# Patient Record
Sex: FEMALE | Race: BLACK OR AFRICAN AMERICAN | NOT HISPANIC OR LATINO | Employment: FULL TIME | URBAN - METROPOLITAN AREA
[De-identification: names, ages, dates, MRNs, and addresses within clinical notes are randomized per-mention and may not be internally consistent; named-entity substitution may affect disease eponyms.]

---

## 2017-01-09 ENCOUNTER — HOSPITAL ENCOUNTER (OUTPATIENT)
Dept: RADIOLOGY | Facility: HOSPITAL | Age: 62
Discharge: HOME | End: 2017-01-09
Attending: UROLOGY
Payer: COMMERCIAL

## 2017-01-09 ENCOUNTER — HOSPITAL ENCOUNTER (OUTPATIENT)
Dept: UROLOGY | Facility: HOSPITAL | Age: 62
Discharge: HOME | End: 2017-01-09
Attending: UROLOGY
Payer: COMMERCIAL

## 2017-01-09 VITALS
HEART RATE: 93 BPM | WEIGHT: 188.25 LBS | BODY MASS INDEX: 30.25 KG/M2 | TEMPERATURE: 98 F | DIASTOLIC BLOOD PRESSURE: 84 MMHG | SYSTOLIC BLOOD PRESSURE: 135 MMHG | HEIGHT: 66 IN | RESPIRATION RATE: 18 BRPM

## 2017-01-09 DIAGNOSIS — R35.0 FREQUENCY OF MICTURITION: ICD-10-CM

## 2017-01-09 PROCEDURE — 87088 URINE BACTERIA CULTURE: CPT

## 2017-01-09 PROCEDURE — 52000 CYSTOURETHROSCOPY: CPT | Mod: ,,, | Performed by: UROLOGY

## 2017-01-09 PROCEDURE — 52000 CYSTOURETHROSCOPY: CPT

## 2017-01-09 PROCEDURE — 76770 US EXAM ABDO BACK WALL COMP: CPT | Mod: TC

## 2017-01-09 PROCEDURE — 25000003 PHARM REV CODE 250: Performed by: UROLOGY

## 2017-01-09 PROCEDURE — 87086 URINE CULTURE/COLONY COUNT: CPT

## 2017-01-09 PROCEDURE — 76770 US EXAM ABDO BACK WALL COMP: CPT | Mod: 26,,, | Performed by: INTERNAL MEDICINE

## 2017-01-09 RX ORDER — CIPROFLOXACIN 500 MG/1
500 TABLET ORAL ONCE
Status: COMPLETED | OUTPATIENT
Start: 2017-01-09 | End: 2017-01-09

## 2017-01-09 RX ORDER — LIDOCAINE HYDROCHLORIDE 20 MG/ML
JELLY TOPICAL ONCE
Status: COMPLETED | OUTPATIENT
Start: 2017-01-09 | End: 2017-01-09

## 2017-01-09 RX ADMIN — CIPROFLOXACIN HYDROCHLORIDE 500 MG: 750 TABLET, FILM COATED ORAL at 01:01

## 2017-01-09 RX ADMIN — LIDOCAINE HYDROCHLORIDE: 20 JELLY TOPICAL at 12:01

## 2017-01-09 NOTE — IP AVS SNAPSHOT
[address]  [place] [address]  Phone: [phone]           I have received a copy of my After Visit Summary and discharge instructions from [facility].    INSTRUCTIONS RECEIVED AND UNDERSTOOD BY:                     Patient/Patient Representative: ________________________________________________________________     Date/Time: ________________________________________________________________                     Instructions Given By: ________________________________________________________________     Date/Time: ________________________________________________________________

## 2017-01-09 NOTE — OP NOTE
DATE OF PROCEDURE:  01/09/2017    PREOPERATIVE DIAGNOSIS:  Urinary frequency and nocturia.    POSTOPERATIVE DIAGNOSES:  Urinary frequency and nocturia.    OPERATION PERFORMED:  Flexible cystoscopy.    BLOOD LOSS:  None.    PROCEDURE IN DETAIL:  The patient was prepped and draped in dorsal supine   position.  I was unable to review her renal ultrasound except for one slot which   demonstrated a renal cyst that appeared benign.  I will await the final   radiologic interpretation.  The patient was prepped and draped in dorsal supine   position.  Xylocaine jelly was instilled per urethra.  The urethra was normal.    Bladder neck was normal.  Both ureteral orifices were normal size, shape, and   position.  There were no stones, tumors, foreign bodies, or active infections   noted.  Urine was cloudy and on dipstick was negative, however, was strong   smelling, therefore a urine culture will be sent.  The patient tolerated the   procedure well.  We will contact her with her culture results.      ALEXEI/IN  dd: 01/09/2017 13:19:48 (CST)  td: 01/09/2017 17:29:23 (CST)  Doc ID   #[id number]  Job ID #[id number]    CC:

## 2017-01-09 NOTE — PATIENT INSTRUCTIONS
What to Expect After a Cystoscopy  For the next 24-48 hours, you may feel a mild burning when you urinate. This burning is normal and expected. Drink plenty of water to dilute the urine to help relieve the burning sensation. You may also see a small amount of blood in your urine after the procedure.    Unless you are already taking antibiotics, you may be given an antibiotic after the test to prevent infection.    Signs and Symptoms to Report  Call the [facility] at [phone] if you develop any of the following:  · Fever of 101 degrees or higher  · Chills or persistent bleeding  · Inability to urinate

## 2017-01-10 LAB — BACTERIA UR CULT: NORMAL

## 2017-01-30 ENCOUNTER — OFFICE VISIT (OUTPATIENT)
Dept: FAMILY MEDICINE | Facility: CLINIC | Age: 62
End: 2017-01-30
Payer: COMMERCIAL

## 2017-01-30 VITALS
DIASTOLIC BLOOD PRESSURE: 81 MMHG | BODY MASS INDEX: 30.72 KG/M2 | HEART RATE: 126 BPM | WEIGHT: 191.13 LBS | SYSTOLIC BLOOD PRESSURE: 115 MMHG | HEIGHT: 66 IN | OXYGEN SATURATION: 93 %

## 2017-01-30 DIAGNOSIS — R73.01 IFG (IMPAIRED FASTING GLUCOSE): ICD-10-CM

## 2017-01-30 DIAGNOSIS — R35.89 POLYURIA: ICD-10-CM

## 2017-01-30 DIAGNOSIS — Z00.00 ROUTINE GENERAL MEDICAL EXAMINATION AT A HEALTH CARE FACILITY: Primary | ICD-10-CM

## 2017-01-30 PROCEDURE — 90686 IIV4 VACC NO PRSV 0.5 ML IM: CPT | Mod: S$GLB,,, | Performed by: FAMILY MEDICINE

## 2017-01-30 PROCEDURE — 90472 IMMUNIZATION ADMIN EACH ADD: CPT | Mod: S$GLB,,, | Performed by: FAMILY MEDICINE

## 2017-01-30 PROCEDURE — 99999 PR PBB SHADOW E&M-EST. PATIENT-LVL III: CPT | Mod: PBBFAC,,, | Performed by: FAMILY MEDICINE

## 2017-01-30 PROCEDURE — 90715 TDAP VACCINE 7 YRS/> IM: CPT | Mod: S$GLB,,, | Performed by: FAMILY MEDICINE

## 2017-01-30 PROCEDURE — 99396 PREV VISIT EST AGE 40-64: CPT | Mod: 25,S$GLB,, | Performed by: FAMILY MEDICINE

## 2017-01-30 PROCEDURE — 90471 IMMUNIZATION ADMIN: CPT | Mod: S$GLB,,, | Performed by: FAMILY MEDICINE

## 2017-01-30 NOTE — PROGRESS NOTES
(Portions of this note were dictated using voice recognition software and may contain dictation related errors in spelling/grammar/syntax not found on text review)    CC:   Chief Complaint   Patient presents with    Annual Exam       HPI: 61 y.o. female here for annual exam.  No current prescription medications.  She takes calcium  over-the-counter.  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  She states that she has upcoming appointment this next week with her specialist.  Does have polyuria for which she is seeing urology.  Normal cystoscopy.  She had a hemoglobin A1c that was 6.0.  She has had past glucose as below, unclear fasting status, that was elevated.  Needs updated blood work.  Does admit that she has tried to get better with exercise and healthy diet once learning about her elevated sugar        Past Medical History   Diagnosis Date    Anxiety     Chronic low back pain     Gastritis     GERD (gastroesophageal reflux disease)     History of cervical cancer     Soft tissue tumor, malignant      sarcoma left foot       Past Surgical History   Procedure Laterality Date    Breast surgery  1984     lump    Total vaginal hysterectomy  1996    Tubal ligation  1994    Foot surgery  2009 or 2010    Back surgery         Family History   Problem Relation Age of Onset    Breast cancer Sister      dx in 2006    No Known Problems Mother     Throat cancer Father     Colon cancer Neg Hx     Diabetes Neg Hx     Hypertension Neg Hx     Ovarian cancer Neg Hx     Stroke Neg Hx        Social History     Social History    Marital status:      Spouse name: N/A    Number of children: N/A    Years of education: N/A     Occupational History    Not on file.     Social History Main Topics    Smoking status: Former Smoker     Types: Cigarettes     Quit date: 8/15/1993    Smokeless tobacco: Never Used    Alcohol use 0.6 oz/week     1 Glasses of wine per week       Comment: seldom    Drug use: No    Sexual activity: Yes     Partners: Male     Other Topics Concern    Not on file     Social History Narrative     SCREENINGS  Colonoscopy 2010. Normal, repeat in 10 years    GYN: Dr. Christianson , last visit in November 2016 with Pap smear    Mammogram dec. 2016    Immunizations    flu today  Tetanus today    Lab Results   Component Value Date    WBC 6.18 06/19/2012    HGB 12.6 06/19/2012    HCT 39.4 06/19/2012     06/19/2012    ALT 16 03/18/2010    AST 27 03/18/2010     03/19/2010    K 4.0 03/19/2010     03/19/2010    CREATININE 0.7 03/19/2010    BUN 5 (L) 03/19/2010    CO2 23 03/19/2010    INR 1.0 09/29/2008    HGBA1C 6.0 12/07/2016     (H) 03/19/2010       ROS:  GENERAL: No fever, chills, fatigability or weight loss.  SKIN: No rashes, no itching.  HEAD: No headaches.  EYES: No visual changes  EARS: No ear pain or changes in hearing.  NOSE: No congestion or rhinorrhea.  MOUTH & THROAT: No hoarseness, change in voice, or sore throat.  NODES: Denies swollen glands.  CHEST: Denies URBINA, cyanosis, wheezing, cough and sputum production.  CARDIOVASCULAR: Denies chest pain, PND, orthopnea.  ABDOMEN: No nausea, vomiting, or changes in bowel function.  URINARY: Polyuria  PERIPHERAL VASCULAR: No claudication or cyanosis.  MUSCULOSKELETAL: No joint stiffness or swelling. Denies back pain.  NEUROLOGIC: No weakness or numbness.    Vital signs reviewed  PE:   APPEARANCE: Well nourished, well developed, in no acute distress.    HEAD: Normocephalic, atraumatic.  EYES: PERRL. EOMI.   Conjunctivae noninjected.  EARS: TM's intact. Light reflex normal. No retraction or perforation  NOSE: Mucosa pink. Airway clear.  MOUTH & THROAT: No tonsillar enlargement. No pharyngeal erythema or exudate.   NECK: Supple with no cervical lymphadenopathy.  No carotid bruits.  No thyromegaly  CHEST: Good inspiratory effort. Lungs clear to auscultation with no wheezes or crackles.  CARDIOVASCULAR:  Normal S1, S2. No rubs, murmurs, or gallops.  ABDOMEN: Bowel sounds normal. Not distended. Soft. No tenderness or masses. No organomegaly.  EXTREMITIES: No edema, cyanosis, or clubbing.      IMPRESSION  1. Routine general medical examination at a health care facility    2. Polyuria    3. IFG (impaired fasting glucose)            PLAN  Orders Placed This Encounter   Procedures    Influenza - Quadrivalent (3 years & older) (PF)    Tdap Vaccine (Adult)    CBC auto differential    Comprehensive metabolic panel    Hemoglobin A1c    Lipid panel    TSH     Labs above    Continue working on exercise, healthy diet, weight loss    Tdap and flu shot today

## 2017-01-30 NOTE — PATIENT INSTRUCTIONS
Viral Upper respiratory tract infections    Most upper respiratory infections are in fact caused by viruses.  Unfortunately as there is no treatment to eradicate these viruses, treatment for upper respiratory infections relies on control of symptoms.      SORE THROAT:  You may take throat lozenges such as Halls cough drops for sore throat pain.  Also, gargling with warm salt water may help with pain symptoms also.  Chloraseptic Spray is an over-the-counter anesthetic spray that may provide relief for sore throat pain. Over-the-counter pain relievers such as Tylenol (acetaminophen), Motrin/Advil  (ibuprofen), or Aleve (naproxen) may also provide relief for sore throat pain. These medications will also help for body aches and/or fever.      NASAL CONGESTION  You may try an oral decongestant such as pseudoephedrine ( brand-name Sudafed).  Note that this medication is available only behind the pharmacy counter.  There are some over-the-counter decongestants with a medicine called phenylephrine but these may not be as effective.  There are some antihistamine/decongestant combination medications (for example, Claritin-D, Zyrtec-D, Allegra-D).  Since these medications already have pseudoephedrine in them, you may take these in place of plain Sudafed.  These may be effective additionally if there is early element of allergy symptoms contributing to your nasal congestion. Although there is a caution  about using decongestants in individuals with hypertension, if you are stable on blood pressure medications and her blood pressure is well-controlled, temporary use of these medications should be okay just as long as you continue to monitor your blood pressures.  If your blood pressure is not controlled on medication therapy, you may need to avoid these pseudoephedrine-containing medications and try antihistamines alone such as plain Claritin, Zyrtec, Allegra.     You may also use a device called a Neti Pot to help with nasal  congestion.  This is an effective treatment for helping clear out nasal congestion and drainage.  A Neti pot is a device in which a saltwater solution is flushed through the nasal passages to help clear out any congestion and help relieve swelling.  You may buy this at any pharmacy.   It is safe to take even if you  have uncontrolled hypertension or are taking certain medications and cannot take other therapies as listed above. However, it is important that she is sterile or distilled water to mix with the solution given to avoid any chance of contamination during the flushing process.    Cough  Cough is a common symptom with most upper respiratory tract infections. Many times this can occur late in the process of a cold and may be the last symptom to resolve.  This is usually a typical pattern for the upper respiratory virus, and usually not a sign that you have a chest infection.  You may have some coughing with mucus.  While no treatments are proven to be absolutely beneficial for cough, some available therapies include guaifenesin with dextromethorphan ( Mucinex DM, Robitussin-DM). This may help to settle the cough and to thin out any mucus associated with the cough.  It is important to stay well hydrated to avoid having the mucus become very thick and difficult to cough up. Many times the cough aspect of an upper respiratory infection may take up to 2 weeks to completely clear.       Most viral upper respiratory infections may take up to 7-10 days to clear.  Many people get better before this time, but some may take the full 7-10 days to completely get better.        Signs that may indicate something more than an upper respiratory viral infection may include:    - Persistent high fever above 100.4 ( note that early low-grade fever may even occur with a viral upper respiratory infection but usually gets better within a few days)    - While discolored mucus such as yellow or green drainage from the nose is not a  sign by itself of a bacterial infection, if you find that symptoms are lasting longer than 7-10 days and nasal drainage is becoming progressively more thick and discolored, this may be indicative of a bacterial sinus infection    - If overall your congestion and headache symptoms get worse beyond the 7-10 day time frame    - If your symptoms started to improve by the  7-10 day time frame but then suddenly got worse      Nutrition: How to Make Healthier Food Choices     Nutrition: How to Make Healthier Food Choices     Why is healthy eating important?  When combined with exercise, a healthy diet can help you lose weight, lower your cholesterol level and improve the way your body functions on a daily basis.  The [place] Department of Agricultures (USDA) Food Guide Pyramid divides food into 6 basic food groups, consisting of 1) grains, 2) fruits, 3) vegetables, 4) meats and beans, 5) dairy and 6) fats.  The USDA recommends an adult daily diet include the following:  3 ounces of whole grains, and 6 ounces of grains total   2 cups of fruit   2 1/2 cups of vegetables   3 cups fat-free or low-fat dairy   The following are some ways to make healthier food choices and to get the recommended amounts of whole grains, fruits, vegetables and dairy.    Grains  Whole-grain breads are low in fat; they're also high in fiber and complex carbohydrates, which help you feel shields longer and prevent overeating. Choose breads whose first ingredient says whole in front of the grain, for example, whole wheat flour or whole white flour; enriched or other types of flour have the important fiber and nutrients removed. Choose whole grain breads for sandwiches and as additions to meals.  Avoid rich bakery foods such as donuts, sweet rolls and muffins. These foods can contain more than 50% fat calories. Snacks such as agrrett food cake and gingersnap cookies can satisfy your sweet tooth without adding fat to your diet.  Hot and cold cereals  are usually low in fat. But instant cereals with cream may contain high-fat oils or butterfat. Granola cereals may also contain high-fat oils and extra sugars. Look for low-sugar options for both instant and granola cereals.  Avoid fried snacks such as potato chips and tortilla chips. Try the low-fat or baked versions instead.  Instead of this: Try this:   Croissants, biscuits, white breads and rolls Low-fat whole grain breads and rolls (wheat, rye and pumpernickel)   Doughnuts, pastries and scones English muffins and small whole grain bagels   Fried tortillas Soft tortillas (corn or whole wheat)   Sugar cereals and regular granola Oatmeal, low-fat granola and whole-grain cereal   Snack crackers Crackers (animal, kiley, rye, soda, saltine, oyster)   Potato or corn chips and buttered popcorn Pretzels (unsalted) and popcorn (unbuttered)   White pasta Whole-wheat pasta   White rice Brown rice   Fried rice, or pasta and rice mixes that contain high-fat sauces Rice or pasta (without egg yolk) with vegetable sauces   All-purpose white flour 100% whole-wheat flour     Fruits and Vegetables  Fruits and vegetables are naturally low in fat. They add flavor and variety to your diet. They also contain fiber, vitamins and minerals.  Margarine, butter, mayonnaise and sour cream add fat to vegetables and fruits. Try using nonfat or low-fat versions of these foods. You can also use nonfat or low-fat yogurt or herbs as seasonings instead.  Instead of this: Try this:   Fried vegetables or vegetables served with cream, cheese or butter sauces All vegetables raw, steamed, broiled, baked or tossed with a very small amount of olive oil and salt and pepper   Coconut Fruit (fresh)   French fries, hash browns and potato   chips Baked white or sweet potatoes     Meat, Poultry and Fish  Beef, Pork, Veal and Lamb  Baking, broiling and roasting are the healthiest ways to prepare meat. Lean cuts can be pan-broiled or stir-fried. Use either a  "nonstick pan or nonstick spray coating instead of butter or margarine.  Trim outside fat before cooking. Trim any inside, separable fat before eating. Select low-fat, lean cuts of meat. Lean beef and veal cuts have the word "loin" or "round" in their names. Lean pork cuts have the word "loin" or "leg" in their names.  Use herbs, spices, fresh vegetables and nonfat marinades to season meat. Avoid high-fat sauces and gravies.  Poultry  Baking, broiling and roasting are the healthiest ways to prepare poultry. Skinless poultry can be pan-broiled or stir-fried. Use either a nonstick pan or nonstick spray coating instead of butter or margarine.  Remove skin and visible fat before cooking. Chicken breasts are a good choice because they are low in fat and high in protein. Use domestic goose and duck only once in a while because both are high in fat.  Fish  Poaching, steaming, baking and broiling are the healthiest ways to prepare fish. Fresh fish should have a clear color, a moist look, a clean smell and firm, springy flesh. If good-quality fresh fish isn't available, buy frozen fish.  Most seafood is high in healthy polyunsaturated fat. Omega-3 fatty acids are also found in some fatty fish, such as salmon and cold-water trout. They may help lower the risk of heart disease in some people.  Cross-over Foods  Dry beans, peas and lentils offer protein and fiber without the cholesterol and fat of meats. Once in a while, try substituting beans for meat in a favorite recipe, such as lasagna or chili.  TVP, or textured vegetable protein, is widely available in many foods. Vegetarian "hot dogs," "hamburger" and "chicken nuggets" are low-fat, cholesterol-free alternatives to meat.  Instead of this: Try this:   Regular or breaded fish sticks or cakes, fish canned in oil, seafood prepared with butter or served in high-fat sauce Fish (fresh, frozen, canned in water), low-fat fish sticks or cakes and shellfish (such as shrimp)   Prime " and marbled cuts Select-grade lean beef (round, sirloin and loin)   Pork spare ribs and jaeger Lean pork (tenderloin and loin chop) and turkey jaeger   Regular ground beef Lean or extra-lean ground beef, ground chicken and turkey breast   Lunch meats such as pepperoni, salami, bologna and liverwurst Lean lunch meats such as turkey, chicken and ham   Regular hot dogs or sausage Fat-free hot dogs and turkey dogs     Dairy  Choose skim milk or low-fat buttermilk. Substitute evaporated skim milk for cream in recipes for soups, sauces and coffee.  Try low-fat cheeses. Skim ricotta can replace cream cheese on a bagel or in a vegetable dip. Use part-skim cheeses in recipes. Use 1% cottage cheese for salads and cooking. String cheese is a low-fat, high-calcium snack option.  Plain nonfat yogurt can replace sour cream in many recipes. (To maintain texture, stir 1 tablespoon of cornstarch into each cup of yogurt that you use in cooking.) Try mixing frozen nonfat or low-fat yogurt with fruit for dessert.  Skim sherbet is an alternative to ice cream. Soft-serve and regular ice creams are also lower in fat than premium styles.  Instead of this: Try this:   Whole or 2% milk Non-fat or 1% milk   Evaporated milk Evaporated non-fat milk   Regular buttermilk Buttermilk made from non-fat (or 1%) milk   Yogurt made with whole milk Nonfat or low-fat yogurt   Regular cheese (examples: American, blue, Brie, cheddar, Ike and Parmesan) Low-fat cheese with less than 3 grams of fat per serving (example: natural cheese, processed cheese and nondairy cheese such as soy cheese)   Regular cottage cheese Low-fat, nonfat, and dry-curd cottage cheese with less than 2% fat   Regular cream cheese Low-fat cream cheese (no more than 3 grams of fat per ounce)   Regular ice cream Sorbet, sherbet and nonfat or low-fat ice cream (no more than 3 grams of fat per 1/2 cup serving)     Fats, Oils and Sweets  Eating too many high-fat foods not only adds excess  "calories (which can lead to obesity and weight gain), but can increase your risk factor for several diseases. Heart disease, diabetes, certain types of cancer and osteoarthritis have all been linked to diets too high in fat. If you consume too much saturated and trans fats, you are more likely to develop high cholesterol and coronary artery disease.  Sugar-sweetened drinks, such as fruit juice, fruit drinks, regular soft drinks, sports drinks, energy drinks, sweetened or flavored milk and sweetened iced tea can add lots of sugar and calories to your diet. But staying hydrated is important for good health. Substitute water, zero-calorie flavored water, non-fat or reduced-fat milk, unsweetened tea or diet soda for sweetened drinks. Talk with your family doctor or a dietitian if you have questions about your diet or healthy eating for your family.  Instead of this: Try this:   Cookies Fig bars, gingersnaps and molasses cookies   Shortening, butter or margarine Olive, soybean and canola oils   Regular mayonnaise Nonfat or light mayonnaise   Regular salad dressing Nonfat or light salad dressing   Using fat (including butter) to grease pan Nonstick cooking spray       What it Takes to Lose Weight     What it Takes to Lose Weight     What does it take to lose weight?  To lose weight, you have to eat fewer calories than your body uses. Calories are the amount of energy in the food you eat. Some foods have more calories than others. For example, foods that are high in fat and sugar are also high in calories. If you eat more calories than your body uses, the extra calories will be stored as body excess fat.  A pound of fat is about 3,500 calories. To lose 1 pound of fat in a week, you have to eat 3,500 fewer calories (that is 500 fewer calories a day), or you have to "burn off" an extra 3,500 calories. You can burn off calories by exercising or just by being more active. (Talk to your family doctor before you begin any type " "of exercise program. Your doctor can help you determine what kind of exercise program is right for you.)  The best way to lose weight and keep it off is to eat fewer calories and be active. If you cut 250 calories from your diet each day and exercise enough to burn off 250 calories, that adds up to 500 fewer calories in one day. If you do this for 7 days, you can lose 1 pound of fat in a week.  Many experts believe you should not try to lose more than 2 pounds per week. Losing more than 2 pounds in a week usually means that you are losing water weight and lean muscle mass instead of losing excess fat. If you do this, you will have less energy, and you will most likely gain the weight back.    How often should I eat?  Most people can eat 3 regular meals and 1 snack every day. The 3 meals should be about the same size and should be low in fat. Try to eat 1 to 2 cups of fruits and vegetables, 2 to 3 ounces of whole grains and 1 to 2 ounces of meat (or a meat alternative) at most meals.  Some people benefit more if they eat 5 to 6 smaller meals throughout the day, about 2 to 3 hours apart. For example, their first meal of the day might be a cup of low-fat or nonfat yogurt and a banana. Three hours later they might eat a simple deli sandwich with whole-grain bread and fat-free mayonnaise.  Eat breakfast and don't skip meals. While skipping meals may help you lose weight in the beginning, it fails in the long run. Skipping meals may make you feel too hungry later in the day, causing you to overeat at your next meal.  After about a month of eating a normal breakfast, lunch and a light dinner, your body will adjust.    What is so bad about high-fat foods?  Foods high in fat are usually high in calories, which could lead to unwanted weight gain. Consuming too much saturated and trans fats may increase LDL cholesterol ("bad" cholesterol) level, and increase your risk of heart disease. The USDA suggests that you eat no more " "than 20 grams of saturated fat, and that you limit the amount of trans fat to as close to 0 grams as possible. Click here for more information on reading nutrition labels.  It is important to remember that some fats can be beneficial to your overall health. "Good" fat, such as polyunsaturated and monounsaturated fats are found in fish, nuts, and low- or nonfat dairy products.    What are empty calories?  Some foods are referred to as "empty calories" because they add lots of calories to your diet without providing much nutritional benefit. An example is sugar-sweetened drinks, such as fruit juice, fruit drinks, regular soft drinks, sports drinks, energy drinks, sweetened or flavored milk and sweetened iced tea. These drinks can add lots of sugar and calories to your diet.  But staying hydrated is important for good health. To reduce the calories and sugar in your diet, substitute water, zero-calorie flavored water, non-fat or reduced-fat milk, unsweetened tea or diet soda for sweetened drinks. Talk with your family doctor or a dietitian if you have questions about your diet or healthy eating for your family.    Can I trust nutrition information I get from newspapers and magazines?  Nutrition tips and diet information from different sources often conflict with each other. You should always check with your doctor first. Also, keep in mind the following advice:  There is no "magic bullet" when it comes to nutrition. There isn't one single diet that works for every person. You need to find an eating plan that works for you.   Good nutrition doesn't come in a vitamin supplement. Only take a vitamin with your doctor's recommendation, as your body benefits the most when you eat healthy foods.   Eating all different kinds of foods is best for your body, so learn to try new foods.   Fad diets offer short-term changes, but good health comes from long-term effort and commitment.   Stories from people who have used a diet " program or product, especially in commercials and [employer], are advertisements. These people are usually paid to endorse what the ad is selling. Remember, regained weight or other problems that develop after someone has completed the diet program are never talked about in those ads.     Will diet drugs help?  Although diet drugs may help you lose weight at first, they usually don't help you keep the weight off and may have damaging side effects. Most diet pills have not been tested by the Food and Drug Administration, which means you can't be sure if the drugs are safe. Taking drugs also does not help you learn how to change your eating and exercise habits. Making lasting changes in these habits is the way to lose weight and keep it off.

## 2017-01-30 NOTE — MR AVS SNAPSHOT
[address]  [place] [address]  Phone: [phone]  Fax: [phone]                  Jaja Toledo   2017 2:00 PM   Office Visit    Description:  Female : 1955   Provider:  Andres Hurtado MD   Department:  [facility]           Reason for Visit     Annual Exam           Diagnoses this Visit        Comments    Routine general medical examination at a health care facility    -  Primary     Polyuria         IFG (impaired fasting glucose)                To Do List           Future Appointments        Provider Department Dept Phone    2017 3:30 PM Leonard Cordero MD [facility] Plastic S[facility] [phone]    2017 8:15 AM LAB, SAME DAY [facility] [phone]      Goals (5 Years of Data)     None      [facility]     [facility] Care [facility] -  Assistance  Registered nurses in the [facility] provide clinical advisement, health education, appointment booking, and other advisory services.  Call for this free service at [phone].             Medications           Message regarding Medications     Verify the changes and/or additions to your medication regime listed below are the same as discussed with your clinician today.  If any of these changes or additions are incorrect, please notify your healthcare provider.        STOP taking these medications     hydrOXYzine HCl (ATARAX) 25 MG tablet TAKE ONE EVERY NIGHT AS NEEDED FOR ITCHING    hydrOXYzine pamoate (VISTARIL) 25 MG Cap Take 1 capsule (25 mg total) by mouth 4 (four) times daily.    ranitidine (ZANTAC) 150 MG tablet Take 1 tablet (150 mg total) by mouth daily as needed for Heartburn.           Verify that the below list of medications is an accurate representation of the medications you are currently taking.  If none reported, the list may be blank. If incorrect, please contact your healthcare provider. Carry  "this list with you in case of emergency.           Current Medications     CALCIUM CARBONATE (CALCIUM 500 ORAL) Take by mouth.    multivitamin capsule Take 1 capsule by mouth once daily.             Clinical Reference Information           Vital Signs - Last Recorded  Most recent update: 1/30/2017  2:20 PM by Ronda Chaudhary MA    BP Pulse Ht Wt SpO2 BMI    115/81 (!) 126 5' 6" (1.676 m) 86.7 kg (191 lb 2.2 oz) (!) 93% 30.85 kg/m2      Blood Pressure          Most Recent Value    BP  115/81      Allergies as of 1/30/2017     No Known Drug Allergies      Immunizations Administered on Date of Encounter - 1/30/2017     Name Date Dose VIS Date Route    TDAP  Incomplete 0.5 mL 2/24/2015 Intramuscular    influenza - Quadrivalent - PF (ADULT)  Incomplete 0.5 mL 8/7/2015 Intramuscular      Orders Placed During Today's Visit      Normal Orders This Visit    Influenza - Quadrivalent (3 years & older) (PF)     Tdap Vaccine (Adult)     Future Labs/Procedures Expected by Expires    CBC auto differential  1/30/2017 1/30/2018    Comprehensive metabolic panel  1/30/2017 1/30/2018    Lipid panel  1/30/2017 1/30/2018    TSH  1/30/2017 1/30/2018    Hemoglobin A1c  As directed 1/30/2018      [facility] Sign-Up     Activating your [facility] account is as easy as 1-2-3!     1) Visit [email or web address], select Sign Up Now, enter this activation code and your date of birth, then select Next.  [email or web address]  Expires: 3/16/2017  2:48 PM      2) Create a username and password to use when you visit [facility] in the future and select a security question in case you lose your password and select Next.    3) Enter your e-mail address and click Sign Up!    Additional Information  If you have questions, please e-mail [email or web address] or call [phone] to talk to our [facility] staff. Remember, [facility] is NOT to be used for urgent needs. For medical emergencies, dial 911.         Instructions    Viral Upper respiratory tract " infections    Most upper respiratory infections are in fact caused by viruses.  Unfortunately as there is no treatment to eradicate these viruses, treatment for upper respiratory infections relies on control of symptoms.      SORE THROAT:  You may take throat lozenges such as Halls cough drops for sore throat pain.  Also, gargling with warm salt water may help with pain symptoms also.  Chloraseptic Spray is an over-the-counter anesthetic spray that may provide relief for sore throat pain. Over-the-counter pain relievers such as Tylenol (acetaminophen), Motrin/Advil  (ibuprofen), or Aleve (naproxen) may also provide relief for sore throat pain. These medications will also help for body aches and/or fever.      NASAL CONGESTION  You may try an oral decongestant such as pseudoephedrine ( brand-name Sudafed).  Note that this medication is available only behind the pharmacy counter.  There are some over-the-counter decongestants with a medicine called phenylephrine but these may not be as effective.  There are some antihistamine/decongestant combination medications (for example, Claritin-D, Zyrtec-D, Allegra-D).  Since these medications already have pseudoephedrine in them, you may take these in place of plain Sudafed.  These may be effective additionally if there is early element of allergy symptoms contributing to your nasal congestion. Although there is a caution  about using decongestants in individuals with hypertension, if you are stable on blood pressure medications and her blood pressure is well-controlled, temporary use of these medications should be okay just as long as you continue to monitor your blood pressures.  If your blood pressure is not controlled on medication therapy, you may need to avoid these pseudoephedrine-containing medications and try antihistamines alone such as plain Claritin, Zyrtec, Allegra.     You may also use a device called a Neti Pot to help with nasal congestion.  This is an  effective treatment for helping clear out nasal congestion and drainage.  A Neti pot is a device in which a saltwater solution is flushed through the nasal passages to help clear out any congestion and help relieve swelling.  You may buy this at any pharmacy.   It is safe to take even if you  have uncontrolled hypertension or are taking certain medications and cannot take other therapies as listed above. However, it is important that she is sterile or distilled water to mix with the solution given to avoid any chance of contamination during the flushing process.    Cough  Cough is a common symptom with most upper respiratory tract infections. Many times this can occur late in the process of a cold and may be the last symptom to resolve.  This is usually a typical pattern for the upper respiratory virus, and usually not a sign that you have a chest infection.  You may have some coughing with mucus.  While no treatments are proven to be absolutely beneficial for cough, some available therapies include guaifenesin with dextromethorphan ( Mucinex DM, Robitussin-DM). This may help to settle the cough and to thin out any mucus associated with the cough.  It is important to stay well hydrated to avoid having the mucus become very thick and difficult to cough up. Many times the cough aspect of an upper respiratory infection may take up to 2 weeks to completely clear.       Most viral upper respiratory infections may take up to 7-10 days to clear.  Many people get better before this time, but some may take the full 7-10 days to completely get better.        Signs that may indicate something more than an upper respiratory viral infection may include:    - Persistent high fever above 100.4 ( note that early low-grade fever may even occur with a viral upper respiratory infection but usually gets better within a few days)    - While discolored mucus such as yellow or green drainage from the nose is not a sign by itself of a  bacterial infection, if you find that symptoms are lasting longer than 7-10 days and nasal drainage is becoming progressively more thick and discolored, this may be indicative of a bacterial sinus infection    - If overall your congestion and headache symptoms get worse beyond the 7-10 day time frame    - If your symptoms started to improve by the  7-10 day time frame but then suddenly got worse      Nutrition: How to Make Healthier Food Choices     Nutrition: How to Make Healthier Food Choices     Why is healthy eating important?  When combined with exercise, a healthy diet can help you lose weight, lower your cholesterol level and improve the way your body functions on a daily basis.  The U.S. Department of Agricultures (USDA) Food Guide Pyramid divides food into 6 basic food groups, consisting of 1) grains, 2) fruits, 3) vegetables, 4) meats and beans, 5) dairy and 6) fats.  The USDA recommends an adult daily diet include the following:  3 ounces of whole grains, and 6 ounces of grains total   2 cups of fruit   2 1/2 cups of vegetables   3 cups fat-free or low-fat dairy   The following are some ways to make healthier food choices and to get the recommended amounts of whole grains, fruits, vegetables and dairy.    Grains  Whole-grain breads are low in fat; they're also high in fiber and complex carbohydrates, which help you feel shields longer and prevent overeating. Choose breads whose first ingredient says whole in front of the grain, for example, whole wheat flour or whole white flour; enriched or other types of flour have the important fiber and nutrients removed. Choose whole grain breads for sandwiches and as additions to meals.  Avoid rich bakery foods such as donuts, sweet rolls and muffins. These foods can contain more than 50% fat calories. Snacks such as garrett food cake and gingersnap cookies can satisfy your sweet tooth without adding fat to your diet.  Hot and cold cereals are usually low in  fat. But instant cereals with cream may contain high-fat oils or butterfat. Granola cereals may also contain high-fat oils and extra sugars. Look for low-sugar options for both instant and granola cereals.  Avoid fried snacks such as potato chips and tortilla chips. Try the low-fat or baked versions instead.  Instead of this: Try this:   Croissants, biscuits, white breads and rolls Low-fat whole grain breads and rolls (wheat, rye and pumpernickel)   Doughnuts, pastries and scones English muffins and small whole grain bagels   Fried tortillas Soft tortillas (corn or whole wheat)   Sugar cereals and regular granola Oatmeal, low-fat granola and whole-grain cereal   Snack crackers Crackers (animal, kiley, rye, soda, saltine, oyster)   Potato or corn chips and buttered popcorn Pretzels (unsalted) and popcorn (unbuttered)   White pasta Whole-wheat pasta   White rice Brown rice   Fried rice, or pasta and rice mixes that contain high-fat sauces Rice or pasta (without egg yolk) with vegetable sauces   All-purpose white flour 100% whole-wheat flour     Fruits and Vegetables  Fruits and vegetables are naturally low in fat. They add flavor and variety to your diet. They also contain fiber, vitamins and minerals.  Margarine, butter, mayonnaise and sour cream add fat to vegetables and fruits. Try using nonfat or low-fat versions of these foods. You can also use nonfat or low-fat yogurt or herbs as seasonings instead.  Instead of this: Try this:   Fried vegetables or vegetables served with cream, cheese or butter sauces All vegetables raw, steamed, broiled, baked or tossed with a very small amount of olive oil and salt and pepper   Coconut Fruit (fresh)   French fries, hash browns and potato   chips Baked white or sweet potatoes     Meat, Poultry and Fish  Beef, Pork, Veal and Lamb  Baking, broiling and roasting are the healthiest ways to prepare meat. Lean cuts can be pan-broiled or stir-fried. Use either a nonstick pan or  "nonstick spray coating instead of butter or margarine.  Trim outside fat before cooking. Trim any inside, separable fat before eating. Select low-fat, lean cuts of meat. Lean beef and veal cuts have the word "loin" or "round" in their names. Lean pork cuts have the word "loin" or "leg" in their names.  Use herbs, spices, fresh vegetables and nonfat marinades to season meat. Avoid high-fat sauces and gravies.  Poultry  Baking, broiling and roasting are the healthiest ways to prepare poultry. Skinless poultry can be pan-broiled or stir-fried. Use either a nonstick pan or nonstick spray coating instead of butter or margarine.  Remove skin and visible fat before cooking. Chicken breasts are a good choice because they are low in fat and high in protein. Use domestic goose and duck only once in a while because both are high in fat.  Fish  Poaching, steaming, baking and broiling are the healthiest ways to prepare fish. Fresh fish should have a clear color, a moist look, a clean smell and firm, springy flesh. If good-quality fresh fish isn't available, buy frozen fish.  Most seafood is high in healthy polyunsaturated fat. Omega-3 fatty acids are also found in some fatty fish, such as salmon and cold-water trout. They may help lower the risk of heart disease in some people.  Cross-over Foods  Dry beans, peas and lentils offer protein and fiber without the cholesterol and fat of meats. Once in a while, try substituting beans for meat in a favorite recipe, such as lasagna or chili.  TVP, or textured vegetable protein, is widely available in many foods. Vegetarian "hot dogs," "hamburger" and "chicken nuggets" are low-fat, cholesterol-free alternatives to meat.  Instead of this: Try this:   Regular or breaded fish sticks or cakes, fish canned in oil, seafood prepared with butter or served in high-fat sauce Fish (fresh, frozen, canned in water), low-fat fish sticks or cakes and shellfish (such as shrimp)   Prime and marbled cuts " Select-grade lean beef (round, sirloin and loin)   Pork spare ribs and jaeger Lean pork (tenderloin and loin chop) and turkey jaeger   Regular ground beef Lean or extra-lean ground beef, ground chicken and turkey breast   Lunch meats such as pepperoni, salami, bologna and liverwurst Lean lunch meats such as turkey, chicken and ham   Regular hot dogs or sausage Fat-free hot dogs and turkey dogs     Dairy  Choose skim milk or low-fat buttermilk. Substitute evaporated skim milk for cream in recipes for soups, sauces and coffee.  Try low-fat cheeses. Skim ricotta can replace cream cheese on a bagel or in a vegetable dip. Use part-skim cheeses in recipes. Use 1% cottage cheese for salads and cooking. String cheese is a low-fat, high-calcium snack option.  Plain nonfat yogurt can replace sour cream in many recipes. (To maintain texture, stir 1 tablespoon of cornstarch into each cup of yogurt that you use in cooking.) Try mixing frozen nonfat or low-fat yogurt with fruit for dessert.  Skim sherbet is an alternative to ice cream. Soft-serve and regular ice creams are also lower in fat than premium styles.  Instead of this: Try this:   Whole or 2% milk Non-fat or 1% milk   Evaporated milk Evaporated non-fat milk   Regular buttermilk Buttermilk made from non-fat (or 1%) milk   Yogurt made with whole milk Nonfat or low-fat yogurt   Regular cheese (examples: American, blue, Brie, cheddar, Ike and Parmesan) Low-fat cheese with less than 3 grams of fat per serving (example: natural cheese, processed cheese and nondairy cheese such as soy cheese)   Regular cottage cheese Low-fat, nonfat, and dry-curd cottage cheese with less than 2% fat   Regular cream cheese Low-fat cream cheese (no more than 3 grams of fat per ounce)   Regular ice cream Sorbet, sherbet and nonfat or low-fat ice cream (no more than 3 grams of fat per 1/2 cup serving)     Fats, Oils and Sweets  Eating too many high-fat foods not only adds excess calories (which  "can lead to obesity and weight gain), but can increase your risk factor for several diseases. Heart disease, diabetes, certain types of cancer and osteoarthritis have all been linked to diets too high in fat. If you consume too much saturated and trans fats, you are more likely to develop high cholesterol and coronary artery disease.  Sugar-sweetened drinks, such as fruit juice, fruit drinks, regular soft drinks, sports drinks, energy drinks, sweetened or flavored milk and sweetened iced tea can add lots of sugar and calories to your diet. But staying hydrated is important for good health. Substitute water, zero-calorie flavored water, non-fat or reduced-fat milk, unsweetened tea or diet soda for sweetened drinks. Talk with your family doctor or a dietitian if you have questions about your diet or healthy eating for your family.  Instead of this: Try this:   Cookies Fig bars, gingersnaps and molasses cookies   Shortening, butter or margarine Olive, soybean and canola oils   Regular mayonnaise Nonfat or light mayonnaise   Regular salad dressing Nonfat or light salad dressing   Using fat (including butter) to grease pan Nonstick cooking spray       What it Takes to Lose Weight     What it Takes to Lose Weight     What does it take to lose weight?  To lose weight, you have to eat fewer calories than your body uses. Calories are the amount of energy in the food you eat. Some foods have more calories than others. For example, foods that are high in fat and sugar are also high in calories. If you eat more calories than your body uses, the extra calories will be stored as body excess fat.  A pound of fat is about 3,500 calories. To lose 1 pound of fat in a week, you have to eat 3,500 fewer calories (that is 500 fewer calories a day), or you have to "burn off" an extra 3,500 calories. You can burn off calories by exercising or just by being more active. (Talk to your family doctor before you begin any type of exercise " "program. Your doctor can help you determine what kind of exercise program is right for you.)  The best way to lose weight and keep it off is to eat fewer calories and be active. If you cut 250 calories from your diet each day and exercise enough to burn off 250 calories, that adds up to 500 fewer calories in one day. If you do this for 7 days, you can lose 1 pound of fat in a week.  Many experts believe you should not try to lose more than 2 pounds per week. Losing more than 2 pounds in a week usually means that you are losing water weight and lean muscle mass instead of losing excess fat. If you do this, you will have less energy, and you will most likely gain the weight back.    How often should I eat?  Most people can eat 3 regular meals and 1 snack every day. The 3 meals should be about the same size and should be low in fat. Try to eat 1 to 2 cups of fruits and vegetables, 2 to 3 ounces of whole grains and 1 to 2 ounces of meat (or a meat alternative) at most meals.  Some people benefit more if they eat 5 to 6 smaller meals throughout the day, about 2 to 3 hours apart. For example, their first meal of the day might be a cup of low-fat or nonfat yogurt and a banana. Three hours later they might eat a simple deli sandwich with whole-grain bread and fat-free mayonnaise.  Eat breakfast and don't skip meals. While skipping meals may help you lose weight in the beginning, it fails in the long run. Skipping meals may make you feel too hungry later in the day, causing you to overeat at your next meal.  After about a month of eating a normal breakfast, lunch and a light dinner, your body will adjust.    What is so bad about high-fat foods?  Foods high in fat are usually high in calories, which could lead to unwanted weight gain. Consuming too much saturated and trans fats may increase LDL cholesterol ("bad" cholesterol) level, and increase your risk of heart disease. The USDA suggests that you eat no more than 20 grams " "of saturated fat, and that you limit the amount of trans fat to as close to 0 grams as possible. Click here for more information on reading nutrition labels.  It is important to remember that some fats can be beneficial to your overall health. "Good" fat, such as polyunsaturated and monounsaturated fats are found in fish, nuts, and low- or nonfat dairy products.    What are empty calories?  Some foods are referred to as "empty calories" because they add lots of calories to your diet without providing much nutritional benefit. An example is sugar-sweetened drinks, such as fruit juice, fruit drinks, regular soft drinks, sports drinks, energy drinks, sweetened or flavored milk and sweetened iced tea. These drinks can add lots of sugar and calories to your diet.  But staying hydrated is important for good health. To reduce the calories and sugar in your diet, substitute water, zero-calorie flavored water, non-fat or reduced-fat milk, unsweetened tea or diet soda for sweetened drinks. Talk with your family doctor or a dietitian if you have questions about your diet or healthy eating for your family.    Can I trust nutrition information I get from newspapers and magazines?  Nutrition tips and diet information from different sources often conflict with each other. You should always check with your doctor first. Also, keep in mind the following advice:  There is no "magic bullet" when it comes to nutrition. There isn't one single diet that works for every person. You need to find an eating plan that works for you.   Good nutrition doesn't come in a vitamin supplement. Only take a vitamin with your doctor's recommendation, as your body benefits the most when you eat healthy foods.   Eating all different kinds of foods is best for your body, so learn to try new foods.   Fad diets offer short-term changes, but good health comes from long-term effort and commitment.   Stories from people who have used a diet program or product, " especially in commercials and [place], are advertisements. These people are usually paid to endorse what the ad is selling. Remember, regained weight or other problems that develop after someone has completed the diet program are never talked about in those ads.     Will diet drugs help?  Although diet drugs may help you lose weight at first, they usually don't help you keep the weight off and may have damaging side effects. Most diet pills have not been tested by the Food and Drug Administration, which means you can't be sure if the drugs are safe. Taking drugs also does not help you learn how to change your eating and exercise habits. Making lasting changes in these habits is the way to lose weight and keep it off.

## 2017-02-01 ENCOUNTER — OFFICE VISIT (OUTPATIENT)
Dept: PLASTIC SURGERY | Facility: CLINIC | Age: 62
End: 2017-02-01
Payer: COMMERCIAL

## 2017-02-01 VITALS
DIASTOLIC BLOOD PRESSURE: 93 MMHG | BODY MASS INDEX: 30.47 KG/M2 | SYSTOLIC BLOOD PRESSURE: 142 MMHG | HEIGHT: 66 IN | TEMPERATURE: 99 F | HEART RATE: 121 BPM | WEIGHT: 189.63 LBS

## 2017-02-01 DIAGNOSIS — Z85.831 HISTORY OF SARCOMA OF SOFT TISSUE: Primary | ICD-10-CM

## 2017-02-01 PROCEDURE — 99204 OFFICE O/P NEW MOD 45 MIN: CPT | Mod: S$GLB,,, | Performed by: SURGERY

## 2017-02-01 PROCEDURE — 99999 PR PBB SHADOW E&M-EST. PATIENT-LVL III: CPT | Mod: PBBFAC,,, | Performed by: SURGERY

## 2017-02-01 NOTE — PROGRESS NOTES
Ms. 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  She did very well.  Everything is well   healed, although she developed new onset of pain in the medial plantar surface of the   foot.  She was evaluated by Dr. Yo and is free of recurrence.     She does not have any signs of recurrence, flap well healed. Bulkiness of superior portion of flap    Patient may be interested in debulking of flap. She will call us. Also discussed cosmetic liposuction.

## 2017-05-11 ENCOUNTER — OFFICE VISIT (OUTPATIENT)
Dept: FAMILY MEDICINE | Facility: CLINIC | Age: 62
End: 2017-05-11
Attending: FAMILY MEDICINE
Payer: COMMERCIAL

## 2017-05-11 VITALS
HEIGHT: 66 IN | DIASTOLIC BLOOD PRESSURE: 70 MMHG | BODY MASS INDEX: 30.24 KG/M2 | HEART RATE: 110 BPM | SYSTOLIC BLOOD PRESSURE: 114 MMHG | WEIGHT: 188.19 LBS | OXYGEN SATURATION: 99 %

## 2017-05-11 DIAGNOSIS — N30.00 ACUTE CYSTITIS WITHOUT HEMATURIA: Primary | ICD-10-CM

## 2017-05-11 LAB
BILIRUB SERPL-MCNC: NEGATIVE MG/DL
BLOOD URINE, POC: NEGATIVE
COLOR, POC UA: YELLOW
GLUCOSE UR QL STRIP: NORMAL
KETONES UR QL STRIP: NEGATIVE
LEUKOCYTE ESTERASE URINE, POC: ABNORMAL
NITRITE, POC UA: NEGATIVE
PH, POC UA: 5
PROTEIN, POC: ABNORMAL
SPECIFIC GRAVITY, POC UA: 1.02
UROBILINOGEN, POC UA: NORMAL

## 2017-05-11 PROCEDURE — 87086 URINE CULTURE/COLONY COUNT: CPT

## 2017-05-11 PROCEDURE — 87088 URINE BACTERIA CULTURE: CPT

## 2017-05-11 PROCEDURE — 81001 URINALYSIS AUTO W/SCOPE: CPT | Mod: S$GLB,,, | Performed by: FAMILY MEDICINE

## 2017-05-11 PROCEDURE — 99213 OFFICE O/P EST LOW 20 MIN: CPT | Mod: 25,S$GLB,, | Performed by: FAMILY MEDICINE

## 2017-05-11 PROCEDURE — 99999 PR PBB SHADOW E&M-EST. PATIENT-LVL III: CPT | Mod: PBBFAC,,, | Performed by: FAMILY MEDICINE

## 2017-05-11 PROCEDURE — 87077 CULTURE AEROBIC IDENTIFY: CPT

## 2017-05-11 PROCEDURE — 1160F RVW MEDS BY RX/DR IN RCRD: CPT | Mod: S$GLB,,, | Performed by: FAMILY MEDICINE

## 2017-05-11 PROCEDURE — 87186 SC STD MICRODIL/AGAR DIL: CPT

## 2017-05-11 RX ORDER — SULFAMETHOXAZOLE AND TRIMETHOPRIM 800; 160 MG/1; MG/1
1 TABLET ORAL 2 TIMES DAILY
Qty: 6 TABLET | Refills: 0 | Status: SHIPPED | OUTPATIENT
Start: 2017-05-11 | End: 2017-05-14

## 2017-05-11 NOTE — PROGRESS NOTES
"  Subjective:      Jaja Toledo is a 61 y.o. female who complains of frequency and suprapubic pressure. She has had symptoms for 10 days. . Patient denies back pain, fever, sorethroat and dysuria. Patient does have a history of recurrent UTI. Patient does not have a history of pyelonephritis.   She did take Azo late last week.    The following portions of the patient's history were reviewed and updated as appropriate: allergies, current medications, past family history, past medical history, past social history, past surgical history and problem list.    Review of Systems  Pertinent items are noted in HPI.      Objective:      /70 (BP Location: Right arm, Patient Position: Sitting, BP Method: Manual)  Pulse 110  Ht 5' 6" (1.676 m)  Wt 85.4 kg (188 lb 3.2 oz)  SpO2 99%  BMI 30.38 kg/m2  Abdomen: soft, non-tender; bowel sounds normal; no masses,  no organomegaly    Laboratory:   Urine dipstick: sp gravity 1.020, negative for glucose, negative for hemoglobin, negative for ketones, 2+ for leukocyte esterase, negative for nitrites, trace for protein, negative for urobilinogen and pH 5.0.    Micro exam: not done.      Assessment:      Acute cystitis       Plan:      Medications: TMP/SMX.  Maintain adequate hydration.  Follow up if symptoms not improving, and as needed.  Await C+S.    Info provided on preventive measures for recurrent UTI.  "

## 2017-05-11 NOTE — PATIENT INSTRUCTIONS
Urinary Tract Infections in Women    Urinary tract infections (UTIs) are most often caused by bacteria (germs). These bacteria enter the urinary tract. The bacteria may come from outside the body. Or they may travel from the skin outside the rectum or vagina into the urethra. Female anatomy makes it easier for bacteria from the bowel to enter a womans urinary tract, which is the most common source of UTI. This means women develop UTIs more often than men. Pain in or around the urinary tract is a common UTI symptom. But the only way to know for sure if you have a UTI for the health care provider to test your urine. The two tests that may be done are the urinalysis and urine culture.  Types of UTIs  · Cystitis: A bladder infection (cystitis) is the most common UTI in women. You may have urgent or frequent urination. You may also have pain, burning when you urinate, and bloody urine.  · Urethritis: This is an inflamed urethra, which is the tube that carries urine from the bladder to outside the body. You may have lower stomach or back pain. You may also have urgent or frequent urination.  · Pyelonephritis: This is a kidney infection. If not treated, it can be serious and damage your kidneys. In severe cases, you may be hospitalized. You may have a fever and lower back pain.  Medications to treat a UTI  Most UTIs are treated with antibiotics. These kill the bacteria. The length of time you need to take them depends on the type of infection. It may be as short as 3 days. If you have repeated UTIs, a low-dose antibiotic may be needed for several months. Take antibiotics exactly as directed. Dont stop taking them until all of the medication is gone. If you stop taking the antibiotic too soon, the infection may not go away, and you may develop a resistance to the antibiotic. This can make it much harder to treat.  Lifestyle changes to treat and prevent UTIs  The lifestyle changes below will help get rid of your UTI.  They may also help prevent future UTIs.  · Drink plenty of fluids. This includes water, juice, or other caffeine-free drinks. Fluids help flush bacteria out of your body.  · Empty your bladder. Always empty your bladder when you feel the urge to urinate. And always urinate before going to sleep. Urine that stays in your bladder can lead to infection. Try to urinate before and after sex as well.  · Practice good personal hygiene. Wipe yourself from front to back after using the toilet. This helps keep bacteria from getting into the urethra.  · Use condoms during sex. These help prevent UTIs caused by sexually transmitted bacteria. Also, avoid using spermicides during sex. These can increase the risk of UTIs. Choose other forms of birth control instead. For women who tend to get UTIs after sex, a low-dose of a preventive antibiotic may be used. Be sure to discuss this option with your health care provider.  · Follow up with your health care provider as directed. He or she may test to make sure the infection has cleared. If necessary, additional treatment may be started.  Date Last Reviewed: 9/8/2014  © 5732-3134 [employer]. [address] [place] [address], [place] [address]. All rights reserved. This information is not intended as a substitute for professional medical care. Always follow your healthcare professional's instructions.

## 2017-05-11 NOTE — MR AVS SNAPSHOT
[facility]  [address], [address]  [facility] [address]  Phone: [phone]                  Jaja Toledo   2017 2:20 PM   Office Visit    Description:  Female : 1955   Provider:  Gage Rogers Jr., MD   Department:  [facility]           Reason for Visit     Urinary Tract Infection           Diagnoses this Visit        Comments    Acute cystitis without hematuria    -  Primary            To Do List           Goals (5 Years of Data)     None       These Medications        Disp Refills Start End    sulfamethoxazole-trimethoprim 800-160mg (BACTRIM DS) 800-160 mg Tab 6 tablet 0 2017    Take 1 tablet by mouth 2 (two) times daily. - Oral    [facility] [address] [place] [address] #: [phone]         [facility]     [facility] Nurse Care Line -  Assistance  Unless otherwise directed by your provider, please contact [facility], our nurse care line that is available for  assistance.     Registered nurses in the [facility] provide: appointment scheduling, clinical advisement, health education, and other advisory services.  Call: [phone] (toll free)               Medications           Message regarding Medications     Verify the changes and/or additions to your medication regime listed below are the same as discussed with your clinician today.  If any of these changes or additions are incorrect, please notify your healthcare provider.        START taking these NEW medications        Refills    sulfamethoxazole-trimethoprim 800-160mg (BACTRIM DS) 800-160 mg Tab 0    Sig: Take 1 tablet by mouth 2 (two) times daily.    Class: Normal    Route: Oral           Verify that the below list of medications is an accurate representation of the medications you are currently taking.  If none reported, the list may be blank. If  "incorrect, please contact your healthcare provider. Carry this list with you in case of emergency.           Current Medications     CALCIUM CARBONATE (CALCIUM 500 ORAL) Take by mouth.    multivitamin capsule Take 1 capsule by mouth once daily.      sulfamethoxazole-trimethoprim 800-160mg (BACTRIM DS) 800-160 mg Tab Take 1 tablet by mouth 2 (two) times daily.           Clinical Reference Information           Your Vitals Were     BP Pulse Height Weight SpO2 BMI    114/70 (BP Location: Right arm, Patient Position: Sitting, BP Method: Manual) 110 5' 6" (1.676 m) 85.4 kg (188 lb 3.2 oz) 99% 30.38 kg/m2      Blood Pressure          Most Recent Value    BP  114/70      Allergies as of 5/11/2017     No Known Drug Allergies      Immunizations Administered on Date of Encounter - 5/11/2017     None      Orders Placed During Today's Visit      Normal Orders This Visit    POCT urinalysis, dipstick or tablet reag     Urine culture                   [facility] Sign-Up     Activating your [facility] account is as easy as 1-2-3!     1) Visit [email or web address], select Sign Up Now, enter this activation code and your date of birth, then select Next.  [email or web address]  Expires: 6/25/2017  3:05 PM      2) Create a username and password to use when you visit [facility] in the future and select a security question in case you lose your password and select Next.    3) Enter your e-mail address and click Sign Up!    Additional Information  If you have questions, please e-mail [email or web address] or call [phone] to talk to our [facility] staff. Remember, [facility] is NOT to be used for urgent needs. For medical emergencies, dial 911.         Instructions      Urinary Tract Infections in Women    Urinary tract infections (UTIs) are most often caused by bacteria (germs). These bacteria enter the urinary tract. The bacteria may come from outside the body. Or they may travel from the skin outside the rectum or vagina into the urethra. Female " anatomy makes it easier for bacteria from the bowel to enter a womans urinary tract, which is the most common source of UTI. This means women develop UTIs more often than men. Pain in or around the urinary tract is a common UTI symptom. But the only way to know for sure if you have a UTI for the health care provider to test your urine. The two tests that may be done are the urinalysis and urine culture.  Types of UTIs  · Cystitis: A bladder infection (cystitis) is the most common UTI in women. You may have urgent or frequent urination. You may also have pain, burning when you urinate, and bloody urine.  · Urethritis: This is an inflamed urethra, which is the tube that carries urine from the bladder to outside the body. You may have lower stomach or back pain. You may also have urgent or frequent urination.  · Pyelonephritis: This is a kidney infection. If not treated, it can be serious and damage your kidneys. In severe cases, you may be hospitalized. You may have a fever and lower back pain.  Medications to treat a UTI  Most UTIs are treated with antibiotics. These kill the bacteria. The length of time you need to take them depends on the type of infection. It may be as short as 3 days. If you have repeated UTIs, a low-dose antibiotic may be needed for several months. Take antibiotics exactly as directed. Dont stop taking them until all of the medication is gone. If you stop taking the antibiotic too soon, the infection may not go away, and you may develop a resistance to the antibiotic. This can make it much harder to treat.  Lifestyle changes to treat and prevent UTIs  The lifestyle changes below will help get rid of your UTI. They may also help prevent future UTIs.  · Drink plenty of fluids. This includes water, juice, or other caffeine-free drinks. Fluids help flush bacteria out of your body.  · Empty your bladder. Always empty your bladder when you feel the urge to urinate. And always urinate before going to  sleep. Urine that stays in your bladder can lead to infection. Try to urinate before and after sex as well.  · Practice good personal hygiene. Wipe yourself from front to back after using the toilet. This helps keep bacteria from getting into the urethra.  · Use condoms during sex. These help prevent UTIs caused by sexually transmitted bacteria. Also, avoid using spermicides during sex. These can increase the risk of UTIs. Choose other forms of birth control instead. For women who tend to get UTIs after sex, a low-dose of a preventive antibiotic may be used. Be sure to discuss this option with your health care provider.  · Follow up with your health care provider as directed. He or she may test to make sure the infection has cleared. If necessary, additional treatment may be started.  Date Last Reviewed: 9/8/2014  © 0033-6253 [employer]. [address] [place] [address]. All rights reserved. This information is not intended as a substitute for professional medical care. Always follow your healthcare professional's instructions.             Language Assistance Services     ATTENTION: Language assistance services are available, free of charge. Please call [phone].      ATENCIÓN: Si habla español, tiene a sutton disposición servicios gratuitos de asistencia lingüística. Llame al [phone].     ROBERT Ý: N?u b?n nói Ti?ng Vi?t, có các d?ch v? h? tr? ngôn ng? mi?n phí dành cho b?n. G?i s? [phone].         [facility] complies with applicable Federal civil rights laws and does not discriminate on the basis of race, color, national origin, age, disability, or sex.

## 2017-05-15 ENCOUNTER — TELEPHONE (OUTPATIENT)
Dept: FAMILY MEDICINE | Facility: CLINIC | Age: 62
End: 2017-05-15

## 2017-05-15 LAB — BACTERIA UR CULT: NORMAL

## 2017-05-15 NOTE — TELEPHONE ENCOUNTER
Please inform patient that urine culture was positive, with the bacteria sensitive to the antibiotic prescribed. If she is asymptomatic, no further evaluation is necessary. If symptoms recur, she should follow with Dr. Hurtado.

## 2017-05-17 NOTE — TELEPHONE ENCOUNTER
Spoke with patient. Informed her of Dr. Rogers's message. Patient states she's finished with the antibiotics and is asymptomatic

## 2017-07-12 ENCOUNTER — OFFICE VISIT (OUTPATIENT)
Dept: PLASTIC SURGERY | Facility: CLINIC | Age: 62
End: 2017-07-12
Payer: COMMERCIAL

## 2017-07-12 VITALS
TEMPERATURE: 98 F | SYSTOLIC BLOOD PRESSURE: 130 MMHG | DIASTOLIC BLOOD PRESSURE: 81 MMHG | HEART RATE: 112 BPM | HEIGHT: 66 IN

## 2017-07-12 DIAGNOSIS — Z85.831 HISTORY OF SARCOMA OF SOFT TISSUE: Primary | ICD-10-CM

## 2017-07-12 PROCEDURE — 99999 PR PBB SHADOW E&M-EST. PATIENT-LVL III: CPT | Mod: PBBFAC,,, | Performed by: SURGERY

## 2017-07-12 PROCEDURE — 99212 OFFICE O/P EST SF 10 MIN: CPT | Mod: S$GLB,,, | Performed by: SURGERY

## 2017-07-12 NOTE — PROGRESS NOTES
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  Otherwise she is doing well.  She also desires liposuction of the abdomen    Vitals:    07/12/17 1244   BP: 130/81   Pulse: (!) 112   Temp: 98 °F (36.7 °C)     NAD  RRR  Non-labored breathing  Soft, Nt, ND  L foot flap well healed and viable    A/P: s/p above  Discussed risks of debulking the protuberant aspect of the flap, including the flap dying resulting in amputation.  She agreed that the risks of the procedure were not worth the benefit  We also discussed liposuction of the abdomen, but we explained that she would need to lose 20lbs prior to undergoing this operation.  She will call back if she is able to lose the weight and still wants the procedure

## 2017-07-20 ENCOUNTER — HOSPITAL ENCOUNTER (EMERGENCY)
Facility: OTHER | Age: 62
Discharge: HOME | End: 2017-07-20
Attending: EMERGENCY MEDICINE
Payer: COMMERCIAL

## 2017-07-20 VITALS
TEMPERATURE: 99 F | SYSTOLIC BLOOD PRESSURE: 126 MMHG | DIASTOLIC BLOOD PRESSURE: 77 MMHG | HEIGHT: 66 IN | HEART RATE: 99 BPM | BODY MASS INDEX: 28.12 KG/M2 | OXYGEN SATURATION: 97 % | RESPIRATION RATE: 17 BRPM | WEIGHT: 175 LBS

## 2017-07-20 DIAGNOSIS — T14.90XA INJURY: ICD-10-CM

## 2017-07-20 DIAGNOSIS — N39.0 URINARY TRACT INFECTION WITHOUT HEMATURIA, SITE UNSPECIFIED: ICD-10-CM

## 2017-07-20 DIAGNOSIS — S93.401A SPRAIN OF RIGHT ANKLE, UNSPECIFIED LIGAMENT, INITIAL ENCOUNTER: Primary | ICD-10-CM

## 2017-07-20 LAB
BACTERIA #/AREA URNS HPF: ABNORMAL /HPF
BILIRUB UR QL STRIP: NEGATIVE
CLARITY UR: CLEAR
COLOR UR: YELLOW
GLUCOSE UR QL STRIP: NEGATIVE
HGB UR QL STRIP: NEGATIVE
KETONES UR QL STRIP: ABNORMAL
LEUKOCYTE ESTERASE UR QL STRIP: ABNORMAL
MICROSCOPIC COMMENT: ABNORMAL
NITRITE UR QL STRIP: NEGATIVE
PH UR STRIP: 6 [PH] (ref 5–8)
PROT UR QL STRIP: NEGATIVE
RBC #/AREA URNS HPF: 1 /HPF (ref 0–4)
SP GR UR STRIP: >=1.03 (ref 1–1.03)
URN SPEC COLLECT METH UR: ABNORMAL
UROBILINOGEN UR STRIP-ACNC: 1 EU/DL
WBC #/AREA URNS HPF: 25 /HPF (ref 0–5)

## 2017-07-20 PROCEDURE — 25000003 PHARM REV CODE 250: Performed by: EMERGENCY MEDICINE

## 2017-07-20 PROCEDURE — 99284 EMERGENCY DEPT VISIT MOD MDM: CPT

## 2017-07-20 PROCEDURE — 81000 URINALYSIS NONAUTO W/SCOPE: CPT

## 2017-07-20 RX ORDER — IBUPROFEN 400 MG/1
800 TABLET ORAL
Status: COMPLETED | OUTPATIENT
Start: 2017-07-20 | End: 2017-07-20

## 2017-07-20 RX ORDER — SULFAMETHOXAZOLE AND TRIMETHOPRIM 800; 160 MG/1; MG/1
1 TABLET ORAL 2 TIMES DAILY
Qty: 6 TABLET | Refills: 0 | Status: SHIPPED | OUTPATIENT
Start: 2017-07-20 | End: 2017-07-23

## 2017-07-20 RX ORDER — IBUPROFEN 800 MG/1
800 TABLET ORAL EVERY 6 HOURS PRN
Qty: 30 TABLET | Refills: 0 | Status: SHIPPED | OUTPATIENT
Start: 2017-07-20 | End: 2018-06-22

## 2017-07-20 RX ADMIN — IBUPROFEN 800 MG: 400 TABLET, FILM COATED ORAL at 04:07

## 2017-07-20 NOTE — ED PROVIDER NOTES
Encounter Date: 7/20/2017    SCRIBE #1 NOTE: I, Chidi Majano, am scribing for, and in the presence of, Dr. Mendoza.       History     Chief Complaint   Patient presents with    Foot Pain     pt states twisted foot going down stairs this morning and is c/o right foot pain and swelling to outer aspect of right foot - pt also c/o UTI symptoms     Time seen by provider: 3:48 PM    This is a 61 y.o. female who presents to the ED with a chief complaint of right foot pain and swelling. The patient reports falling and twisting her right foot while walking down stairs this morning. Her pain has been constant since onset and worse with bearing weight and walking. It is rated at an 8/10 in severity. She reports no pain in her knee. No analgesia use reported. The patient also reports dysuria and urinary frequency over the past week. She notes that she was taking azo with some improvement. She reports her last dose was 4 days ago. She denies any hx of DM or HTN.      The history is provided by the patient.     Review of patient's allergies indicates:   Allergen Reactions    No known drug allergies      Past Medical History:   Diagnosis Date    Anxiety     Chronic low back pain     Gastritis     GERD (gastroesophageal reflux disease)     History of cervical cancer     Soft tissue tumor, malignant     sarcoma left foot     Past Surgical History:   Procedure Laterality Date    BACK SURGERY      BREAST SURGERY  1984    lump    FOOT SURGERY  2009 or 2010    TOTAL VAGINAL HYSTERECTOMY  1996    TUBAL LIGATION  1994     Family History   Problem Relation Age of Onset    Breast cancer Sister      dx in 2006    No Known Problems Mother     Throat cancer Father     Colon cancer Neg Hx     Diabetes Neg Hx     Hypertension Neg Hx     Ovarian cancer Neg Hx     Stroke Neg Hx      Social History   Substance Use Topics    Smoking status: Former Smoker     Types: Cigarettes     Quit date: 8/15/1993    Smokeless tobacco:  Never Used    Alcohol use 0.6 oz/week     1 Glasses of wine per week      Comment: seldom     Review of Systems   Constitutional: Negative for fever.   HENT: Negative for sore throat.    Respiratory: Negative for shortness of breath.    Cardiovascular: Negative for chest pain.   Gastrointestinal: Negative for nausea.   Genitourinary: Positive for dysuria and frequency.   Musculoskeletal: Negative for back pain.        Positive for right foot pain and swelling. Negative for knee pain.   Skin: Negative for rash.   Neurological: Negative for weakness.   Hematological: Does not bruise/bleed easily.       Physical Exam     Initial Vitals [07/20/17 1406]   BP Pulse Resp Temp SpO2   (!) 158/73 (!) 118 18 98.5 °F (36.9 °C) 96 %      MAP       101.33         Physical Exam    Nursing note and vitals reviewed.  Constitutional: She is not diaphoretic. No distress.   Overweight. Uncomfortable appearing.    HENT:   Head: Normocephalic and atraumatic.   Right Ear: External ear normal.   Left Ear: External ear normal.   Mouth/Throat: Oropharynx is clear and moist.   Eyes: Conjunctivae and EOM are normal. Pupils are equal, round, and reactive to light. Right eye exhibits no discharge. Left eye exhibits no discharge.   Neck: Normal range of motion.   Musculoskeletal: Normal range of motion.   No TTP at the proximal fibula or malleoli. TTP over the lateral foot and base of the 5th metatarsal along with mild soft tissue swelling laterally. No ecchymosis. 2+ pulse. NV intact distally.    Neurological: She is alert and oriented to person, place, and time. She has normal strength. No cranial nerve deficit or sensory deficit.   Skin: Skin is warm and dry. No rash and no abscess noted. No erythema. No pallor.   Psychiatric: She has a normal mood and affect. Her behavior is normal. Judgment and thought content normal.         ED Course   Procedures  Labs Reviewed   URINALYSIS - Abnormal; Notable for the following:        Result Value     Specific Gravity, UA >=1.030 (*)     Ketones, UA Trace (*)     Leukocytes, UA Trace (*)     All other components within normal limits   URINALYSIS MICROSCOPIC - Abnormal; Notable for the following:     WBC, UA 25 (*)     Bacteria, UA Moderate (*)     All other components within normal limits         Imaging Results          X-Ray Foot Complete Right (Final result)  Result time 07/20/17 15:12:29    Final result by Franco Valerio MD (07/20/17 15:12:29)                 Impression:      No evidence for acute fracture, bone destruction, or dislocation.      Electronically signed by: FRANCO VALERIO MD  Date:     07/20/17  Time:    15:12              Narrative:    AP, lateral, and oblique radiographs of the right foot were obtained.  The bones are intact.  There is no evidence for acute fracture or bone destruction.  There is no evidence for dislocation.  No radiopaque soft tissue foreign bodies are identified.                                X-Rays:   Independently Interpreted Readings:   Other Readings:  Right foot x-ray: No fracture or dislocation.                Scribe Attestation:   Scribe #1: I performed the above scribed service and the documentation accurately describes the services I performed. I attest to the accuracy of the note.    Attending Attestation:           Physician Attestation for Scribe:  Physician Attestation Statement for Scribe #1: I, Dr. Mendoza, reviewed documentation, as scribed by Chidi Majano in my presence, and it is both accurate and complete.                 ED Course     Patient presents with lateral right foot pain after twisting earlier today.  Likely inversion injury.  On exam she does not have ankle tenderness but does have tenderness over the lateral foot, however x-ray does not show acute fracture.  Ace bandage applied.  Treat with anti-inflammatory, rice.  Encouraged follow-up with primary care, especially if symptoms persist.    Clinical Impression:     1. Sprain of right ankle,  unspecified ligament, initial encounter    2. Injury    3. Urinary tract infection without hematuria, site unspecified                                 Juan Carlos Mendoza II, MD  07/20/17 3190

## 2017-11-13 ENCOUNTER — OFFICE VISIT (OUTPATIENT)
Dept: OBSTETRICS AND GYNECOLOGY | Facility: CLINIC | Age: 62
End: 2017-11-13
Attending: OBSTETRICS & GYNECOLOGY
Payer: COMMERCIAL

## 2017-11-13 VITALS
BODY MASS INDEX: 28.16 KG/M2 | HEIGHT: 67 IN | WEIGHT: 179.44 LBS | DIASTOLIC BLOOD PRESSURE: 80 MMHG | SYSTOLIC BLOOD PRESSURE: 130 MMHG

## 2017-11-13 DIAGNOSIS — Z01.419 WELL WOMAN EXAM WITH ROUTINE GYNECOLOGICAL EXAM: Primary | ICD-10-CM

## 2017-11-13 DIAGNOSIS — R39.15 URINARY URGENCY: ICD-10-CM

## 2017-11-13 DIAGNOSIS — Z12.31 VISIT FOR SCREENING MAMMOGRAM: ICD-10-CM

## 2017-11-13 PROCEDURE — 99396 PREV VISIT EST AGE 40-64: CPT | Mod: S$GLB,,, | Performed by: OBSTETRICS & GYNECOLOGY

## 2017-11-13 PROCEDURE — 87086 URINE CULTURE/COLONY COUNT: CPT

## 2017-11-13 PROCEDURE — 99999 PR PBB SHADOW E&M-EST. PATIENT-LVL III: CPT | Mod: PBBFAC,,, | Performed by: OBSTETRICS & GYNECOLOGY

## 2017-11-13 NOTE — PROGRESS NOTES
SUBJECTIVE:   62 y.o. female   for annual routine Pap and checkup. No LMP recorded. Patient has had a hysterectomy..  She complains of urinary urgency.        Past Medical History:   Diagnosis Date    Anxiety     Chronic low back pain     Gastritis     GERD (gastroesophageal reflux disease)     History of cervical cancer     History of recurrent urinary tract infection     Soft tissue tumor, malignant     sarcoma left foot     Past Surgical History:   Procedure Laterality Date    BACK SURGERY      BREAST SURGERY      lump    FOOT SURGERY   or     TOTAL VAGINAL HYSTERECTOMY      TUBAL LIGATION       Social History     Social History    Marital status:      Spouse name: N/A    Number of children: N/A    Years of education: N/A     Occupational History    Not on file.     Social History Main Topics    Smoking status: Former Smoker     Types: Cigarettes     Quit date: 8/15/1993    Smokeless tobacco: Never Used    Alcohol use 0.6 oz/week     1 Glasses of wine per week      Comment: seldom    Drug use: No    Sexual activity: Yes     Partners: Male     Other Topics Concern    Not on file     Social History Narrative    No narrative on file     Family History   Problem Relation Age of Onset    Breast cancer Sister      dx in     No Known Problems Mother     Throat cancer Father     Colon cancer Neg Hx     Diabetes Neg Hx     Hypertension Neg Hx     Ovarian cancer Neg Hx     Stroke Neg Hx      OB History    Para Term  AB Living   3 3       6   SAB TAB Ectopic Multiple Live Births           3      # Outcome Date GA Lbr Chris/2nd Weight Sex Delivery Anes PTL Lv   3 Para 94    M    AZIZA   2 Para 10/21/84    F    AZIZA   1 Para 76    F    AZIZA            Current Outpatient Prescriptions   Medication Sig Dispense Refill    CALCIUM CARBONATE (CALCIUM 500 ORAL) Take by mouth.      ibuprofen (ADVIL,MOTRIN) 800 MG tablet Take 1 tablet (800 mg  "total) by mouth every 6 (six) hours as needed for Pain. 30 tablet 0    multivitamin capsule Take 1 capsule by mouth once daily.         No current facility-administered medications for this visit.      Allergies: No known drug allergies     ROS:  Constitutional: no weight loss, weight gain, fever, fatigue  Eyes:  No vision changes, glasses/contacts  ENT/Mouth: No ulcers, sinus problems, ears ringing, headache  Cardiovascular: No inability to lie flat, chest pain, exercise intolerance, swelling, heart palpitations  Respiratory: No wheezing, coughing blood, shortness of breath, or cough  Gastrointestinal: No diarrhea, bloody stool, nausea/vomiting, constipation, gas, hemorrhoids  Genitourinary: No blood in urine, painful urination, urgency of urination, frequency of urination, incomplete emptying, incontinence, abnormal bleeding, painful periods, heavy periods, vaginal discharge, vaginal odor, painful intercourse, sexual problems, bleeding after intercourse.  Musculoskeletal: No muscle weakness  Skin/Breast: No painful breasts, nipple discharge, masses, rash, ulcers  Neurological: No passing out, seizures, numbness, headache  Endocrine: No diabetes, hypothyroid, hyperthyroid, hot flashes, hair loss, abnormal hair growth, ance  Psychiatric: No depression, crying  Hematologic: No bruises, bleeding, swollen lymph nodes, anemia.      OBJECTIVE:   The patient appears well, alert, oriented x 3, in no distress.  /80   Ht 5' 7" (1.702 m)   Wt 81.4 kg (179 lb 7.3 oz)   BMI 28.11 kg/m²   NECK: no thyromegaly, trachea midline  SKIN: no acne, striae, hirsutism  BREAST EXAM: breasts appear normal, no suspicious masses, no skin or nipple changes or axillary nodes  ABDOMEN: no hernias, masses, or hepatosplenomegaly  GENITALIA: normal external genitalia, no erythema, no discharge  URETHRA: normal urethra, normal urethral meatus  VAGINA: mucosal atrophy  CERVIX: absent  UTERUS: uterus absent  ADNEXA: no mass, fullness, " tenderness    \  ASSESSMENT:   El was seen today for annual exam, mammogram and urinary tract infection.    Diagnoses and all orders for this visit:    Well woman exam with routine gynecological exam    Visit for screening mammogram  -     Mammo Digital Screening Bilat with CAD; Future    Urinary urgency  -     Urine culture

## 2017-11-14 LAB — BACTERIA UR CULT: NO GROWTH

## 2018-01-11 ENCOUNTER — HOSPITAL ENCOUNTER (OUTPATIENT)
Dept: RADIOLOGY | Facility: HOSPITAL | Age: 63
Discharge: HOME | End: 2018-01-11
Attending: OBSTETRICS & GYNECOLOGY
Payer: COMMERCIAL

## 2018-01-11 DIAGNOSIS — Z12.31 VISIT FOR SCREENING MAMMOGRAM: ICD-10-CM

## 2018-01-11 PROCEDURE — 77063 BREAST TOMOSYNTHESIS BI: CPT | Mod: 26,,, | Performed by: RADIOLOGY

## 2018-01-11 PROCEDURE — 77067 SCR MAMMO BI INCL CAD: CPT | Mod: 26,,, | Performed by: RADIOLOGY

## 2018-01-11 PROCEDURE — 77067 SCR MAMMO BI INCL CAD: CPT | Mod: TC

## 2018-03-20 ENCOUNTER — TELEPHONE (OUTPATIENT)
Dept: FAMILY MEDICINE | Facility: CLINIC | Age: 63
End: 2018-03-20

## 2018-03-20 NOTE — TELEPHONE ENCOUNTER
----- Message from Bindu Taylor sent at 3/20/2018 10:44 AM CDT -----  Contact: Self. [phone]  Patient is returning your call.  Please advise.

## 2018-03-20 NOTE — TELEPHONE ENCOUNTER
----- Message from Chelsea Colon sent at 3/19/2018  3:27 PM CDT -----  Contact: self  Pt needs a refill on her heart medication. She stated that she would like to schedule her annual physical but she cannot wait till the next available because she is almost out of meds.    Please call pt at [phone]

## 2018-03-21 ENCOUNTER — TELEPHONE (OUTPATIENT)
Dept: FAMILY MEDICINE | Facility: CLINIC | Age: 63
End: 2018-03-21

## 2018-03-21 NOTE — TELEPHONE ENCOUNTER
----- Message from George Ball sent at 3/21/2018 11:25 AM CDT -----  Contact: [phone] self  Patient called in returning your call. Please advise.

## 2018-05-14 ENCOUNTER — TELEPHONE (OUTPATIENT)
Dept: FAMILY MEDICINE | Facility: CLINIC | Age: 63
End: 2018-05-14

## 2018-05-14 DIAGNOSIS — Z00.00 ROUTINE GENERAL MEDICAL EXAMINATION AT A HEALTH CARE FACILITY: Primary | ICD-10-CM

## 2018-05-14 NOTE — TELEPHONE ENCOUNTER
----- Message from Narda Putnam sent at 5/14/2018  2:46 PM CDT -----  Contact: [phone]/ self   Pt its requesting to be seen as soon as possible . Please advise

## 2018-05-17 NOTE — TELEPHONE ENCOUNTER
Orders Placed This Encounter   Procedures    CBC auto differential    Comprehensive metabolic panel    Hemoglobin A1c    Lipid panel    TSH     1 wk prior to annual

## 2018-06-15 ENCOUNTER — LAB VISIT (OUTPATIENT)
Dept: LAB | Facility: HOSPITAL | Age: 63
End: 2018-06-15
Attending: FAMILY MEDICINE
Payer: COMMERCIAL

## 2018-06-15 DIAGNOSIS — Z00.00 ROUTINE GENERAL MEDICAL EXAMINATION AT A HEALTH CARE FACILITY: ICD-10-CM

## 2018-06-15 LAB
ALBUMIN SERPL BCP-MCNC: 3.8 G/DL
ALP SERPL-CCNC: 70 U/L
ALT SERPL W/O P-5'-P-CCNC: 16 U/L
ANION GAP SERPL CALC-SCNC: 5 MMOL/L
AST SERPL-CCNC: 20 U/L
BASOPHILS # BLD AUTO: 0.05 K/UL
BASOPHILS NFR BLD: 0.8 %
BILIRUB SERPL-MCNC: 0.4 MG/DL
BUN SERPL-MCNC: 13 MG/DL
CALCIUM SERPL-MCNC: 9.9 MG/DL
CHLORIDE SERPL-SCNC: 106 MMOL/L
CHOLEST SERPL-MCNC: 225 MG/DL
CHOLEST/HDLC SERPL: 5.9 {RATIO}
CO2 SERPL-SCNC: 29 MMOL/L
CREAT SERPL-MCNC: 0.8 MG/DL
DIFFERENTIAL METHOD: ABNORMAL
EOSINOPHIL # BLD AUTO: 0.1 K/UL
EOSINOPHIL NFR BLD: 2 %
ERYTHROCYTE [DISTWIDTH] IN BLOOD BY AUTOMATED COUNT: 14.1 %
EST. GFR  (AFRICAN AMERICAN): >60 ML/MIN/1.73 M^2
EST. GFR  (NON AFRICAN AMERICAN): >60 ML/MIN/1.73 M^2
ESTIMATED AVG GLUCOSE: 117 MG/DL
GLUCOSE SERPL-MCNC: 108 MG/DL
HBA1C MFR BLD HPLC: 5.7 %
HCT VFR BLD AUTO: 40.9 %
HDLC SERPL-MCNC: 38 MG/DL
HDLC SERPL: 16.9 %
HGB BLD-MCNC: 12.4 G/DL
LDLC SERPL CALC-MCNC: 149.2 MG/DL
LYMPHOCYTES # BLD AUTO: 1.9 K/UL
LYMPHOCYTES NFR BLD: 32.8 %
MCH RBC QN AUTO: 26.4 PG
MCHC RBC AUTO-ENTMCNC: 30.3 G/DL
MCV RBC AUTO: 87 FL
MONOCYTES # BLD AUTO: 0.4 K/UL
MONOCYTES NFR BLD: 7.4 %
NEUTROPHILS # BLD AUTO: 3.4 K/UL
NEUTROPHILS NFR BLD: 56.8 %
NONHDLC SERPL-MCNC: 187 MG/DL
PLATELET # BLD AUTO: 312 K/UL
PMV BLD AUTO: 9 FL
POTASSIUM SERPL-SCNC: 3.8 MMOL/L
PROT SERPL-MCNC: 8 G/DL
RBC # BLD AUTO: 4.69 M/UL
SODIUM SERPL-SCNC: 140 MMOL/L
TRIGL SERPL-MCNC: 189 MG/DL
TSH SERPL DL<=0.005 MIU/L-ACNC: 0.45 UIU/ML
WBC # BLD AUTO: 5.92 K/UL

## 2018-06-15 PROCEDURE — 80061 LIPID PANEL: CPT

## 2018-06-15 PROCEDURE — 80053 COMPREHEN METABOLIC PANEL: CPT

## 2018-06-15 PROCEDURE — 83036 HEMOGLOBIN GLYCOSYLATED A1C: CPT

## 2018-06-15 PROCEDURE — 85025 COMPLETE CBC W/AUTO DIFF WBC: CPT

## 2018-06-15 PROCEDURE — 84443 ASSAY THYROID STIM HORMONE: CPT

## 2018-06-15 PROCEDURE — 36415 COLL VENOUS BLD VENIPUNCTURE: CPT

## 2018-06-22 ENCOUNTER — OFFICE VISIT (OUTPATIENT)
Dept: FAMILY MEDICINE | Facility: CLINIC | Age: 63
End: 2018-06-22
Payer: COMMERCIAL

## 2018-06-22 VITALS
TEMPERATURE: 98 F | WEIGHT: 171.94 LBS | BODY MASS INDEX: 27.63 KG/M2 | DIASTOLIC BLOOD PRESSURE: 80 MMHG | SYSTOLIC BLOOD PRESSURE: 121 MMHG | OXYGEN SATURATION: 98 % | HEIGHT: 66 IN | HEART RATE: 102 BPM

## 2018-06-22 DIAGNOSIS — K21.9 GASTROESOPHAGEAL REFLUX DISEASE, ESOPHAGITIS PRESENCE NOT SPECIFIED: ICD-10-CM

## 2018-06-22 DIAGNOSIS — R73.01 IFG (IMPAIRED FASTING GLUCOSE): ICD-10-CM

## 2018-06-22 DIAGNOSIS — Z00.00 ROUTINE GENERAL MEDICAL EXAMINATION AT A HEALTH CARE FACILITY: Primary | ICD-10-CM

## 2018-06-22 DIAGNOSIS — E78.5 DYSLIPIDEMIA: ICD-10-CM

## 2018-06-22 DIAGNOSIS — R63.0 DECREASED APPETITE: ICD-10-CM

## 2018-06-22 PROCEDURE — 99396 PREV VISIT EST AGE 40-64: CPT | Mod: S$GLB,,, | Performed by: FAMILY MEDICINE

## 2018-06-22 PROCEDURE — 99999 PR PBB SHADOW E&M-EST. PATIENT-LVL III: CPT | Mod: PBBFAC,,, | Performed by: FAMILY MEDICINE

## 2018-06-22 RX ORDER — CYPROHEPTADINE HYDROCHLORIDE 4 MG/1
4 TABLET ORAL 3 TIMES DAILY PRN
Qty: 30 TABLET | Refills: 3 | Status: SHIPPED | OUTPATIENT
Start: 2018-06-22 | End: 2020-01-14 | Stop reason: SDUPTHER

## 2018-06-22 RX ORDER — HYDROXYZINE HYDROCHLORIDE 25 MG/1
TABLET, FILM COATED ORAL
Qty: 30 TABLET | Refills: 2 | Status: SHIPPED | OUTPATIENT
Start: 2018-06-22 | End: 2020-01-14

## 2018-06-22 NOTE — PROGRESS NOTES
(Portions of this note were dictated using voice recognition software and may contain dictation related errors in spelling/grammar/syntax not found on text review)    CC: annual    HPI: 62 y.o. female Last annual exam in 2017    IFG, last labs as below.  I have discussed lifestyle modification    GERD, would like a refill of ranitidine to use as needed    Does get some itching periodically, did respond well to hydroxyzine the past, would like a refill    Complains of chronically decreased appetite.  She states this is gone on all her life.  She has used cyproheptidine she is talking crazy in the past which did help.  She does not use this all the time but would like to have prescription in case she goes through a time where she is really not eating much.  She is not exercising.  We did discuss increasing exercise may help increase appetite as well.    Past Medical History:   Diagnosis Date    Anxiety     Chronic low back pain     Gastritis     GERD (gastroesophageal reflux disease)     History of cervical cancer     History of recurrent urinary tract infection     Soft tissue tumor, malignant     sarcoma left foot       Past Surgical History:   Procedure Laterality Date    BACK SURGERY      BREAST CYST EXCISION Left 1984    FOOT SURGERY  2009 or 2010    TOTAL VAGINAL HYSTERECTOMY  1996    TUBAL LIGATION  1994       Family History   Problem Relation Age of Onset    Breast cancer Sister         dx in 2006    No Known Problems Mother     Throat cancer Father     Colon cancer Neg Hx     Diabetes Neg Hx     Hypertension Neg Hx     Ovarian cancer Neg Hx     Stroke Neg Hx        Social History     Social History    Marital status:      Spouse name: N/A    Number of children: N/A    Years of education: N/A     Occupational History    Not on file.     Social History Main Topics    Smoking status: Former Smoker     Types: Cigarettes     Quit date: 8/15/1993    Smokeless tobacco: Never Used     Alcohol use 0.6 oz/week     1 Glasses of wine per week      Comment: seldom    Drug use: No    Sexual activity: Yes     Partners: Male     Other Topics Concern    Not on file     Social History Narrative    No narrative on file          Lab Results   Component Value Date    WBC 5.92 06/15/2018    HGB 12.4 06/15/2018    HCT 40.9 06/15/2018     06/15/2018    CHOL 225 (H) 06/15/2018    TRIG 189 (H) 06/15/2018    HDL 38 (L) 06/15/2018    ALT 16 06/15/2018    AST 20 06/15/2018     06/15/2018    K 3.8 06/15/2018     06/15/2018    CREATININE 0.8 06/15/2018    CALCIUM 9.9 06/15/2018    BUN 13 06/15/2018    CO2 29 06/15/2018    TSH 0.448 06/15/2018    INR 1.0 09/29/2008    HGBA1C 5.7 (H) 06/15/2018    LDLCALC 149.2 06/15/2018     06/15/2018                 Vital signs reviewed  PE:   APPEARANCE: Well nourished, well developed, in no acute distress.    HEAD: Normocephalic, atraumatic.  EYES: PERRL. EOMI.   Conjunctivae noninjected.  EARS: TM's intact. Light reflex normal. No retraction or perforation.    NOSE: Mucosa pink. Airway clear.  MOUTH & THROAT: No tonsillar enlargement. No pharyngeal erythema or exudate.   NECK: Supple with no cervical lymphadenopathy.    CHEST: Good inspiratory effort. Lungs clear to auscultation with no wheezes or crackles.  CARDIOVASCULAR: Normal S1, S2. No rubs, murmurs, or gallops.  ABDOMEN: Bowel sounds normal. Not distended. Soft. No tenderness or masses. No organomegaly.  EXTREMITIES: No edema, cyanosis, or clubbing.      IMPRESSION  1. Routine general medical examination at a health care facility    2. Dyslipidemia    3. IFG (impaired fasting glucose)    4. Gastroesophageal reflux disease, esophagitis presence not specified    5. Decreased appetite        PLAN  Reviewed labs as above    IFG and dyslipidemia:  Dietary and exercise modification    Refill ranitidine    Refill hydroxyzine if needed for itching    Refill cyproheptidine if needed for significantly  decreased appetite.  Discussed trying to eat small frequent meals and regular exercise also help with appetite.    Labs below in 1 year prior to next scheduled wellness check. rtc sooner as needed..    Orders Placed This Encounter   Procedures    CBC auto differential    Comprehensive metabolic panel    Hemoglobin A1c    Hepatitis C antibody    Lipid panel    TSH       SCREENINGS  Colonoscopy 2010. Normal, repeat in 10 years     GYN: Dr. Christianson , pap smear November 2016       Mammogram 01/11/2018     Immunizations    flu  up-to-date  Tetanus  2017   Advise that she can get zoster vaccine at pharmacy

## 2018-06-22 NOTE — PATIENT INSTRUCTIONS
Nutrition: How to Make Healthier Food Choices     Nutrition: How to Make Healthier Food Choices     Why is healthy eating important?  When combined with exercise, a healthy diet can help you lose weight, lower your cholesterol level and improve the way your body functions on a daily basis.  The U.[place]. Department of Agricultures (USDA) Food Guide Pyramid divides food into 6 basic food groups, consisting of 1) grains, 2) fruits, 3) vegetables, 4) meats and beans, 5) dairy and 6) fats.  The USDA recommends an adult daily diet include the following:  3 ounces of whole grains, and 6 ounces of grains total   2 cups of fruit   2 1/2 cups of vegetables   3 cups fat-free or low-fat dairy   The following are some ways to make healthier food choices and to get the recommended amounts of whole grains, fruits, vegetables and dairy.    Grains  Whole-grain breads are low in fat; they're also high in fiber and complex carbohydrates, which help you feel shields longer and prevent overeating. Choose breads whose first ingredient says whole in front of the grain, for example, whole wheat flour or whole white flour; enriched or other types of flour have the important fiber and nutrients removed. Choose whole grain breads for sandwiches and as additions to meals.  Avoid rich bakery foods such as donuts, sweet rolls and muffins. These foods can contain more than 50% fat calories. Snacks such as garrett food cake and gingersnap cookies can satisfy your sweet tooth without adding fat to your diet.  Hot and cold cereals are usually low in fat. But instant cereals with cream may contain high-fat oils or butterfat. Granola cereals may also contain high-fat oils and extra sugars. Look for low-sugar options for both instant and granola cereals.  Avoid fried snacks such as potato chips and tortilla chips. Try the low-fat or baked versions instead.  Instead of this: Try this:   Croissants, biscuits, white breads and rolls Low-fat whole grain  "breads and rolls (wheat, rye and pumpernickel)   Doughnuts, pastries and scones English muffins and small whole grain bagels   Fried tortillas Soft tortillas (corn or whole wheat)   Sugar cereals and regular granola Oatmeal, low-fat granola and whole-grain cereal   Snack crackers Crackers (animal, kiley, rye, soda, saltine, oyster)   Potato or corn chips and buttered popcorn Pretzels (unsalted) and popcorn (unbuttered)   White pasta Whole-wheat pasta   White rice Brown rice   Fried rice, or pasta and rice mixes that contain high-fat sauces Rice or pasta (without egg yolk) with vegetable sauces   All-purpose white flour 100% whole-wheat flour     Fruits and Vegetables  Fruits and vegetables are naturally low in fat. They add flavor and variety to your diet. They also contain fiber, vitamins and minerals.  Margarine, butter, mayonnaise and sour cream add fat to vegetables and fruits. Try using nonfat or low-fat versions of these foods. You can also use nonfat or low-fat yogurt or herbs as seasonings instead.  Instead of this: Try this:   Fried vegetables or vegetables served with cream, cheese or butter sauces All vegetables raw, steamed, broiled, baked or tossed with a very small amount of olive oil and salt and pepper   Coconut Fruit (fresh)   French fries, hash browns and potato   chips Baked white or sweet potatoes     Meat, Poultry and Fish  Beef, Pork, Veal and Lamb  Baking, broiling and roasting are the healthiest ways to prepare meat. Lean cuts can be pan-broiled or stir-fried. Use either a nonstick pan or nonstick spray coating instead of butter or margarine.  Trim outside fat before cooking. Trim any inside, separable fat before eating. Select low-fat, lean cuts of meat. Lean beef and veal cuts have the word "loin" or "round" in their names. Lean pork cuts have the word "loin" or "leg" in their names.  Use herbs, spices, fresh vegetables and nonfat marinades to season meat. Avoid high-fat sauces and " "gravies.  Poultry  Baking, broiling and roasting are the healthiest ways to prepare poultry. Skinless poultry can be pan-broiled or stir-fried. Use either a nonstick pan or nonstick spray coating instead of butter or margarine.  Remove skin and visible fat before cooking. Chicken breasts are a good choice because they are low in fat and high in protein. Use domestic goose and duck only once in a while because both are high in fat.  Fish  Poaching, steaming, baking and broiling are the healthiest ways to prepare fish. Fresh fish should have a clear color, a moist look, a clean smell and firm, springy flesh. If good-quality fresh fish isn't available, buy frozen fish.  Most seafood is high in healthy polyunsaturated fat. Omega-3 fatty acids are also found in some fatty fish, such as salmon and cold-water trout. They may help lower the risk of heart disease in some people.  Cross-over Foods  Dry beans, peas and lentils offer protein and fiber without the cholesterol and fat of meats. Once in a while, try substituting beans for meat in a favorite recipe, such as lasagna or chili.  TVP, or textured vegetable protein, is widely available in many foods. Vegetarian "hot dogs," "hamburger" and "chicken nuggets" are low-fat, cholesterol-free alternatives to meat.  Instead of this: Try this:   Regular or breaded fish sticks or cakes, fish canned in oil, seafood prepared with butter or served in high-fat sauce Fish (fresh, frozen, canned in water), low-fat fish sticks or cakes and shellfish (such as shrimp)   Prime and marbled cuts Select-grade lean beef (round, sirloin and loin)   Pork spare ribs and jaeger Lean pork (tenderloin and loin chop) and turkey jaeger   Regular ground beef Lean or extra-lean ground beef, ground chicken and turkey breast   Lunch meats such as pepperoni, salami, bologna and liverwurst Lean lunch meats such as turkey, chicken and ham   Regular hot dogs or sausage Fat-free hot dogs and turkey dogs "     Dairy  Choose skim milk or low-fat buttermilk. Substitute evaporated skim milk for cream in recipes for soups, sauces and coffee.  Try low-fat cheeses. Skim ricotta can replace cream cheese on a bagel or in a vegetable dip. Use part-skim cheeses in recipes. Use 1% cottage cheese for salads and cooking. String cheese is a low-fat, high-calcium snack option.  Plain nonfat yogurt can replace sour cream in many recipes. (To maintain texture, stir 1 tablespoon of cornstarch into each cup of yogurt that you use in cooking.) Try mixing frozen nonfat or low-fat yogurt with fruit for dessert.  Skim sherbet is an alternative to ice cream. Soft-serve and regular ice creams are also lower in fat than premium styles.  Instead of this: Try this:   Whole or 2% milk Non-fat or 1% milk   Evaporated milk Evaporated non-fat milk   Regular buttermilk Buttermilk made from non-fat (or 1%) milk   Yogurt made with whole milk Nonfat or low-fat yogurt   Regular cheese (examples: American, blue, Brie, cheddar, Ike and Parmesan) Low-fat cheese with less than 3 grams of fat per serving (example: natural cheese, processed cheese and nondairy cheese such as soy cheese)   Regular cottage cheese Low-fat, nonfat, and dry-curd cottage cheese with less than 2% fat   Regular cream cheese Low-fat cream cheese (no more than 3 grams of fat per ounce)   Regular ice cream Sorbet, sherbet and nonfat or low-fat ice cream (no more than 3 grams of fat per 1/2 cup serving)     Fats, Oils and Sweets  Eating too many high-fat foods not only adds excess calories (which can lead to obesity and weight gain), but can increase your risk factor for several diseases. Heart disease, diabetes, certain types of cancer and osteoarthritis have all been linked to diets too high in fat. If you consume too much saturated and trans fats, you are more likely to develop high cholesterol and coronary artery disease.  Sugar-sweetened drinks, such as fruit juice, fruit drinks,  "regular soft drinks, sports drinks, energy drinks, sweetened or flavored milk and sweetened iced tea can add lots of sugar and calories to your diet. But staying hydrated is important for good health. Substitute water, zero-calorie flavored water, non-fat or reduced-fat milk, unsweetened tea or diet soda for sweetened drinks. Talk with your family doctor or a dietitian if you have questions about your diet or healthy eating for your family.  Instead of this: Try this:   Cookies Fig bars, gingersnaps and molasses cookies   Shortening, butter or margarine Olive, soybean and canola oils   Regular mayonnaise Nonfat or light mayonnaise   Regular salad dressing Nonfat or light salad dressing   Using fat (including butter) to grease pan Nonstick cooking spray       What it Takes to Lose Weight     What it Takes to Lose Weight     What does it take to lose weight?  To lose weight, you have to eat fewer calories than your body uses. Calories are the amount of energy in the food you eat. Some foods have more calories than others. For example, foods that are high in fat and sugar are also high in calories. If you eat more calories than your body uses, the extra calories will be stored as body excess fat.  A pound of fat is about 3,500 calories. To lose 1 pound of fat in a week, you have to eat 3,500 fewer calories (that is 500 fewer calories a day), or you have to "burn off" an extra 3,500 calories. You can burn off calories by exercising or just by being more active. (Talk to your family doctor before you begin any type of exercise program. Your doctor can help you determine what kind of exercise program is right for you.)  The best way to lose weight and keep it off is to eat fewer calories and be active. If you cut 250 calories from your diet each day and exercise enough to burn off 250 calories, that adds up to 500 fewer calories in one day. If you do this for 7 days, you can lose 1 pound of fat in a week.  Many experts " "believe you should not try to lose more than 2 pounds per week. Losing more than 2 pounds in a week usually means that you are losing water weight and lean muscle mass instead of losing excess fat. If you do this, you will have less energy, and you will most likely gain the weight back.    How often should I eat?  Most people can eat 3 regular meals and 1 snack every day. The 3 meals should be about the same size and should be low in fat. Try to eat 1 to 2 cups of fruits and vegetables, 2 to 3 ounces of whole grains and 1 to 2 ounces of meat (or a meat alternative) at most meals.  Some people benefit more if they eat 5 to 6 smaller meals throughout the day, about 2 to 3 hours apart. For example, their first meal of the day might be a cup of low-fat or nonfat yogurt and a banana. Three hours later they might eat a simple deli sandwich with whole-grain bread and fat-free mayonnaise.  Eat breakfast and don't skip meals. While skipping meals may help you lose weight in the beginning, it fails in the long run. Skipping meals may make you feel too hungry later in the day, causing you to overeat at your next meal.  After about a month of eating a normal breakfast, lunch and a light dinner, your body will adjust.    What is so bad about high-fat foods?  Foods high in fat are usually high in calories, which could lead to unwanted weight gain. Consuming too much saturated and trans fats may increase LDL cholesterol ("bad" cholesterol) level, and increase your risk of heart disease. The USDA suggests that you eat no more than 20 grams of saturated fat, and that you limit the amount of trans fat to as close to 0 grams as possible. Click here for more information on reading nutrition labels.  It is important to remember that some fats can be beneficial to your overall health. "Good" fat, such as polyunsaturated and monounsaturated fats are found in fish, nuts, and low- or nonfat dairy products.    What are empty calories?  Some " "foods are referred to as "empty calories" because they add lots of calories to your diet without providing much nutritional benefit. An example is sugar-sweetened drinks, such as fruit juice, fruit drinks, regular soft drinks, sports drinks, energy drinks, sweetened or flavored milk and sweetened iced tea. These drinks can add lots of sugar and calories to your diet.  But staying hydrated is important for good health. To reduce the calories and sugar in your diet, substitute water, zero-calorie flavored water, non-fat or reduced-fat milk, unsweetened tea or diet soda for sweetened drinks. Talk with your family doctor or a dietitian if you have questions about your diet or healthy eating for your family.    Can I trust nutrition information I get from newspapers and magazines?  Nutrition tips and diet information from different sources often conflict with each other. You should always check with your doctor first. Also, keep in mind the following advice:  There is no "magic bullet" when it comes to nutrition. There isn't one single diet that works for every person. You need to find an eating plan that works for you.   Good nutrition doesn't come in a vitamin supplement. Only take a vitamin with your doctor's recommendation, as your body benefits the most when you eat healthy foods.   Eating all different kinds of foods is best for your body, so learn to try new foods.   Fad diets offer short-term changes, but good health comes from long-term effort and commitment.   Stories from people who have used a diet program or product, especially in commercials and LOYAL3, are advertisements. These people are usually paid to endorse what the ad is selling. Remember, regained weight or other problems that develop after someone has completed the diet program are never talked about in those ads.     Will diet drugs help?  Although diet drugs may help you lose weight at first, they usually don't help you keep the weight off " and may have damaging side effects. Most diet pills have not been tested by the Food and Drug Administration, which means you can't be sure if the drugs are safe. Taking drugs also does not help you learn how to change your eating and exercise habits. Making lasting changes in these habits is the way to lose weight and keep it off.

## 2018-08-15 ENCOUNTER — OFFICE VISIT (OUTPATIENT)
Dept: URGENT CARE | Facility: CLINIC | Age: 63
End: 2018-08-15
Payer: COMMERCIAL

## 2018-08-15 VITALS
OXYGEN SATURATION: 97 % | HEIGHT: 66 IN | HEART RATE: 108 BPM | SYSTOLIC BLOOD PRESSURE: 149 MMHG | DIASTOLIC BLOOD PRESSURE: 77 MMHG | RESPIRATION RATE: 18 BRPM | TEMPERATURE: 98 F | WEIGHT: 171 LBS | BODY MASS INDEX: 27.48 KG/M2

## 2018-08-15 DIAGNOSIS — N30.90 CYSTITIS: Primary | ICD-10-CM

## 2018-08-15 DIAGNOSIS — R10.9 ABDOMINAL PRESSURE: ICD-10-CM

## 2018-08-15 LAB
BILIRUB UR QL STRIP: POSITIVE
GLUCOSE UR QL STRIP: NEGATIVE
KETONES UR QL STRIP: NEGATIVE
LEUKOCYTE ESTERASE UR QL STRIP: NEGATIVE
PH, POC UA: 5.5 (ref 5–8)
POC BLOOD, URINE: NEGATIVE
POC NITRATES, URINE: NEGATIVE
PROT UR QL STRIP: NEGATIVE
SP GR UR STRIP: 1.03 (ref 1–1.03)
UROBILINOGEN UR STRIP-ACNC: NORMAL (ref 0.1–1.1)

## 2018-08-15 PROCEDURE — 81003 URINALYSIS AUTO W/O SCOPE: CPT | Mod: QW,S$GLB,, | Performed by: NURSE PRACTITIONER

## 2018-08-15 PROCEDURE — 99214 OFFICE O/P EST MOD 30 MIN: CPT | Mod: 25,S$GLB,, | Performed by: NURSE PRACTITIONER

## 2018-08-15 PROCEDURE — 3008F BODY MASS INDEX DOCD: CPT | Mod: CPTII,S$GLB,, | Performed by: NURSE PRACTITIONER

## 2018-08-15 RX ORDER — NITROFURANTOIN 25; 75 MG/1; MG/1
100 CAPSULE ORAL 2 TIMES DAILY
Qty: 14 CAPSULE | Refills: 0 | Status: SHIPPED | OUTPATIENT
Start: 2018-08-15 | End: 2018-08-22

## 2018-08-15 NOTE — PROGRESS NOTES
"Subjective:       Patient ID: Jaja Toledo is a 62 y.o. female.    Vitals:  height is 5' 6" (1.676 m) and weight is 77.6 kg (171 lb). Her temperature is 97.7 °F (36.5 °C). Her blood pressure is 149/77 (abnormal) and her pulse is 108. Her respiration is 18 and oxygen saturation is 97%.     Chief Complaint: Dysuria    Lower abdominal pressure for 2 weeks       Dysuria    This is a new problem. The current episode started 1 to 4 weeks ago. The problem occurs every urination. The problem has been gradually worsening. There is no history of pyelonephritis. Pertinent negatives include no chills, hematuria, nausea, urgency or vomiting. Treatments tried: Azo  The treatment provided no relief.     Review of Systems   Constitution: Negative for chills and fever.   Skin: Negative for itching.   Musculoskeletal: Negative for back pain.   Gastrointestinal: Negative for abdominal pain, nausea and vomiting.   Genitourinary: Negative for dysuria, genital sores, hematuria, missed menses, non-menstrual bleeding and urgency.        Lower abdominal pressure          Objective:      Physical Exam   Constitutional: She is oriented to person, place, and time. She appears well-developed and well-nourished.   HENT:   Head: Normocephalic and atraumatic.   Nose: Nose normal. No nasal deformity. No epistaxis.   Mouth/Throat: Mucous membranes are normal.   Eyes: Conjunctivae and lids are normal.   Neck: Trachea normal, normal range of motion and phonation normal. Neck supple.   Cardiovascular: Normal rate, regular rhythm, normal heart sounds and normal pulses.   Pulmonary/Chest: Effort normal and breath sounds normal.   Abdominal: Soft. Normal appearance and bowel sounds are normal. She exhibits no distension and no mass. There is no tenderness. There is no CVA tenderness.   Genitourinary: No vaginal discharge found.   Neurological: She is alert and oriented to person, place, and time.   Skin: Skin is warm, dry and intact.   Psychiatric: She " "has a normal mood and affect. Her speech is normal and behavior is normal. Cognition and memory are normal.   Nursing note and vitals reviewed.      Assessment:       1. Cystitis    2. Abdominal pressure        Plan:       Patient Instructions       Bladder Infection, Female (Adult)    Urine is normally doesn't have any bacteria in it. But bacteria can get into the urinary tract from the skin around the rectum. Or they can travel in the blood from elsewhere in the body. Once they are in your urinary tract, they can cause infection in the urethra (urethritis), the bladder (cystitis), or the kidneys (pyelonephritis).  The most common place for an infection is in the bladder. This is called a bladder infection. This is one of the most common infections in women. Most bladder infections are easily treated. They are not serious unless the infection spreads to the kidney.  The phrases "bladder infection," "UTI," and "cystitis" are often used to describe the same thing. But they are not always the same. Cystitis is an inflammation of the bladder. The most common cause of cystitis is an infection.  Symptoms  The infection causes inflammation in the urethra and bladder. This causes many of the symptoms. The most common symptoms of a bladder infection are:  · Pain or burning when urinating  · Having to urinate more often than usual  · Urgent need to urinate  · Only a small amount of urine comes out  · Blood in urine  · Abdominal discomfort. This is usually in the lower abdomen above the pubic bone.  · Cloudy urine  · Strong- or bad-smelling urine  · Unable to urinate (urinary retention)  · Unable to hold urine in (urinary incontinence)  · Fever  · Loss of appetite  · Confusion (in older adults)  Causes  Bladder infections are not contagious. You can't get one from someone else, from a toilet seat, or from sharing a bath.  The most common cause of bladder infections is bacteria from the bowels. The bacteria get onto the skin " around the opening of the urethra. From there, they can get into the urine and travel up to the bladder, causing inflammation and infection. This usually happens because of:  · Wiping improperly after urinating. Always wipe from front to back.  · Bowel incontinence  · Pregnancy  · Procedures such as having a catheter inserted  · Older age  · Not emptying your bladder. This can allow bacteria a chance to grow in your urine.  · Dehydration  · Constipation  · Sex  · Use of a diaphragm for birth control   Treatment  Bladder infections are diagnosed by a urine test. They are treated with antibiotics and usually clear up quickly without complications. Treatment helps prevent a more serious kidney infection.  Medicines  Medicines can help in the treatment of a bladder infection:  · Take antibiotics until they are used up, even if you feel better. It is important to finish them to make sure the infection has cleared.  · You can use acetaminophen or ibuprofen for pain, fever, or discomfort, unless another medicine was prescribed. If you have chronic liver or kidney disease, talk with your healthcare provider before using these medicines. Also talk with your provider if you've ever had a stomach ulcer or gastrointestinal bleeding, or are taking blood-thinner medicines.  · If you are given phenazopydridine to reduce burning with urination, it will cause your urine to become a bright orange color. This can stain clothing.  Care and prevention  These self-care steps can help prevent future infections:  · Drink plenty of fluids to prevent dehydration and flush out your bladder. Do this unless you must restrict fluids for other health reasons, or your doctor told you not to.  · Proper cleaning after going to the bathroom is important. Wipe from front to back after using the toilet to prevent the spread of bacteria.  · Urinate more often. Don't try to hold urine in for a long time.  · Wear loose-fitting clothes and cotton  underwear. Avoid tight-fitting pants.  · Improve your diet and prevent constipation. Eat more fresh fruit and vegetables, and fiber, and less junk and fatty foods.  · Avoid sex until your symptoms are gone.  · Avoid caffeine, alcohol, and spicy foods. These can irritate your bladder.  · Urinate right after intercourse to flush out your bladder.  · If you use birth control pills and have frequent bladder infections, discuss it with your doctor.  Follow-up care  Call your healthcare provider if all symptoms are not gone after 3 days of treatment. This is especially important if you have repeat infections.  If a culture was done, you will be told if your treatment needs to be changed. If directed, you can call to find out the results.  If X-rays were done, you will be told if the results will affect your treatment.  Call 911  Call 911 if any of the following occur:  · Trouble breathing  · Hard to wake up or confusion  · Fainting or loss of consciousness  · Rapid heart rate  When to seek medical advice  Call your healthcare provider right away if any of these occur:  · Fever of 100.4ºF (38.0ºC) or higher, or as directed by your healthcare provider  · Symptoms are not better by the third day of treatment  · Back or belly (abdominal) pain that gets worse  · Repeated vomiting, or unable to keep medicine down  · Weakness or dizziness  · Vaginal discharge  · Pain, redness, or swelling in the outer vaginal area (labia)  Date Last Reviewed: 10/1/2016  © 1761-9654 [employer]. [address] [place] [address]. All rights reserved. This information is not intended as a substitute for professional medical care. Always follow your healthcare professional's instructions.            What is Interstitial Cystitis?     Cross section of bladder showing normal lining.     Interstitial cystitis is a painful condition of the bladder. It causes the bladder wall to be tender and easily irritated. This leads to  uncomfortable symptoms. Interstitial cystitis is chronic (ongoing). This means it has no cure. But you can manage the symptoms to help you feel better.   Your bladder stores urine until its passed out of the body. It is not clear what causes interstitial cystitis. However, doctors note some changes in the bladder that may be responsible. The protective lining that keeps urine away from your bladder walls seems to thin and stiffen. This makes it hard for the bladder to expand to hold urine. During certain tests, doctors may see pinpoints of bleeding (glomerulations) on your bladder wall. In rare cases, healthcare providers may also find a crater (called a Hunner ulcer).  What are the symptoms of interstitial cystitis?  Symptoms in women may get worse during their period. Symptoms may go away for a time (remission), but they often come back again. Symptoms include:  · The frequent and urgent need to urinate  · Pain or pressure in the bladder area, often relieved for a short time after urinating  · Pain in the genitals or anus  · Painful sexual intercourse  What causes interstitial cystitis?  Possible causes include:  · Damage to the protective bladder lining, allowing urine to irritate your bladder wall  · Infection of your bladder  · Allergic reaction in your bladder  · Nerve problems  · Substances found in the urine that irritate your bladder   How is interstitial cystitis treated?  Treatment may involve a combination of medicine, lifestyle changes, surgery and other methods. There is no single known effective treatment. It may take some time to find the right combination of treatments for you.  Medicine options include:  · Pain medicine. These may be used for a short time to help ease discomfort.  · Antispasmodic medicines. These may help relax the bladder muscles. This may decrease the need to urinate.  · Nonsteroidal anti-inflammatory drugs (NSAIDs). These may help reduce inflammation and ease  pain.  · Antihistamines. These may help reduce inflammation and ease pain.  · Antidepressants. In low doses, these may block pain and help ease symptoms.  · Pentosan polysulfate sodium and similar medicines. These can restore the bladder lining.  Bladder instillation. In some cases, medicine may be flushed directly into the bladder using a catheter.  Other treatments may include:  · Biofeedback. Biofeedback uses sensors placed on your abdomen to allow you to see signals given off by your bladder muscles. This may  help you control your bladder muscles and reduce symptoms.  · Electrical stimulation. Electrical signals may help block nerve sensations to and from the bladder. This may improve blood flow and strengthen pelvic muscles. This is sometimes called TENS.  · Surgery. For severe cases, surgery may be recommended.  Lifestyle changes include:  · Avoiding foods that irritate your bladder and worsen symptoms. These may include alcohol, spicy food, chocolate, and caffeine among others.  · Bladder retraining. This involves holding urine for longer and longer periods. The goal is to stretch the bladder and increase the amount the bladder can control.  · Stress management. While stress does not cause interstitial cystitis, any type of chronic pain can make be helped by learning techniques to help manage stress. Exercise may help, too.  Date Last Reviewed: 1/1/2017  © 4354-1670 [employer]. [address] [place] [address], [place] [address]. All rights reserved. This information is not intended as a substitute for professional medical care. Always follow your healthcare professional's instructions.            Cystitis    Abdominal pressure  -     POCT Urinalysis, Dipstick, Automated, W/O Scope  -     Ambulatory referral to Urology    Other orders  -     nitrofurantoin, macrocrystal-monohydrate, (MACROBID) 100 MG capsule; Take 1 capsule (100 mg total) by mouth 2 (two) times daily. for 7 days  Dispense: 14 capsule;  Refill: 0

## 2018-08-15 NOTE — PATIENT INSTRUCTIONS
"  Bladder Infection, Female (Adult)    Urine is normally doesn't have any bacteria in it. But bacteria can get into the urinary tract from the skin around the rectum. Or they can travel in the blood from elsewhere in the body. Once they are in your urinary tract, they can cause infection in the urethra (urethritis), the bladder (cystitis), or the kidneys (pyelonephritis).  The most common place for an infection is in the bladder. This is called a bladder infection. This is one of the most common infections in women. Most bladder infections are easily treated. They are not serious unless the infection spreads to the kidney.  The phrases "bladder infection," "UTI," and "cystitis" are often used to describe the same thing. But they are not always the same. Cystitis is an inflammation of the bladder. The most common cause of cystitis is an infection.  Symptoms  The infection causes inflammation in the urethra and bladder. This causes many of the symptoms. The most common symptoms of a bladder infection are:  · Pain or burning when urinating  · Having to urinate more often than usual  · Urgent need to urinate  · Only a small amount of urine comes out  · Blood in urine  · Abdominal discomfort. This is usually in the lower abdomen above the pubic bone.  · Cloudy urine  · Strong- or bad-smelling urine  · Unable to urinate (urinary retention)  · Unable to hold urine in (urinary incontinence)  · Fever  · Loss of appetite  · Confusion (in older adults)  Causes  Bladder infections are not contagious. You can't get one from someone else, from a toilet seat, or from sharing a bath.  The most common cause of bladder infections is bacteria from the bowels. The bacteria get onto the skin around the opening of the urethra. From there, they can get into the urine and travel up to the bladder, causing inflammation and infection. This usually happens because of:  · Wiping improperly after urinating. Always wipe from front to " back.  · Bowel incontinence  · Pregnancy  · Procedures such as having a catheter inserted  · Older age  · Not emptying your bladder. This can allow bacteria a chance to grow in your urine.  · Dehydration  · Constipation  · Sex  · Use of a diaphragm for birth control   Treatment  Bladder infections are diagnosed by a urine test. They are treated with antibiotics and usually clear up quickly without complications. Treatment helps prevent a more serious kidney infection.  Medicines  Medicines can help in the treatment of a bladder infection:  · Take antibiotics until they are used up, even if you feel better. It is important to finish them to make sure the infection has cleared.  · You can use acetaminophen or ibuprofen for pain, fever, or discomfort, unless another medicine was prescribed. If you have chronic liver or kidney disease, talk with your healthcare provider before using these medicines. Also talk with your provider if you've ever had a stomach ulcer or gastrointestinal bleeding, or are taking blood-thinner medicines.  · If you are given phenazopydridine to reduce burning with urination, it will cause your urine to become a bright orange color. This can stain clothing.  Care and prevention  These self-care steps can help prevent future infections:  · Drink plenty of fluids to prevent dehydration and flush out your bladder. Do this unless you must restrict fluids for other health reasons, or your doctor told you not to.  · Proper cleaning after going to the bathroom is important. Wipe from front to back after using the toilet to prevent the spread of bacteria.  · Urinate more often. Don't try to hold urine in for a long time.  · Wear loose-fitting clothes and cotton underwear. Avoid tight-fitting pants.  · Improve your diet and prevent constipation. Eat more fresh fruit and vegetables, and fiber, and less junk and fatty foods.  · Avoid sex until your symptoms are gone.  · Avoid caffeine, alcohol, and spicy  foods. These can irritate your bladder.  · Urinate right after intercourse to flush out your bladder.  · If you use birth control pills and have frequent bladder infections, discuss it with your doctor.  Follow-up care  Call your healthcare provider if all symptoms are not gone after 3 days of treatment. This is especially important if you have repeat infections.  If a culture was done, you will be told if your treatment needs to be changed. If directed, you can call to find out the results.  If X-rays were done, you will be told if the results will affect your treatment.  Call 911  Call 911 if any of the following occur:  · Trouble breathing  · Hard to wake up or confusion  · Fainting or loss of consciousness  · Rapid heart rate  When to seek medical advice  Call your healthcare provider right away if any of these occur:  · Fever of 100.4ºF (38.0ºC) or higher, or as directed by your healthcare provider  · Symptoms are not better by the third day of treatment  · Back or belly (abdominal) pain that gets worse  · Repeated vomiting, or unable to keep medicine down  · Weakness or dizziness  · Vaginal discharge  · Pain, redness, or swelling in the outer vaginal area (labia)  Date Last Reviewed: 10/1/2016  © 7117-7777 [employer]. [address] [place] [address], [place] [address]. All rights reserved. This information is not intended as a substitute for professional medical care. Always follow your healthcare professional's instructions.            What is Interstitial Cystitis?     Cross section of bladder showing normal lining.     Interstitial cystitis is a painful condition of the bladder. It causes the bladder wall to be tender and easily irritated. This leads to uncomfortable symptoms. Interstitial cystitis is chronic (ongoing). This means it has no cure. But you can manage the symptoms to help you feel better.   Your bladder stores urine until its passed out of the body. It is not clear what causes  interstitial cystitis. However, doctors note some changes in the bladder that may be responsible. The protective lining that keeps urine away from your bladder walls seems to thin and stiffen. This makes it hard for the bladder to expand to hold urine. During certain tests, doctors may see pinpoints of bleeding (glomerulations) on your bladder wall. In rare cases, healthcare providers may also find a crater (called a Hunner ulcer).  What are the symptoms of interstitial cystitis?  Symptoms in women may get worse during their period. Symptoms may go away for a time (remission), but they often come back again. Symptoms include:  · The frequent and urgent need to urinate  · Pain or pressure in the bladder area, often relieved for a short time after urinating  · Pain in the genitals or anus  · Painful sexual intercourse  What causes interstitial cystitis?  Possible causes include:  · Damage to the protective bladder lining, allowing urine to irritate your bladder wall  · Infection of your bladder  · Allergic reaction in your bladder  · Nerve problems  · Substances found in the urine that irritate your bladder   How is interstitial cystitis treated?  Treatment may involve a combination of medicine, lifestyle changes, surgery and other methods. There is no single known effective treatment. It may take some time to find the right combination of treatments for you.  Medicine options include:  · Pain medicine. These may be used for a short time to help ease discomfort.  · Antispasmodic medicines. These may help relax the bladder muscles. This may decrease the need to urinate.  · Nonsteroidal anti-inflammatory drugs (NSAIDs). These may help reduce inflammation and ease pain.  · Antihistamines. These may help reduce inflammation and ease pain.  · Antidepressants. In low doses, these may block pain and help ease symptoms.  · Pentosan polysulfate sodium and similar medicines. These can restore the bladder lining.  Bladder  instillation. In some cases, medicine may be flushed directly into the bladder using a catheter.  Other treatments may include:  · Biofeedback. Biofeedback uses sensors placed on your abdomen to allow you to see signals given off by your bladder muscles. This may  help you control your bladder muscles and reduce symptoms.  · Electrical stimulation. Electrical signals may help block nerve sensations to and from the bladder. This may improve blood flow and strengthen pelvic muscles. This is sometimes called TENS.  · Surgery. For severe cases, surgery may be recommended.  Lifestyle changes include:  · Avoiding foods that irritate your bladder and worsen symptoms. These may include alcohol, spicy food, chocolate, and caffeine among others.  · Bladder retraining. This involves holding urine for longer and longer periods. The goal is to stretch the bladder and increase the amount the bladder can control.  · Stress management. While stress does not cause interstitial cystitis, any type of chronic pain can make be helped by learning techniques to help manage stress. Exercise may help, too.  Date Last Reviewed: 1/1/2017 © 2000-2017 [employer]. [address] [place] [address], [place] [address]. All rights reserved. This information is not intended as a substitute for professional medical care. Always follow your healthcare professional's instructions.

## 2018-08-20 ENCOUNTER — OFFICE VISIT (OUTPATIENT)
Dept: UROLOGY | Facility: CLINIC | Age: 63
End: 2018-08-20
Payer: COMMERCIAL

## 2018-08-20 VITALS — BODY MASS INDEX: 27.75 KG/M2 | WEIGHT: 171.94 LBS

## 2018-08-20 DIAGNOSIS — R10.32 BILATERAL LOWER ABDOMINAL DISCOMFORT: Primary | ICD-10-CM

## 2018-08-20 DIAGNOSIS — R10.31 BILATERAL LOWER ABDOMINAL DISCOMFORT: Primary | ICD-10-CM

## 2018-08-20 PROCEDURE — 3008F BODY MASS INDEX DOCD: CPT | Mod: CPTII,S$GLB,, | Performed by: NURSE PRACTITIONER

## 2018-08-20 PROCEDURE — 87086 URINE CULTURE/COLONY COUNT: CPT

## 2018-08-20 PROCEDURE — 87088 URINE BACTERIA CULTURE: CPT

## 2018-08-20 PROCEDURE — 81001 URINALYSIS AUTO W/SCOPE: CPT | Mod: S$GLB,,, | Performed by: NURSE PRACTITIONER

## 2018-08-20 PROCEDURE — 87077 CULTURE AEROBIC IDENTIFY: CPT

## 2018-08-20 PROCEDURE — 87186 SC STD MICRODIL/AGAR DIL: CPT

## 2018-08-20 PROCEDURE — 99999 PR PBB SHADOW E&M-EST. PATIENT-LVL III: CPT | Mod: PBBFAC,,, | Performed by: NURSE PRACTITIONER

## 2018-08-20 PROCEDURE — 99214 OFFICE O/P EST MOD 30 MIN: CPT | Mod: 25,S$GLB,, | Performed by: NURSE PRACTITIONER

## 2018-08-20 NOTE — PATIENT INSTRUCTIONS
Mag Citrate (comes in glass bottle)    Miralax- 1/2 to 1 capful daily in 10 oz water.  Try to have bowel movement daily or every other day.    May need to follow up with GI if constipation persist

## 2018-08-20 NOTE — LETTER
August 22, 2018      Sabine Brown, NP  [address] [facility] [address]           [facility] 4th Floor  [address]  [place] [address]  Phone: [phone]          Patient: Jaja Toledo   MR Number: [id number]   YOB: 1955   Date of Visit: 8/20/2018       Dear Sabine Brown:    Thank you for referring Jaja Toledo to me for evaluation. Attached you will find relevant portions of my assessment and plan of care.    If you have questions, please do not hesitate to call me. I look forward to following Jaja Toledo along with you.    Sincerely,    Marialuisa Madison NP    Enclosure  CC:  No Recipients    If you would like to receive this communication electronically, please contact [email or web address] or [phone] to request more information on [employer] access.    For providers and/or their staff who would like to refer a patient to [facility], please contact us through our one-stop-shop provider referral line, [facility] , at [phone].    If you feel you have received this communication in error or would no longer like to receive these types of communications, please e-mail [email or web address]

## 2018-08-22 NOTE — PROGRESS NOTES
"Subjective:       Patient ID: Jaja Toledo is a 62 y.o. female.    Chief Complaint: Lower abdominal pressure       HPI: Jaja Toledo is a 62 y.o. Black or  female who presents today for evaluation and management of lower abdominal pressure. She is an established patient with [facility] but a new patient to me. Her last clinic visit was with Dr. Pollock 12/7/16.    Had a cysto 1/9/2017   The urethra was normal. Bladder neck was normal.  Both ureteral orifices were normal size, shape, and position.  There were no stones, tumors, foreign bodies, or active infections noted.     She was recently seen at Urgent Care for lower abdominal pressure. Her POCT UA dipstick resulted negative nitrates, leukocytes, and blood. She was started on Macrobid for her symptoms. No urine culture was ordered.    Today she reports lower abdominal pressure ("bloating") for the past few weeks. She just filled her prescription for Macrobid and took one pill before coming to appt. She denies dysuria, hematuria or flank pain. Denies frequency, urgency, incontinence, bladder pain, or any other urinary complaints. Denies f/c/n/v. She reports constipation. She has not had bowel movement in over 3 days. She takes Miralax as needed twice a day for constipation but since it gives her diarrhea, she has not taken it lately. She states she does not drink much water.   Denies history of recurrent UTI's. Last UTI was 5/11/17.      Review of patient's allergies indicates:   Allergen Reactions    No known drug allergies        Current Outpatient Medications   Medication Sig Dispense Refill    cyproheptadine (PERIACTIN) 4 mg tablet Take 1 tablet (4 mg total) by mouth 3 (three) times daily as needed (decreased appetite). 30 tablet 3    hydrOXYzine HCl (ATARAX) 25 MG tablet TAKE ONE EVERY NIGHT AS NEEDED FOR ITCHING 30 tablet 2    nitrofurantoin, macrocrystal-monohydrate, (MACROBID) 100 MG capsule Take 1 capsule (100 mg total) by mouth 2 (two) " times daily. for 7 days 14 capsule 0    multivitamin capsule Take 1 capsule by mouth once daily.        ranitidine (ZANTAC) 150 MG tablet Take 1 tablet (150 mg total) by mouth daily as needed for Heartburn. 30 tablet 2     No current facility-administered medications for this visit.        Past Medical History:   Diagnosis Date    Anxiety     Chronic low back pain     Gastritis     GERD (gastroesophageal reflux disease)     History of cervical cancer     History of recurrent urinary tract infection     Soft tissue tumor, malignant     sarcoma left foot       Past Surgical History:   Procedure Laterality Date    BACK SURGERY      BREAST CYST EXCISION Left 1984    FOOT SURGERY  2009 or 2010    TOTAL VAGINAL HYSTERECTOMY  1996    TUBAL LIGATION  1994       Family History   Problem Relation Age of Onset    Breast cancer Sister         dx in 2006    No Known Problems Mother     Throat cancer Father     Colon cancer Neg Hx     Diabetes Neg Hx     Hypertension Neg Hx     Ovarian cancer Neg Hx     Stroke Neg Hx        Review of Systems   Constitutional: Negative for chills, diaphoresis and fever.   HENT: Negative for congestion and trouble swallowing.    Eyes: Negative for visual disturbance.   Respiratory: Negative for chest tightness and shortness of breath.    Cardiovascular: Negative for chest pain and palpitations.   Gastrointestinal: Positive for constipation. Negative for diarrhea, nausea and vomiting.        +lower abdominal pressure   Genitourinary: Negative for decreased urine volume, difficulty urinating, dysuria, flank pain, frequency, hematuria and urgency.   Musculoskeletal: Negative for gait problem.   Skin: Negative for rash.   Allergic/Immunologic: Negative for immunocompromised state.   Neurological: Negative for seizures, syncope and headaches.   Hematological: Negative for adenopathy.   Psychiatric/Behavioral: Negative for confusion.         All other systems were reviewed and were  negative.    Objective:   There were no vitals filed for this visit.     Physical Exam   Nursing note and vitals reviewed.  Constitutional: She is oriented to person, place, and time. She appears well-developed and well-nourished.  Non-toxic appearance. She does not have a sickly appearance. She does not appear ill. No distress.   HENT:   Head: Normocephalic.   Eyes: Conjunctivae are normal.   Neck: Normal range of motion.   Cardiovascular: Regular rhythm.    Pulmonary/Chest: Effort normal. No respiratory distress.   Abdominal: Soft. She exhibits no distension. There is no tenderness. There is no CVA tenderness.   Musculoskeletal: Normal range of motion.   Neurological: She is alert and oriented to person, place, and time.   Skin: Skin is warm and dry.     Psychiatric: She has a normal mood and affect. Her behavior is normal. Judgment and thought content normal.         Lab Results   Component Value Date    CREATININE 0.8 06/15/2018     Lab Results   Component Value Date    EGFRNONAA >60 06/15/2018     Lab Results   Component Value Date    ESTGFRAFRICA >60 06/15/2018     POCT UA: negative bacteria or blood, results negative    Assessment:       1. Bilateral lower abdominal discomfort        Plan:     Jaja was seen today for urinary frequency.    Diagnoses and all orders for this visit:    Bilateral lower abdominal discomfort  -     POCT urinalysis, dipstick or tablet reag  -     Urine culture      -Discussed plan of care with patient  -Urine specimen sent for urine culture. Will notify patient of results.   -Avoid Bladder Irritants: Tea, coffee, caffeine, alcohol, artificial sweeteners, citrus, spicy foods, acidic foods,chocolate, tomato-based foods, smoking  -Increase water intake  -Avoid constipation. Can take mag citrate as clean out for constipation. Then can take miralax 1/2-1 capful daily or every other day to have bowel movement daily. Mix in at least 10 oz water.  -RTC as needed     I spent 25 minutes with  the patient of which more than half was spent in coordinating the patient's care as well as in direct consultation with the patient in regards to our treatment and plan.

## 2018-08-23 ENCOUNTER — TELEPHONE (OUTPATIENT)
Dept: UROLOGY | Facility: CLINIC | Age: 63
End: 2018-08-23

## 2018-08-23 LAB — BACTERIA UR CULT: NORMAL

## 2018-08-23 NOTE — TELEPHONE ENCOUNTER
Spoke with patient regarding + urine culture.  ENTEROCOCCUS FAECALIS >100,000 cfu/ml     She is taking Macrobid and will complete course of Macrobid as ordered.   F/U as needed.  Pt verbalized understanding

## 2018-11-13 ENCOUNTER — TELEPHONE (OUTPATIENT)
Dept: FAMILY MEDICINE | Facility: CLINIC | Age: 63
End: 2018-11-13

## 2018-11-13 NOTE — TELEPHONE ENCOUNTER
----- Message from Bindu Taylor sent at 11/13/2018  9:11 AM CST -----  Contact: Self [phone]  Patient would like blood work orders put in the system. Please advise.

## 2018-11-13 NOTE — TELEPHONE ENCOUNTER
----- Message from Narda Putnam sent at 11/13/2018 10:53 AM CST -----  Contact: [phone]/Melinda with Dr Bart Lawrence its requesting to speak with the nurse in regarding getting a medical clearance for the pt . Please advise

## 2018-11-13 NOTE — TELEPHONE ENCOUNTER
Left message requesting a callback.  Need to schedule patient for an appointment for medical clearance.

## 2018-11-13 NOTE — TELEPHONE ENCOUNTER
Patient scheduled Thursday 11/15 at 11:00am for an appointment for medical clearance.  Patient verbalized understanding.

## 2018-11-13 NOTE — TELEPHONE ENCOUNTER
----- Message from Mari Mcnamara sent at 11/13/2018  2:26 PM CST -----  Contact: [phone]/self  Patient states she is returning your call. Please advise.

## 2018-11-13 NOTE — TELEPHONE ENCOUNTER
Left message requesting a callback.  Patient is not due for labs.  Need to clarify what labs she is requesting.

## 2018-11-15 ENCOUNTER — TELEPHONE (OUTPATIENT)
Dept: FAMILY MEDICINE | Facility: CLINIC | Age: 63
End: 2018-11-15

## 2018-11-15 ENCOUNTER — OFFICE VISIT (OUTPATIENT)
Dept: FAMILY MEDICINE | Facility: CLINIC | Age: 63
End: 2018-11-15
Payer: COMMERCIAL

## 2018-11-15 ENCOUNTER — HOSPITAL ENCOUNTER (OUTPATIENT)
Dept: RADIOLOGY | Facility: HOSPITAL | Age: 63
Discharge: HOME | End: 2018-11-15
Attending: FAMILY MEDICINE
Payer: COMMERCIAL

## 2018-11-15 VITALS
TEMPERATURE: 98 F | SYSTOLIC BLOOD PRESSURE: 130 MMHG | HEIGHT: 66 IN | HEART RATE: 104 BPM | WEIGHT: 175.94 LBS | OXYGEN SATURATION: 99 % | BODY MASS INDEX: 28.27 KG/M2 | DIASTOLIC BLOOD PRESSURE: 84 MMHG

## 2018-11-15 DIAGNOSIS — Z23 NEEDS FLU SHOT: ICD-10-CM

## 2018-11-15 DIAGNOSIS — M25.511 RIGHT SHOULDER PAIN, UNSPECIFIED CHRONICITY: ICD-10-CM

## 2018-11-15 DIAGNOSIS — R07.9 CHEST PAIN, UNSPECIFIED TYPE: Primary | ICD-10-CM

## 2018-11-15 DIAGNOSIS — Z01.818 PREOP EXAMINATION: ICD-10-CM

## 2018-11-15 DIAGNOSIS — R73.01 IFG (IMPAIRED FASTING GLUCOSE): ICD-10-CM

## 2018-11-15 DIAGNOSIS — Z11.59 ENCOUNTER FOR HEPATITIS C SCREENING TEST FOR LOW RISK PATIENT: ICD-10-CM

## 2018-11-15 DIAGNOSIS — R07.9 CHEST PAIN, UNSPECIFIED TYPE: ICD-10-CM

## 2018-11-15 PROCEDURE — 71046 X-RAY EXAM CHEST 2 VIEWS: CPT | Mod: TC,FY

## 2018-11-15 PROCEDURE — 90686 IIV4 VACC NO PRSV 0.5 ML IM: CPT | Mod: S$GLB,,, | Performed by: FAMILY MEDICINE

## 2018-11-15 PROCEDURE — 3008F BODY MASS INDEX DOCD: CPT | Mod: CPTII,S$GLB,, | Performed by: FAMILY MEDICINE

## 2018-11-15 PROCEDURE — 99215 OFFICE O/P EST HI 40 MIN: CPT | Mod: 25,S$GLB,, | Performed by: FAMILY MEDICINE

## 2018-11-15 PROCEDURE — 99999 PR PBB SHADOW E&M-EST. PATIENT-LVL IV: CPT | Mod: PBBFAC,,, | Performed by: FAMILY MEDICINE

## 2018-11-15 PROCEDURE — 90471 IMMUNIZATION ADMIN: CPT | Mod: S$GLB,,, | Performed by: FAMILY MEDICINE

## 2018-11-15 PROCEDURE — 71046 X-RAY EXAM CHEST 2 VIEWS: CPT | Mod: 26,,, | Performed by: RADIOLOGY

## 2018-11-15 NOTE — TELEPHONE ENCOUNTER
Spoke with patient and reviewed results, she verbalized understanding. Informed patient clearance has been faxed.

## 2018-11-15 NOTE — LETTER
November 15, 2018        Mio Arriaga MD  [address] [place] [address] [address]  [place] [address]             [address]  [facility] [address]  Phone: [phone]  Fax: [phone]   Patient: Jaja Toledo   MR Number: [id number]   YOB: 1955   Date of Visit: 11/15/2018       Dear Dr. Arriaga:    Thank you for referring Jaja Toledo to me for evaluation. Attached you will find relevant portions of my assessment and plan of care.    If you have questions, please do not hesitate to call me. I look forward to following Jaja Toledo along with you.    Sincerely,      Andres Hurtado MD            CC  No Recipients    Enclosure

## 2018-11-15 NOTE — PROGRESS NOTES
(Portions of this note were dictated using voice recognition software and may contain dictation related errors in spelling/grammar/syntax not found on text review)    CC:   Chief Complaint   Patient presents with    Pre-op Exam     chest discomfort ongoing off and on for a few weeks, pt stopped taking all meds for the surgery, flu/pneumo vacc       HPI: 63 y.o. female here for preop exam for abdominoplasty, surgeon is Dr. Mio Arriaga.  Date of surgery is 11/26/2018.  Medical history below.  However, has had recurring chest discomfort off and on for the last few weeks.  Medical history below including GERD in IFG and anxiety.  She states that chest pain is more sternal and over to left chest wall.  Symptoms may happen for a minute and then resolved.  Pain is described as an achy or burning pain. May not happen until later on in the evening after happens during the day.  It may happen again every couple of days.  No associated shortness of breath.  No exertional pain.  Usually pain happens when she is in bed.  She does mention that she lies on her left side and often will read or watch TV or eat in that position.  Denies any history of heart disease. No current smoking history, former smoker, quit in the 90s.  Does not feel any problems with heartburn/GERD since she is taking the ranitidine    Also noticing some right shoulder problems when she is reaching back to get something off her night stand.  No direct trauma.  No meds taken for this specifically    Can  walk up stairs without any dyspnea or chest pain or weakness or dizziness.    Past Medical History:   Diagnosis Date    Anxiety     Chronic low back pain     Gastritis     GERD (gastroesophageal reflux disease)     History of cervical cancer     History of recurrent urinary tract infection     IFG (impaired fasting glucose)     Soft tissue tumor, malignant     sarcoma left foot       Past Surgical History:   Procedure Laterality Date    BACK SURGERY       BREAST CYST EXCISION Left     EGD (ESOPHAGOGASTRODUODENOSCOPY) N/A 1/10/2014    Performed by [facility]les [facility] at [facility]    FOOT SURGERY   or     TOTAL VAGINAL HYSTERECTOMY      TUBAL LIGATION         Family History   Problem Relation Age of Onset    Breast cancer Sister         dx in     No Known Problems Mother     Throat cancer Father     Colon cancer Neg Hx     Diabetes Neg Hx     Hypertension Neg Hx     Ovarian cancer Neg Hx     Stroke Neg Hx        Social History     Socioeconomic History    Marital status:      Spouse name: Not on file    Number of children: Not on file    Years of education: Not on file    Highest education level: Not on file   Social Needs    Financial resource strain: Not on file    Food insecurity - worry: Not on file    Food insecurity - inability: Not on file    Transportation needs - medical: Not on file    Transportation needs - non-medical: Not on file   Occupational History    Not on file   Tobacco Use    Smoking status: Former Smoker     Types: Cigarettes     Last attempt to quit: 8/15/1993     Years since quittin.2    Smokeless tobacco: Never Used   Substance and Sexual Activity    Alcohol use: Yes     Alcohol/week: 0.6 oz     Types: 1 Glasses of wine per week     Comment: seldom    Drug use: No    Sexual activity: Yes     Partners: Male   Other Topics Concern    Not on file   Social History Narrative    Not on file     Lab Results   Component Value Date    WBC 5.92 06/15/2018    HGB 12.4 06/15/2018    HCT 40.9 06/15/2018     06/15/2018    CHOL 225 (H) 06/15/2018    TRIG 189 (H) 06/15/2018    HDL 38 (L) 06/15/2018    ALT 16 06/15/2018    AST 20 06/15/2018     06/15/2018    K 3.8 06/15/2018     06/15/2018    CREATININE 0.8 06/15/2018    CALCIUM 9.9 06/15/2018    BUN 13 06/15/2018    CO2 29 06/15/2018    TSH 0.448 06/15/2018    INR 1.0 2008    HGBA1C 5.7 (H) 06/15/2018     LDLCALC 149.2 06/15/2018     06/15/2018           ROS:  GENERAL: No fever, chills, fatigability or weight loss.  SKIN: No rashes, no itching.  HEAD: No headaches.  EYES: No visual changes  EARS: No ear pain or changes in hearing.  NOSE: No congestion or rhinorrhea.  MOUTH & THROAT: No hoarseness, change in voice, or sore throat.  NODES: Denies swollen glands.  CHEST: Denies URBINA, cyanosis, wheezing, cough and sputum production.  CARDIOVASCULAR:  Above  ABDOMEN: No nausea, vomiting, or changes in bowel function.  URINARY: No flank pain, dysuria or hematuria.  PERIPHERAL VASCULAR: No claudication or cyanosis.  MUSCULOSKELETAL:  Above  NEUROLOGIC: No weakness or numbness.    Vital signs reviewed  PE:   APPEARANCE: Well nourished, well developed, in no acute distress.    HEAD: Normocephalic, atraumatic.  EYES: PERRL. EOMI.   Conjunctivae noninjected.  EARS: TM's intact. Light reflex normal. No retraction or perforation  NOSE: Mucosa pink. Airway clear.  MOUTH & THROAT: No tonsillar enlargement. No pharyngeal erythema or exudate.   NECK: Supple with no cervical lymphadenopathy.  No carotid bruits.  No thyromegaly    CHEST: Good inspiratory effort. Lungs clear to auscultation with no wheezes or crackles.  Mild left-sided costochondral junction tenderness to palpation, seems similar to her symptom of complaint  CARDIOVASCULAR: Normal S1, S2. No rubs, murmurs, or gallops.  ABDOMEN: Bowel sounds normal. Not distended. Soft. No tenderness or masses. No organomegaly.  EXTREMITIES: No edema, cyanosis, or clubbing.  MSK:  Right shoulder exam demonstrates negative painful arc.  Positive empty can and resisted external rotation test and subscapularis lift-off test.  Positive Neer's and Bush impingement testing.    IMPRESSION  1. Chest pain, unspecified type    2. IFG (impaired fasting glucose)    3. Preop examination    4. Encounter for hepatitis C screening test for low risk patient    5. Needs flu shot    6. Right  shoulder pain, unspecified chronicity            PLAN  Chest pain likely from costochondritis, worsened by sitting and lying down position in bed.  Advise posture change, chest wall stretches.  Low suspicion for cardiac cause    Preop exam, otherwise normal.  She is medically stable for her upcoming abdominoplasty surgery.  Her surgeon has requested several preop tests which have been ordered as below  EKG independently reviewed in the office, demonstrates:  Normal sinus rhythm. Normal axis and intervals.  No acute ST or T-wave change    Shoulder pain, likely rotator cuff tendinitis.  Advise home exercise program which has been provided.  Notify for significantly worsening symptoms    In addition, will follow-up IFG with A1c testing repeat.  Also needs hepatitis-C screening which has been ordered with her labs    Flu shot also needed, provided     Will fax study results and notes to Dr. Mio Arriaga ([phone])    Orders Placed This Encounter   Procedures    X-Ray Chest PA And Lateral    Influenza - Quadrivalent (3 years & older) (PF)    CBC auto differential    Comprehensive metabolic panel    Hemoglobin A1c    Urinalysis    Hepatitis C antibody    EKG 12-lead         SCREENINGS  Colonoscopy 2010. Normal, repeat in 10 years     GYN: Dr. Christianson , pap smear November 2016       Mammogram 01/11/2018     Immunizations    flu   today  Tetanus  2017   Advise that she can get zoster vaccine at pharmacy

## 2018-11-27 ENCOUNTER — HOSPITAL ENCOUNTER (OUTPATIENT)
Dept: PREADMISSION TESTING | Facility: HOSPITAL | Age: 63
Discharge: HOME | End: 2018-11-27
Attending: PLASTIC SURGERY

## 2018-11-27 VITALS
SYSTOLIC BLOOD PRESSURE: 123 MMHG | WEIGHT: 173.38 LBS | BODY MASS INDEX: 27.86 KG/M2 | RESPIRATION RATE: 17 BRPM | TEMPERATURE: 98 F | HEART RATE: 99 BPM | DIASTOLIC BLOOD PRESSURE: 87 MMHG | OXYGEN SATURATION: 100 % | HEIGHT: 66 IN

## 2018-11-27 NOTE — DISCHARGE INSTRUCTIONS
"Your procedure  is scheduled for _THURSDAY NOV. 29, 2018_________.    Call [phone] between 2pm and 5pm on _WEDNESDAY__ NOV. 28, 2018____to find out your arrival time for the day of surgery.    Report to Same Day Surgery Unit at ____ AM on the 2nd floor of the hospital.  Use the front entrance of the hospital.  The front doors of the hospital open promptly at 5:30am.  If you need wheelchair assistance, call [phone] from your cell phone, or call "0" from the courtesy phone in the lobby.    Important instructions:   Do not eat or drink after 12 midnight, including water.  It is okay to brush your teeth.                                                                                                                                                                                                            Do not have gum, candy or mints.    TAKE  ZANTAC  WITH A SIP OF WATER THE AM OF SURGERY      Stop taking Aspirin, Ibuprofen, Motrin and Aleve , Fish oil, and Vitamin E for at least 7 days before your surgery.                                            You may use Tylenol unless otherwise instructed by your doctor.         Prep instructions:    SHOWER   OTHER_____________     Please shower the night before and the morning of your surgery.      Use Hibiclens soap to your surgery site if instructed by your pre op nurse.   If your surgery is on your abdomen, be sure to wash your naval.  Be sure to rinse off Hibiclens after it is on your skin for several minutes.  Do not use Hibiclens on your face or genitals. Please place clean linens on your bed the night before surgery. Please wear fresh clean clothing after each shower.     No shaving of procedural area at least 4-5 days before surgery due to increased risk of skin irritation and/or possible infection.      You may be asked to take a third shower on arrival to Same Day Surgery depending on the type of surgery you are having.     Do not wear make- up, " including mascara.     You may wear deodorant only.      Do not wear powder, body lotion or perfume/cologne.     Do not wear any jewelry or have any metal on your body.     You will be asked to remove any dentures or partials for the procedure.     Wear loose fitting clothes allowing for bandages.     Please leave money and valuables home.       You may bring your cell phone.     Call the doctor if fever or illness should occur before your surgery.    Call [phone] to contact us here if needed.

## 2018-11-29 ENCOUNTER — ANESTHESIA EVENT (OUTPATIENT)
Dept: SURGERY | Facility: HOSPITAL | Age: 63
End: 2018-11-29

## 2018-11-29 ENCOUNTER — HOSPITAL ENCOUNTER (OUTPATIENT)
Facility: HOSPITAL | Age: 63
Discharge: HOME | End: 2018-11-30
Attending: PLASTIC SURGERY | Admitting: PLASTIC SURGERY

## 2018-11-29 ENCOUNTER — ANESTHESIA (OUTPATIENT)
Dept: SURGERY | Facility: HOSPITAL | Age: 63
End: 2018-11-29

## 2018-11-29 DIAGNOSIS — E65 LOCALIZED ADIPOSITY OF ABDOMEN: Primary | ICD-10-CM

## 2018-11-29 PROCEDURE — 25000003 PHARM REV CODE 250: Performed by: NURSE ANESTHETIST, CERTIFIED REGISTERED

## 2018-11-29 PROCEDURE — C1729 CATH, DRAINAGE: HCPCS | Performed by: PLASTIC SURGERY

## 2018-11-29 PROCEDURE — 71000033 HC RECOVERY, INTIAL HOUR: Performed by: PLASTIC SURGERY

## 2018-11-29 PROCEDURE — D9220A PRA ANESTHESIA: Mod: ,,, | Performed by: ANESTHESIOLOGY

## 2018-11-29 PROCEDURE — 27201423 OPTIME MED/SURG SUP & DEVICES STERILE SUPPLY: Performed by: PLASTIC SURGERY

## 2018-11-29 PROCEDURE — 27800903 OPTIME MED/SURG SUP & DEVICES OTHER IMPLANTS: Performed by: PLASTIC SURGERY

## 2018-11-29 PROCEDURE — 37000008 HC ANESTHESIA 1ST 15 MINUTES: Performed by: PLASTIC SURGERY

## 2018-11-29 PROCEDURE — 25000003 PHARM REV CODE 250: Performed by: PLASTIC SURGERY

## 2018-11-29 PROCEDURE — 36000707: Performed by: PLASTIC SURGERY

## 2018-11-29 PROCEDURE — 63600175 PHARM REV CODE 636 W HCPCS: Performed by: NURSE ANESTHETIST, CERTIFIED REGISTERED

## 2018-11-29 PROCEDURE — 37000009 HC ANESTHESIA EA ADD 15 MINS: Performed by: PLASTIC SURGERY

## 2018-11-29 PROCEDURE — 36000706: Performed by: PLASTIC SURGERY

## 2018-11-29 PROCEDURE — 63600175 PHARM REV CODE 636 W HCPCS: Performed by: ANESTHESIOLOGY

## 2018-11-29 PROCEDURE — S0020 INJECTION, BUPIVICAINE HYDRO: HCPCS | Performed by: PLASTIC SURGERY

## 2018-11-29 PROCEDURE — 63600175 PHARM REV CODE 636 W HCPCS: Performed by: PLASTIC SURGERY

## 2018-11-29 PROCEDURE — 71000039 HC RECOVERY, EACH ADD'L HOUR: Performed by: PLASTIC SURGERY

## 2018-11-29 PROCEDURE — 27100025 HC TUBING, SET FLUID WARMER: Performed by: NURSE ANESTHETIST, CERTIFIED REGISTERED

## 2018-11-29 RX ORDER — PROPOFOL 10 MG/ML
VIAL (ML) INTRAVENOUS
Status: DISCONTINUED | OUTPATIENT
Start: 2018-11-29 | End: 2018-11-29

## 2018-11-29 RX ORDER — ONDANSETRON 8 MG/1
8 TABLET, ORALLY DISINTEGRATING ORAL EVERY 8 HOURS PRN
Status: DISCONTINUED | OUTPATIENT
Start: 2018-11-29 | End: 2018-11-30

## 2018-11-29 RX ORDER — HYDROCODONE BITARTRATE AND ACETAMINOPHEN 5; 325 MG/1; MG/1
1 TABLET ORAL EVERY 4 HOURS PRN
Status: DISCONTINUED | OUTPATIENT
Start: 2018-11-29 | End: 2018-11-30

## 2018-11-29 RX ORDER — FENTANYL CITRATE 50 UG/ML
INJECTION, SOLUTION INTRAMUSCULAR; INTRAVENOUS
Status: DISCONTINUED | OUTPATIENT
Start: 2018-11-29 | End: 2018-11-29

## 2018-11-29 RX ORDER — LIDOCAINE HCL/PF 100 MG/5ML
SYRINGE (ML) INTRAVENOUS
Status: DISCONTINUED | OUTPATIENT
Start: 2018-11-29 | End: 2018-11-29

## 2018-11-29 RX ORDER — LIDOCAINE HYDROCHLORIDE 10 MG/ML
INJECTION INFILTRATION; PERINEURAL
Status: DISCONTINUED | OUTPATIENT
Start: 2018-11-29 | End: 2018-11-29 | Stop reason: HOSPADM

## 2018-11-29 RX ORDER — SODIUM CHLORIDE 9 MG/ML
INJECTION, SOLUTION INTRAVENOUS CONTINUOUS
Status: DISCONTINUED | OUTPATIENT
Start: 2018-11-29 | End: 2018-11-30

## 2018-11-29 RX ORDER — SODIUM CHLORIDE 9 MG/ML
INJECTION, SOLUTION INTRAVENOUS CONTINUOUS PRN
Status: DISCONTINUED | OUTPATIENT
Start: 2018-11-29 | End: 2018-11-29

## 2018-11-29 RX ORDER — GLYCOPYRROLATE 0.2 MG/ML
INJECTION INTRAMUSCULAR; INTRAVENOUS
Status: DISCONTINUED | OUTPATIENT
Start: 2018-11-29 | End: 2018-11-29

## 2018-11-29 RX ORDER — PHENYLEPHRINE HYDROCHLORIDE 10 MG/ML
INJECTION INTRAVENOUS
Status: DISCONTINUED | OUTPATIENT
Start: 2018-11-29 | End: 2018-11-29

## 2018-11-29 RX ORDER — ACETAMINOPHEN 10 MG/ML
INJECTION, SOLUTION INTRAVENOUS
Status: DISCONTINUED | OUTPATIENT
Start: 2018-11-29 | End: 2018-11-29

## 2018-11-29 RX ORDER — SODIUM CHLORIDE, SODIUM LACTATE, POTASSIUM CHLORIDE, CALCIUM CHLORIDE 600; 310; 30; 20 MG/100ML; MG/100ML; MG/100ML; MG/100ML
INJECTION, SOLUTION INTRAVENOUS CONTINUOUS PRN
Status: DISCONTINUED | OUTPATIENT
Start: 2018-11-29 | End: 2018-11-29

## 2018-11-29 RX ORDER — HYDROMORPHONE HYDROCHLORIDE 2 MG/ML
0.2 INJECTION, SOLUTION INTRAMUSCULAR; INTRAVENOUS; SUBCUTANEOUS EVERY 5 MIN PRN
Status: DISCONTINUED | OUTPATIENT
Start: 2018-11-29 | End: 2018-11-29 | Stop reason: HOSPADM

## 2018-11-29 RX ORDER — DEXAMETHASONE SODIUM PHOSPHATE 4 MG/ML
INJECTION, SOLUTION INTRA-ARTICULAR; INTRALESIONAL; INTRAMUSCULAR; INTRAVENOUS; SOFT TISSUE
Status: DISCONTINUED | OUTPATIENT
Start: 2018-11-29 | End: 2018-11-29

## 2018-11-29 RX ORDER — ONDANSETRON 2 MG/ML
INJECTION INTRAMUSCULAR; INTRAVENOUS
Status: DISCONTINUED | OUTPATIENT
Start: 2018-11-29 | End: 2018-11-29

## 2018-11-29 RX ORDER — CEFAZOLIN SODIUM 2 G/50ML
2 SOLUTION INTRAVENOUS
Status: COMPLETED | OUTPATIENT
Start: 2018-11-29 | End: 2018-11-29

## 2018-11-29 RX ORDER — BUPIVACAINE HYDROCHLORIDE AND EPINEPHRINE 2.5; 5 MG/ML; UG/ML
INJECTION, SOLUTION EPIDURAL; INFILTRATION; INTRACAUDAL; PERINEURAL
Status: DISCONTINUED | OUTPATIENT
Start: 2018-11-29 | End: 2018-11-29 | Stop reason: HOSPADM

## 2018-11-29 RX ORDER — NEOSTIGMINE METHYLSULFATE 1 MG/ML
INJECTION, SOLUTION INTRAVENOUS
Status: DISCONTINUED | OUTPATIENT
Start: 2018-11-29 | End: 2018-11-29

## 2018-11-29 RX ORDER — BUPIVACAINE HYDROCHLORIDE 5 MG/ML
INJECTION, SOLUTION EPIDURAL; INTRACAUDAL
Status: DISCONTINUED | OUTPATIENT
Start: 2018-11-29 | End: 2018-11-29 | Stop reason: HOSPADM

## 2018-11-29 RX ORDER — ENOXAPARIN SODIUM 100 MG/ML
30 INJECTION SUBCUTANEOUS EVERY 24 HOURS
Status: DISCONTINUED | OUTPATIENT
Start: 2018-11-29 | End: 2018-11-30

## 2018-11-29 RX ORDER — MIDAZOLAM HYDROCHLORIDE 1 MG/ML
INJECTION, SOLUTION INTRAMUSCULAR; INTRAVENOUS
Status: DISCONTINUED | OUTPATIENT
Start: 2018-11-29 | End: 2018-11-29

## 2018-11-29 RX ORDER — EPINEPHRINE 1 MG/ML
INJECTION, SOLUTION INTRACARDIAC; INTRAMUSCULAR; INTRAVENOUS; SUBCUTANEOUS
Status: DISCONTINUED | OUTPATIENT
Start: 2018-11-29 | End: 2018-11-29 | Stop reason: HOSPADM

## 2018-11-29 RX ORDER — FENTANYL CITRATE 50 UG/ML
25 INJECTION, SOLUTION INTRAMUSCULAR; INTRAVENOUS EVERY 5 MIN PRN
Status: DISCONTINUED | OUTPATIENT
Start: 2018-11-29 | End: 2018-11-29 | Stop reason: HOSPADM

## 2018-11-29 RX ORDER — ROCURONIUM BROMIDE 10 MG/ML
INJECTION, SOLUTION INTRAVENOUS
Status: DISCONTINUED | OUTPATIENT
Start: 2018-11-29 | End: 2018-11-29

## 2018-11-29 RX ORDER — BACITRACIN 50000 [IU]/1
INJECTION, POWDER, FOR SOLUTION INTRAMUSCULAR
Status: DISCONTINUED | OUTPATIENT
Start: 2018-11-29 | End: 2018-11-29 | Stop reason: HOSPADM

## 2018-11-29 RX ORDER — SODIUM CHLORIDE 0.9 % (FLUSH) 0.9 %
3 SYRINGE (ML) INJECTION
Status: DISCONTINUED | OUTPATIENT
Start: 2018-11-29 | End: 2018-11-29 | Stop reason: HOSPADM

## 2018-11-29 RX ADMIN — HYDROMORPHONE HYDROCHLORIDE 0.2 MG: 2 INJECTION, SOLUTION INTRAMUSCULAR; INTRAVENOUS; SUBCUTANEOUS at 12:11

## 2018-11-29 RX ADMIN — PHENYLEPHRINE HYDROCHLORIDE 100 MCG: 10 INJECTION INTRAVENOUS at 08:11

## 2018-11-29 RX ADMIN — SODIUM CHLORIDE: 0.9 INJECTION, SOLUTION INTRAVENOUS at 10:11

## 2018-11-29 RX ADMIN — SODIUM CHLORIDE, SODIUM LACTATE, POTASSIUM CHLORIDE, AND CALCIUM CHLORIDE: .6; .31; .03; .02 INJECTION, SOLUTION INTRAVENOUS at 11:11

## 2018-11-29 RX ADMIN — PROPOFOL 5 MG: 10 INJECTION, EMULSION INTRAVENOUS at 09:11

## 2018-11-29 RX ADMIN — PHENYLEPHRINE HYDROCHLORIDE 200 MCG: 10 INJECTION INTRAVENOUS at 09:11

## 2018-11-29 RX ADMIN — MIDAZOLAM HYDROCHLORIDE 2 MG: 1 INJECTION, SOLUTION INTRAMUSCULAR; INTRAVENOUS at 07:11

## 2018-11-29 RX ADMIN — HYDROCODONE BITARTRATE AND ACETAMINOPHEN 1 TABLET: 5; 325 TABLET ORAL at 01:11

## 2018-11-29 RX ADMIN — LIDOCAINE HYDROCHLORIDE 100 MG: 20 INJECTION, SOLUTION INTRAVENOUS at 07:11

## 2018-11-29 RX ADMIN — FENTANYL CITRATE 25 MCG: 50 INJECTION INTRAMUSCULAR; INTRAVENOUS at 12:11

## 2018-11-29 RX ADMIN — FENTANYL CITRATE 100 MCG: 50 INJECTION INTRAMUSCULAR; INTRAVENOUS at 07:11

## 2018-11-29 RX ADMIN — PHENYLEPHRINE HYDROCHLORIDE 100 MCG: 10 INJECTION INTRAVENOUS at 10:11

## 2018-11-29 RX ADMIN — PHENYLEPHRINE HYDROCHLORIDE 100 MCG: 10 INJECTION INTRAVENOUS at 07:11

## 2018-11-29 RX ADMIN — DEXAMETHASONE SODIUM PHOSPHATE 4 MG: 4 INJECTION, SOLUTION INTRAMUSCULAR; INTRAVENOUS at 08:11

## 2018-11-29 RX ADMIN — PROPOFOL 150 MG: 10 INJECTION, EMULSION INTRAVENOUS at 07:11

## 2018-11-29 RX ADMIN — FENTANYL CITRATE 50 MCG: 50 INJECTION INTRAMUSCULAR; INTRAVENOUS at 08:11

## 2018-11-29 RX ADMIN — ENOXAPARIN SODIUM 30 MG: 30 INJECTION, SOLUTION INTRAVENOUS; SUBCUTANEOUS at 06:11

## 2018-11-29 RX ADMIN — SODIUM CHLORIDE: 0.9 INJECTION, SOLUTION INTRAVENOUS at 07:11

## 2018-11-29 RX ADMIN — ROCURONIUM BROMIDE 50 MG: 10 INJECTION, SOLUTION INTRAVENOUS at 07:11

## 2018-11-29 RX ADMIN — GLYCOPYRROLATE 0.4 MG: 0.2 INJECTION, SOLUTION INTRAMUSCULAR; INTRAVENOUS at 11:11

## 2018-11-29 RX ADMIN — CEFAZOLIN SODIUM 2 G: 2 SOLUTION INTRAVENOUS at 07:11

## 2018-11-29 RX ADMIN — SODIUM CHLORIDE, SODIUM LACTATE, POTASSIUM CHLORIDE, AND CALCIUM CHLORIDE: .6; .31; .03; .02 INJECTION, SOLUTION INTRAVENOUS at 07:11

## 2018-11-29 RX ADMIN — ACETAMINOPHEN 1000 MG: 10 INJECTION, SOLUTION INTRAVENOUS at 08:11

## 2018-11-29 RX ADMIN — NEOSTIGMINE METHYLSULFATE 2.5 MG: 1 INJECTION INTRAVENOUS at 11:11

## 2018-11-29 RX ADMIN — ONDANSETRON 4 MG: 2 INJECTION, SOLUTION INTRAMUSCULAR; INTRAVENOUS at 11:11

## 2018-11-29 NOTE — INTERVAL H&P NOTE
The patient has been examined and the H&P has been reviewed:    I concur with the findings and no changes have occurred since H&P was written.11/16/18    Anesthesia/Surgery risks, benefits and alternative options discussed and understood by patient/family.          Active Hospital Problems    Diagnosis  POA    Localized adiposity of abdomen [E65]  Yes      Resolved Hospital Problems   No resolved problems to display.

## 2018-11-29 NOTE — TRANSFER OF CARE
"Anesthesia Transfer of Care Note    Patient: Jaja Toledo    Procedure(s) Performed: Procedure(s) (LRB):  EXTENDED ABDOMINOPLASTY (N/A)  LIPOSUCTION, WITH FAT TRANSFER TO BUTTOCKS (N/A)    Patient location: PACU    Anesthesia Type: general    Transport from OR: Transported from OR on room air with adequate spontaneous ventilation    Post pain: adequate analgesia    Post assessment: no apparent anesthetic complications    Post vital signs: stable    Level of consciousness: sedated and responds to stimulation    Nausea/Vomiting: no nausea/vomiting    Complications: none    Transfer of care protocol was followed      Last vitals:   Visit Vitals  /70 (BP Location: Right arm, Patient Position: Lying)   Pulse 84   Temp 36.1 °C (97 °F) (Oral)   Resp 10   Ht 5' 6" (1.676 m)   Wt 78.6 kg (173 lb 6 oz)   SpO2 (!) 94%   Breastfeeding? No   BMI 27.98 kg/m²     "

## 2018-11-29 NOTE — BRIEF OP NOTE
[facility]  Brief Operative Note     SUMMARY     Surgery Date: 11/29/2018     Surgeon(s) and Role:     * Mio Arriaga MD - Primary    Assisting Surgeon: None    Pre-op Diagnosis:  Localized adiposity of abdomen [E65]    Post-op Diagnosis:  Post-Op Diagnosis Codes:     * Localized adiposity of abdomen [E65]    Procedure(s) (LRB):  EXTENDED ABDOMINOPLASTY (N/A)  LIPOSUCTION, WITH FAT TRANSFER TO BUTTOCKS (N/A)    Anesthesia: General    Description of the findings of the procedure: localized adiposity abdomen    Findings/Key Components: excess of skin abdomen    Estimated Blood Loss: 50 mL         Specimens:   Specimen (12h ago, onward)    None          Discharge Note    SUMMARY     Admit Date: 11/29/2018    Discharge Date and Time:  11/29/2018 11:48 AM    Hospital Course (synopsis of major diagnoses, care, treatment, and services provided during the course of the hospital stay):      Final Diagnosis: Post-Op Diagnosis Codes:     * Localized adiposity of abdomen [E65]    Disposition: Home or Self Care    Follow Up/Patient Instructions:     Medications:  Reconciled Home Medications:      Medication List      ASK your doctor about these medications    cyproheptadine 4 mg tablet  Commonly known as:  PERIACTIN  Take 1 tablet (4 mg total) by mouth 3 (three) times daily as needed (decreased appetite).     hydrOXYzine HCl 25 MG tablet  Commonly known as:  ATARAX  TAKE ONE EVERY NIGHT AS NEEDED FOR ITCHING     multivitamin capsule  Take 1 capsule by mouth once daily.     ranitidine 150 MG tablet  Commonly known as:  ZANTAC  Take 1 tablet (150 mg total) by mouth daily as needed for Heartburn.          No discharge procedures on file.

## 2018-11-29 NOTE — ANESTHESIA PREPROCEDURE EVALUATION
11/29/2018  Jaja Toledo is a 63 y.o., female.    Anesthesia Evaluation     I have reviewed the Nursing Notes.      Review of Systems  Anesthesia Hx:  No problems with previous Anesthesia   Social:  Former Smoker    Hematology/Oncology:         -- Cancer in past history:   Cardiovascular:  Cardiovascular Normal Exercise tolerance: good     Pulmonary:  Pulmonary Normal    Hepatic/GI:   No Bowel Prep. Denies PUD. GERD Denies Liver Disease. Denies Hepatitis. gastritis   Neurological:  Neurology Normal    Endocrine:  Endocrine Normal        Physical Exam  General:  Well nourished    Airway/Jaw/Neck:  AIRWAY FINDINGS: Normal      Chest/Lungs:  Chest/Lungs Clear    Heart/Vascular:  Heart Findings: Normal       Mental Status:  Mental Status Findings: Normal        Anesthesia Plan  Type of Anesthesia, risks & benefits discussed:  Anesthesia Type:  general  Patient's Preference:   Intra-op Monitoring Plan: standard ASA monitors  Intra-op Monitoring Plan Comments:   Post Op Pain Control Plan:   Post Op Pain Control Plan Comments:   Induction:   IV  Beta Blocker:  Patient is not currently on a Beta-Blocker (No further documentation required).       Informed Consent: Patient understands risks and agrees with Anesthesia plan.  Questions answered. Anesthesia consent signed with patient.  ASA Score: 2     Day of Surgery Review of History & Physical:  There are no significant changes.  H&P update referred to the provider.         Ready For Surgery From Anesthesia Perspective.

## 2018-11-29 NOTE — OP NOTE
DATE OF PROCEDURE:  11/29/2018.    SURGEON:  Mio Arriaga M.D.    PREOPERATIVE DIAGNOSIS:  Localized adiposity, lower abdomen and upper back.    POSTOPERATIVE DIAGNOSIS:  Localized adiposity, lower abdomen and upper back.    PROCEDURE:  Extended abdominoplasty with liposuction to the upper back and fat   transfer to the gluteal area.    ANESTHESIA:  General endotracheal anesthesia.    INDICATIONS:  This is a case a 63-year-old female patient with history of   abdominoplasty like 15 years ago, presenting with excess of skin also in the   back and some excess fat in the upper back, requesting liposuction and then do   the flat transfer to the gluteal area.  I advised the patient that we are going   to take all the fat that we can and then put it in there and also we are going   to do extended abdominoplasty.  Operative procedure as well as complications   were explained to the patient who understood and signed the consent.    PROCEDURE IN DETAIL:  Under sterile condition, general endotracheal anesthesia,   after preparation in the usual fashion, starting in the prone position,   infiltration of tumescent technique was done in the upper back, about 1200 mL.    After that, the fat was centrifuged and then about 280 mL on each side was   injected to both gluteal areas without any complication.  The stab incision was   closed with 4-0 nylon.  Then, liposuction incisions were closed with 4-0   Monocryl and 4-0 nylon.  Then, excision of the skin starting in the back was   done back to the muscle.  The area was irrigated and coagulation of the bleeders   and closure in two layers using 2-0 Monocryl dermis and 3-0 Monocryl   subcuticular, Dermabond was placed.  Then the patient was turned around in   supine position and then the incision around the belly button was done in the   previous scar and then the horizontal incision was made connecting the lateral   incisions then dissection on top of the fascia was done to the  xiphoid and the   costal margins.  The area was irrigated with bacitracin solution, coagulation of   the bleeders and then plication was done of the rectus muscle with #1 Prolene   horizontal mattress and then running.  Then, 20 mL of 1% lidocaine with   epinephrine and 0.25% Marcaine was injected, 10 mL in each side of the rectus   muscle and then On-Q pump was this put under the fascia.  Two #19 Blakes were   placed over the suprapubic stab incision and anchored with 2-0 nylon.  Then, the   patient was flexed and excessive skin was removed.  The area was irrigated   again with bacitracin solution, coagulation of the bleeders, and then closure of   Aarti with a 2-0 Monocryl and then dermis with 3-0 Monocryl and then   subcuticular with 3-0 Monocryl, then Dermabond was applied in the horizontal   incision, and then a horizontal incision was done about 10 cm from the pubic   bone to bring the navel to the surface, anchored with a 3-0 Monocryl and then   5-0 nylon, bacitracin ointment, Xeroform gauze to the navel and then Dermabond   tape to the horizontal incision and Biopatch to the drains.  ABD pad and then   garment was placed.  The patient tolerated the procedure without complication.    Estimated blood loss about 50 mL. The patient received 2 g of Ancef.  The   patient is going to be sent back to [facility] Day Surgery and then wheeled back to   the room for 23 hours of observation.  The patient is going to receive 30 mg of   Lovenox in the Recovery Room and then I am going to follow possible discharge   tomorrow morning.      JRE/IN  dd: 11/29/2018 11:46:26 (CST)  td: 11/29/2018 14:05:02 (CST)  Doc ID   #[id number]  Job ID #[id number]    CC:

## 2018-11-30 VITALS
TEMPERATURE: 98 F | SYSTOLIC BLOOD PRESSURE: 114 MMHG | HEART RATE: 101 BPM | RESPIRATION RATE: 18 BRPM | OXYGEN SATURATION: 97 % | WEIGHT: 173.38 LBS | BODY MASS INDEX: 27.86 KG/M2 | HEIGHT: 66 IN | DIASTOLIC BLOOD PRESSURE: 63 MMHG

## 2018-11-30 NOTE — PROGRESS NOTES
Pt discharge instructions reviewed. Understanding verbalized. Pt discharged from hospital via wheelchair.

## 2018-11-30 NOTE — PROGRESS NOTES
Patient ambulated to bathroom with assistance x2. Able to sit up independently, no complaints of pain. Complains of dizziness with standing and walking. Ambulated to bathroom with assistance. Unable to void at this time. Ambulated back to bed. Patient vomited bile.

## 2018-11-30 NOTE — PLAN OF CARE
Problem: Patient Care Overview  Goal: Plan of Care Review  Pt progressing well. NAD noted. VSS. Pain well controlled. Pt denies nausea. Ambulating and voiding without assistance. POC discussed with pt. Understanding verbalized.

## 2018-12-03 ENCOUNTER — TELEPHONE (OUTPATIENT)
Dept: OBSTETRICS AND GYNECOLOGY | Facility: CLINIC | Age: 63
End: 2018-12-03

## 2018-12-03 NOTE — ANESTHESIA POSTPROCEDURE EVALUATION
"Anesthesia Post Evaluation    Patient: Jaja Toledo    Procedure(s) Performed: Procedure(s) (LRB):  EXTENDED ABDOMINOPLASTY (N/A)  LIPOSUCTION, WITH FAT TRANSFER TO BUTTOCKS (N/A)    Final Anesthesia Type: general  Patient location during evaluation: PACU  Patient participation: Yes- Able to Participate  Level of consciousness: awake and alert  Post-procedure vital signs: reviewed and stable  Pain management: adequate  Airway patency: patent  PONV status at discharge: No PONV  Anesthetic complications: no      Cardiovascular status: hemodynamically stable and blood pressure returned to baseline  Respiratory status: unassisted and spontaneous ventilation  Hydration status: euvolemic  Follow-up not needed.        Visit Vitals  /63   Pulse 101   Temp 36.8 °C (98.2 °F)   Resp 18   Ht 5' 6" (1.676 m)   Wt 78.6 kg (173 lb 6 oz)   SpO2 97%   Breastfeeding? No   BMI 27.98 kg/m²       Pain/Cathy Score: No Data Recorded      "

## 2018-12-03 NOTE — TELEPHONE ENCOUNTER
----- Message from Linda Munson sent at 12/3/2018 12:06 PM CST -----  Contact: pt   Name of Who is Calling: BART VALLE [[id number]]    What is the request in detail:Pateint is needing to reschedule appointment  for 12/3/18 Please contact to further discuss and advise      Can the clinic reply by [facility]: No     [facility] [phone]

## 2018-12-03 NOTE — DISCHARGE SUMMARY
The patient was admitted to 11/29/2018 and discharged on 11/30/2018.  Extended   outpatient.  This morning, she was walking, no complaint, minimal pain.  The   dressing removed, dry and clean.  Advised to apply antibiotic ointment daily to   the belly button, to  the drain output, to continue with previous medication   and diet.  Ambulation, only restriction do not submerge in water.  To continue   with the antibiotics as prescribed in the office and the pain medication.  We   are going to follow her on Wednesday.     FINAL DIAGNOSIS:  Localized adiposity with extended abdominoplasty, liposuction   and fat transfer to the gluteal area.        /marlon [id number] ladarius(s)          WALLY/LEYLA  dd: 11/30/2018 06:38:21 (CST)  td: 12/01/2018 00:34:43 (CST)  Doc ID   #[id number]  Job ID #[id number]    CC:

## 2019-01-11 DIAGNOSIS — Z12.31 ENCOUNTER FOR SCREENING MAMMOGRAM FOR MALIGNANT NEOPLASM OF BREAST: Primary | ICD-10-CM

## 2019-03-11 ENCOUNTER — OFFICE VISIT (OUTPATIENT)
Dept: OBSTETRICS AND GYNECOLOGY | Facility: CLINIC | Age: 64
End: 2019-03-11
Attending: OBSTETRICS & GYNECOLOGY
Payer: COMMERCIAL

## 2019-03-11 VITALS
HEIGHT: 66 IN | SYSTOLIC BLOOD PRESSURE: 104 MMHG | BODY MASS INDEX: 26.47 KG/M2 | WEIGHT: 164.69 LBS | DIASTOLIC BLOOD PRESSURE: 70 MMHG

## 2019-03-11 DIAGNOSIS — Z01.419 WELL WOMAN EXAM WITH ROUTINE GYNECOLOGICAL EXAM: Primary | ICD-10-CM

## 2019-03-11 DIAGNOSIS — Z12.31 VISIT FOR SCREENING MAMMOGRAM: ICD-10-CM

## 2019-03-11 PROCEDURE — 99396 PR PREVENTIVE VISIT,EST,40-64: ICD-10-PCS | Mod: S$GLB,,, | Performed by: OBSTETRICS & GYNECOLOGY

## 2019-03-11 PROCEDURE — 99999 PR PBB SHADOW E&M-EST. PATIENT-LVL III: CPT | Mod: PBBFAC,,, | Performed by: OBSTETRICS & GYNECOLOGY

## 2019-03-11 PROCEDURE — 99396 PREV VISIT EST AGE 40-64: CPT | Mod: S$GLB,,, | Performed by: OBSTETRICS & GYNECOLOGY

## 2019-03-11 PROCEDURE — 99999 PR PBB SHADOW E&M-EST. PATIENT-LVL III: ICD-10-PCS | Mod: PBBFAC,,, | Performed by: OBSTETRICS & GYNECOLOGY

## 2019-03-11 NOTE — PROGRESS NOTES
SUBJECTIVE:   63 y.o. female   for annual routine Pap and checkup. No LMP recorded. Patient has had a hysterectomy..  She has no unusual complaints.        Past Medical History:   Diagnosis Date    Anxiety     Chronic low back pain     Gastritis     GERD (gastroesophageal reflux disease)     History of cervical cancer     History of recurrent urinary tract infection     IFG (impaired fasting glucose)     Soft tissue tumor, malignant     sarcoma left foot     Past Surgical History:   Procedure Laterality Date    BACK SURGERY      BREAST CYST EXCISION Left     EGD (ESOPHAGOGASTRODUODENOSCOPY) N/A 1/10/2014    [facility]    EXTENDED ABDOMINOPLASTY N/A 2018    Performed by [facility]aga [facility] at [facility] OR    FOOT SURGERY   or     HYSTERECTOMY      LIPOSUCTION, WITH FAT TRANSFER TO BUTTOCKS N/A 2018    Performed [facility] at [facility] OR    TOTAL VAGINAL HYSTERECTOMY      TUBAL LIGATION       Social History     Socioeconomic History    Marital status:      Spouse name: Not on file    Number of children: Not on file    Years of education: Not on file    Highest education level: Not on file   Social Needs    Financial resource strain: Not on file    Food insecurity - worry: Not on file    Food insecurity - inability: Not on file    Transportation needs - medical: Not on file    Transportation needs - non-medical: Not on file   Occupational History    Not on file   Tobacco Use    Smoking status: Former Smoker     Packs/day: 0.25     Years: 15.00     Pack years: 3.75     Types: Cigarettes     Last attempt to quit: 8/15/1993     Years since quittin.5    Smokeless tobacco: Never Used   Substance and Sexual Activity    Alcohol use: Yes     Alcohol/week: 0.6 oz     Types: 1 Glasses of wine per week     Comment: seldom    Drug use: No    Sexual activity: Yes     Partners: Male   Other Topics Concern     Not on file   Social History Narrative    Not on file     Family History   Problem Relation Age of Onset    Breast cancer Sister         dx in     No Known Problems Mother     Throat cancer Father     Colon cancer Neg Hx     Diabetes Neg Hx     Hypertension Neg Hx     Ovarian cancer Neg Hx     Stroke Neg Hx      OB History    Para Term  AB Living   3 3 0     3   SAB TAB Ectopic Multiple Live Births           3      # Outcome Date GA Lbr Chris/2nd Weight Sex Delivery Anes PTL Lv   3 Para 94    M    AZIZA   2 Para 10/21/84    F    AZIZA   1 Para 76    F    AZIZA            Current Outpatient Medications   Medication Sig Dispense Refill    calcium carbonate (CALCIUM 500 ORAL) Take by mouth.      cyproheptadine (PERIACTIN) 4 mg tablet Take 1 tablet (4 mg total) by mouth 3 (three) times daily as needed (decreased appetite). 30 tablet 3    multivitamin capsule Take 1 capsule by mouth once daily.        hydrOXYzine HCl (ATARAX) 25 MG tablet TAKE ONE EVERY NIGHT AS NEEDED FOR ITCHING 30 tablet 2    ranitidine (ZANTAC) 150 MG tablet Take 1 tablet (150 mg total) by mouth daily as needed for Heartburn. 30 tablet 2     No current facility-administered medications for this visit.      Allergies: No known drug allergies     ROS:  Constitutional: no weight loss, weight gain, fever, fatigue  Eyes:  No vision changes, glasses/contacts  ENT/Mouth: No ulcers, sinus problems, ears ringing, headache  Cardiovascular: No inability to lie flat, chest pain, exercise intolerance, swelling, heart palpitations  Respiratory: No wheezing, coughing blood, shortness of breath, or cough  Gastrointestinal: No diarrhea, bloody stool, nausea/vomiting, constipation, gas, hemorrhoids  Genitourinary: No blood in urine, painful urination, urgency of urination, frequency of urination, incomplete emptying, incontinence, abnormal bleeding, painful periods, heavy periods, vaginal discharge, vaginal odor, painful  "intercourse, sexual problems, bleeding after intercourse.  Musculoskeletal: No muscle weakness  Skin/Breast: No painful breasts, nipple discharge, masses, rash, ulcers  Neurological: No passing out, seizures, numbness, headache  Endocrine: No diabetes, hypothyroid, hyperthyroid, hot flashes, hair loss, abnormal hair growth, ance  Psychiatric: No depression, crying  Hematologic: No bruises, bleeding, swollen lymph nodes, anemia.      OBJECTIVE:   The patient appears well, alert, oriented x 3, in no distress.  /70   Ht 5' 6" (1.676 m)   Wt 74.7 kg (164 lb 10.9 oz)   BMI 26.58 kg/m²   NECK: no thyromegaly, trachea midline  SKIN: no acne, striae, hirsutism  BREAST EXAM: breasts appear normal, no suspicious masses, no skin or nipple changes or axillary nodes  ABDOMEN: no hernias, masses, or hepatosplenomegaly  GENITALIA: normal external genitalia, no erythema, no discharge  URETHRA: normal urethra, normal urethral meatus  VAGINA: Normal  CERVIX: absent  UTERUS: uterus absent  ADNEXA: normal adnexa and no mass, fullness, tenderness    \  ASSESSMENT:   .Jaja was seen today for well woman.    Diagnoses and all orders for this visit:    Well woman exam with routine gynecological exam    Visit for screening mammogram  -     Mammo Digital Screening Bilat; Future      "

## 2019-03-18 ENCOUNTER — HOSPITAL ENCOUNTER (OUTPATIENT)
Dept: RADIOLOGY | Facility: OTHER | Age: 64
Discharge: HOME | End: 2019-03-18
Attending: OBSTETRICS & GYNECOLOGY
Payer: COMMERCIAL

## 2019-03-18 DIAGNOSIS — Z12.31 VISIT FOR SCREENING MAMMOGRAM: ICD-10-CM

## 2019-03-18 PROCEDURE — 77067 SCR MAMMO BI INCL CAD: CPT | Mod: 26,,, | Performed by: RADIOLOGY

## 2019-03-18 PROCEDURE — 77063 MAMMO DIGITAL SCREENING BILAT WITH TOMOSYNTHESIS_CAD: ICD-10-PCS | Mod: 26,,, | Performed by: RADIOLOGY

## 2019-03-18 PROCEDURE — 77067 MAMMO DIGITAL SCREENING BILAT WITH TOMOSYNTHESIS_CAD: ICD-10-PCS | Mod: 26,,, | Performed by: RADIOLOGY

## 2019-03-18 PROCEDURE — 77067 SCR MAMMO BI INCL CAD: CPT | Mod: TC

## 2019-03-18 PROCEDURE — 77063 BREAST TOMOSYNTHESIS BI: CPT | Mod: 26,,, | Performed by: RADIOLOGY

## 2019-03-21 ENCOUNTER — TELEPHONE (OUTPATIENT)
Dept: RADIOLOGY | Facility: OTHER | Age: 64
End: 2019-03-21

## 2019-03-21 NOTE — TELEPHONE ENCOUNTER
Called the patient to review abnormal mammogram and recommendations. No answer, left voicemail to return call.

## 2019-04-03 ENCOUNTER — TELEPHONE (OUTPATIENT)
Dept: OBSTETRICS AND GYNECOLOGY | Facility: CLINIC | Age: 64
End: 2019-04-03

## 2019-04-03 NOTE — TELEPHONE ENCOUNTER
----- Message from Yazmin Suarez LPN sent at 4/3/2019  1:09 PM CDT -----      ----- Message -----  From: Lenard Perez  Sent: 4/3/2019  11:14 AM  To: [facility] Staff    Pt returning  phone call. Pt can be reached at [phone].

## 2019-04-04 NOTE — TELEPHONE ENCOUNTER
----- Message from Yazmin Suarez LPN sent at 4/3/2019  4:51 PM CDT -----  Pt returning Dr. Christianson phone call. Pt can be reached at [phone].

## 2019-04-05 ENCOUNTER — TELEPHONE (OUTPATIENT)
Dept: RADIOLOGY | Facility: OTHER | Age: 64
End: 2019-04-05

## 2019-04-11 ENCOUNTER — HOSPITAL ENCOUNTER (OUTPATIENT)
Dept: RADIOLOGY | Facility: OTHER | Age: 64
Discharge: HOME | End: 2019-04-11
Attending: OBSTETRICS & GYNECOLOGY
Payer: COMMERCIAL

## 2019-04-11 DIAGNOSIS — R92.8 ABNORMAL MAMMOGRAM: ICD-10-CM

## 2019-04-11 PROCEDURE — 77061 MAMMO DIGITAL DIAGNOSTIC RIGHT WITH TOMOSYNTHESIS_CAD: ICD-10-PCS | Mod: 26,,, | Performed by: RADIOLOGY

## 2019-04-11 PROCEDURE — 76642 ULTRASOUND BREAST LIMITED: CPT | Mod: TC,RT

## 2019-04-11 PROCEDURE — 77065 DX MAMMO INCL CAD UNI: CPT | Mod: 26,,, | Performed by: RADIOLOGY

## 2019-04-11 PROCEDURE — 76642 ULTRASOUND BREAST LIMITED: CPT | Mod: 26,RT,, | Performed by: RADIOLOGY

## 2019-04-11 PROCEDURE — 77065 MAMMO DIGITAL DIAGNOSTIC RIGHT WITH TOMOSYNTHESIS_CAD: ICD-10-PCS | Mod: 26,,, | Performed by: RADIOLOGY

## 2019-04-11 PROCEDURE — 77061 BREAST TOMOSYNTHESIS UNI: CPT | Mod: 26,,, | Performed by: RADIOLOGY

## 2019-04-11 PROCEDURE — 77065 DX MAMMO INCL CAD UNI: CPT | Mod: TC

## 2019-04-11 PROCEDURE — 77061 BREAST TOMOSYNTHESIS UNI: CPT | Mod: TC

## 2019-04-11 PROCEDURE — 76642 US BREAST RIGHT LIMITED: ICD-10-PCS | Mod: 26,RT,, | Performed by: RADIOLOGY

## 2019-04-12 ENCOUNTER — TELEPHONE (OUTPATIENT)
Dept: RADIOLOGY | Facility: OTHER | Age: 64
End: 2019-04-12

## 2019-04-12 NOTE — TELEPHONE ENCOUNTER
Called patient to schedule breast biopsy as recommended. No answer, left voicemail to return call.

## 2019-04-15 ENCOUNTER — TELEPHONE (OUTPATIENT)
Dept: RADIOLOGY | Facility: OTHER | Age: 64
End: 2019-04-15

## 2019-04-18 ENCOUNTER — TELEPHONE (OUTPATIENT)
Dept: OBSTETRICS AND GYNECOLOGY | Facility: CLINIC | Age: 64
End: 2019-04-18

## 2019-04-18 NOTE — TELEPHONE ENCOUNTER
Called pt concerning a residual balance due per Marisela Haynes. Pt stated that she spoke to someone in the [facility] department approximately 10 minutes ago. Denies any problems at this time.

## 2019-04-22 ENCOUNTER — HOSPITAL ENCOUNTER (OUTPATIENT)
Dept: RADIOLOGY | Facility: OTHER | Age: 64
Discharge: HOME | End: 2019-04-22
Attending: OBSTETRICS & GYNECOLOGY
Payer: COMMERCIAL

## 2019-04-22 DIAGNOSIS — R92.8 ABNORMAL MAMMOGRAM: ICD-10-CM

## 2019-04-22 PROCEDURE — 19083 US BREAST BIOPSY WITH IMAGING 1ST SITE RIGHT: ICD-10-PCS | Mod: RT,,, | Performed by: RADIOLOGY

## 2019-04-22 PROCEDURE — 88305 TISSUE EXAM BY PATHOLOGIST: CPT | Performed by: PATHOLOGY

## 2019-04-22 PROCEDURE — 88360 TUMOR IMMUNOHISTOCHEM/MANUAL: CPT | Mod: 26,,, | Performed by: PATHOLOGY

## 2019-04-22 PROCEDURE — 25000003 PHARM REV CODE 250: Performed by: OBSTETRICS & GYNECOLOGY

## 2019-04-22 PROCEDURE — 27201068 US BREAST BIOPSY WITH IMAGING 1ST SITE RIGHT

## 2019-04-22 PROCEDURE — 88360 TUMOR IMMUNOHISTOCHEM/MANUAL: CPT | Performed by: PATHOLOGY

## 2019-04-22 PROCEDURE — 88305 TISSUE EXAM BY PATHOLOGIST: CPT | Mod: 26,,, | Performed by: PATHOLOGY

## 2019-04-22 PROCEDURE — 88305 TISSUE SPECIMEN TO PATHOLOGY, RADIOLOGY: ICD-10-PCS | Mod: 26,,, | Performed by: PATHOLOGY

## 2019-04-22 PROCEDURE — 19083 BX BREAST 1ST LESION US IMAG: CPT | Mod: RT,,, | Performed by: RADIOLOGY

## 2019-04-22 PROCEDURE — 88360 TISSUE SPECIMEN TO PATHOLOGY, RADIOLOGY: ICD-10-PCS | Mod: 26,,, | Performed by: PATHOLOGY

## 2019-04-22 RX ORDER — LIDOCAINE HYDROCHLORIDE 10 MG/ML
5 INJECTION INFILTRATION; PERINEURAL ONCE
Status: COMPLETED | OUTPATIENT
Start: 2019-04-22 | End: 2019-04-22

## 2019-04-22 RX ORDER — LIDOCAINE HYDROCHLORIDE AND EPINEPHRINE 20; 10 MG/ML; UG/ML
10 INJECTION, SOLUTION INFILTRATION; PERINEURAL ONCE
Status: COMPLETED | OUTPATIENT
Start: 2019-04-22 | End: 2019-04-22

## 2019-04-22 RX ADMIN — LIDOCAINE HYDROCHLORIDE 5 ML: 10 INJECTION, SOLUTION INFILTRATION; PERINEURAL at 02:04

## 2019-04-22 RX ADMIN — LIDOCAINE HYDROCHLORIDE AND EPINEPHRINE 10 ML: 20; 10 INJECTION, SOLUTION INFILTRATION; PERINEURAL at 02:04

## 2019-04-26 ENCOUNTER — TELEPHONE (OUTPATIENT)
Dept: RADIOLOGY | Facility: OTHER | Age: 64
End: 2019-04-26

## 2019-04-29 ENCOUNTER — TELEPHONE (OUTPATIENT)
Dept: RADIOLOGY | Facility: OTHER | Age: 64
End: 2019-04-29

## 2019-04-29 NOTE — TELEPHONE ENCOUNTER
Second attempt to contact patient to review breast pathology results. No answer, left voicemail to return call.

## 2019-04-30 ENCOUNTER — TELEPHONE (OUTPATIENT)
Dept: RADIOLOGY | Facility: OTHER | Age: 64
End: 2019-04-30

## 2019-04-30 NOTE — TELEPHONE ENCOUNTER
Three unsuccessful attempts to contact patient to review breast pathology results. Called daughter's number on chart, no answer, unable to leave voicemail.

## 2019-04-30 NOTE — TELEPHONE ENCOUNTER
Third attempt to contact patient to review breast pathology results. No answer, left voicemail to return call.

## 2019-05-02 ENCOUNTER — TELEPHONE (OUTPATIENT)
Dept: OBSTETRICS AND GYNECOLOGY | Facility: CLINIC | Age: 64
End: 2019-05-02

## 2019-05-02 NOTE — TELEPHONE ENCOUNTER
----- Message from Hipolito Todd MA sent at 4/30/2019  3:27 PM CDT -----  Contact: Brianna / Mammo / Radiology / ext# [phone]      ----- Message -----  From: Karol Edgar  Sent: 4/30/2019   2:56 PM  To: [facility] Staff    Name of Who is Calling: Christtommy / Mammo Radiology      What is the request in detail:   Brianna with radiology called to advise this office that the pathology reports are in.  Please give a call back at your earliest convenience.  THANKS!    Can the clinic reply by [facility]:  NO      What Number to Call Back: Brianna / Mammo / Radiology /  Ext# [phone]

## 2019-05-07 ENCOUNTER — TELEPHONE (OUTPATIENT)
Dept: RADIOLOGY | Facility: OTHER | Age: 64
End: 2019-05-07

## 2019-05-07 NOTE — H&P (VIEW-ONLY)
History and Physical  [facility]  Department of Surgery    CHIEF COMPLAINT: Right Breast IDC, associated DCIS    REFERRING:  No referring provider defined for this encounter.  Andres Hurtado MD      Subjective:      Jaja Toledo is a 63 y.o. postmenopausal female referred for evaluation of a breast mass. Per chart review, change was first noted on regular annual screening MMG completed on 3/18/19, in which asymmetry was noted in the right breast at the inner position. Follow up diagnostic MMG was completed on 19, at which time a 11 mm high density, irregularly shaped mass with indistinct margins was seen in the right breast at 3 o'clock that correlated with the prior mammogram finding. US subsequently completed showed a 7 x 9 x 7 mm irregularly shaped, hypoechoic mass with spiculated margins in the right breast at 3 o'clock, 2 cm from the nipple. The right axilla was normal. This area of interest was then biopsied on 19 and noted to be invasive ductal carcinoma, grade 3, with associated DCIS, high grade, in comedo and solid patterns. Receptors were ER+ (>95%)/[place]+(>90%), HER2 equivocal (FISH negative), with Ki-67 (20%).    Patient has not noted change on self breast exam. Patient denies nipple discharge. Patient admits to to previous breast biopsy (notes she had a left breast excisional biopsy done in -benign). Patient denies a personal history of breast cancer.    GYN History:  Age of menarche was 17. Age of menopause was in mid-50's. S/p total vaginal hysterectomy (). Patient denies hormonal therapy. Patient is . Age of first live birth was 20.  Patient did not breast feed.    FAMILY history:  Sister: Breast Cancer: dx age: late 30's, now 52  Sister: Breast Cancer: dx age: early 40's, now 56     PMHx:  Former Smoker (quit in )  GERD, gastritis   Hx of cervical cancer, sarcoma of the left foot    Past Medical History:   Diagnosis Date    Anxiety     Chronic low back pain      Gastritis     GERD (gastroesophageal reflux disease)     History of cervical cancer     History of recurrent urinary tract infection     IFG (impaired fasting glucose)     Soft tissue tumor, malignant     sarcoma left foot     Past Surgical History:   Procedure Laterality Date    BACK SURGERY      BREAST CYST EXCISION Left 1984    EGD (ESOPHAGOGASTRODUODENOSCOPY) N/A 1/10/2014    [facility]    EXTENDED ABDOMINOPLASTY N/A 11/29/2018    [facility]    FOOT SURGERY  2009 or 2010    HYSTERECTOMY      LIPOSUCTION, WITH FAT TRANSFER TO BUTTOCKS N/A 11/29/2018    [facility]    TOTAL VAGINAL HYSTERECTOMY  1996    TUBAL LIGATION  1994     Current Outpatient Medications on File Prior to Visit   Medication Sig Dispense Refill    calcium carbonate (CALCIUM 500 ORAL) Take by mouth.      cyproheptadine (PERIACTIN) 4 mg tablet Take 1 tablet (4 mg total) by mouth 3 (three) times daily as needed (decreased appetite). 30 tablet 3    hydrOXYzine HCl (ATARAX) 25 MG tablet TAKE ONE EVERY NIGHT AS NEEDED FOR ITCHING 30 tablet 2    multivitamin capsule Take 1 capsule by mouth once daily.        ranitidine (ZANTAC) 150 MG tablet Take 1 tablet (150 mg total) by mouth daily as needed for Heartburn. 30 tablet 2     No current facility-administered medications on file prior to visit.      Social History     Socioeconomic History    Marital status:      Spouse name: Not on file    Number of children: Not on file    Years of education: Not on file    Highest education level: Not on file   Occupational History    Not on file   Social Needs    Financial resource strain: Not on file    Food insecurity:     Worry: Not on file     Inability: Not on file    Transportation needs:     Medical: Not on file     Non-medical: Not on file   Tobacco Use    Smoking status: Former Smoker     Packs/day: 0.25     Years:  "15.00     Pack years: 3.75     Types: Cigarettes     Last attempt to quit: 8/15/1993     Years since quittin.7    Smokeless tobacco: Never Used   Substance and Sexual Activity    Alcohol use: Yes     Alcohol/week: 0.6 oz     Types: 1 Glasses of wine per week     Comment: seldom    Drug use: No    Sexual activity: Yes     Partners: Male   Lifestyle    Physical activity:     Days per week: Not on file     Minutes per session: Not on file    Stress: Not on file   Relationships    Social connections:     Talks on phone: Not on file     Gets together: Not on file     Attends Christianity service: Not on file     Active member of club or organization: Not on file     Attends meetings of clubs or organizations: Not on file     Relationship status: Not on file   Other Topics Concern    Not on file   Social History Narrative    Not on file     Family History   Problem Relation Age of Onset    Breast cancer Sister         dx in     No Known Problems Mother     Throat cancer Father     Colon cancer Neg Hx     Diabetes Neg Hx     Hypertension Neg Hx     Ovarian cancer Neg Hx     Stroke Neg Hx        Review of Systems  Pertinent items are noted in HPI.       Objective:   BP (!) 147/88 (BP Location: Right arm, Patient Position: Sitting, BP Method: Large (Automatic))   Pulse (!) 116   Ht 5' 6" (1.676 m)   Wt 74.7 kg (164 lb 10.9 oz)   BMI 26.58 kg/m²     General appearance: alert, appears stated age and cooperative  Head: Normocephalic, without obvious abnormality, atraumatic  Neck: no adenopathy and supple, symmetrical, trachea midline  Lungs: normal effort, nonlabored breathing  Breasts: No nipple retraction or dimpling, No nipple discharge or bleeding, No axillary or supraclavicular adenopathy, no palpable masses noted to both breasts bilaterally.   Heart: regular rate and rhythm  Abdomen: soft, non-tender; bowel sounds normal; no masses,  no organomegaly  Extremities: extremities normal, atraumatic, " no cyanosis or edema  Skin: normal, no edema and no lesions noted  Lymph nodes: Cervical, supraclavicular, and axillary nodes normal.  Neurologic: Grossly normal    Radiology review: Images personally reviewed by me in the clinic.  Mammogram: 3/18/19  Films Compared:  Compared to: 01/11/2018 Mammo Digital Screening Bilat with Tomosynthesis_CAD, 12/06/2016 Mammo Digital Screening Bilat with Tomosynthesis_CAD, and 11/30/2015 Mammo Digital Screening Bilat With CAD     Findings:  This procedure was performed using tomosynthesis.  Computer-aided detection was utilized in the interpretation of this examination.  The breasts are heterogeneously dense, which may obscure small masses.      Right  There is an asymmetry seen in the inner region of the right breast on the CC view.      Left  There is no evidence of suspicious masses, calcifications, or other abnormal findings.     Impression:  Right  Asymmetry: Right breast asymmetry at the inner position. Assessment: 0 - Incomplete. Diagnostic Mammogram and/or Ultrasound is recommended.      Left  There is no mammographic evidence of malignancy.     BI-RADS Category:   Overall: 0 - Incomplete: Needs Additional Imaging Evaluation    Mammogram (4/11/19):  Findings:  This procedure was performed using tomosynthesis.  Computer-aided detection was utilized in the interpretation of this examination.  Mammo Digital Diagnostic Right w/ Asif  The breast is heterogeneously dense, which may obscure small masses.     There is an 11 mm high density, irregularly shaped mass with indistinct margins seen in the right breast at 3 o'clock. The mass correlates with the prior mammogram finding.      US Breast Right Limited  There is a 7 mm x 9 mm x 7 mm irregularly shaped, hypoechoic mass with spiculated margins seen in the right breast at 3 o'clock, 2 cm from the nipple. The mass correlates with the prior mammogram finding. The right axilla is normal.      Impression:  Right  Mass: Right breast  7 mm x 9 mm x 7 mm mass at the 3 o'clock position. Assessment: 5 - Highly suggestive of malignancy. Biopsy is recommended.       The findings and recommendations were discussed in detail with the patient.      BI-RADS Category:   Right: 5 - Highly Suggestive of Malignancy  Overall: 5 - Highly Suggestive of Malignancy    Pathology (4/22/19):  Supplemental Diagnosis  FINAL PATHOLOGIC DIAGNOSIS  Right breast at 3 o'clock, core biopsy:  - Invasive ductal carcinoma, [place] Grade 3 (T=3, N=3, M=2), 7mm in greatest dimension  - Ductal carcinoma in-situ (DCIS), high grade, comedo and solid patterns  - No lymphovascular invasion identified on sections examined    Immunohistochemical stains for hormonal receptors are completed on the tumor cells and reveal the following:  Estrogen receptor: Positive; strong nuclear staining over 95% of tumor cells.  Progesterone receptor: Positive; strong nuclear staining in 90% of tumor cells.  Her2: Equivocal (Stain score = 2+). This block will be sent for HER2 analysis by FISH and results will follow in a  supplemental report.  Ki-67: Positive nuclear staining in 20% of tumor cells.  All immunohistochemical stains have satisfactory positive and negative controls.  Supplemental (2):  HER2, BREAST TUMOR, FISH:  RESULT SUMMARY:  -NEGATIVE.  INTERPRETATION:  There is no evidence of HER2 (ERBB2) gene amplification in this tumor sample.    Assessment:      Jaja Toledo is a 63 y.o. postmenopausal female with right breast IDC, associated DCIS.      Plan:    Options for management were discussed with the patient and her family. We reviewed the existing data noting the equivalency of breast conserving surgery with radiation therapy and mastectomy. We also reviewed the guidelines of the National Comprehensive Cancer Network for Stage 1 breast carcinoma. We discussed the need for lumpectomy margins to be negative for carcinoma, the necessity for postoperative radiation therapy after breast  conservation in most cases, the possibility of a failed or false negative sentinel lymph node biopsy and the potential need for complete lymphadenectomy for a failed or positive sentinel lymph node biopsy were fully discussed. In the setting of mastectomy, delayed or immediate reconstruction options are available and were discussed.     In the setting of lumpectomy, radiation therapy would be recommended majority of the time.  The duration and treatment side effects were discussed with the patient.  This will coordinated with the radiation oncologist pending final pathology.    We also discussed the role of systemic therapy in the treatment of early stage breast cancer.  We discussed that this is based on tumor biology and rehan status and will be determined based on final pathology.  We discussed that if the cancer is hormone positive, endocrine therapy would be recommended in most cases and its use can reduce the risk of recurrence as well as improve survival. Side effects of treatment were briefly discussed. We also discussed the potential role for chemotherapy based on a number of factors such as tumor phenotype (ER+ vs. triple negative vs. Dii0rlz+) and this would be determined in coordination with the medical oncologist.    Patient was educated on breast cancer, receptors, wire localization lumpectomy, mastectomy, sentinel lymph node mapping and biopsy, axillary lymph node dissection, reconstruction, breast prosthesis with post-mastectomy bra and radiation therapy. Patient was given patient information binder including [facility] breast cancer treatment brochure.  All her questions were answered.    Total time spent with the patient: 60 minutes.  45 minutes of face to face consultation and 15 minutes of chart review and coordination of care.      We discussed further evaluation with imaging including an MRI; considering patient's family history (two sisters with history of breast cancer) patient would be considered  high risk, and therefore would benefit from high risk imaging and better characterization of both breasts with breast MRI imaging before and surgical planning is completed.     Will also obtain genetic testing considering patient's family history and personal history of cervical cancer and soft tissue sarcoma in the face of new breast cancer diagnosis.      Finally, we discussed surgical treatment options. At this point in time patient would a good candidate for a partial mastectomy with right sentinel lymph node biopsy. Patient is amenable.  All questions were asked and answered. Will follow up with breast MRI and genetics, and then pending results will look to move forward with surgery.    Surgery will be coordinated with the patient's schedule.  Risks and benefits were explained including but not limited to bleeding, infection, pain, recurrence, and need for further surgery.

## 2019-05-07 NOTE — PROGRESS NOTES
History and Physical  [facility]  Department of Surgery    CHIEF COMPLAINT: Right Breast IDC, associated DCIS    REFERRING:  No referring provider defined for this encounter.  Andres Hurtado MD      Subjective:      Jaja Toledo is a 63 y.o. postmenopausal female referred for evaluation of a breast mass. Per chart review, change was first noted on regular annual screening MMG completed on 3/18/19, in which asymmetry was noted in the right breast at the inner position. Follow up diagnostic MMG was completed on 19, at which time a 11 mm high density, irregularly shaped mass with indistinct margins was seen in the right breast at 3 o'clock that correlated with the prior mammogram finding. US subsequently completed showed a 7 x 9 x 7 mm irregularly shaped, hypoechoic mass with spiculated margins in the right breast at 3 o'clock, 2 cm from the nipple. The right axilla was normal. This area of interest was then biopsied on 19 and noted to be invasive ductal carcinoma, grade 3, with associated DCIS, high grade, in comedo and solid patterns. Receptors were ER+ (>95%)/ID+(>90%), HER2 equivocal (FISH negative), with Ki-67 (20%).    Patient has not noted change on self breast exam. Patient denies nipple discharge. Patient admits to to previous breast biopsy (notes she had a left breast excisional biopsy done in -benign). Patient denies a personal history of breast cancer.    GYN History:  Age of menarche was 17. Age of menopause was in mid-50's. S/p total vaginal hysterectomy (). Patient denies hormonal therapy. Patient is . Age of first live birth was 20.  Patient did not breast feed.    FAMILY history:  Sister: Breast Cancer: dx age: late 30's, now 52  Sister: Breast Cancer: dx age: early 40's, now 56     PMHx:  Former Smoker (quit in )  GERD, gastritis   Hx of cervical cancer, sarcoma of the left foot    Past Medical History:   Diagnosis Date    Anxiety     Chronic low back pain      Gastritis     GERD (gastroesophageal reflux disease)     History of cervical cancer     History of recurrent urinary tract infection     IFG (impaired fasting glucose)     Soft tissue tumor, malignant     sarcoma left foot     Past Surgical History:   Procedure Laterality Date    BACK SURGERY      BREAST CYST EXCISION Left 1984    EGD (ESOPHAGOGASTRODUODENOSCOPY) N/A 1/10/2014    [facility]    EXTENDED ABDOMINOPLASTY N/A 11/29/2018    [facility] OR    FOOT SURGERY  2009 or 2010    HYSTERECTOMY      LIPOSUCTION, WITH FAT TRANSFER TO BUTTOCKS N/A 11/29/2018    Per[facility]    TOTAL VAGINAL HYSTERECTOMY  1996    TUBAL LIGATION  1994     Current Outpatient Medications on File Prior to Visit   Medication Sig Dispense Refill    calcium carbonate (CALCIUM 500 ORAL) Take by mouth.      cyproheptadine (PERIACTIN) 4 mg tablet Take 1 tablet (4 mg total) by mouth 3 (three) times daily as needed (decreased appetite). 30 tablet 3    hydrOXYzine HCl (ATARAX) 25 MG tablet TAKE ONE EVERY NIGHT AS NEEDED FOR ITCHING 30 tablet 2    multivitamin capsule Take 1 capsule by mouth once daily.        ranitidine (ZANTAC) 150 MG tablet Take 1 tablet (150 mg total) by mouth daily as needed for Heartburn. 30 tablet 2     No current facility-administered medications on file prior to visit.      Social History     Socioeconomic History    Marital status:      Spouse name: Not on file    Number of children: Not on file    Years of education: Not on file    Highest education level: Not on file   Occupational History    Not on file   Social Needs    Financial resource strain: Not on file    Food insecurity:     Worry: Not on file     Inability: Not on file    Transportation needs:     Medical: Not on file     Non-medical: Not on file   Tobacco Use    Smoking status: Former Smoker     Packs/day: 0.25     Years:  "15.00     Pack years: 3.75     Types: Cigarettes     Last attempt to quit: 8/15/1993     Years since quittin.7    Smokeless tobacco: Never Used   Substance and Sexual Activity    Alcohol use: Yes     Alcohol/week: 0.6 oz     Types: 1 Glasses of wine per week     Comment: seldom    Drug use: No    Sexual activity: Yes     Partners: Male   Lifestyle    Physical activity:     Days per week: Not on file     Minutes per session: Not on file    Stress: Not on file   Relationships    Social connections:     Talks on phone: Not on file     Gets together: Not on file     Attends [employer] service: Not on file     Active member of club or organization: Not on file     Attends meetings of clubs or organizations: Not on file     Relationship status: Not on file   Other Topics Concern    Not on file   Social History Narrative    Not on file     Family History   Problem Relation Age of Onset    Breast cancer Sister         dx in     No Known Problems Mother     Throat cancer Father     Colon cancer Neg Hx     Diabetes Neg Hx     Hypertension Neg Hx     Ovarian cancer Neg Hx     Stroke Neg Hx        Review of Systems  Pertinent items are noted in HPI.       Objective:   BP (!) 147/88 (BP Location: Right arm, Patient Position: Sitting, BP Method: Large (Automatic))   Pulse (!) 116   Ht 5' 6" (1.676 m)   Wt 74.7 kg (164 lb 10.9 oz)   BMI 26.58 kg/m²     General appearance: alert, appears stated age and cooperative  Head: Normocephalic, without obvious abnormality, atraumatic  Neck: no adenopathy and supple, symmetrical, trachea midline  Lungs: normal effort, nonlabored breathing  Breasts: No nipple retraction or dimpling, No nipple discharge or bleeding, No axillary or supraclavicular adenopathy, no palpable masses noted to both breasts bilaterally.   Heart: regular rate and rhythm  Abdomen: soft, non-tender; bowel sounds normal; no masses,  no organomegaly  Extremities: extremities normal, atraumatic, " no cyanosis or edema  Skin: normal, no edema and no lesions noted  Lymph nodes: Cervical, supraclavicular, and axillary nodes normal.  Neurologic: Grossly normal    Radiology review: Images personally reviewed by me in the clinic.  Mammogram: 3/18/19  Films Compared:  Compared to: 01/11/2018 Mammo Digital Screening Bilat with Tomosynthesis_CAD, 12/06/2016 Mammo Digital Screening Bilat with Tomosynthesis_CAD, and 11/30/2015 Mammo Digital Screening Bilat With CAD     Findings:  This procedure was performed using tomosynthesis.  Computer-aided detection was utilized in the interpretation of this examination.  The breasts are heterogeneously dense, which may obscure small masses.      Right  There is an asymmetry seen in the inner region of the right breast on the CC view.      Left  There is no evidence of suspicious masses, calcifications, or other abnormal findings.     Impression:  Right  Asymmetry: Right breast asymmetry at the inner position. Assessment: 0 - Incomplete. Diagnostic Mammogram and/or Ultrasound is recommended.      Left  There is no mammographic evidence of malignancy.     BI-RADS Category:   Overall: 0 - Incomplete: Needs Additional Imaging Evaluation    Mammogram (4/11/19):  Findings:  This procedure was performed using tomosynthesis.  Computer-aided detection was utilized in the interpretation of this examination.  Mammo Digital Diagnostic Right w/ Asif  The breast is heterogeneously dense, which may obscure small masses.     There is an 11 mm high density, irregularly shaped mass with indistinct margins seen in the right breast at 3 o'clock. The mass correlates with the prior mammogram finding.      US Breast Right Limited  There is a 7 mm x 9 mm x 7 mm irregularly shaped, hypoechoic mass with spiculated margins seen in the right breast at 3 o'clock, 2 cm from the nipple. The mass correlates with the prior mammogram finding. The right axilla is normal.      Impression:  Right  Mass: Right breast  7 mm x 9 mm x 7 mm mass at the 3 o'clock position. Assessment: 5 - Highly suggestive of malignancy. Biopsy is recommended.       The findings and recommendations were discussed in detail with the patient.      BI-RADS Category:   Right: 5 - Highly Suggestive of Malignancy  Overall: 5 - Highly Suggestive of Malignancy    Pathology (4/22/19):  Supplemental Diagnosis  FINAL PATHOLOGIC DIAGNOSIS  Right breast at 3 o'clock, core biopsy:  - Invasive ductal carcinoma, Foster Grade 3 (T=3, N=3, M=2), 7mm in greatest dimension  - Ductal carcinoma in-situ (DCIS), high grade, comedo and solid patterns  - No lymphovascular invasion identified on sections examined    Immunohistochemical stains for hormonal receptors are completed on the tumor cells and reveal the following:  Estrogen receptor: Positive; strong nuclear staining over 95% of tumor cells.  Progesterone receptor: Positive; strong nuclear staining in 90% of tumor cells.  Her2: Equivocal (Stain score = 2+). This block will be sent for HER2 analysis by FISH and results will follow in a  supplemental report.  Ki-67: Positive nuclear staining in 20% of tumor cells.  All immunohistochemical stains have satisfactory positive and negative controls.  Supplemental (2):  HER2, BREAST TUMOR, FISH:  RESULT SUMMARY:  -NEGATIVE.  INTERPRETATION:  There is no evidence of HER2 (ERBB2) gene amplification in this tumor sample.    Assessment:      Jaja Toledo is a 63 y.o. postmenopausal female with right breast IDC, associated DCIS.      Plan:    Options for management were discussed with the patient and her family. We reviewed the existing data noting the equivalency of breast conserving surgery with radiation therapy and mastectomy. We also reviewed the guidelines of the National Comprehensive Cancer Network for Stage 1 breast carcinoma. We discussed the need for lumpectomy margins to be negative for carcinoma, the necessity for postoperative radiation therapy after breast  conservation in most cases, the possibility of a failed or false negative sentinel lymph node biopsy and the potential need for complete lymphadenectomy for a failed or positive sentinel lymph node biopsy were fully discussed. In the setting of mastectomy, delayed or immediate reconstruction options are available and were discussed.     In the setting of lumpectomy, radiation therapy would be recommended majority of the time.  The duration and treatment side effects were discussed with the patient.  This will coordinated with the radiation oncologist pending final pathology.    We also discussed the role of systemic therapy in the treatment of early stage breast cancer.  We discussed that this is based on tumor biology and rehan status and will be determined based on final pathology.  We discussed that if the cancer is hormone positive, endocrine therapy would be recommended in most cases and its use can reduce the risk of recurrence as well as improve survival. Side effects of treatment were briefly discussed. We also discussed the potential role for chemotherapy based on a number of factors such as tumor phenotype (ER+ vs. triple negative vs. [id number]+) and this would be determined in coordination with the medical oncologist.    Patient was educated on breast cancer, receptors, wire localization lumpectomy, mastectomy, sentinel lymph node mapping and biopsy, axillary lymph node dissection, reconstruction, breast prosthesis with post-mastectomy bra and radiation therapy. Patient was given patient information binder including [facility] breast cancer treatment brochure.  All her questions were answered.    Total time spent with the patient: 60 minutes.  45 minutes of face to face consultation and 15 minutes of chart review and coordination of care.      We discussed further evaluation with imaging including an MRI; considering patient's family history (two sisters with history of breast cancer) patient would be considered  high risk, and therefore would benefit from high risk imaging and better characterization of both breasts with breast MRI imaging before and surgical planning is completed.     Will also obtain genetic testing considering patient's family history and personal history of cervical cancer and soft tissue sarcoma in the face of new breast cancer diagnosis.      Finally, we discussed surgical treatment options. At this point in time patient would a good candidate for a partial mastectomy with right sentinel lymph node biopsy. Patient is amenable.  All questions were asked and answered. Will follow up with breast MRI and genetics, and then pending results will look to move forward with surgery.    Surgery will be coordinated with the patient's schedule.  Risks and benefits were explained including but not limited to bleeding, infection, pain, recurrence, and need for further surgery.

## 2019-05-07 NOTE — TELEPHONE ENCOUNTER
5/2/19 0840 Patient notified of right breast biopsy results per pathology reported on 4/26/19. Diagnosis: INVASIVE DUCTAL CARCINOMA. Explained to patient results are positive for breast cancer. Instructed on need for consultation with breast surgeon. Questions answered. Appointment with breast surgeon confirmed 5/2/19 0840. Patient voices understanding. Did not review finding of DCIS at this time. Surgeon will review pathology results in detail at consultation appt.

## 2019-05-09 ENCOUNTER — OFFICE VISIT (OUTPATIENT)
Dept: SURGERY | Facility: CLINIC | Age: 64
End: 2019-05-09
Attending: SURGERY
Payer: COMMERCIAL

## 2019-05-09 ENCOUNTER — LAB VISIT (OUTPATIENT)
Dept: LAB | Facility: OTHER | Age: 64
End: 2019-05-09
Attending: SURGERY
Payer: COMMERCIAL

## 2019-05-09 VITALS
BODY MASS INDEX: 26.47 KG/M2 | HEART RATE: 116 BPM | DIASTOLIC BLOOD PRESSURE: 88 MMHG | WEIGHT: 164.69 LBS | SYSTOLIC BLOOD PRESSURE: 147 MMHG | HEIGHT: 66 IN

## 2019-05-09 DIAGNOSIS — C50.911 MALIGNANT NEOPLASM OF RIGHT FEMALE BREAST, UNSPECIFIED ESTROGEN RECEPTOR STATUS, UNSPECIFIED SITE OF BREAST: Primary | ICD-10-CM

## 2019-05-09 DIAGNOSIS — C50.911 MALIGNANT NEOPLASM OF RIGHT FEMALE BREAST, UNSPECIFIED ESTROGEN RECEPTOR STATUS, UNSPECIFIED SITE OF BREAST: ICD-10-CM

## 2019-05-09 DIAGNOSIS — Z01.818 PRE-OP TESTING: ICD-10-CM

## 2019-05-09 LAB
ALBUMIN SERPL BCP-MCNC: 4.2 G/DL (ref 3.5–5.2)
ALP SERPL-CCNC: 58 U/L (ref 55–135)
ALT SERPL W/O P-5'-P-CCNC: 26 U/L (ref 10–44)
ANION GAP SERPL CALC-SCNC: 9 MMOL/L (ref 8–16)
AST SERPL-CCNC: 30 U/L (ref 10–40)
BASOPHILS # BLD AUTO: 0.03 K/UL (ref 0–0.2)
BASOPHILS NFR BLD: 0.5 % (ref 0–1.9)
BILIRUB SERPL-MCNC: 0.4 MG/DL (ref 0.1–1)
BUN SERPL-MCNC: 14 MG/DL (ref 8–23)
CALCIUM SERPL-MCNC: 9.8 MG/DL (ref 8.7–10.5)
CHLORIDE SERPL-SCNC: 109 MMOL/L (ref 95–110)
CO2 SERPL-SCNC: 27 MMOL/L (ref 23–29)
CREAT SERPL-MCNC: 0.9 MG/DL (ref 0.5–1.4)
DIFFERENTIAL METHOD: ABNORMAL
EOSINOPHIL # BLD AUTO: 0.2 K/UL (ref 0–0.5)
EOSINOPHIL NFR BLD: 3.1 % (ref 0–8)
ERYTHROCYTE [DISTWIDTH] IN BLOOD BY AUTOMATED COUNT: 14.9 % (ref 11.5–14.5)
EST. GFR  (AFRICAN AMERICAN): >60 ML/MIN/1.73 M^2
EST. GFR  (NON AFRICAN AMERICAN): >60 ML/MIN/1.73 M^2
GLUCOSE SERPL-MCNC: 94 MG/DL (ref 70–110)
HCT VFR BLD AUTO: 42.2 % (ref 37–48.5)
HGB BLD-MCNC: 12.9 G/DL (ref 12–16)
LYMPHOCYTES # BLD AUTO: 1.5 K/UL (ref 1–4.8)
LYMPHOCYTES NFR BLD: 26.5 % (ref 18–48)
MCH RBC QN AUTO: 27 PG (ref 27–31)
MCHC RBC AUTO-ENTMCNC: 30.6 G/DL (ref 32–36)
MCV RBC AUTO: 89 FL (ref 82–98)
MONOCYTES # BLD AUTO: 0.4 K/UL (ref 0.3–1)
MONOCYTES NFR BLD: 7.5 % (ref 4–15)
NEUTROPHILS # BLD AUTO: 3.4 K/UL (ref 1.8–7.7)
NEUTROPHILS NFR BLD: 62.2 % (ref 38–73)
PLATELET # BLD AUTO: 361 K/UL (ref 150–350)
PMV BLD AUTO: 9.6 FL (ref 9.2–12.9)
POTASSIUM SERPL-SCNC: 3.7 MMOL/L (ref 3.5–5.1)
PROT SERPL-MCNC: 8.2 G/DL (ref 6–8.4)
RBC # BLD AUTO: 4.77 M/UL (ref 4–5.4)
SODIUM SERPL-SCNC: 145 MMOL/L (ref 136–145)
WBC # BLD AUTO: 5.5 K/UL (ref 3.9–12.7)

## 2019-05-09 PROCEDURE — 85025 COMPLETE CBC W/AUTO DIFF WBC: CPT

## 2019-05-09 PROCEDURE — 99205 PR OFFICE/OUTPT VISIT, NEW, LEVL V, 60-74 MIN: ICD-10-PCS | Mod: S$GLB,,, | Performed by: SURGERY

## 2019-05-09 PROCEDURE — 36415 COLL VENOUS BLD VENIPUNCTURE: CPT

## 2019-05-09 PROCEDURE — 3008F BODY MASS INDEX DOCD: CPT | Mod: CPTII,S$GLB,, | Performed by: SURGERY

## 2019-05-09 PROCEDURE — 3008F PR BODY MASS INDEX (BMI) DOCUMENTED: ICD-10-PCS | Mod: CPTII,S$GLB,, | Performed by: SURGERY

## 2019-05-09 PROCEDURE — 99205 OFFICE O/P NEW HI 60 MIN: CPT | Mod: S$GLB,,, | Performed by: SURGERY

## 2019-05-09 PROCEDURE — 80053 COMPREHEN METABOLIC PANEL: CPT

## 2019-05-16 ENCOUNTER — HOSPITAL ENCOUNTER (OUTPATIENT)
Dept: RADIOLOGY | Facility: OTHER | Age: 64
Discharge: HOME | End: 2019-05-16
Attending: SURGERY
Payer: COMMERCIAL

## 2019-05-16 DIAGNOSIS — C50.911 MALIGNANT NEOPLASM OF RIGHT FEMALE BREAST, UNSPECIFIED ESTROGEN RECEPTOR STATUS, UNSPECIFIED SITE OF BREAST: ICD-10-CM

## 2019-05-16 PROCEDURE — 25500020 PHARM REV CODE 255: Performed by: SURGERY

## 2019-05-16 PROCEDURE — A9577 INJ MULTIHANCE: HCPCS | Performed by: SURGERY

## 2019-05-16 PROCEDURE — 77049 MRI BREAST C-+ W/CAD BI: CPT | Mod: 26,,, | Performed by: RADIOLOGY

## 2019-05-16 PROCEDURE — 77049 MRI BREAST C-+ W/CAD BI: CPT | Mod: TC

## 2019-05-16 PROCEDURE — 77049 MRI BREAST W/WO CONTRAST, W/CAD, BILATERAL: ICD-10-PCS | Mod: 26,,, | Performed by: RADIOLOGY

## 2019-05-16 RX ADMIN — GADOBENATE DIMEGLUMINE 15 ML: 529 INJECTION, SOLUTION INTRAVENOUS at 04:05

## 2019-05-21 ENCOUNTER — TELEPHONE (OUTPATIENT)
Dept: RADIOLOGY | Facility: HOSPITAL | Age: 64
End: 2019-05-21

## 2019-05-21 ENCOUNTER — TELEPHONE (OUTPATIENT)
Dept: SURGERY | Facility: CLINIC | Age: 64
End: 2019-05-21

## 2019-05-21 NOTE — TELEPHONE ENCOUNTER
Called patient in reference to her breast MRI imaging and scheduling a breast ultrasound and breast biopsy . No answer. Left the patient a message with my direct phone number.

## 2019-05-21 NOTE — TELEPHONE ENCOUNTER
----- Message from Roberto Cruz LPN sent at 5/21/2019  2:34 PM CDT -----  Thank you   ----- Message -----  From: Kiarra Lni RN  Sent: 5/21/2019   2:24 PM  To: Roberto Cruz LPN    Yes, she will need this biopsied.  She is only scheduled for a lumpectomy so will need additional biopsy if this area is not near the known cancer.  I am going to call her shortly about her FMLA paperwork, so I can let her know she needs another biopsy :(    Kiarra      ----- Message -----  From: Roberto Cruz LPN  Sent: 5/21/2019   2:23 PM  To: Gillian Murguia, RT, Kiarra Lin RN, #    Kiarra,     Just following up on this patient. Do I need to schedule her for any further imaging and biopsy she is scheduled for surgery on 5/31.    Thanks,   Roberto   ----- Message -----  From: Becky Figueroa MD  Sent: 5/17/2019  10:00 AM  To: Roberto Cruz LPN, Sabine Arteaga MD    Needs a second look US and biopsy on right side.     Impression:  Right  Mass: Right breast 13 mm x 10 mm x 10 mm mass at the middle 3 o'clock position. Assessment: 6 - Known biopsy, proven malignancy. Surgical Consult is recommended.   Mass: Right breast 9 mm x 7 mm x 8 mm mass at the middle 6 o'clock position. Assessment: 4 - Suspicious finding. This finding is concerning for additional disease. Biopsy is recommended if breast conservation therapy is desired.     Left  There is no MR evidence of malignancy.     BI-RADS Category:   Overall: 4 - Suspicious     Recommendation:  IF breast conservation therapy is desired, recommend second look US for finding at 6 o'clock in the right breast. If a suspicious ultrasound correlate is identified, US guided biopsy is recommended. If no sonographic correlate is identified, MRI guided biopsy is recommended.    Clinical management of known right breast cancer. Patient is established with the breast surgery clinic.

## 2019-05-21 NOTE — TELEPHONE ENCOUNTER
Patient's FMLA paperwork complete, breast radiology LPN to speak with patient regarding additional breast biopsy

## 2019-05-22 ENCOUNTER — TELEPHONE (OUTPATIENT)
Dept: RADIOLOGY | Facility: HOSPITAL | Age: 64
End: 2019-05-22

## 2019-05-22 ENCOUNTER — TELEPHONE (OUTPATIENT)
Dept: SURGERY | Facility: CLINIC | Age: 64
End: 2019-05-22

## 2019-05-22 NOTE — TELEPHONE ENCOUNTER
Patient called to schedule right breast core biopsy.  Appointment set for Monday 5/27/19 at 3pm. Patient advised to arrive 30 minutes early and not to wear perfume, deodorant or powder.

## 2019-05-22 NOTE — TELEPHONE ENCOUNTER
Called number listed x 2 and was told that no one by that name was associated with this number, no other number listed, patient called regarding her FMLA paperwork which is completed and available to pick at [facility]

## 2019-05-27 ENCOUNTER — RESEARCH ENCOUNTER (OUTPATIENT)
Dept: RESEARCH | Facility: HOSPITAL | Age: 64
End: 2019-05-27

## 2019-05-27 ENCOUNTER — HOSPITAL ENCOUNTER (OUTPATIENT)
Dept: RADIOLOGY | Facility: HOSPITAL | Age: 64
Discharge: HOME | End: 2019-05-27
Attending: SURGERY
Payer: COMMERCIAL

## 2019-05-27 DIAGNOSIS — N63.0 BREAST MASS: ICD-10-CM

## 2019-05-27 PROCEDURE — 88341 IMHCHEM/IMCYTCHM EA ADD ANTB: CPT | Performed by: PATHOLOGY

## 2019-05-27 PROCEDURE — 27201068 US BREAST BIOPSY WITH IMAGING 1ST SITE RIGHT: Mod: PO

## 2019-05-27 PROCEDURE — 77065 DX MAMMO INCL CAD UNI: CPT | Mod: 26,RT,, | Performed by: RADIOLOGY

## 2019-05-27 PROCEDURE — 76642 ULTRASOUND BREAST LIMITED: CPT | Mod: TC,PO,RT

## 2019-05-27 PROCEDURE — 88342 IMHCHEM/IMCYTCHM 1ST ANTB: CPT | Mod: 26,59,, | Performed by: PATHOLOGY

## 2019-05-27 PROCEDURE — 88360 PR  TUMOR IMMUNOHISTOCHEM/MANUAL: ICD-10-PCS | Mod: 26,,, | Performed by: PATHOLOGY

## 2019-05-27 PROCEDURE — 88342 TISSUE SPECIMEN TO PATHOLOGY, RADIOLOGY: ICD-10-PCS | Mod: 26,59,, | Performed by: PATHOLOGY

## 2019-05-27 PROCEDURE — 88342 IMHCHEM/IMCYTCHM 1ST ANTB: CPT | Performed by: PATHOLOGY

## 2019-05-27 PROCEDURE — 88305 TISSUE EXAM BY PATHOLOGIST: CPT | Performed by: PATHOLOGY

## 2019-05-27 PROCEDURE — 88341 TISSUE SPECIMEN TO PATHOLOGY, RADIOLOGY: ICD-10-PCS | Mod: 26,59,, | Performed by: PATHOLOGY

## 2019-05-27 PROCEDURE — 77065 MAMMO DIGITAL DIAGNOSTIC RIGHT WITH CAD: ICD-10-PCS | Mod: 26,RT,, | Performed by: RADIOLOGY

## 2019-05-27 PROCEDURE — 88341 IMHCHEM/IMCYTCHM EA ADD ANTB: CPT | Mod: 26,59,, | Performed by: PATHOLOGY

## 2019-05-27 PROCEDURE — 88360 TUMOR IMMUNOHISTOCHEM/MANUAL: CPT | Mod: 26,,, | Performed by: PATHOLOGY

## 2019-05-27 PROCEDURE — 76642 US BREAST RIGHT LIMITED: ICD-10-PCS | Mod: 26,RT,, | Performed by: RADIOLOGY

## 2019-05-27 PROCEDURE — 19083 US BREAST BIOPSY WITH IMAGING 1ST SITE RIGHT: ICD-10-PCS | Mod: RT,,, | Performed by: RADIOLOGY

## 2019-05-27 PROCEDURE — 77065 DX MAMMO INCL CAD UNI: CPT | Mod: TC,PO,RT

## 2019-05-27 PROCEDURE — 88305 TISSUE SPECIMEN TO PATHOLOGY, RADIOLOGY: ICD-10-PCS | Mod: 26,,, | Performed by: PATHOLOGY

## 2019-05-27 PROCEDURE — 88305 TISSUE EXAM BY PATHOLOGIST: CPT | Mod: 26,,, | Performed by: PATHOLOGY

## 2019-05-27 PROCEDURE — 19083 BX BREAST 1ST LESION US IMAG: CPT | Mod: RT,,, | Performed by: RADIOLOGY

## 2019-05-27 PROCEDURE — 25000003 PHARM REV CODE 250: Mod: PO | Performed by: SURGERY

## 2019-05-27 PROCEDURE — 76642 ULTRASOUND BREAST LIMITED: CPT | Mod: 26,RT,, | Performed by: RADIOLOGY

## 2019-05-27 RX ORDER — LIDOCAINE HYDROCHLORIDE 10 MG/ML
0.5 INJECTION, SOLUTION EPIDURAL; INFILTRATION; INTRACAUDAL; PERINEURAL ONCE
Status: COMPLETED | OUTPATIENT
Start: 2019-05-27 | End: 2019-05-27

## 2019-05-27 RX ORDER — LIDOCAINE HYDROCHLORIDE AND EPINEPHRINE 20; 10 MG/ML; UG/ML
6 INJECTION, SOLUTION INFILTRATION; PERINEURAL ONCE
Status: COMPLETED | OUTPATIENT
Start: 2019-05-27 | End: 2019-05-27

## 2019-05-27 RX ADMIN — LIDOCAINE HYDROCHLORIDE 0.5 ML: 10 INJECTION, SOLUTION EPIDURAL; INFILTRATION; INTRACAUDAL; PERINEURAL at 03:05

## 2019-05-27 RX ADMIN — LIDOCAINE HYDROCHLORIDE,EPINEPHRINE BITARTRATE 6 ML: 20; .01 INJECTION, SOLUTION INFILTRATION; PERINEURAL at 03:05

## 2019-05-27 NOTE — PROGRESS NOTES
Pt approached in Mammography clinic regarding participation in IRB protocol #2018.314, [facility] Breast Biopsy study. Pt was agreeable.      The Informed Consent Form (ICF) was reviewed with pt. The discussion included:   - participation is voluntary  - pt can change her mind about participating  - pt was informed that participation in this study would not preclude her from participating in any other research if offered  - specimens may be used by [facility] researchers or community researchers  - specimens collected include only those discussed with the patient at the time of consent and are indicated on the ICF   - specimens may be used for DNA, RNA or protein studies investigating biomarkers for diagnostic, prognostic or treatment purposes  - breast biopsy will be collected from [facility] after their approval  - all specimens released to researchers will be stripped of identifiers  - no personal medical information will be released to any parties outside of this research study  - there will be no other physical risks outside of those involved in standard of care procedure       approved of patient's participation in the study.  Pt did not have any questions. Pt willingly and independently signed ICF.     A copy of signed ICF was given to pt with instructions to call with any questions that may arise or if she should change her mind regarding participation in study

## 2019-05-29 ENCOUNTER — OFFICE VISIT (OUTPATIENT)
Dept: PLASTIC SURGERY | Facility: CLINIC | Age: 64
End: 2019-05-29
Payer: COMMERCIAL

## 2019-05-29 ENCOUNTER — HOSPITAL ENCOUNTER (OUTPATIENT)
Dept: RADIOLOGY | Facility: HOSPITAL | Age: 64
Discharge: HOME | End: 2019-05-29
Attending: SURGERY
Payer: COMMERCIAL

## 2019-05-29 VITALS — SYSTOLIC BLOOD PRESSURE: 138 MMHG | HEART RATE: 101 BPM | DIASTOLIC BLOOD PRESSURE: 85 MMHG

## 2019-05-29 DIAGNOSIS — C50.911 MALIGNANT NEOPLASM OF RIGHT FEMALE BREAST, UNSPECIFIED ESTROGEN RECEPTOR STATUS, UNSPECIFIED SITE OF BREAST: ICD-10-CM

## 2019-05-29 DIAGNOSIS — Z85.3 PERSONAL HISTORY OF BREAST CANCER: Primary | ICD-10-CM

## 2019-05-29 PROCEDURE — 99214 PR OFFICE/OUTPT VISIT, EST, LEVL IV, 30-39 MIN: ICD-10-PCS | Mod: S$GLB,,, | Performed by: SURGERY

## 2019-05-29 PROCEDURE — 99214 OFFICE O/P EST MOD 30 MIN: CPT | Mod: S$GLB,,, | Performed by: SURGERY

## 2019-05-29 PROCEDURE — 99999 PR PBB SHADOW E&M-EST. PATIENT-LVL II: ICD-10-PCS | Mod: PBBFAC,,, | Performed by: SURGERY

## 2019-05-29 PROCEDURE — 99999 PR PBB SHADOW E&M-EST. PATIENT-LVL II: CPT | Mod: PBBFAC,,, | Performed by: SURGERY

## 2019-05-29 NOTE — LETTER
[facility] - Plastic Surg [facility]  [address]  [facility] [address]  Phone: [phone]  Fax: [phone] May 29, 2019      Sabine Arteaga MD  [address]  [facility] [address]    Patient: Jaja Toledo   MR Number: [id number]   YOB: 1955   Date of Visit: 5/29/2019     Dear Dr. Sabine Arteaga:    Thank you for referring Jaja Toledo to me for evaluation. Below you will find relevant portions of my assessment and plan of care.    Patient is a 63-year-old female presents with newly diagnosed R breast cancer. She presents to discuss reconstruction options.  Based on the consultation, she appears to be interested in implant-based reconstruction.      We discussed that given the location of her nipple, she would most likely lose her nipple with the mastectomy, although that will be deferred to breast surgery.  Secondly, we discussed that it is difficult to match a natural breast to an implant, but she still prefers to undergo implant-based reconstruction, with the knowledge that autologous reconstruction can be performed at a later date if she desires.  We discussed that options for reconstruction include delayed with no implant, TE placement, or implant placement.  We also discussed that she would need symmetry procedure on the left side, most likely to include aug/mastopexy.  She understood. I spent a great deal of time discussing free tissue transfer. Patient really does not grasp the concept and after speaking with her for 45 minutes, I think she would be best suited for an expander or implant.     We will schedule surgery with Dr. Arteaga.    If you have questions, please do not hesitate to call me. I look forward to following Jaja Toledo along with you.    Sincerely,    Leonard oCrdero MD  Section of Plastic Surgery  Department of Surgery  [facility]     CRB/hcr    CC:  Andres Hurtado MD

## 2019-05-29 NOTE — PROGRESS NOTES
History & Physical    SUBJECTIVE:     History of Present Illness:  Patient is a 63 y.o. female presents with newly diagnosed R breast cancer.  She presents to discuss reconstruction options.  Based on the consultation, she appears to be interested in implant-based reconstruction.      No chief complaint on file.      Review of patient's allergies indicates:   Allergen Reactions    No known drug allergies        Current Outpatient Medications   Medication Sig Dispense Refill    calcium carbonate (CALCIUM 500 ORAL) Take by mouth.      cyproheptadine (PERIACTIN) 4 mg tablet Take 1 tablet (4 mg total) by mouth 3 (three) times daily as needed (decreased appetite). 30 tablet 3    hydrOXYzine HCl (ATARAX) 25 MG tablet TAKE ONE EVERY NIGHT AS NEEDED FOR ITCHING 30 tablet 2    multivitamin capsule Take 1 capsule by mouth once daily.        ranitidine (ZANTAC) 150 MG tablet Take 1 tablet (150 mg total) by mouth daily as needed for Heartburn. 30 tablet 2     No current facility-administered medications for this visit.        Past Medical History:   Diagnosis Date    Anxiety     Chronic low back pain     Gastritis     GERD (gastroesophageal reflux disease)     History of cervical cancer     History of recurrent urinary tract infection     IFG (impaired fasting glucose)     Soft tissue tumor, malignant     sarcoma left foot     Past Surgical History:   Procedure Laterality Date    BACK SURGERY      BREAST CYST EXCISION Left 1984    EGD (ESOPHAGOGASTRODUODENOSCOPY) N/A 1/10/2014    [facility]O    EXTENDED ABDOMINOPLASTY N/A 11/29/2018    [facility] OR    FOOT SURGERY  2009 or 2010    HYSTERECTOMY      LIPOSUCTION, WITH FAT TRANSFER TO BUTTOCKS N/A 11/29/2018    [facility] OR    TOTAL VAGINAL HYSTERECTOMY  1996    TUBAL LIGATION  1994     Family History   Problem Relation Age of Onset    Breast cancer Sister          dx in     No Known Problems Mother     Throat cancer Father     Colon cancer Neg Hx     Diabetes Neg Hx     Hypertension Neg Hx     Ovarian cancer Neg Hx     Stroke Neg Hx      Social History     Tobacco Use    Smoking status: Former Smoker     Packs/day: 0.25     Years: 15.00     Pack years: 3.75     Types: Cigarettes     Last attempt to quit: 8/15/1993     Years since quittin.8    Smokeless tobacco: Never Used   Substance Use Topics    Alcohol use: Yes     Alcohol/week: 0.6 oz     Types: 1 Glasses of wine per week     Comment: seldom    Drug use: No        Review of Systems:  Review of Systems    OBJECTIVE:     Vital Signs (Most Recent)  Pulse: 101 (19)  BP: 138/85 (19)           Physical Exam:  NAD  Non-labored breathing  B breasts with stage 2 ptosis  R breast with bandage in place from biopsy, no hematoma/seroma    ASSESSMENT/PLAN:     We discussed that given the location of her nipple, she would most likely lose her nipple with the mastectomy, although that will be deferred to breast surgery.  Secondly, we discussed that it is difficult to match a natural breast to an implant, but she still prefers to undergo implant-based reconstruction, with the knowledge that autologous reconstruction can be performed at a later date if she desires.  We discussed that options for reconstruction include delayed with no implant, TE placement, or implant placement.  We also discussed that she would need symmetry procedure on the left side, most likely to include aug/mastopexy.  She understood. I spent a great deal of time discussing free tissue transfer. Patient really does not grasp the concept and after speaking with her for 45 minutes, I think she would be best suited for an expander or implant    We will schedule surgery with Dr. Coburn.    Lucius Aguilar MD  Plastic Surgery Fellow

## 2019-05-30 ENCOUNTER — TELEPHONE (OUTPATIENT)
Dept: SURGERY | Facility: CLINIC | Age: 64
End: 2019-05-30

## 2019-05-30 ENCOUNTER — TELEPHONE (OUTPATIENT)
Dept: HEMATOLOGY/ONCOLOGY | Facility: CLINIC | Age: 64
End: 2019-05-30

## 2019-05-30 DIAGNOSIS — C50.911 MALIGNANT NEOPLASM OF RIGHT FEMALE BREAST, UNSPECIFIED ESTROGEN RECEPTOR STATUS, UNSPECIFIED SITE OF BREAST: Primary | ICD-10-CM

## 2019-05-30 NOTE — TELEPHONE ENCOUNTER
Tried calling again, it sounds like someone picks up but no one is there. Will try again to call with biopsy results

## 2019-05-31 ENCOUNTER — TELEPHONE (OUTPATIENT)
Dept: SURGERY | Facility: CLINIC | Age: 64
End: 2019-05-31

## 2019-05-31 ENCOUNTER — ANESTHESIA EVENT (OUTPATIENT)
Dept: SURGERY | Facility: HOSPITAL | Age: 64
End: 2019-05-31
Payer: COMMERCIAL

## 2019-05-31 NOTE — TELEPHONE ENCOUNTER
----- Message from Toribio Blas sent at 5/31/2019  4:32 PM CDT -----  Reason for call: Kiarra flores calling to speak with you again.        Communication Preference:[phone]    Additional Information:

## 2019-05-31 NOTE — TELEPHONE ENCOUNTER
Spoke with patient regarding surgery, surgery scheduled and confirmed for 6-4-19, all questions answered at this time, pre-op education provided, pt needing FMLA paperwork updated to reflect new surgery date and procedure

## 2019-05-31 NOTE — TELEPHONE ENCOUNTER
Called patient regarding biopsy results. She states she was given results by Dr. Arteaga that additional breast mass was positive. She is just waiting on confirmation of surgery date. Told her it appeared her surgery was scheduled for Tuesday 6/4, but that I would ask Dr. Arteaga's office to confirm this for her. Verbalized understanding of all information

## 2019-06-03 ENCOUNTER — TELEPHONE (OUTPATIENT)
Dept: SURGERY | Facility: CLINIC | Age: 64
End: 2019-06-03

## 2019-06-03 NOTE — ANESTHESIA PREPROCEDURE EVALUATION
06/03/2019  Pre-operative evaluation for Procedure(s) (LRB):  MASTECTOMY, SIMPLE RIGHT (CONSENT AM OF) 4.0 hr case (Right)  BIOPSY, LYMPH NODE, SENTINEL RIGHT (Right)  LYMPHADENECTOMY, AXILLARY RIGHT (Right)  MASTOPEXY LEFT (Left)  INSERTION, TISSUE EXPANDER, BREAST RIGHT (Right)  INSERTION, BREAST IMPLANT RIGHT (Right)    Jaja Toledo is a 63 y.o. female with  7 x 9 x 7 mm irregularly shaped, hypoechoic mass with spiculated margins in the right breast at 3 o'clock, 2 cm from the nipple. She is scheduled for above procedure. Biopsy on 4/22/19 revealed invasive ductal carcinoma, grade 3 with associated DCIS, high grade, in comedo and solid patterns. Receptors were ER+ (>95%)/NH+(>90%), HER2 equivocal (FISH negative), with Ki-67 (20%).       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      Current Outpatient Medications on File Prior to Encounter   Medication Sig Dispense Refill    calcium carbonate (CALCIUM 500 ORAL) Take by mouth once daily.       cyproheptadine  (PERIACTIN) 4 mg tablet Take 1 tablet (4 mg total) by mouth 3 (three) times daily as needed (decreased appetite). 30 tablet 3    hydrOXYzine HCl (ATARAX) 25 MG tablet TAKE ONE EVERY NIGHT AS NEEDED FOR ITCHING 30 tablet 2    multivitamin capsule Take 1 capsule by mouth once daily.        ranitidine (ZANTAC) 150 MG tablet Take 1 tablet (150 mg total) by mouth daily as needed for Heartburn. 30 tablet 2       Past Surgical History:   Procedure Laterality Date    BACK SURGERY      BREAST CYST EXCISION Left     EGD (ESOPHAGOGASTRODUODENOSCOPY) N/A 1/10/2014    [facility]    EXTENDED ABDOMINOPLASTY N/A 2018    Per[facility] at [facility] OR    FOOT SURGERY   or     HYSTERECTOMY      LIPOSUCTION, WITH FAT TRANSFER TO BUTTOCKS N/A 2018    Per[facility] at [facility] OR    TOTAL VAGINAL HYSTERECTOMY      TUBAL LIGATION         Social History     Socioeconomic History    Marital status:      Spouse name: Not on file    Number of children: Not on file    Years of education: Not on file    Highest education level: Not on file   Occupational History    Not on file   Social Needs    Financial resource strain: Not on file    Food insecurity:     Worry: Not on file     Inability: Not on file    Transportation needs:     Medical: Not on file     Non-medical: Not on file   Tobacco Use    Smoking status: Former Smoker     Packs/day: 0.25     Years: 15.00     Pack years: 3.75     Types: Cigarettes     Last attempt to quit: 8/15/1993     Years since quittin.8    Smokeless tobacco: Never Used   Substance and Sexual Activity    Alcohol use: Yes     Alcohol/week: 0.6 oz     Types: 1 Glasses of wine per week     Comment: seldom    Drug use: No    Sexual activity: Yes     Partners: Male   Lifestyle    Physical activity:     Days per week: Not on file     Minutes per session: Not on file    Stress: Not on  file   Relationships    Social connections:     Talks on phone: Not on file     Gets together: Not on file     Attends [employer] service: Not on file     Active member of club or organization: Not on file     Attends meetings of clubs or organizations: Not on file     Relationship status: Not on file   Other Topics Concern    Not on file   Social History Narrative    Not on file         Vital Signs Range (Last 24H):         CBC: No results for input(s): WBC, RBC, HGB, HCT, PLT, MCV, MCH, MCHC in the last 72 hours.    CMP: No results for input(s): NA, K, CL, CO2, BUN, CREATININE, GLU, MG, PHOS, CALCIUM, ALBUMIN, PROT, ALKPHOS, ALT, AST, BILITOT in the last 72 hours.    INR  No results for input(s): PT, INR, PROTIME, APTT in the last 72 hours.      EKG: None      2D Echo: Nonr          Anesthesia Evaluation    I have reviewed the Patient Summary Reports.    I have reviewed the Nursing Notes.   I have reviewed the Medications.     Review of Systems  Anesthesia Hx:  No problems with previous Anesthesia  History of prior surgery of interest to airway management or planning: Denies Family Hx of Anesthesia complications.   Denies Personal Hx of Anesthesia complications.   Social:  Former Smoker    Hematology/Oncology:  Hematology Normal       -- Cancer in past history:  Breast right   EENT/Dental:EENT/Dental Normal   Cardiovascular:  Cardiovascular Normal Exercise tolerance: good     Pulmonary:  Pulmonary Normal    Renal/:  Renal/ Normal     Hepatic/GI:   No Bowel Prep. Denies PUD. GERD Denies Liver Disease. Denies Hepatitis.    Musculoskeletal:  Musculoskeletal Normal    Neurological:  Neurology Normal    Endocrine:  Endocrine Normal    Dermatological:  Skin Normal    Psych:  Psychiatric Normal           Physical Exam  General:  Well nourished    Airway/Jaw/Neck:  Airway Findings: Mouth Opening: Normal Tongue: Normal  General Airway Assessment: Adult, Good  Mallampati: I  Improves to I with phonation.  TM Distance:  Normal, at least 6 cm  Jaw/Neck Findings:     Neck ROM: Normal ROM      Dental:  Dental Findings: In tact, Lower retainer, Upper front caps   Chest/Lungs:  Chest/Lungs Findings: Clear to auscultation, Normal Respiratory Rate     Heart/Vascular:  Heart Findings: Rate: Normal  Rhythm: Regular Rhythm  Sounds: Normal     Abdomen:  Abdomen Findings:  Normal, Soft, Nontender       Mental Status:  Mental Status Findings:  Cooperative, Alert and Oriented         Anesthesia Plan  Type of Anesthesia, risks & benefits discussed:  Anesthesia Type:  general, MAC, regional  Patient's Preference:   Intra-op Monitoring Plan: standard ASA monitors  Intra-op Monitoring Plan Comments:   Post Op Pain Control Plan:   Post Op Pain Control Plan Comments:   Induction:   IV  Beta Blocker:  Patient is not currently on a Beta-Blocker (No further documentation required).       Informed Consent: Patient understands risks and agrees with Anesthesia plan.  Questions answered. Anesthesia consent signed with patient.  ASA Score: 3     Day of Surgery Review of History & Physical:  There are no significant changes.  H&P update referred to the provider.         Ready For Surgery From Anesthesia Perspective.

## 2019-06-03 NOTE — TELEPHONE ENCOUNTER
Spoke with pt regarding surgery, pt advised to arrive to [facility] at 0900 for 1100 surgery, pt verbalized understanding, pre-op education reinforced, all questions answered at this time, pt given reassurance

## 2019-06-04 ENCOUNTER — HOSPITAL ENCOUNTER (OUTPATIENT)
Dept: RADIOLOGY | Facility: HOSPITAL | Age: 64
Discharge: HOME | End: 2019-06-04
Attending: SURGERY
Payer: COMMERCIAL

## 2019-06-04 ENCOUNTER — ANESTHESIA (OUTPATIENT)
Dept: SURGERY | Facility: HOSPITAL | Age: 64
End: 2019-06-04
Payer: COMMERCIAL

## 2019-06-04 ENCOUNTER — HOSPITAL ENCOUNTER (OUTPATIENT)
Facility: HOSPITAL | Age: 64
Discharge: HOME | End: 2019-06-05
Attending: SURGERY | Admitting: SURGERY
Payer: COMMERCIAL

## 2019-06-04 ENCOUNTER — HOSPITAL ENCOUNTER (OUTPATIENT)
Dept: RADIOLOGY | Facility: HOSPITAL | Age: 64
Discharge: HOME | End: 2019-06-04
Attending: SURGERY | Admitting: SURGERY
Payer: COMMERCIAL

## 2019-06-04 DIAGNOSIS — Z17.0 MALIGNANT NEOPLASM OF RIGHT BREAST IN FEMALE, ESTROGEN RECEPTOR POSITIVE, UNSPECIFIED SITE OF BREAST: Primary | ICD-10-CM

## 2019-06-04 DIAGNOSIS — C50.911 MALIGNANT NEOPLASM OF RIGHT BREAST IN FEMALE, ESTROGEN RECEPTOR POSITIVE, UNSPECIFIED SITE OF BREAST: Primary | ICD-10-CM

## 2019-06-04 DIAGNOSIS — N63.0 BREAST MASS: ICD-10-CM

## 2019-06-04 DIAGNOSIS — C50.911 MALIGNANT NEOPLASM OF RIGHT FEMALE BREAST, UNSPECIFIED ESTROGEN RECEPTOR STATUS, UNSPECIFIED SITE OF BREAST: ICD-10-CM

## 2019-06-04 PROCEDURE — A9520 TC99 TILMANOCEPT DIAG 0.5MCI: HCPCS

## 2019-06-04 PROCEDURE — S0020 INJECTION, BUPIVICAINE HYDRO: HCPCS | Performed by: STUDENT IN AN ORGANIZED HEALTH CARE EDUCATION/TRAINING PROGRAM

## 2019-06-04 PROCEDURE — 88307 TISSUE SPECIMEN TO PATHOLOGY - SURGERY: ICD-10-PCS | Mod: 26,,, | Performed by: PATHOLOGY

## 2019-06-04 PROCEDURE — 64461 ERECTOR SPINAE PLANE SINGLE INJECTION BLOCK: ICD-10-PCS | Mod: 59,,, | Performed by: ANESTHESIOLOGY

## 2019-06-04 PROCEDURE — S0077 INJECTION, CLINDAMYCIN PHOSP: HCPCS | Performed by: SURGERY

## 2019-06-04 PROCEDURE — 25000003 PHARM REV CODE 250: Performed by: STUDENT IN AN ORGANIZED HEALTH CARE EDUCATION/TRAINING PROGRAM

## 2019-06-04 PROCEDURE — 27000221 HC OXYGEN, UP TO 24 HOURS

## 2019-06-04 PROCEDURE — 88341 IMHCHEM/IMCYTCHM EA ADD ANTB: CPT | Mod: 26,,, | Performed by: PATHOLOGY

## 2019-06-04 PROCEDURE — 76098 X-RAY EXAM SURGICAL SPECIMEN: CPT | Mod: TC,PO

## 2019-06-04 PROCEDURE — 76098 MAMMO BREAST SPECIMEN: ICD-10-PCS | Mod: 26,,, | Performed by: RADIOLOGY

## 2019-06-04 PROCEDURE — 25000003 PHARM REV CODE 250: Performed by: NURSE ANESTHETIST, CERTIFIED REGISTERED

## 2019-06-04 PROCEDURE — 19340 PR INSERT BREAST PROS IMMED AFTER EXCIS: ICD-10-PCS | Mod: RT,,, | Performed by: SURGERY

## 2019-06-04 PROCEDURE — D9220A PRA ANESTHESIA: Mod: ANES,,, | Performed by: ANESTHESIOLOGY

## 2019-06-04 PROCEDURE — 63600175 PHARM REV CODE 636 W HCPCS: Performed by: SURGERY

## 2019-06-04 PROCEDURE — 19340 INSJ BREAST IMPLT SM D MAST: CPT | Mod: RT,,, | Performed by: SURGERY

## 2019-06-04 PROCEDURE — 63600175 PHARM REV CODE 636 W HCPCS: Performed by: STUDENT IN AN ORGANIZED HEALTH CARE EDUCATION/TRAINING PROGRAM

## 2019-06-04 PROCEDURE — 25000003 PHARM REV CODE 250: Performed by: SURGERY

## 2019-06-04 PROCEDURE — 36000706: Performed by: SURGERY

## 2019-06-04 PROCEDURE — 63600175 PHARM REV CODE 636 W HCPCS: Performed by: NURSE ANESTHETIST, CERTIFIED REGISTERED

## 2019-06-04 PROCEDURE — 27201423 OPTIME MED/SURG SUP & DEVICES STERILE SUPPLY: Performed by: SURGERY

## 2019-06-04 PROCEDURE — 19316 PR SUSPENSION OF BREAST: ICD-10-PCS | Mod: 51,LT,, | Performed by: SURGERY

## 2019-06-04 PROCEDURE — 88341 PR IHC OR ICC EACH ADD'L SINGLE ANTIBODY  STAINPR: ICD-10-PCS | Mod: 26,,, | Performed by: PATHOLOGY

## 2019-06-04 PROCEDURE — G0378 HOSPITAL OBSERVATION PER HR: HCPCS

## 2019-06-04 PROCEDURE — 19303 MAST SIMPLE COMPLETE: CPT | Mod: RT,,, | Performed by: SURGERY

## 2019-06-04 PROCEDURE — 88342 IMHCHEM/IMCYTCHM 1ST ANTB: CPT | Mod: 26,,, | Performed by: PATHOLOGY

## 2019-06-04 PROCEDURE — D9220A PRA ANESTHESIA: ICD-10-PCS | Mod: ANES,,, | Performed by: ANESTHESIOLOGY

## 2019-06-04 PROCEDURE — 37000009 HC ANESTHESIA EA ADD 15 MINS: Performed by: SURGERY

## 2019-06-04 PROCEDURE — 19303 PR MASTECTOMY, SIMPLE, COMPLETE: ICD-10-PCS | Mod: RT,,, | Performed by: SURGERY

## 2019-06-04 PROCEDURE — 25000003 PHARM REV CODE 250: Performed by: ANESTHESIOLOGY

## 2019-06-04 PROCEDURE — 88342 IMHCHEM/IMCYTCHM 1ST ANTB: CPT | Performed by: PATHOLOGY

## 2019-06-04 PROCEDURE — 25000003 PHARM REV CODE 250

## 2019-06-04 PROCEDURE — 88342 TISSUE SPECIMEN TO PATHOLOGY - SURGERY: ICD-10-PCS | Mod: 26,,, | Performed by: PATHOLOGY

## 2019-06-04 PROCEDURE — 15777 PR ACELLULAR DERM MATRIX IMPLT: ICD-10-PCS | Mod: RT,,, | Performed by: SURGERY

## 2019-06-04 PROCEDURE — 19316 MASTOPEXY: CPT | Mod: 51,LT,, | Performed by: SURGERY

## 2019-06-04 PROCEDURE — 38525 PR BIOPSY/REM LYMPH NODES, AXILLARY: ICD-10-PCS | Mod: 51,RT,, | Performed by: SURGERY

## 2019-06-04 PROCEDURE — 88331 TISSUE SPECIMEN TO PATHOLOGY - SURGERY: ICD-10-PCS | Mod: 26,,, | Performed by: PATHOLOGY

## 2019-06-04 PROCEDURE — 38525 BIOPSY/REMOVAL LYMPH NODES: CPT | Mod: 51,RT,, | Performed by: SURGERY

## 2019-06-04 PROCEDURE — D9220A PRA ANESTHESIA: Mod: CRNA,,, | Performed by: NURSE ANESTHETIST, CERTIFIED REGISTERED

## 2019-06-04 PROCEDURE — 15777 ACELLULAR DERM MATRIX IMPLT: CPT | Mod: RT,,, | Performed by: SURGERY

## 2019-06-04 PROCEDURE — 88331 PATH CONSLTJ SURG 1 BLK 1SPC: CPT | Performed by: PATHOLOGY

## 2019-06-04 PROCEDURE — 38900 IO MAP OF SENT LYMPH NODE: CPT | Mod: RT,,, | Performed by: SURGERY

## 2019-06-04 PROCEDURE — 38900 PR INTRAOPERATIVE SENTINEL LYMPH NODE ID W DYE INJECTION: ICD-10-PCS | Mod: RT,,, | Performed by: SURGERY

## 2019-06-04 PROCEDURE — 37000008 HC ANESTHESIA 1ST 15 MINUTES: Performed by: SURGERY

## 2019-06-04 PROCEDURE — 88307 TISSUE EXAM BY PATHOLOGIST: CPT | Mod: 26,,, | Performed by: PATHOLOGY

## 2019-06-04 PROCEDURE — 71000033 HC RECOVERY, INTIAL HOUR: Performed by: SURGERY

## 2019-06-04 PROCEDURE — 64461 PVB THORACIC SINGLE INJ SITE: CPT | Mod: 59,,, | Performed by: ANESTHESIOLOGY

## 2019-06-04 PROCEDURE — 71000039 HC RECOVERY, EACH ADD'L HOUR: Performed by: SURGERY

## 2019-06-04 PROCEDURE — 76098 X-RAY EXAM SURGICAL SPECIMEN: CPT | Mod: 26,,, | Performed by: RADIOLOGY

## 2019-06-04 PROCEDURE — 76942 ECHO GUIDE FOR BIOPSY: CPT | Performed by: ANESTHESIOLOGY

## 2019-06-04 PROCEDURE — 36000707: Performed by: SURGERY

## 2019-06-04 PROCEDURE — C1729 CATH, DRAINAGE: HCPCS | Performed by: SURGERY

## 2019-06-04 PROCEDURE — S0028 INJECTION, FAMOTIDINE, 20 MG: HCPCS | Performed by: ANESTHESIOLOGY

## 2019-06-04 PROCEDURE — C1789 PROSTHESIS, BREAST, IMP: HCPCS | Performed by: SURGERY

## 2019-06-04 PROCEDURE — 88331 PATH CONSLTJ SURG 1 BLK 1SPC: CPT | Mod: 26,,, | Performed by: PATHOLOGY

## 2019-06-04 PROCEDURE — D9220A PRA ANESTHESIA: ICD-10-PCS | Mod: CRNA,,, | Performed by: NURSE ANESTHETIST, CERTIFIED REGISTERED

## 2019-06-04 PROCEDURE — 94761 N-INVAS EAR/PLS OXIMETRY MLT: CPT

## 2019-06-04 PROCEDURE — 63600175 PHARM REV CODE 636 W HCPCS: Performed by: ANESTHESIOLOGY

## 2019-06-04 DEVICE — ALLODERM PERF MED 16CMX20CM: Type: IMPLANTABLE DEVICE | Site: BREAST | Status: FUNCTIONAL

## 2019-06-04 RX ORDER — HYDROMORPHONE HYDROCHLORIDE 1 MG/ML
0.2 INJECTION, SOLUTION INTRAMUSCULAR; INTRAVENOUS; SUBCUTANEOUS EVERY 5 MIN PRN
Status: DISCONTINUED | OUTPATIENT
Start: 2019-06-04 | End: 2019-06-04 | Stop reason: HOSPADM

## 2019-06-04 RX ORDER — CEPHALEXIN 500 MG/1
500 CAPSULE ORAL EVERY 6 HOURS
Status: DISCONTINUED | OUTPATIENT
Start: 2019-06-04 | End: 2019-06-05 | Stop reason: HOSPADM

## 2019-06-04 RX ORDER — BUPIVACAINE HYDROCHLORIDE 7.5 MG/ML
INJECTION, SOLUTION EPIDURAL; RETROBULBAR
Status: COMPLETED | OUTPATIENT
Start: 2019-06-04 | End: 2019-06-04

## 2019-06-04 RX ORDER — CYCLOBENZAPRINE HCL 10 MG
10 TABLET ORAL 3 TIMES DAILY PRN
Status: DISCONTINUED | OUTPATIENT
Start: 2019-06-04 | End: 2019-06-05 | Stop reason: HOSPADM

## 2019-06-04 RX ORDER — DOCUSATE SODIUM 100 MG/1
100 CAPSULE, LIQUID FILLED ORAL EVERY 12 HOURS
Status: DISCONTINUED | OUTPATIENT
Start: 2019-06-05 | End: 2019-06-05 | Stop reason: HOSPADM

## 2019-06-04 RX ORDER — PROPOFOL 10 MG/ML
VIAL (ML) INTRAVENOUS
Status: DISCONTINUED | OUTPATIENT
Start: 2019-06-04 | End: 2019-06-04

## 2019-06-04 RX ORDER — ENOXAPARIN SODIUM 100 MG/ML
40 INJECTION SUBCUTANEOUS EVERY 24 HOURS
Status: DISCONTINUED | OUTPATIENT
Start: 2019-06-05 | End: 2019-06-05 | Stop reason: HOSPADM

## 2019-06-04 RX ORDER — BACITRACIN 50000 [IU]/1
INJECTION, POWDER, FOR SOLUTION INTRAMUSCULAR
Status: DISCONTINUED | OUTPATIENT
Start: 2019-06-04 | End: 2019-06-04 | Stop reason: HOSPADM

## 2019-06-04 RX ORDER — FENTANYL CITRATE 50 UG/ML
25 INJECTION, SOLUTION INTRAMUSCULAR; INTRAVENOUS EVERY 5 MIN PRN
Status: DISCONTINUED | OUTPATIENT
Start: 2019-06-04 | End: 2019-06-04 | Stop reason: HOSPADM

## 2019-06-04 RX ORDER — MEPERIDINE HYDROCHLORIDE 50 MG/ML
12.5 INJECTION INTRAMUSCULAR; INTRAVENOUS; SUBCUTANEOUS ONCE AS NEEDED
Status: DISCONTINUED | OUTPATIENT
Start: 2019-06-04 | End: 2019-06-04 | Stop reason: HOSPADM

## 2019-06-04 RX ORDER — MIDAZOLAM HYDROCHLORIDE 1 MG/ML
0.5 INJECTION INTRAMUSCULAR; INTRAVENOUS
Status: DISCONTINUED | OUTPATIENT
Start: 2019-06-04 | End: 2019-06-04 | Stop reason: HOSPADM

## 2019-06-04 RX ORDER — PHENYLEPHRINE HYDROCHLORIDE 10 MG/ML
INJECTION INTRAVENOUS
Status: DISCONTINUED | OUTPATIENT
Start: 2019-06-04 | End: 2019-06-04

## 2019-06-04 RX ORDER — LIDOCAINE HYDROCHLORIDE 10 MG/ML
1 INJECTION, SOLUTION EPIDURAL; INFILTRATION; INTRACAUDAL; PERINEURAL ONCE
Status: DISCONTINUED | OUTPATIENT
Start: 2019-06-04 | End: 2019-06-04 | Stop reason: HOSPADM

## 2019-06-04 RX ORDER — GLYCOPYRROLATE 0.2 MG/ML
INJECTION INTRAMUSCULAR; INTRAVENOUS
Status: DISCONTINUED | OUTPATIENT
Start: 2019-06-04 | End: 2019-06-04

## 2019-06-04 RX ORDER — ONDANSETRON 2 MG/ML
INJECTION INTRAMUSCULAR; INTRAVENOUS
Status: DISCONTINUED | OUTPATIENT
Start: 2019-06-04 | End: 2019-06-04

## 2019-06-04 RX ORDER — ROCURONIUM BROMIDE 10 MG/ML
INJECTION, SOLUTION INTRAVENOUS
Status: DISCONTINUED | OUTPATIENT
Start: 2019-06-04 | End: 2019-06-04

## 2019-06-04 RX ORDER — ONDANSETRON 2 MG/ML
4 INJECTION INTRAMUSCULAR; INTRAVENOUS DAILY PRN
Status: DISCONTINUED | OUTPATIENT
Start: 2019-06-04 | End: 2019-06-04 | Stop reason: HOSPADM

## 2019-06-04 RX ORDER — ACETAMINOPHEN 10 MG/ML
INJECTION, SOLUTION INTRAVENOUS
Status: DISCONTINUED | OUTPATIENT
Start: 2019-06-04 | End: 2019-06-04

## 2019-06-04 RX ORDER — SODIUM CHLORIDE 9 MG/ML
INJECTION, SOLUTION INTRAVENOUS CONTINUOUS PRN
Status: DISCONTINUED | OUTPATIENT
Start: 2019-06-04 | End: 2019-06-04

## 2019-06-04 RX ORDER — MORPHINE SULFATE 2 MG/ML
2 INJECTION, SOLUTION INTRAMUSCULAR; INTRAVENOUS
Status: DISCONTINUED | OUTPATIENT
Start: 2019-06-04 | End: 2019-06-05 | Stop reason: HOSPADM

## 2019-06-04 RX ORDER — DIPHENHYDRAMINE HCL 25 MG
25 CAPSULE ORAL EVERY 4 HOURS PRN
Status: DISCONTINUED | OUTPATIENT
Start: 2019-06-04 | End: 2019-06-05 | Stop reason: HOSPADM

## 2019-06-04 RX ORDER — METOCLOPRAMIDE HYDROCHLORIDE 5 MG/ML
10 INJECTION INTRAMUSCULAR; INTRAVENOUS EVERY 10 MIN PRN
Status: DISCONTINUED | OUTPATIENT
Start: 2019-06-04 | End: 2019-06-04 | Stop reason: HOSPADM

## 2019-06-04 RX ORDER — LIDOCAINE HCL/PF 100 MG/5ML
SYRINGE (ML) INTRAVENOUS
Status: DISCONTINUED | OUTPATIENT
Start: 2019-06-04 | End: 2019-06-04

## 2019-06-04 RX ORDER — HYDROCODONE BITARTRATE AND ACETAMINOPHEN 5; 325 MG/1; MG/1
1 TABLET ORAL EVERY 4 HOURS PRN
Status: DISCONTINUED | OUTPATIENT
Start: 2019-06-04 | End: 2019-06-05 | Stop reason: HOSPADM

## 2019-06-04 RX ORDER — INDOCYANINE GREEN AND WATER 25 MG
KIT INJECTION
Status: DISCONTINUED | OUTPATIENT
Start: 2019-06-04 | End: 2019-06-04

## 2019-06-04 RX ORDER — KETAMINE HCL IN 0.9 % NACL 50 MG/5 ML
SYRINGE (ML) INTRAVENOUS
Status: DISCONTINUED | OUTPATIENT
Start: 2019-06-04 | End: 2019-06-04

## 2019-06-04 RX ORDER — DEXTROSE MONOHYDRATE, SODIUM CHLORIDE, AND POTASSIUM CHLORIDE 50; 1.49; 4.5 G/1000ML; G/1000ML; G/1000ML
INJECTION, SOLUTION INTRAVENOUS
Status: COMPLETED
Start: 2019-06-04 | End: 2019-06-04

## 2019-06-04 RX ORDER — EPHEDRINE SULFATE 50 MG/ML
INJECTION, SOLUTION INTRAVENOUS
Status: DISCONTINUED | OUTPATIENT
Start: 2019-06-04 | End: 2019-06-04

## 2019-06-04 RX ORDER — CEFAZOLIN SODIUM 1 G/3ML
INJECTION, POWDER, FOR SOLUTION INTRAMUSCULAR; INTRAVENOUS
Status: DISCONTINUED | OUTPATIENT
Start: 2019-06-04 | End: 2019-06-04

## 2019-06-04 RX ORDER — DEXTROSE MONOHYDRATE, SODIUM CHLORIDE, AND POTASSIUM CHLORIDE 50; 1.49; 4.5 G/1000ML; G/1000ML; G/1000ML
INJECTION, SOLUTION INTRAVENOUS CONTINUOUS
Status: DISCONTINUED | OUTPATIENT
Start: 2019-06-04 | End: 2019-06-05 | Stop reason: HOSPADM

## 2019-06-04 RX ORDER — ONDANSETRON 8 MG/1
8 TABLET, ORALLY DISINTEGRATING ORAL EVERY 8 HOURS PRN
Status: DISCONTINUED | OUTPATIENT
Start: 2019-06-04 | End: 2019-06-05 | Stop reason: HOSPADM

## 2019-06-04 RX ORDER — SODIUM CHLORIDE 9 MG/ML
INJECTION, SOLUTION INTRAVENOUS CONTINUOUS
Status: DISCONTINUED | OUTPATIENT
Start: 2019-06-04 | End: 2019-06-04

## 2019-06-04 RX ORDER — NEOSTIGMINE METHYLSULFATE 1 MG/ML
INJECTION, SOLUTION INTRAVENOUS
Status: DISCONTINUED | OUTPATIENT
Start: 2019-06-04 | End: 2019-06-04

## 2019-06-04 RX ORDER — SODIUM CHLORIDE 0.9 % (FLUSH) 0.9 %
10 SYRINGE (ML) INJECTION
Status: DISCONTINUED | OUTPATIENT
Start: 2019-06-04 | End: 2019-06-04 | Stop reason: HOSPADM

## 2019-06-04 RX ORDER — LORAZEPAM 2 MG/ML
0.25 INJECTION INTRAMUSCULAR ONCE AS NEEDED
Status: DISCONTINUED | OUTPATIENT
Start: 2019-06-04 | End: 2019-06-04 | Stop reason: HOSPADM

## 2019-06-04 RX ORDER — FENTANYL CITRATE 50 UG/ML
INJECTION, SOLUTION INTRAMUSCULAR; INTRAVENOUS
Status: DISCONTINUED | OUTPATIENT
Start: 2019-06-04 | End: 2019-06-04

## 2019-06-04 RX ORDER — HEPARIN SODIUM 5000 [USP'U]/ML
5000 INJECTION, SOLUTION INTRAVENOUS; SUBCUTANEOUS ONCE
Status: COMPLETED | OUTPATIENT
Start: 2019-06-04 | End: 2019-06-04

## 2019-06-04 RX ORDER — OXYCODONE AND ACETAMINOPHEN 5; 325 MG/1; MG/1
1 TABLET ORAL
Status: DISCONTINUED | OUTPATIENT
Start: 2019-06-04 | End: 2019-06-04 | Stop reason: HOSPADM

## 2019-06-04 RX ORDER — FAMOTIDINE 10 MG/ML
20 INJECTION INTRAVENOUS ONCE
Status: COMPLETED | OUTPATIENT
Start: 2019-06-04 | End: 2019-06-04

## 2019-06-04 RX ORDER — CIPROFLOXACIN 2 MG/ML
INJECTION, SOLUTION INTRAVENOUS CONTINUOUS PRN
Status: COMPLETED | OUTPATIENT
Start: 2019-06-04 | End: 2019-06-04

## 2019-06-04 RX ADMIN — EPHEDRINE SULFATE 5 MG: 50 INJECTION, SOLUTION INTRAMUSCULAR; INTRAVENOUS; SUBCUTANEOUS at 02:06

## 2019-06-04 RX ADMIN — ROCURONIUM BROMIDE 10 MG: 10 INJECTION, SOLUTION INTRAVENOUS at 02:06

## 2019-06-04 RX ADMIN — INDOCYANINE GREEN 10 MG: KIT INTRAVENOUS at 02:06

## 2019-06-04 RX ADMIN — FENTANYL CITRATE 25 MCG: 50 INJECTION INTRAMUSCULAR; INTRAVENOUS at 10:06

## 2019-06-04 RX ADMIN — FENTANYL CITRATE 50 MCG: 50 INJECTION, SOLUTION INTRAMUSCULAR; INTRAVENOUS at 02:06

## 2019-06-04 RX ADMIN — ROCURONIUM BROMIDE 10 MG: 10 INJECTION, SOLUTION INTRAVENOUS at 01:06

## 2019-06-04 RX ADMIN — HEPARIN SODIUM 5000 UNITS: 5000 INJECTION, SOLUTION INTRAVENOUS; SUBCUTANEOUS at 10:06

## 2019-06-04 RX ADMIN — FENTANYL CITRATE 25 MCG: 50 INJECTION, SOLUTION INTRAMUSCULAR; INTRAVENOUS at 03:06

## 2019-06-04 RX ADMIN — FENTANYL CITRATE 50 MCG: 50 INJECTION, SOLUTION INTRAMUSCULAR; INTRAVENOUS at 11:06

## 2019-06-04 RX ADMIN — DEXTROSE MONOHYDRATE, SODIUM CHLORIDE, AND POTASSIUM CHLORIDE 1000 ML: 50; 1.49; 4.5 INJECTION, SOLUTION INTRAVENOUS at 04:06

## 2019-06-04 RX ADMIN — DEXTROSE, SODIUM CHLORIDE, AND POTASSIUM CHLORIDE 1000 ML: 5; .45; .15 INJECTION INTRAVENOUS at 04:06

## 2019-06-04 RX ADMIN — PHENYLEPHRINE HYDROCHLORIDE 100 MCG: 10 INJECTION INTRAVENOUS at 02:06

## 2019-06-04 RX ADMIN — CEPHALEXIN 500 MG: 500 CAPSULE ORAL at 11:06

## 2019-06-04 RX ADMIN — NEOSTIGMINE METHYLSULFATE 2.5 MG: 1 INJECTION INTRAVENOUS at 04:06

## 2019-06-04 RX ADMIN — PHENYLEPHRINE HYDROCHLORIDE 100 MCG: 10 INJECTION INTRAVENOUS at 12:06

## 2019-06-04 RX ADMIN — EPHEDRINE SULFATE 5 MG: 50 INJECTION, SOLUTION INTRAMUSCULAR; INTRAVENOUS; SUBCUTANEOUS at 01:06

## 2019-06-04 RX ADMIN — FENTANYL CITRATE 50 MCG: 50 INJECTION INTRAMUSCULAR; INTRAVENOUS at 10:06

## 2019-06-04 RX ADMIN — BUPIVACAINE HYDROCHLORIDE 30 ML: 7.5 INJECTION, SOLUTION EPIDURAL; RETROBULBAR at 10:06

## 2019-06-04 RX ADMIN — ACETAMINOPHEN 1000 MG: 10 INJECTION, SOLUTION INTRAVENOUS at 11:06

## 2019-06-04 RX ADMIN — ONDANSETRON 4 MG: 2 INJECTION INTRAMUSCULAR; INTRAVENOUS at 03:06

## 2019-06-04 RX ADMIN — CEFAZOLIN 2 G: 330 INJECTION, POWDER, FOR SOLUTION INTRAMUSCULAR; INTRAVENOUS at 11:06

## 2019-06-04 RX ADMIN — Medication 10 MG: at 01:06

## 2019-06-04 RX ADMIN — Medication 10 MG: at 12:06

## 2019-06-04 RX ADMIN — GLYCOPYRROLATE 0.3 MG: 0.2 INJECTION, SOLUTION INTRAMUSCULAR; INTRAVENOUS at 04:06

## 2019-06-04 RX ADMIN — PHENYLEPHRINE HYDROCHLORIDE 100 MCG: 10 INJECTION INTRAVENOUS at 11:06

## 2019-06-04 RX ADMIN — ROCURONIUM BROMIDE 50 MG: 10 INJECTION, SOLUTION INTRAVENOUS at 11:06

## 2019-06-04 RX ADMIN — Medication 30 MG: at 11:06

## 2019-06-04 RX ADMIN — PROPOFOL 170 MG: 10 INJECTION, EMULSION INTRAVENOUS at 11:06

## 2019-06-04 RX ADMIN — ONDANSETRON 8 MG: 8 TABLET, ORALLY DISINTEGRATING ORAL at 10:06

## 2019-06-04 RX ADMIN — PHENYLEPHRINE HYDROCHLORIDE 100 MCG: 10 INJECTION INTRAVENOUS at 03:06

## 2019-06-04 RX ADMIN — PROPOFOL 50 MG: 10 INJECTION, EMULSION INTRAVENOUS at 03:06

## 2019-06-04 RX ADMIN — FAMOTIDINE 20 MG: 10 INJECTION, SOLUTION INTRAVENOUS at 09:06

## 2019-06-04 RX ADMIN — LIDOCAINE HYDROCHLORIDE 75 MG: 20 INJECTION, SOLUTION INTRAVENOUS at 11:06

## 2019-06-04 RX ADMIN — HYDROCODONE BITARTRATE AND ACETAMINOPHEN 1 TABLET: 5; 325 TABLET ORAL at 10:06

## 2019-06-04 RX ADMIN — SODIUM CHLORIDE: 0.9 INJECTION, SOLUTION INTRAVENOUS at 10:06

## 2019-06-04 RX ADMIN — ROCURONIUM BROMIDE 10 MG: 10 INJECTION, SOLUTION INTRAVENOUS at 12:06

## 2019-06-04 RX ADMIN — SODIUM CHLORIDE, SODIUM GLUCONATE, SODIUM ACETATE, POTASSIUM CHLORIDE, MAGNESIUM CHLORIDE, SODIUM PHOSPHATE, DIBASIC, AND POTASSIUM PHOSPHATE: .53; .5; .37; .037; .03; .012; .00082 INJECTION, SOLUTION INTRAVENOUS at 11:06

## 2019-06-04 RX ADMIN — CEFAZOLIN 2 G: 330 INJECTION, POWDER, FOR SOLUTION INTRAMUSCULAR; INTRAVENOUS at 03:06

## 2019-06-04 RX ADMIN — MIDAZOLAM HYDROCHLORIDE 2 MG: 1 INJECTION, SOLUTION INTRAMUSCULAR; INTRAVENOUS at 10:06

## 2019-06-04 RX ADMIN — SODIUM CHLORIDE 500 ML: 0.9 INJECTION, SOLUTION INTRAVENOUS at 09:06

## 2019-06-04 NOTE — OP NOTE
DATE OF PROCEDURE:  06/04/2019.    PREOPERATIVE DIAGNOSIS:  Right breast cancer.    POSTOPERATIVE DIAGNOSIS:  Right breast cancer.    PROCEDURES PERFORMED:  1.  Immediate breast reconstruction using an implant.  2.  Placement of AlloDerm.  3.  Left mastopexy.    SURGEON:  Leonard Cordero M.D., [facility]    ANESTHESIA:  General.    INDICATIONS:  The patient was evaluated in the preoperative holding area.  I   believe her significant other was with her in the room.  I discussed the   procedure in detail with Ms. Toledo.  Again, she had a difficult time   understanding again, the procedure was explained that the best option again for   her in the long run would be to use her own tissue.  Since she has had an   abdominoplasty in the past, she would have to have PAP flaps.  She understands   that is the best option for her at this point.  She stated she would like to   proceed with the implant and then make a decision at another time.  I discussed   with her that we would either put a tissue expander or a permanent implant in   her.  That, after this surgery, she would not match.  She has a very ptotic left   breast with no volume and the implant would basically have much more   projection.  So she understands that she would not match, that she would need to   make a decision after this surgery on which way she would like to go.  I   strongly encouraged her and did talk to her, I believe her  after the   procedure and discussed with him that I think the best procedure for her would   be a flap.  Nonetheless, the risks and possible complications including, but not   limited to, infection, bleeding, scarring, breast asymmetry, breast deformity,   loss of the implant due to infection or exposure, open wounds and need for   further surgery were explained.  I also explained on the opposite side where an   instant loss of sensation to the nipple and partial or total loss of the nipple,   open wounds and need for further  surgery.  The patient signed the informed   consent.    PROCEDURE IN DETAIL:  The patient was taken to the Operative Room, placed in the   supine position and after adequate general endotracheal anesthesia, was prepped   and draped in normal sterile fashion.  The mastectomy then proceeded.  General   Surgery removed the breast and the left nipple in place.  This was done through   an inframammary incision.  After the completion of their procedure, we entered   the room.  The flaps were noted to be on the right side, were noted to be good   and vascularized.  Hemostasis was achieved using the Bovie.  On the left side, a   38 mm cookie cutter was used for the new nipple areolar complex.  Modified Siddiqui   pattern was drawn.  The tissue around the nipple and inferior to it were   deepithelialized.  The lateral flap was dissected a great deal.  As much tissue   that was possible to be recruited laterally was performed.  This was folded up   underneath the breast for an additional projection.  The breast itself was moved   as medially as possible and secured using Vicryl sutures.  Next, the incisions   were closed using interrupted 3-0 Monocryl followed by running 4-0 Monocryl   subcuticular suture for the inframammary incision.  The vertical limb and   nipple-areolar complex were closed using interrupted 4-0 Monocryl followed by   running 4-0 Monocryl subcuticular suture.  On the opposite side, 276 mL soft   touch implant was opened on the back table.  It was soaked in antibiotic   solution.  AlloDerm 20 x 16 cm was then soaked in antibiotic solution as well.    The implant was completely wrapped in the AlloDerm.  It was secured using 2-0   Vicryl sutures.  The implant was placed and positioned exactly behind the   nipple.  It was secured using 2-0 Vicryl sutures.  The patient was placed in the   upright position to evaluate for symmetry.  The patient was virtually all   implant on the right side.  She would need a large  amount of fat grafting to get   any type of symmetry both medially and laterally due to the fact that the   implant needed to be placed directly behind the implant, which left a void   medially and especially laterally as well.  The patient understood this would be   at least be a two-stage procedure with implant reconstruction.  Thus, the   patient was then placed in the supine position.  Two drains were then placed.    The pocket had already been irrigated with triple antibiotic solution as well as   Betadine.  The incisions were closed using interrupted 3-0 Monocryl followed by   running 4-0 Monocryl subcuticular suture.  Prevena wound VAC was applied.    There were no complications.      CRB/IN  dd: 06/04/2019 16:26:17 (CDT)  td: 06/04/2019 18:34:16 (ROE)  Doc ID   #[id number]  Job ID #[id number]    CC:

## 2019-06-04 NOTE — NURSING TRANSFER
Nursing Transfer Note      6/4/2019     Transfer To:  [address] From PACU    Transfer via bed    Transfer with IV pump, wound vac and personal belongings    Transported by PCT x 2    Medicines sent: none    Chart send with patient: Yes    Notified: spouse    Patient reassessed at: 6/4/19 @ 1830     Upon arrival to floor:

## 2019-06-04 NOTE — TRANSFER OF CARE
"Anesthesia Transfer of Care Note    Patient: Jaja Toledo    Procedure(s) Performed: Procedure(s) (LRB):  MASTECTOMY, SIMPLE RIGHT (CONSENT AM OF) 4.0 hr case (Right)  BIOPSY, LYMPH NODE, SENTINEL RIGHT (Right)  LYMPHADENECTOMY, AXILLARY RIGHT (Right)  MASTOPEXY LEFT (Left)  INSERTION, TISSUE EXPANDER, BREAST RIGHT (Right)  INSERTION, BREAST IMPLANT RIGHT (Right)    Patient location: PACU    Anesthesia Type: general    Transport from OR: Transported from OR on 6-10 L/min O2 by face mask with adequate spontaneous ventilation    Post pain: adequate analgesia    Post assessment: tolerated procedure well and no apparent anesthetic complications    Post vital signs: stable    Level of consciousness: sedated and responds to stimulation    Nausea/Vomiting: no nausea/vomiting    Complications: none    Transfer of care protocol was followed      Last vitals:   Visit Vitals  /68   Pulse 92   Temp 36.1 °C (97 °F) (Temporal)   Resp 17   Ht 5' 6" (1.676 m)   Wt 74.8 kg (165 lb)   SpO2 97%   Breastfeeding? No   BMI 26.63 kg/m²     "

## 2019-06-04 NOTE — ANESTHESIA PROCEDURE NOTES
Erector Spinae Plane Single Injection Block    Patient location during procedure: pre-op   Block not for primary anesthetic.  Reason for block: at surgeon's request and post-op pain management   Post-op Pain Location: bilateral breast pain  Start time: 6/4/2019 10:30 AM  Timeout: 6/4/2019 10:30 AM   End time: 6/4/2019 10:40 AM  Staffing  Anesthesiologist: Ger Mcallister MD  Resident/CRNA: Rafia Strong MD  Performed: resident/CRNA   Preanesthetic Checklist  Completed: patient identified, site marked, surgical consent, pre-op evaluation, timeout performed, IV checked, risks and benefits discussed and monitors and equipment checked  Peripheral Block  Patient position: sitting  Prep: ChloraPrep  Patient monitoring: heart rate, cardiac monitor, continuous pulse ox, continuous capnometry and frequent blood pressure checks  Block type: erector spinae plane (Erector Spinae Plane Block)  Laterality: bilateral  Injection technique: single shot  Location: T2-3  Needle  Needle type: Tuohy   Needle gauge: 17 G  Needle length: 3.5 in  Needle localization: anatomical landmarks and ultrasound guidance   -ultrasound image captured on disc.  Assessment  Injection assessment: negative aspiration, negative parasthesia and local visualized surrounding nerve  Paresthesia pain: none  Heart rate change: no  Slow fractionated injection: yes  Additional Notes  Patient tolerated well.  [facility]. .375% bupivicaine 30ml injected bilaterally

## 2019-06-04 NOTE — BRIEF OP NOTE
[facility]  Brief Operative Note    SUMMARY     Surgery Date: 6/4/2019     Surgeon(s) and Role:  Panel 1:     * Sabine Arteaga MD - Primary  Panel 2:     * Leonard Cordero MD - Primary     * Lucius Aguilar MD - Resident - Assisting     * Reinaldo Gaona MD - Resident - Assisting        Pre-op Diagnosis:  Malignant neoplasm of right female breast, unspecified estrogen receptor status, unspecified site of breast [C50.911]    Post-op Diagnosis:  Post-Op Diagnosis Codes:     * Malignant neoplasm of right female breast, unspecified estrogen receptor status, unspecified site of breast [C50.911]    Procedure(s) (LRB):  MASTECTOMY, SIMPLE RIGHT (CONSENT AM OF) 4.0 hr case (Right)  BIOPSY, LYMPH NODE, SENTINEL RIGHT (Right)  LYMPHADENECTOMY, AXILLARY RIGHT (Right)  MASTOPEXY LEFT (Left)  INSERTION, TISSUE EXPANDER, BREAST RIGHT (Right)  INSERTION, BREAST IMPLANT RIGHT (Right)    Anesthesia: General    Description of Procedure: mastopexy of left breast, direct to implant reconstruction of right breast with alloderm placement    Description of the findings of the procedure:please see dictated op note    Estimated Blood Loss: 50cc       Specimens:   Specimen (12h ago, onward)    Start     Ordered    06/04/19 1518  Specimen to Pathology - Surgery  Once     Comments:  1. RIGHT AXILLARY SENTINEL LYMPH NODE COUNT 740 HOT-FROZEN2. RIGHT BREAST SHORT STITCH-SUPERIOR, LONG STITCH-LATERAL, LOOPED-SUBAREOLAR-PERM     Start Status     06/04/19 1518 Collected (06/04/19 1525) Order ID: [id number]       06/04/19 1525    06/04/19 1150  Specimen to Pathology - Surgery  Once     Comments:  1. Right axillary sentinel lymph node count 740 hot - Frozen     Start Status     06/04/19 1150 Collected (06/04/19 1525) Order ID: [id number]       06/04/19 1224

## 2019-06-05 VITALS
WEIGHT: 165 LBS | TEMPERATURE: 98 F | RESPIRATION RATE: 18 BRPM | HEIGHT: 66 IN | OXYGEN SATURATION: 97 % | DIASTOLIC BLOOD PRESSURE: 65 MMHG | SYSTOLIC BLOOD PRESSURE: 104 MMHG | HEART RATE: 86 BPM | BODY MASS INDEX: 26.52 KG/M2

## 2019-06-05 PROCEDURE — 63600175 PHARM REV CODE 636 W HCPCS: Performed by: STUDENT IN AN ORGANIZED HEALTH CARE EDUCATION/TRAINING PROGRAM

## 2019-06-05 PROCEDURE — 25000003 PHARM REV CODE 250: Performed by: STUDENT IN AN ORGANIZED HEALTH CARE EDUCATION/TRAINING PROGRAM

## 2019-06-05 PROCEDURE — 94761 N-INVAS EAR/PLS OXIMETRY MLT: CPT

## 2019-06-05 RX ORDER — OXYCODONE AND ACETAMINOPHEN 5; 325 MG/1; MG/1
1 TABLET ORAL EVERY 6 HOURS PRN
Qty: 28 TABLET | Refills: 0 | Status: SHIPPED | OUTPATIENT
Start: 2019-06-05 | End: 2020-07-10 | Stop reason: CLARIF

## 2019-06-05 RX ORDER — HYDROCODONE BITARTRATE AND ACETAMINOPHEN 5; 325 MG/1; MG/1
1 TABLET ORAL EVERY 4 HOURS PRN
Status: CANCELLED | OUTPATIENT
Start: 2019-06-05

## 2019-06-05 RX ORDER — CEPHALEXIN 500 MG/1
500 CAPSULE ORAL EVERY 6 HOURS
Qty: 40 CAPSULE | Refills: 0 | Status: SHIPPED | OUTPATIENT
Start: 2019-06-05 | End: 2019-06-15

## 2019-06-05 RX ORDER — ONDANSETRON 8 MG/1
8 TABLET, ORALLY DISINTEGRATING ORAL EVERY 8 HOURS PRN
Status: CANCELLED | OUTPATIENT
Start: 2019-06-05

## 2019-06-05 RX ADMIN — HYDROCODONE BITARTRATE AND ACETAMINOPHEN 1 TABLET: 5; 325 TABLET ORAL at 12:06

## 2019-06-05 RX ADMIN — HYDROCODONE BITARTRATE AND ACETAMINOPHEN 1 TABLET: 5; 325 TABLET ORAL at 04:06

## 2019-06-05 RX ADMIN — HYDROCODONE BITARTRATE AND ACETAMINOPHEN 1 TABLET: 5; 325 TABLET ORAL at 08:06

## 2019-06-05 RX ADMIN — DOCUSATE SODIUM 100 MG: 100 CAPSULE, LIQUID FILLED ORAL at 08:06

## 2019-06-05 RX ADMIN — CEPHALEXIN 500 MG: 500 CAPSULE ORAL at 05:06

## 2019-06-05 RX ADMIN — ENOXAPARIN SODIUM 40 MG: 100 INJECTION SUBCUTANEOUS at 04:06

## 2019-06-05 RX ADMIN — HYDROCODONE BITARTRATE AND ACETAMINOPHEN 1 TABLET: 5; 325 TABLET ORAL at 03:06

## 2019-06-05 RX ADMIN — CEPHALEXIN 500 MG: 500 CAPSULE ORAL at 12:06

## 2019-06-05 NOTE — OP NOTE
Operative Note     6/4/2019    PRE-OP DIAGNOSIS: Malignant neoplasm of right female breast, unspecified estrogen receptor status, unspecified site of breast [C50.911]      POST-OP DIAGNOSIS: Post-Op Diagnosis Codes:     * Malignant neoplasm of right female breast, unspecified estrogen receptor status, unspecified site of breast [C50.911]    Procedure(s):  MASTECTOMY right nipple sparing  BIOPSY, LYMPH NODE, SENTINEL RIGHT axillary  ID of SLN  Injection of radioactive technetium for SLN      SURGEON: Surgeon(s) and Role:  Panel 1:     * Sabine Arteaga MD - Primary  Panel 2:     * Leonard Cordero MD - Primary     * Lucius Aguilar MD - Resident - Assisting     * Reinaldo Gaona MD - Resident - Assisting    ANESTHESIA: General     OPERATIVE FINDINGS: 1 SLN, count 740 negative on frozen    INDICATION FOR PROCEDURE: This patient presents with a history of cancer of the right breast    PROCEDURE IN DETAIL:  Jaja Toledo is a 63 y.o. female brought to the operating room for definitive surgery of cancer of the right breast.  The patient has elected to undergo right nipple sparing mastectomy with right axillary sentinel lymph node biopsy for rehan assessment. The patient was informed of the possible risks and complications of the procedure, including but not limited to anesthetic risks, bleeding, infection, and need for additional surgery.  The patient concurred with the proposed plan, and has given informed consent.  The site of surgery was properly noted/marked in the preoperative holding area.    Prior to arriving in the operating room, the patient's right breast was injected with technetium to facilitate sentinel lymph node identification. The patient was then brought to the operating room and placed in the supine position with both upper extremities extended.  General anesthesia was administered. Perioperative antibiotics were administered consisting of Ancef and a time out was performed confirming  the patient, site, and procedure.  The bilateral chest and axilla was then prepped and draped in the usual sterile fashion.    We first turned our attention to the right axilla.   Blue dye was injected prior to the incision in the usual subareolar fashion. The gamma probe was used to identify an area of increased radioactivity within the lower axilla.  A small incision was made in a transverse inferior fashion just beneath the hairline in the axilla. The clavipectoral sheath was sharply incised to reveal the level I axillary lymph nodes. The probe was used to identify a single node with increased radioactivity.  This node was brought into the operative field and carefully dissected free of the surrounding lymphovascular structures.  The highest ex vivo count of the node was 740.  The node was then sent to pathology for frozen section evaluation, labeled as sentinel node #1.  A total of 1 axillary sentinel nodes and 0 axillary non-sentinel nodes were identified, excised and submitted to pathology.  Bed counts were obtained to confirm that the 10% rule had not been violated.   The wound was irrigated with normal saline, and all bleeding points were secured with Bovie electrocautery.  The incision was closed with an interrupted 3-0 vicryl deep dermal stitch followed by a running 4-0 monocryl subcuticular.     We then turned our attention back to the right breast where an inferior radial incision was fashioned sparing the nipple areolar complex.  The incision was made with a 15-blade and extended through the subcutaneous tissues with plasmablade.  Skin flaps were raised to the clavicle superiorly.  We then proceeded to raise the remainder of the flaps to the lateral border of the sternum medially, to the inframammary fold inferiorly, and to the anterior border of the latissimus dorsi muscle laterally.  Lighted retractors was used to assist in the creation of the skin flaps. The tissue beneath the nipple areolar complex  was sharply dissected being careful to dissect to the dermis.  We were careful to ensure that the ductal tissue beneath the nipple was removed. The breast tissue was sharply excised off the chest wall taking care to incorporate the pectoralis fascia while leaving the serratus fascia behind.  The resulting mastectomy specimen was marked using a short stitch superiorly and long stitch laterally.  The breast was sent to pathology for permanent evaluation.      Frozen section rehan evaluation revealed no evidence of metastatic disease.  Therefore, the operative field was irrigated with normal saline and all bleeding points were secured.  The incision will be closed by the plastic surgery service.      At the end of my portion of the operation, all sponge, instrument, and needle counts x 2 were correct.    ESTIMATED BLOOD LOSS: Minimal      COMPLICATIONS: none    DISPOSITION: intraop transfer to the plastic surgery service    ATTESTATION:   I performed the procedure.

## 2019-06-05 NOTE — NURSING
Pt discharged home, awaiting ride. Aaox4. VSS. Rt breast drsg clean, intact; Lt breast incision clean, intact. GEETA drains x3 intact -- teaching done for maintenance. RX and D/C instructions given--understanding verbalized.

## 2019-06-05 NOTE — DISCHARGE SUMMARY
[facility]  Orthopedics  Discharge Summary      Patient Name: Jaja Toledo  MRN: [id number]  Admission Date: 6/4/2019  Hospital Length of Stay: 0 days  Discharge Date and Time: No discharge date for patient encounter.  Attending Physician: Sabine Arteaga MD   Discharging Provider: Reinaldo Gaona MD  Primary Care Provider: Andres Hurtado MD    HPI: see HPI    Procedure(s) (LRB):  MASTECTOMY, SIMPLE RIGHT (CONSENT AM OF) 4.0 hr case (Right)  BIOPSY, LYMPH NODE, SENTINEL RIGHT (Right)  LYMPHADENECTOMY, AXILLARY RIGHT (Right)  MASTOPEXY LEFT (Left)  INSERTION, TISSUE EXPANDER, BREAST RIGHT (Right)  INSERTION, BREAST IMPLANT RIGHT (Right)      Hospital Course: the patient arrived to pre-op area for proper pre-operative management.  Upon completion of pre-operative preparation, the patient was taken back to the operative theatre.  A right mastectomy with Lymph node biopsy and left mastopexy with tissue expander was performed without complication and the patient was transported to the post anesthesia care unit in stable condition. After appropriate recovery from the anaesthetic agents used during the surgery the patient was transferred to the floor where they received pain medication. Drain output was minimal the following morning so that day, the patient was deemed safe for DC, they were discharged home.              Significant Diagnostic Studies: none    Pending Diagnostic Studies:     None        Final Active Diagnoses:    Diagnosis Date Noted POA    PRINCIPAL PROBLEM:  Breast mass [N63.0] 06/04/2019 Yes      Problems Resolved During this Admission:      Discharged Condition: stable    Disposition: Home or Self Care    Follow Up:  Follow-up Information     Leonard Cordero MD In 1 week.    Specialty:  Plastic Surgery  Contact information:  [address] [place] [id number]  [phone]                 Patient Instructions:      Diet general     Lifting restrictions     Call MD for:   temperature >100.4     Call MD for:  persistent nausea and vomiting     Call MD for:  severe uncontrolled pain     Call MD for:  difficulty breathing, headache or visual disturbances     Call MD for:  redness, tenderness, or signs of infection (pain, swelling, redness, odor or green/yellow discharge around incision site)     Call MD for:  hives     Call MD for:  persistent dizziness or light-headedness     Call MD for:  extreme fatigue     No dressing needed     Medications:  Reconciled Home Medications:      Medication List      START taking these medications    cephALEXin 500 MG capsule  Commonly known as:  KEFLEX  Take 1 capsule (500 mg total) by mouth every 6 (six) hours. for 10 days     oxyCODONE-acetaminophen 5-325 mg per tablet  Commonly known as:  PERCOCET  Take 1 tablet by mouth every 6 (six) hours as needed for Pain.        CONTINUE taking these medications    CALCIUM 500 ORAL  Take by mouth once daily.     cyproheptadine 4 mg tablet  Commonly known as:  PERIACTIN  Take 1 tablet (4 mg total) by mouth 3 (three) times daily as needed (decreased appetite).     hydrOXYzine HCl 25 MG tablet  Commonly known as:  ATARAX  TAKE ONE EVERY NIGHT AS NEEDED FOR ITCHING     multivitamin capsule  Take 1 capsule by mouth once daily.     ranitidine 150 MG tablet  Commonly known as:  ZANTAC  Take 1 tablet (150 mg total) by mouth daily as needed for Heartburn.            Reinaldo Gaona MD  Orthopedics  [facility]

## 2019-06-05 NOTE — NURSING
Pt ate 2 sandwiches and half of a large smoothie and became nauseated.  Suggested for pt to slow down on meal and fluid intake until nausea subsides.  Pt non compliant, drank some more of her smoothie according to  and vomited.  Zofran administered per md order

## 2019-06-05 NOTE — DISCHARGE INSTRUCTIONS
No lifting more than 5 lbs  No bra  Follow up in 1 week with Dr gorman  Continue to strip drains  Plug in vac when not ambulating  Bacitracin to left nipple  Keflex for 10 days  Percocet is for pain control  Log drain volumes and bring to clinic

## 2019-06-05 NOTE — ANESTHESIA POSTPROCEDURE EVALUATION
"Anesthesia Post Evaluation    Patient: Jaja Toledo    Procedure(s) Performed: Procedure(s) (LRB):  MASTECTOMY, SIMPLE RIGHT (CONSENT AM OF) 4.0 hr case (Right)  BIOPSY, LYMPH NODE, SENTINEL RIGHT (Right)  LYMPHADENECTOMY, AXILLARY RIGHT (Right)  MASTOPEXY LEFT (Left)  INSERTION, TISSUE EXPANDER, BREAST RIGHT (Right)  INSERTION, BREAST IMPLANT RIGHT (Right)    Final Anesthesia Type: general  Patient location during evaluation: PACU  Patient participation: Yes- Able to Participate  Level of consciousness: awake and alert and oriented  Post-procedure vital signs: reviewed and stable  Pain management: adequate  Airway patency: patent  PONV status at discharge: No PONV  Anesthetic complications: no      Cardiovascular status: stable  Respiratory status: unassisted, spontaneous ventilation and room air  Hydration status: euvolemic  Follow-up not needed.    BP (!) 157/71   Pulse 76   Temp 36.2 °C (97.1 °F)   Resp 18   Ht 5' 6" (1.676 m)   Wt 74.8 kg (165 lb)   SpO2 (!) 94%   Breastfeeding? No   BMI 26.63 kg/m²       Event Time     Out of Recovery 19:02:27          Pain/Cathy Score: Pain Rating Prior to Med Admin: 6 Cathy Score: 10 (6/4/2019  6:30 PM)        "

## 2019-06-06 NOTE — SUBJECTIVE & OBJECTIVE
Interval History: Patient seen and examined at bedside.  No acute events overnight.  Pain controlled.  Drain output decreasing overnight.     Medications:  Continuous Infusions:  Scheduled Meds:  PRN Meds:     Review of patient's allergies indicates:   Allergen Reactions    No known drug allergies      Objective:     Vital Signs (Most Recent):  Temp: 98 °F (36.7 °C) (06/05/19 1109)  Pulse: 86 (06/05/19 1109)  Resp: 18 (06/05/19 0827)  BP: 104/65 (06/05/19 1109)  SpO2: 97 % (06/05/19 1639) Vital Signs (24h Range):  Temp:  [97.1 °F (36.2 °C)-98.3 °F (36.8 °C)] 98 °F (36.7 °C)  Pulse:  [] 86  Resp:  [18] 18  SpO2:  [94 %-98 %] 97 %  BP: (104-157)/(63-74) 104/65     Weight: 74.8 kg (165 lb)  Body mass index is 26.63 kg/m².    Intake/Output - Last 3 Shifts       06/03 0700 - 06/04 0659 06/04 0700 - 06/05 0659 06/05 0700 - 06/06 0659    P.O.  1080     I.V. (mL/kg)  2950 (39.4)     Total Intake(mL/kg)  4030 (53.9)     Urine (mL/kg/hr)  900 0 (0)    Emesis/NG output   0    Drains  180 62    Stool   0    Total Output  1080 62    Net  +2950 -62           Urine Occurrence  4 x 3 x    Stool Occurrence   0 x    Emesis Occurrence   0 x          Physical Exam  Breast incisions c/d/i  Wound vac in place, excellent suction  Significant Labs:  none    Significant Diagnostics:  none

## 2019-06-06 NOTE — PROGRESS NOTES
[facility]  Plastic Surgery  Progress Note    Subjective:     History of Present Illness:  No notes on file    Post-Op Info:  Procedure(s) (LRB):  MASTECTOMY, SIMPLE RIGHT (CONSENT AM OF) 4.0 hr case (Right)  BIOPSY, LYMPH NODE, SENTINEL RIGHT (Right)  LYMPHADENECTOMY, AXILLARY RIGHT (Right)  MASTOPEXY LEFT (Left)  INSERTION, TISSUE EXPANDER, BREAST RIGHT (Right)  INSERTION, BREAST IMPLANT RIGHT (Right)   1 Day Post-Op     Interval History: Patient seen and examined at bedside.  No acute events overnight.  Pain controlled.  Drain output decreasing overnight.     Medications:  Continuous Infusions:  Scheduled Meds:  PRN Meds:     Review of patient's allergies indicates:   Allergen Reactions    No known drug allergies      Objective:     Vital Signs (Most Recent):  Temp: 98 °F (36.7 °C) (06/05/19 1109)  Pulse: 86 (06/05/19 1109)  Resp: 18 (06/05/19 0827)  BP: 104/65 (06/05/19 1109)  SpO2: 97 % (06/05/19 1639) Vital Signs (24h Range):  Temp:  [97.1 °F (36.2 °C)-98.3 °F (36.8 °C)] 98 °F (36.7 °C)  Pulse:  [] 86  Resp:  [18] 18  SpO2:  [94 %-98 %] 97 %  BP: (104-157)/(63-74) 104/65     Weight: 74.8 kg (165 lb)  Body mass index is 26.63 kg/m².    Intake/Output - Last 3 Shifts       06/03 0700 - 06/04 0659 06/04 0700 - 06/05 0659 06/05 0700 - 06/06 0659    P.O.  1080     I.V. (mL/kg)  2950 (39.4)     Total Intake(mL/kg)  4030 (53.9)     Urine (mL/kg/hr)  900 0 (0)    Emesis/NG output   0    Drains  180 62    Stool   0    Total Output  1080 62    Net  +2950 -62           Urine Occurrence  4 x 3 x    Stool Occurrence   0 x    Emesis Occurrence   0 x          Physical Exam  Breast incisions c/d/i  Wound vac in place, excellent suction  Significant Labs:  none    Significant Diagnostics:  none    Assessment/Plan:     * Breast mass  Jaja Toledo is a 63 y.o. female s/p left mastopexy and right breast reconstruction  -Drain output continuing to decrease  -Keflex  -Oral pain control  -Prevena wound  vac in place  -DC home  -Follow up in 1 week with Dr Consuelo Gaona MD  Plastic Surgery  [facility]

## 2019-06-06 NOTE — ASSESSMENT & PLAN NOTE
Jaja Toledo is a 63 y.o. female s/p left mastopexy and right breast reconstruction  -Drain output continuing to decrease  -Keflex  -Oral pain control  -Prevena wound vac in place  -DC home  -Follow up in 1 week with Dr Cordero

## 2019-06-07 ENCOUNTER — TELEPHONE (OUTPATIENT)
Dept: PLASTIC SURGERY | Facility: CLINIC | Age: 64
End: 2019-06-07

## 2019-06-08 ENCOUNTER — HOSPITAL ENCOUNTER (EMERGENCY)
Facility: OTHER | Age: 64
Discharge: HOME | End: 2019-06-08
Attending: EMERGENCY MEDICINE
Payer: COMMERCIAL

## 2019-06-08 VITALS
OXYGEN SATURATION: 98 % | BODY MASS INDEX: 26.52 KG/M2 | RESPIRATION RATE: 17 BRPM | DIASTOLIC BLOOD PRESSURE: 73 MMHG | TEMPERATURE: 98 F | HEART RATE: 99 BPM | SYSTOLIC BLOOD PRESSURE: 119 MMHG | WEIGHT: 165 LBS | HEIGHT: 66 IN

## 2019-06-08 DIAGNOSIS — T85.698A: Primary | ICD-10-CM

## 2019-06-08 PROCEDURE — 99282 EMERGENCY DEPT VISIT SF MDM: CPT

## 2019-06-08 NOTE — ED PROVIDER NOTES
Encounter Date: 6/8/2019    SCRIBE #1 NOTE: IKylie, am scribing for, and in the presence of, Dr. Tineo.       History     Chief Complaint   Patient presents with    Post-op Problem     Pt reports she had a lumpectomy on tuesday and states the wound vac stopped working and she was told to report to the ED to figure out why.      Time seen by provider: 12:01 PM    This is a 63 y.o. female who presents to the ED with a Prevena wound vac that abruptly stopped working an hour ago. Patient had a mastectomy with a tissue expander and breast implant on Tuesday. Patient denies any pain or other complications. Patient states she has three more days with the wound vac.    The history is provided by the patient.     Review of patient's allergies indicates:   Allergen Reactions    No known drug allergies      Past Medical History:   Diagnosis Date    Anxiety     Chronic low back pain     Gastritis     GERD (gastroesophageal reflux disease)     History of cervical cancer     History of recurrent urinary tract infection     IFG (impaired fasting glucose)     Soft tissue tumor, malignant     sarcoma left foot     Past Surgical History:   Procedure Laterality Date    BACK SURGERY      BIOPSY, LYMPH NODE, SENTINEL RIGHT Right 6/4/2019    Performed by Sabine [facility]aga [facility] at [facility] 2ND FLR    BREAST CYST EXCISION Left 1984    EGD (ESOPHAGOGASTRODUODENOSCOPY) N/A 1/10/2014    Performed by Brissa Nettles [facility] at [facility] ENDO    EXTENDED ABDOMINOPLASTY N/A 11/29/2018    Performed by [facility]aga [facility] at [facility] OR    FOOT SURGERY  2009 or 2010    HYSTERECTOMY      INSERTION, BREAST IMPLANT RIGHT Right 6/4/2019    Performed by Leonard [facility]o MD at [facility] 2ND FLR    INSERTION, TISSUE EXPANDER, BREAST RIGHT Right 6/4/2019    Performed by Leonard [facility]o MD at [facility] 2ND FLR    LIPOSUCTION, WITH FAT TRANSFER TO BUTTOCKS N/A 11/29/2018    Performed by [facility] at [facility] OR     LYMPHADENECTOMY, AXILLARY RIGHT Right 2019    Performed by [facility] at [facility] OR 2ND FLR    MASTECTOMY, SIMPLE RIGHT (CONSENT AM OF) 4.0 hr case Right 2019    Performed by [facility] at [facility] OR 2ND FLR    MASTOPEXY LEFT Left 2019    Performed by Leonard [facility] MD at [facility] OR 2ND FLR    TOTAL VAGINAL HYSTERECTOMY      TUBAL LIGATION       Family History   Problem Relation Age of Onset    Breast cancer Sister         dx in     No Known Problems Mother     Throat cancer Father     Colon cancer Neg Hx     Diabetes Neg Hx     Hypertension Neg Hx     Ovarian cancer Neg Hx     Stroke Neg Hx      Social History     Tobacco Use    Smoking status: Former Smoker     Packs/day: 0.25     Years: 15.00     Pack years: 3.75     Types: Cigarettes     Last attempt to quit: 8/15/1993     Years since quittin.8    Smokeless tobacco: Never Used   Substance Use Topics    Alcohol use: Yes     Alcohol/week: 0.6 oz     Types: 1 Glasses of wine per week     Comment: seldom    Drug use: No     Review of Systems   Constitutional: Negative for fever.   HENT: Negative for sore throat.    Respiratory: Negative for shortness of breath.    Cardiovascular: Negative for chest pain.   Gastrointestinal: Negative for nausea.   Genitourinary: Negative for dysuria.   Musculoskeletal: Negative for back pain.   Skin: Negative for rash.   Neurological: Negative for weakness.   Hematological: Does not bruise/bleed easily.       Physical Exam     Initial Vitals [19 1152]   BP Pulse Resp Temp SpO2   (!) 141/83 107 18 98 °F (36.7 °C) 99 %      MAP       --         Physical Exam    Nursing note and vitals reviewed.  Constitutional: She appears well-developed and well-nourished. She is not diaphoretic. No distress.   HENT:   Head: Normocephalic and atraumatic.   Eyes: Conjunctivae and EOM are normal. No scleral icterus.   Neck: Normal range of motion. Neck supple.   Cardiovascular: Normal rate,  regular rhythm and normal heart sounds. Exam reveals no gallop and no friction rub.    No murmur heard.  Pulmonary/Chest: Breath sounds normal. No respiratory distress. She has no wheezes. She has no rhonchi. She has no rales.   Abdominal: Soft. Bowel sounds are normal. She exhibits no distension. There is no tenderness. There is no rebound and no guarding.   Musculoskeletal: Normal range of motion. She exhibits no edema or tenderness.   Lymphadenopathy:     She has no cervical adenopathy.   Neurological: She is alert and oriented to person, place, and time.   Skin: Skin is warm and dry. No rash noted. No erythema. No pallor.   Over right breast, multiple surgical wounds with dressing in place. GEETA drains with serosanguineous fluid. Scarring over left breast with small incision over lateral portion with another GEETA drain, draining blood fluid. No purulence or erythema. Mild induration over the right breast.   Psychiatric: She has a normal mood and affect. Her behavior is normal. Judgment and thought content normal.         ED Course   Procedures  Labs Reviewed - No data to display       Imaging Results    None          Medical Decision Making:   Initial Assessment:   63-year-old female presents emergency department following extensive surgery of her breasts with Prevena vacuum assisted closure device.  Tubing reconnected with subsequent normal function of the device.  The patient was monitored for short period time to ensure that did not shut off again.  The patient was given instructions to return for recurrent issues or seek care at the hospital where she had surgery.              Scribe Attestation:   Scribe #1: I performed the above scribed service and the documentation accurately describes the services I performed. I attest to the accuracy of the note.    Attending Attestation:           Physician Attestation for Scribe:  Physician Attestation Statement for Scribe #1: I, Dr. Tineo, reviewed documentation, as  scribed by Kylie Sumner in my presence, and it is both accurate and complete.                    Clinical Impression:     1. Malfunction of vacuum-assisted closure (VAC) device, initial encounter                               Brigid Tineo MD  06/13/19 [id number]

## 2019-06-08 NOTE — ED NOTES
RN called wound-vac  to assist with troubleshooting, Wound-vac representative advised checking tubing connection to wound site and connection to pump and to reset wound-vac. Loose connection noted at tubing insertion site to pump, tube readjusted to assure tight connection, wound-vac restarted, alarm is currently resolved. Will reassess pt in approx. 30 minutes to verify issue is resolved.

## 2019-06-08 NOTE — ED NOTES
"Pt presents to ED via spouse with c/o a wound-vac problem. Pt reporting her wound-vac started alarming today, states "I didn't pay attention when they were telling me what these lights meant. They just told me to come to the hospital if it starts to make sound". Pt had a recent lumpectomy to R breast, wound-vac currently attached. No drainage or leakage noted from incision site. No redness, swelling, signs of infection noted to surgical site. Pt denies any s/s at this time.     Two patient identifiers have been checked and are correct.      Appearance: Pt awake, alert & oriented to person, place & time. Pt in no acute distress at present time. Pt is clean and well groomed with clothes appropriately fastened.   Skin: Skin warm, dry & intact. Color consistent with ethnicity. Mucous membranes moist. No breakdown or brusing noted. SEE HPI.   Musculoskeletal: Patient moving all extremities well, no obvious swelling or deformities noted.   Respiratory: Respirations spontaneous, even, and non-labored. Visible chest rise noted. Airway is open and patent. No accessory muscle use noted.   Neurologic: Sensation is intact. Speech is clear and appropriate. Eyes open spontaneously, behavior appropriate to situation, follows commands, facial expression symmetrical, bilateral hand grasp equal and even, purposeful motor response noted.  Cardiac: All peripheral pulses present. No Bilateral lower extremity edema. Cap refill is <3 seconds.  Abdomen: Abdomen soft, non-tender to palpation.   : Pt reports no dysuria or hematuria.           "

## 2019-06-08 NOTE — DISCHARGE INSTRUCTIONS
Followup as scheduled with your surgeon.  Return to the ED (consider going to the [facility] if safe to do so) if you have any further problems including fever, severe pain, bleeding or further concerns.

## 2019-06-12 ENCOUNTER — TELEPHONE (OUTPATIENT)
Dept: SURGERY | Facility: CLINIC | Age: 64
End: 2019-06-12

## 2019-06-12 ENCOUNTER — OFFICE VISIT (OUTPATIENT)
Dept: PLASTIC SURGERY | Facility: CLINIC | Age: 64
End: 2019-06-12
Payer: COMMERCIAL

## 2019-06-12 VITALS
BODY MASS INDEX: 26.99 KG/M2 | SYSTOLIC BLOOD PRESSURE: 140 MMHG | HEART RATE: 104 BPM | TEMPERATURE: 98 F | WEIGHT: 167.25 LBS | DIASTOLIC BLOOD PRESSURE: 78 MMHG

## 2019-06-12 DIAGNOSIS — Z09 SURGERY FOLLOW-UP EXAMINATION: Primary | ICD-10-CM

## 2019-06-12 PROCEDURE — 99024 POSTOP FOLLOW-UP VISIT: CPT | Mod: S$GLB,,, | Performed by: SURGERY

## 2019-06-12 PROCEDURE — 99999 PR PBB SHADOW E&M-EST. PATIENT-LVL III: CPT | Mod: PBBFAC,,, | Performed by: SURGERY

## 2019-06-12 PROCEDURE — 99024 PR POST-OP FOLLOW-UP VISIT: ICD-10-PCS | Mod: S$GLB,,, | Performed by: SURGERY

## 2019-06-12 PROCEDURE — 99999 PR PBB SHADOW E&M-EST. PATIENT-LVL III: ICD-10-PCS | Mod: PBBFAC,,, | Performed by: SURGERY

## 2019-06-12 NOTE — PROGRESS NOTES
SP rt mastectomy with DTI and lt mastopexy.  Definitely will need re-operation with placement of a   Much larger implant.  Will wait until both sides are totally healed and take her back for  An implant exchange and possible re-lift.  She understands that she will be assymetric until that time.  2 drains removed.  RTC next week.

## 2019-06-12 NOTE — TELEPHONE ENCOUNTER
Spoke with patient regarding results of Nexway genetic test, results negative, all questions answered at this time, results mailed to patient

## 2019-06-18 ENCOUNTER — HOSPITAL ENCOUNTER (OUTPATIENT)
Dept: RADIOLOGY | Facility: HOSPITAL | Age: 64
Discharge: HOME | End: 2019-06-18
Attending: SURGERY
Payer: COMMERCIAL

## 2019-06-18 DIAGNOSIS — R92.8 ABNORMAL MAMMOGRAM: ICD-10-CM

## 2019-06-18 PROCEDURE — 76098 X-RAY EXAM SURGICAL SPECIMEN: CPT | Mod: TC,PO

## 2019-06-18 PROCEDURE — 76098 X-RAY EXAM SURGICAL SPECIMEN: CPT | Mod: 26,,, | Performed by: RADIOLOGY

## 2019-06-18 PROCEDURE — 76098 MAMMO BREAST SPECIMEN: ICD-10-PCS | Mod: 26,,, | Performed by: RADIOLOGY

## 2019-06-19 ENCOUNTER — OFFICE VISIT (OUTPATIENT)
Dept: PLASTIC SURGERY | Facility: CLINIC | Age: 64
End: 2019-06-19
Payer: COMMERCIAL

## 2019-06-19 VITALS
DIASTOLIC BLOOD PRESSURE: 81 MMHG | SYSTOLIC BLOOD PRESSURE: 128 MMHG | TEMPERATURE: 98 F | HEART RATE: 101 BPM | BODY MASS INDEX: 26.51 KG/M2 | WEIGHT: 164.25 LBS

## 2019-06-19 DIAGNOSIS — Z09 SURGERY FOLLOW-UP EXAMINATION: Primary | ICD-10-CM

## 2019-06-19 PROCEDURE — 99999 PR PBB SHADOW E&M-EST. PATIENT-LVL III: ICD-10-PCS | Mod: PBBFAC,,, | Performed by: SURGERY

## 2019-06-19 PROCEDURE — 99999 PR PBB SHADOW E&M-EST. PATIENT-LVL III: CPT | Mod: PBBFAC,,, | Performed by: SURGERY

## 2019-06-19 PROCEDURE — 99024 POSTOP FOLLOW-UP VISIT: CPT | Mod: S$GLB,,, | Performed by: SURGERY

## 2019-06-19 PROCEDURE — 99024 PR POST-OP FOLLOW-UP VISIT: ICD-10-PCS | Mod: S$GLB,,, | Performed by: SURGERY

## 2019-06-19 NOTE — PROGRESS NOTES
S/p R DTI and L mastopexy    Doing well, drain output < 30cc/day, denies fever/chills    Vitals:    06/19/19 1124   BP: 128/81   Pulse: 101   Temp: 98 °F (36.7 °C)     NAD  Non-labored breathing  L breast incisions well-healed with tape in place, no evidence of infection, NAC viable  R breast incision well-healed, NAC viable, R breast drain with serous output, implant in place     A/P: s/p above  Doing well  F/u in 1 week  Will need implant exchange on right    Lucius Aguilar MD  Plastic Surgery Fellow

## 2019-06-24 LAB — INTEGRATED BRAC ANALYSIS: NORMAL

## 2019-06-26 ENCOUNTER — OFFICE VISIT (OUTPATIENT)
Dept: PLASTIC SURGERY | Facility: CLINIC | Age: 64
End: 2019-06-26
Payer: COMMERCIAL

## 2019-06-26 VITALS
DIASTOLIC BLOOD PRESSURE: 75 MMHG | BODY MASS INDEX: 26.4 KG/M2 | HEIGHT: 66 IN | WEIGHT: 164.25 LBS | HEART RATE: 102 BPM | SYSTOLIC BLOOD PRESSURE: 128 MMHG

## 2019-06-26 DIAGNOSIS — Z09 SURGERY FOLLOW-UP EXAMINATION: Primary | ICD-10-CM

## 2019-06-26 PROCEDURE — 99999 PR PBB SHADOW E&M-EST. PATIENT-LVL III: ICD-10-PCS | Mod: PBBFAC,,, | Performed by: PHYSICIAN ASSISTANT

## 2019-06-26 PROCEDURE — 99024 PR POST-OP FOLLOW-UP VISIT: ICD-10-PCS | Mod: S$GLB,,, | Performed by: PHYSICIAN ASSISTANT

## 2019-06-26 PROCEDURE — 99024 POSTOP FOLLOW-UP VISIT: CPT | Mod: S$GLB,,, | Performed by: PHYSICIAN ASSISTANT

## 2019-06-26 PROCEDURE — 99999 PR PBB SHADOW E&M-EST. PATIENT-LVL III: CPT | Mod: PBBFAC,,, | Performed by: PHYSICIAN ASSISTANT

## 2019-06-26 NOTE — PROGRESS NOTES
Subjective:      Jaja Toledo is a 63 y.o. year old female who presents to the Plastic Surgery Clinic on 06/26/2019 for follow up visit status post R breast recon with TE placement and L mastopexy on 06/04/2019. She is doing well today with no issues since her last visit with Dr. Leonard Cordero. Denies fever, chills, nausea, vomiting, or other systemic signs of infection.    There were no vitals filed for this visit.     Review of patient's allergies indicates:   Allergen Reactions    No known drug allergies        Current Outpatient Medications on File Prior to Visit   Medication Sig Dispense Refill    calcium carbonate (CALCIUM 500 ORAL) Take by mouth once daily.       cyproheptadine (PERIACTIN) 4 mg tablet Take 1 tablet (4 mg total) by mouth 3 (three) times daily as needed (decreased appetite). 30 tablet 3    hydrOXYzine HCl (ATARAX) 25 MG tablet TAKE ONE EVERY NIGHT AS NEEDED FOR ITCHING 30 tablet 2    multivitamin capsule Take 1 capsule by mouth once daily.        oxyCODONE-acetaminophen (PERCOCET) 5-325 mg per tablet Take 1 tablet by mouth every 6 (six) hours as needed for Pain. 28 tablet 0    ranitidine (ZANTAC) 150 MG tablet Take 1 tablet (150 mg total) by mouth daily as needed for Heartburn. 30 tablet 2     No current facility-administered medications on file prior to visit.        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, including hip    IFG (impaired fasting glucose)    Localized adiposity of abdomen    Breast mass       [unfilled]    Social History     Socioeconomic History    Marital status:      Spouse name: Not on file    Number of children: Not on file    Years of education: Not on file    Highest education level: Not on file   Occupational History    Not on file   Social Needs    Financial resource strain: Not on file    Food  insecurity:     Worry: Not on file     Inability: Not on file    Transportation needs:     Medical: Not on file     Non-medical: Not on file   Tobacco Use    Smoking status: Former Smoker     Packs/day: 0.25     Years: 15.00     Pack years: 3.75     Types: Cigarettes     Last attempt to quit: 8/15/1993     Years since quittin.8    Smokeless tobacco: Never Used   Substance and Sexual Activity    Alcohol use: Yes     Alcohol/week: 0.6 oz     Types: 1 Glasses of wine per week     Comment: seldom    Drug use: No    Sexual activity: Yes     Partners: Male   Lifestyle    Physical activity:     Days per week: Not on file     Minutes per session: Not on file    Stress: Not on file   Relationships    Social connections:     Talks on phone: Not on file     Gets together: Not on file     Attends [employer] service: Not on file     Active member of club or organization: Not on file     Attends meetings of clubs or organizations: Not on file     Relationship status: Not on file   Other Topics Concern    Not on file   Social History Narrative    Not on file       DATE OF PROCEDURE:  2019.     PREOPERATIVE DIAGNOSIS:  Right breast cancer.     POSTOPERATIVE DIAGNOSIS:  Right breast cancer.     PROCEDURES PERFORMED:  1.  Immediate breast reconstruction using an implant.  2.  Placement of AlloDerm.  3.  Left mastopexy.     SURGEON:  Leonard Cordero M.D., FACS     ANESTHESIA:  General.    Review of Systems: breast reconstruction    Objective:     Physical Exam:  Vitals:    19 1031   BP: 128/75   Pulse: 102         WD WN NAD  VSS  Normal resp effort  R breast - incision CDI, no erythema or drainage, nipple viable  L breast - incision CDI, no erythema or drainage, nipple viable        Assessment:       1. Surgery follow-up examination        Plan:   63 y.o. female status post R breast recon and L breast mastopexy  - Doing well, no issues  - All surgical tapes removed, all Monocryl loops were clipped  -  Will need R breast implant exchange in the future    The patient was advised to call the clinic with any questions or concerns prior to their next visit.

## 2019-06-27 ENCOUNTER — OFFICE VISIT (OUTPATIENT)
Dept: SURGERY | Facility: CLINIC | Age: 64
End: 2019-06-27
Attending: SURGERY
Payer: COMMERCIAL

## 2019-06-27 VITALS
BODY MASS INDEX: 26.4 KG/M2 | TEMPERATURE: 99 F | WEIGHT: 164.25 LBS | DIASTOLIC BLOOD PRESSURE: 83 MMHG | HEART RATE: 113 BPM | HEIGHT: 66 IN | SYSTOLIC BLOOD PRESSURE: 126 MMHG

## 2019-06-27 DIAGNOSIS — C50.911 MALIGNANT NEOPLASM OF RIGHT FEMALE BREAST, UNSPECIFIED ESTROGEN RECEPTOR STATUS, UNSPECIFIED SITE OF BREAST: Primary | ICD-10-CM

## 2019-06-27 PROCEDURE — 99024 PR POST-OP FOLLOW-UP VISIT: ICD-10-PCS | Mod: S$GLB,,, | Performed by: SURGERY

## 2019-06-27 PROCEDURE — 99024 POSTOP FOLLOW-UP VISIT: CPT | Mod: S$GLB,,, | Performed by: SURGERY

## 2019-06-27 NOTE — PROGRESS NOTES
REFERRING PHYSICIAN:  No referring provider defined for this encounter.       Andres Hurtado MD    MEDICAL ONCOLOGIST:    pending  RADIATION ONCOLOGIST:   pending    DIAGNOSIS:    This is a 63 y.o. female with a stage pT1c N 0 (I+)  M0 grade 2 ER positive [place] negative HER2 negative invasive ductal carcinoma of the right breast.    TREATMENT SUMMARY:  The patient is status post right total mastectomy and sentinel node biopsy on 06/14/19 with immediate implant reconstruction and mastopexy on left.     FINAL PATHOLOGIC DIAGNOSIS  1. ONE RIGHT AXILLARY SENTINEL LYMPH NODE SHOWING A FOCUS OF MICROMETASTASIS IDENTIFIED  ON IMMUNOHISTOCHEMICAL STAINS (AE1/AE3, CAM 5.2 AND WSK). THE CONTROLS STAIN  APPROPRIATELY.  2. RIGHT BREAST SHOWING IN SITU AND INVASIVE DUCT CARCINOMA WITH NEGATIVE MARGINS,    SYNOPTIC REPORT:  PROCEDURE: TOTAL MASTECTOMY  SPECIMEN LATERALITY: RIGHT  TUMOR SIZE: 13 MM  HISTOLOGIC TYPE: INVASIVE CARCINOMA WITH LOBULAR FEATURES  HISTOLOGIC GRADE: GLANDULAR GRADE 3, NUCLEAR GRADE 3, MITOTIC INDEX 1; OVERALL GRADE 2  DCIS: PRESENT  MARGINS: ALL MARGINS RESECTION ARE NEGATIVE FOR IN SITU AND INVASIVE CARCINOMA, THE  CLOSEST MARGIN IS THE POSTERIOR MARGIN AT 10 MM  REGIONAL LYMPH NODES: 1 RIGHT AXILLARY SENTINEL LYMPH NODES WITH MICROMETASTASIS   IMMUNE RECEPTOR RESULTS: [id number]: Estrogen receptor: Positive (moderate to strong intensity, 70% of  tumor cell nuclei). Progesterone receptor: Negative. HER2: Negative.  SPECIAL STUDIES: E CADHERIN STAIN IS DIFFUSELY POSITIVE. THE CONTROL STAINED  APPROPRIATELY  ADDITIONAL FINDINGS: FIBROADENOMA  TUMOR STAGE: pT1c sn N0(i+)    INTERVAL HISTORY:   Jaja Toledo comes in for a post-op check.  She denies fever, chills, chest pain or shortness of breath.  Her pain is well controlled.      MEDICATIONS:  Current Outpatient Medications   Medication Sig Dispense Refill    calcium carbonate (CALCIUM 500 ORAL) Take by mouth once daily.       cyproheptadine (PERIACTIN) 4  mg tablet Take 1 tablet (4 mg total) by mouth 3 (three) times daily as needed (decreased appetite). 30 tablet 3    hydrOXYzine HCl (ATARAX) 25 MG tablet TAKE ONE EVERY NIGHT AS NEEDED FOR ITCHING 30 tablet 2    multivitamin capsule Take 1 capsule by mouth once daily.        oxyCODONE-acetaminophen (PERCOCET) 5-325 mg per tablet Take 1 tablet by mouth every 6 (six) hours as needed for Pain. 28 tablet 0    ranitidine (ZANTAC) 150 MG tablet Take 1 tablet (150 mg total) by mouth daily as needed for Heartburn. 30 tablet 2     No current facility-administered medications for this visit.        ALLERGIES:   Review of patient's allergies indicates:   Allergen Reactions    No known drug allergies        PHYSICAL EXAMINATION:   General:  This is a well appearing female with appropriate speech, affect and gait.     Breast:  Incision clean, dry, and intact    IMPRESSION:   The patient has had an uneventful postoperative course.    PLAN:   1. return in 6 months for a follow up office visit and breast exam, will follow up with pathology results concerning micromets  2. Yearly mammogram due January 2020  3. The patient is advised in continued exam of the breast chest wall and to report to this office sooner should she note any areas of abnormality or concern.   4.  She has been instructed to meet with med onc for discussion of adjuvant treatment recommendations    Addendum:  Discussed with pathology.  They found the second focus of cancer with negative margins. In addition, the LN was ITC, not micromet.  The pathology report has been addended and patient was notified. No further surgery necessary. No radiation necessary.

## 2019-07-05 DIAGNOSIS — C50.911 MALIGNANT NEOPLASM OF RIGHT FEMALE BREAST, UNSPECIFIED ESTROGEN RECEPTOR STATUS, UNSPECIFIED SITE OF BREAST: Primary | ICD-10-CM

## 2019-07-18 ENCOUNTER — OFFICE VISIT (OUTPATIENT)
Dept: SURGERY | Facility: CLINIC | Age: 64
End: 2019-07-18
Attending: SURGERY
Payer: COMMERCIAL

## 2019-07-18 VITALS
HEIGHT: 66 IN | HEART RATE: 123 BPM | BODY MASS INDEX: 26.4 KG/M2 | WEIGHT: 164.25 LBS | SYSTOLIC BLOOD PRESSURE: 130 MMHG | DIASTOLIC BLOOD PRESSURE: 80 MMHG

## 2019-07-18 DIAGNOSIS — C50.911 MALIGNANT NEOPLASM OF RIGHT FEMALE BREAST, UNSPECIFIED ESTROGEN RECEPTOR STATUS, UNSPECIFIED SITE OF BREAST: Primary | ICD-10-CM

## 2019-07-18 PROCEDURE — 99024 POSTOP FOLLOW-UP VISIT: CPT | Mod: S$GLB,,, | Performed by: SURGERY

## 2019-07-18 PROCEDURE — 99024 PR POST-OP FOLLOW-UP VISIT: ICD-10-PCS | Mod: S$GLB,,, | Performed by: SURGERY

## 2019-07-18 NOTE — PROGRESS NOTES
REFERRING PHYSICIAN:  Aaareferral Self  No address on file       Andres Hurtado MD    MEDICAL ONCOLOGIST:    pending  RADIATION ONCOLOGIST:   pending    DIAGNOSIS:    This is a 63 y.o. female with a stage pT1c N 0 (I+)  M0 grade 2 ER positive [place] negative HER2 negative invasive ductal carcinoma of the right breast.    TREATMENT SUMMARY:  The patient is status post right total mastectomy and sentinel node biopsy on 06/14/19 with immediate implant reconstruction and mastopexy on left.     FINAL PATHOLOGIC DIAGNOSIS  Addendum:  Discussed with pathology.  They found the second focus of cancer with negative margins. In addition, the LN was ITC, not micromet.  The pathology report has been addended and patient was notified. No further surgery necessary. No radiation necessary.        1. ONE RIGHT AXILLARY SENTINEL LYMPH NODE SHOWING A FOCUS OF MICROMETASTASIS IDENTIFIED  ON IMMUNOHISTOCHEMICAL STAINS (AE1/AE3, CAM 5.2 AND WSK). THE CONTROLS STAIN  APPROPRIATELY.  2. RIGHT BREAST SHOWING IN SITU AND INVASIVE DUCT CARCINOMA WITH NEGATIVE MARGINS,    SYNOPTIC REPORT:  PROCEDURE: TOTAL MASTECTOMY  SPECIMEN LATERALITY: RIGHT  TUMOR SIZE: 13 MM  HISTOLOGIC TYPE: INVASIVE CARCINOMA WITH LOBULAR FEATURES  HISTOLOGIC GRADE: GLANDULAR GRADE 3, NUCLEAR GRADE 3, MITOTIC INDEX 1; OVERALL GRADE 2  DCIS: PRESENT  MARGINS: ALL MARGINS RESECTION ARE NEGATIVE FOR IN SITU AND INVASIVE CARCINOMA, THE  CLOSEST MARGIN IS THE POSTERIOR MARGIN AT 10 MM  REGIONAL LYMPH NODES: 1 RIGHT AXILLARY SENTINEL LYMPH NODES WITH MICROMETASTASIS   IMMUNE RECEPTOR RESULTS: [id number]: Estrogen receptor: Positive (moderate to strong intensity, 70% of  tumor cell nuclei). Progesterone receptor: Negative. HER2: Negative.  SPECIAL STUDIES: E CADHERIN STAIN IS DIFFUSELY POSITIVE. THE CONTROL STAINED  APPROPRIATELY  ADDITIONAL FINDINGS: FIBROADENOMA  TUMOR STAGE: pT1c sn N0(i+)    INTERVAL HISTORY:   Jaja Toledo comes in for a post-op check.  She denies fever,  chills, chest pain or shortness of breath.  Her pain is well controlled.      MEDICATIONS:  Current Outpatient Medications   Medication Sig Dispense Refill    calcium carbonate (CALCIUM 500 ORAL) Take by mouth once daily.       cyproheptadine (PERIACTIN) 4 mg tablet Take 1 tablet (4 mg total) by mouth 3 (three) times daily as needed (decreased appetite). 30 tablet 3    hydrOXYzine HCl (ATARAX) 25 MG tablet TAKE ONE EVERY NIGHT AS NEEDED FOR ITCHING 30 tablet 2    multivitamin capsule Take 1 capsule by mouth once daily.        ranitidine (ZANTAC) 150 MG tablet Take 1 tablet (150 mg total) by mouth daily as needed for Heartburn. 30 tablet 2    oxyCODONE-acetaminophen (PERCOCET) 5-325 mg per tablet Take 1 tablet by mouth every 6 (six) hours as needed for Pain. 28 tablet 0     No current facility-administered medications for this visit.        ALLERGIES:   Review of patient's allergies indicates:   Allergen Reactions    No known drug allergies        PHYSICAL EXAMINATION:   General:  This is a well appearing female with appropriate speech, affect and gait.     Breast:  Incision clean, dry, and intact    IMPRESSION:   The patient has had an uneventful postoperative course.    PLAN:   1. return in 6 months for a follow up office visit and breast exam  2. Yearly mammogram due January 2020  3. The patient is advised in continued exam of the breast chest wall and to report to this office sooner should she note any areas of abnormality or concern.   4.  She has been instructed to meet with med onc for discussion of adjuvant treatment recommendations

## 2019-07-19 PROBLEM — C50.811 MALIGNANT NEOPLASM OF OVERLAPPING SITES OF RIGHT BREAST IN FEMALE, ESTROGEN RECEPTOR POSITIVE: Status: ACTIVE | Noted: 2019-07-19

## 2019-07-19 PROBLEM — Z17.0 MALIGNANT NEOPLASM OF OVERLAPPING SITES OF RIGHT BREAST IN FEMALE, ESTROGEN RECEPTOR POSITIVE: Status: ACTIVE | Noted: 2019-07-19

## 2019-07-19 NOTE — PROGRESS NOTES
CC:  Stage IA (mp T1cN0 (I+)M0) Right breast cancer    HPI:  Ms Toledo is a 64 yo postmenopausal woman who was noted to have asymmetry in the right breast at the inner position on mammogram 3/18/19. Diagnosed mammogram on 4/11/19 showed a 11 mm high density, irregularly shaped mass with indistinct margins in the right breast at 3 oclock. US showed a 7 x 9 x 7 mm mass at 3 oclock. The right axilla was normal. Breast biopsy on 4/22/19 showed invasive ductal carcinoma, grade 3, with associated high grade DCIS. ER+ (>95%), VT+(>90%), HER2 equivocal, FISH negative. Ki-67 20%. She was seen by Dr Arteaga in May 2019. MRI breast on 5/16/19 showed a 13 mm x 10 mm x 10 mm mass at 3 oclock correlating with the prior finding. There is another 9 mm x 7 mm x 8 mm mass at 6 oclock in the right breast. Left breast is benign. She underwent biopsy of the second mass on 5/27/19, which showed a grade 2 invasive lobular carcinoma, ER positive 70%, VT negative, HER2 negative, Ki-67 15%. Patient underwent right mastectomy and right axillary lymph node biopsy on 6/4/19. Pathology showed two tumors: #1, one 1.3 cm grade 2 invasive ductal carcinoma with lobular features, #2, one 1.7 cm grade 2 invasive lobular carcinoma. DCIS present, grade 3, with necrosis. LCIS. Margins are negative. lymphovascular invasion was not identified. One sentinel lymph node removed, with isolated tumor cells. Extranodal extention not identified. mp T1c sn N0 (i+). Receptors: #1: ER positive 95%, VT positive 90%, HER2 FISH negative, Ki67 20%. #2, ER positive 70%, VT negative, HER2 negative, Ki-67 15%. She returns today to discuss further management. She currently feels well. She had some mild sweating on her forehead in her last 50s. She had a hysterectomy more than 20 years ago. She had [employer] genetic test done which was negative. Oncotype DX of her 1.7 cm ILC showed an RS of 15, distant recurrence rate at 9 years is 4%, benefit from chemo is <1%. Oncotype DX  of her 1.3 cm IDC is pending.       ECO      OB/GYN history:  Age of menarche was 17. Age of menopause was in mid-50's. S/p total vaginal hysterectomy (). Patient denies hormonal therapy. Patient is . Age of first live birth was 20.  Patient did not breast feed.     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       Past Surgical History:   Procedure Laterality Date    BACK SURGERY      BIOPSY, LYMPH NODE, SENTINEL RIGHT Right 2019    [facility] 2ND FLR    BREAST CYST EXCISION Left     EGD (ESOPHAGOGASTRODUODENOSCOPY) N/A 1/10/2014    [facility]    EXTENDED ABDOMINOPLASTY N/A 2018    [facility]    FOOT SURGERY   or     HYSTERECTOMY      INSERTION, BREAST IMPLANT RIGHT Right 2019    [facility] OR 2ND FLR    INSERTION, TISSUE EXPANDER, BREAST RIGHT Right 2019    [facility] OR 2ND FLR    LIPOSUCTION, WITH FAT TRANSFER TO BUTTOCKS N/A 2018    [facility] at [facility] OR    LYMPHADENECTOMY, AXILLARY RIGHT Right 2019    [facility] OR 2ND FLR    MASTECTOMY, SIMPLE RIGHT (CONSENT AM OF) 4.0 hr case Right 2019    [facility] OR 2ND FLR    MASTOPEXY LEFT Left 2019    [facility] OR 2ND FLR    TOTAL VAGINAL HYSTERECTOMY      TUBAL LIGATION         Family History   Problem Relation Age of Onset    Breast cancer Sister         dx in     No Known Problems Mother     Throat cancer Father     Colon cancer Neg Hx     Diabetes Neg Hx     Hypertension Neg Hx      Ovarian cancer Neg Hx     Stroke Neg Hx        Social History     Socioeconomic History    Marital status:      Spouse name: Not on file    Number of children: Not on file    Years of education: Not on file    Highest education level: Not on file   Occupational History    Not on file   Social Needs    Financial resource strain: Not on file    Food insecurity:     Worry: Not on file     Inability: Not on file    Transportation needs:     Medical: Not on file     Non-medical: Not on file   Tobacco Use    Smoking status: Former Smoker     Packs/day: 0.25     Years: 15.00     Pack years: 3.75     Types: Cigarettes     Last attempt to quit: 8/15/1993     Years since quittin.9    Smokeless tobacco: Never Used   Substance and Sexual Activity    Alcohol use: Yes     Alcohol/week: 0.6 oz     Types: 1 Glasses of wine per week     Comment: seldom    Drug use: No    Sexual activity: Yes     Partners: Male   Lifestyle    Physical activity:     Days per week: Not on file     Minutes per session: Not on file    Stress: Not on file   Relationships    Social connections:     Talks on phone: Not on file     Gets together: Not on file     Attends Hinduism service: Not on file     Active member of club or organization: Not on file     Attends meetings of clubs or organizations: Not on file     Relationship status: Not on file   Other Topics Concern    Not on file   Social History Narrative    Not on file       Review of Systems   Constitutional: Negative for appetite change, chills, fatigue, fever and unexpected weight change.   HENT: Negative for mouth sores, nosebleeds, tinnitus, trouble swallowing and voice change.    Eyes: Negative for pain, redness and visual disturbance.   Respiratory: Negative for cough, shortness of breath and wheezing.    Cardiovascular: Negative for chest pain, palpitations and leg swelling.   Gastrointestinal: Negative for abdominal distention, abdominal pain, blood in stool,  constipation, diarrhea, nausea and vomiting.   Endocrine: Negative for polydipsia, polyphagia and polyuria.   Genitourinary: Negative for flank pain, frequency and hematuria.   Musculoskeletal: Negative for arthralgias, back pain, gait problem, joint swelling, myalgias, neck pain and neck stiffness.   Skin: Negative for color change, pallor, rash and wound.   Neurological: Negative for tremors, seizures, syncope, speech difficulty, weakness, light-headedness, numbness and headaches.   Hematological: Negative for adenopathy. Does not bruise/bleed easily.   Psychiatric/Behavioral: Negative for confusion, dysphoric mood and self-injury. The patient is nervous/anxious.    All other systems reviewed and are negative.      Objective:  Physical Exam   Constitutional: She is oriented to person, place, and time. She appears well-developed and well-nourished. No distress.   HENT:   Head: Normocephalic and atraumatic.   Mouth/Throat: Oropharynx is clear and moist. No oropharyngeal exudate.   Eyes: Pupils are equal, round, and reactive to light. Conjunctivae and EOM are normal. No scleral icterus.   Neck: Normal range of motion. Neck supple. No JVD present. No thyromegaly present.   Cardiovascular: Normal rate, regular rhythm, normal heart sounds and intact distal pulses. Exam reveals no gallop and no friction rub.   No murmur heard.  Pulmonary/Chest: Effort normal and breath sounds normal. No respiratory distress. She has no wheezes. She has no rales. Right breast exhibits no inverted nipple, no mass, no nipple discharge and no tenderness. Left breast exhibits no inverted nipple, no mass, no nipple discharge and no tenderness. Breasts are asymmetrical.   Right breast s/p mastectomy and reconstruction   Abdominal: Soft. Bowel sounds are normal. She exhibits no distension and no mass. There is no hepatosplenomegaly. There is no tenderness. There is no rebound. No hernia.   Musculoskeletal: Normal range of motion. She exhibits no  edema, tenderness or deformity.   Lymphadenopathy:     She has no cervical adenopathy.     She has no axillary adenopathy.        Right: No supraclavicular adenopathy present.        Left: No supraclavicular adenopathy present.   Neurological: She is alert and oriented to person, place, and time. No cranial nerve deficit. She exhibits normal muscle tone.   Skin: Skin is warm and dry. No rash noted. She is not diaphoretic. No erythema. No pallor.   Psychiatric: She has a normal mood and affect. Her behavior is normal. Judgment and thought content normal.   Vitals reviewed.      Labs:  Labs from today pending. CBC, CMP from 5/9/19 unremarkable.         Assessment and plan:  1. Malignant neoplasm of overlapping sites of right breast in female, estrogen receptor positive      - Ms Toledo is a 64 yo woman with Stage IA (mp T1cN0 (I+)M0) Right breast cancer s/p R mastectomy on 6/4/19. She had a 1.3 cm invasive ductal carcinoma and a 1.7 cm invasive lobular carcinoma. Both tumors ER positive, HER2 negative. She had isolated tumor cells in one sentinel lymph node, which is stage N0 (I+).  Oncotype DX score of the 1.7 cm ILC showed an RS of 15, distant recurrence rate at 9 years is 4%, benefit from chemo is <1%. Oncotype DX of her 1.3 cm IDC is pending.   - discussed with patient that for hormone receptor positive breast cancer s/p mastectomy, we recommend adjuvant endocrine therapy for 10 years. She understands and agrees. Whether she needs chemo or not depends on the Oncotype DX score. RS for her ILC is 15, indicating lack of benefit from chemo. We will wait for the result from her IDC. If it also shows a RS of <26, she will not need chemo and only needs endocrine therapy. She understands and agrees with the plan  - RTC in 2 weeks to discuss oncotype DX result    I spent 60 minutes with patient with at least 50% of the time on face-to-face counseling.

## 2019-07-22 ENCOUNTER — OFFICE VISIT (OUTPATIENT)
Dept: HEMATOLOGY/ONCOLOGY | Facility: CLINIC | Age: 64
End: 2019-07-22
Payer: COMMERCIAL

## 2019-07-22 ENCOUNTER — LAB VISIT (OUTPATIENT)
Dept: LAB | Facility: OTHER | Age: 64
End: 2019-07-22
Attending: INTERNAL MEDICINE
Payer: COMMERCIAL

## 2019-07-22 VITALS
WEIGHT: 166.44 LBS | DIASTOLIC BLOOD PRESSURE: 78 MMHG | HEART RATE: 114 BPM | TEMPERATURE: 98 F | RESPIRATION RATE: 16 BRPM | SYSTOLIC BLOOD PRESSURE: 114 MMHG | HEIGHT: 66 IN | BODY MASS INDEX: 26.75 KG/M2 | OXYGEN SATURATION: 99 %

## 2019-07-22 DIAGNOSIS — Z17.0 MALIGNANT NEOPLASM OF OVERLAPPING SITES OF RIGHT BREAST IN FEMALE, ESTROGEN RECEPTOR POSITIVE: ICD-10-CM

## 2019-07-22 DIAGNOSIS — C50.811 MALIGNANT NEOPLASM OF OVERLAPPING SITES OF RIGHT BREAST IN FEMALE, ESTROGEN RECEPTOR POSITIVE: ICD-10-CM

## 2019-07-22 LAB
25(OH)D3+25(OH)D2 SERPL-MCNC: 32 NG/ML (ref 30–96)
ALBUMIN SERPL BCP-MCNC: 4.3 G/DL (ref 3.5–5.2)
ALP SERPL-CCNC: 71 U/L (ref 55–135)
ALT SERPL W/O P-5'-P-CCNC: 24 U/L (ref 10–44)
ANION GAP SERPL CALC-SCNC: 9 MMOL/L (ref 8–16)
AST SERPL-CCNC: 33 U/L (ref 10–40)
BASOPHILS # BLD AUTO: 0.04 K/UL (ref 0–0.2)
BASOPHILS NFR BLD: 0.7 % (ref 0–1.9)
BILIRUB SERPL-MCNC: 0.4 MG/DL (ref 0.1–1)
BUN SERPL-MCNC: 10 MG/DL (ref 8–23)
CALCIUM SERPL-MCNC: 9.9 MG/DL (ref 8.7–10.5)
CHLORIDE SERPL-SCNC: 108 MMOL/L (ref 95–110)
CO2 SERPL-SCNC: 25 MMOL/L (ref 23–29)
CREAT SERPL-MCNC: 0.9 MG/DL (ref 0.5–1.4)
DIFFERENTIAL METHOD: ABNORMAL
EOSINOPHIL # BLD AUTO: 0.3 K/UL (ref 0–0.5)
EOSINOPHIL NFR BLD: 4.8 % (ref 0–8)
ERYTHROCYTE [DISTWIDTH] IN BLOOD BY AUTOMATED COUNT: 13.6 % (ref 11.5–14.5)
EST. GFR  (AFRICAN AMERICAN): >60 ML/MIN/1.73 M^2
EST. GFR  (NON AFRICAN AMERICAN): >60 ML/MIN/1.73 M^2
ESTRADIOL SERPL-MCNC: <10 PG/ML
FSH SERPL-ACNC: 68.4 MIU/ML
GLUCOSE SERPL-MCNC: 113 MG/DL (ref 70–110)
HCT VFR BLD AUTO: 41.2 % (ref 37–48.5)
HGB BLD-MCNC: 12.9 G/DL (ref 12–16)
IMM GRANULOCYTES # BLD AUTO: 0.03 K/UL (ref 0–0.04)
IMM GRANULOCYTES NFR BLD AUTO: 0.6 % (ref 0–0.5)
LH SERPL-ACNC: 23.8 MIU/ML
LYMPHOCYTES # BLD AUTO: 1.9 K/UL (ref 1–4.8)
LYMPHOCYTES NFR BLD: 35.8 % (ref 18–48)
MCH RBC QN AUTO: 27.1 PG (ref 27–31)
MCHC RBC AUTO-ENTMCNC: 31.3 G/DL (ref 32–36)
MCV RBC AUTO: 87 FL (ref 82–98)
MONOCYTES # BLD AUTO: 0.3 K/UL (ref 0.3–1)
MONOCYTES NFR BLD: 6.3 % (ref 4–15)
NEUTROPHILS # BLD AUTO: 2.8 K/UL (ref 1.8–7.7)
NEUTROPHILS NFR BLD: 51.8 % (ref 38–73)
NRBC BLD-RTO: 0 /100 WBC
PLATELET # BLD AUTO: 325 K/UL (ref 150–350)
PMV BLD AUTO: 9.5 FL (ref 9.2–12.9)
POTASSIUM SERPL-SCNC: 3.6 MMOL/L (ref 3.5–5.1)
PROT SERPL-MCNC: 8.6 G/DL (ref 6–8.4)
RBC # BLD AUTO: 4.76 M/UL (ref 4–5.4)
SODIUM SERPL-SCNC: 142 MMOL/L (ref 136–145)
WBC # BLD AUTO: 5.37 K/UL (ref 3.9–12.7)

## 2019-07-22 PROCEDURE — 85025 COMPLETE CBC W/AUTO DIFF WBC: CPT

## 2019-07-22 PROCEDURE — 99999 PR PBB SHADOW E&M-EST. PATIENT-LVL III: ICD-10-PCS | Mod: PBBFAC,,, | Performed by: INTERNAL MEDICINE

## 2019-07-22 PROCEDURE — 99205 PR OFFICE/OUTPT VISIT, NEW, LEVL V, 60-74 MIN: ICD-10-PCS | Mod: S$GLB,,, | Performed by: INTERNAL MEDICINE

## 2019-07-22 PROCEDURE — 82670 ASSAY OF TOTAL ESTRADIOL: CPT

## 2019-07-22 PROCEDURE — 83001 ASSAY OF GONADOTROPIN (FSH): CPT

## 2019-07-22 PROCEDURE — 3008F PR BODY MASS INDEX (BMI) DOCUMENTED: ICD-10-PCS | Mod: CPTII,S$GLB,, | Performed by: INTERNAL MEDICINE

## 2019-07-22 PROCEDURE — 36415 COLL VENOUS BLD VENIPUNCTURE: CPT

## 2019-07-22 PROCEDURE — 99999 PR PBB SHADOW E&M-EST. PATIENT-LVL III: CPT | Mod: PBBFAC,,, | Performed by: INTERNAL MEDICINE

## 2019-07-22 PROCEDURE — 80053 COMPREHEN METABOLIC PANEL: CPT

## 2019-07-22 PROCEDURE — 83002 ASSAY OF GONADOTROPIN (LH): CPT

## 2019-07-22 PROCEDURE — 82306 VITAMIN D 25 HYDROXY: CPT

## 2019-07-22 PROCEDURE — 99205 OFFICE O/P NEW HI 60 MIN: CPT | Mod: S$GLB,,, | Performed by: INTERNAL MEDICINE

## 2019-07-22 PROCEDURE — 3008F BODY MASS INDEX DOCD: CPT | Mod: CPTII,S$GLB,, | Performed by: INTERNAL MEDICINE

## 2019-07-22 NOTE — LETTER
July 22, 2019      Sabine Arteaga MD  [address] [facility] [address]           [facility] [address] [facility] [place] [address]  [address], [address]  [facility] [address]  Phone: [phone]          Patient: Jaja Toledo   MR Number: [id number]   YOB: 1955   Date of Visit: 7/22/2019       Dear Dr. Sabine Arteaga:    Thank you for referring Jaja Toledo to me for evaluation. Attached you will find relevant portions of my assessment and plan of care.    If you have questions, please do not hesitate to call me. I look forward to following Jaja Toledo along with you.    Sincerely,    Jake Petersen MD    Enclosure  CC:  No Recipients    If you would like to receive this communication electronically, please contact [email or web address] or [phone] to request more information on [employer] Link access.    For providers and/or their staff who would like to refer a patient to [facility], please contact us through our one-stop-shop provider referral line, [facility], at [phone].    If you feel you have received this communication in error or would no longer like to receive these types of communications, please e-mail [email or web address]

## 2019-07-25 ENCOUNTER — TELEPHONE (OUTPATIENT)
Dept: SURGERY | Facility: CLINIC | Age: 64
End: 2019-07-25

## 2019-07-25 NOTE — TELEPHONE ENCOUNTER
----- Message from Nandini Mondragon sent at 7/25/2019 12:39 PM CDT -----  Contact: [employer]  Needs Advice    Reason for call: Caller states they have orders from 07/05 and 07/22. Caller states they need to know if the order from 07/22 is a duplicate and can be discarded. The order is for Invasive breast cancer testing.         Communication Preference: [phone]     Additional Information: please contact caller regarding this matter

## 2019-07-25 NOTE — TELEPHONE ENCOUNTER
Spoke with [employer] to verify that additional Oncotype was ordered on second breast cancer tumor

## 2019-08-09 NOTE — PROGRESS NOTES
PROGRESS NOTE    Subjective:       Patient ID: Jaja Toledo is a 63 y.o. female.    Chief Complaint: follow up for test results    Diagnosis:  Stage IA (mp T1cN0 (I+)M0) Right breast cancer, s/p right mastectomy on 6/4/19    Oncologic History:  1. Ms Toledo is a 62 yo postmenopausal woman who initially saw me on 7/22/19 for breast cancer. She was noted to have asymmetry in the right breast at the inner position on mammogram 3/18/19. Diagnosed mammogram on 4/11/19 showed a 11 mm high density, irregularly shaped mass with indistinct margins in the right breast at 3 oclock. US showed a 7 x 9 x 7 mm mass at 3 oclock. The right axilla was normal. Breast biopsy on 4/22/19 showed invasive ductal carcinoma, grade 3, with associated high grade DCIS. ER+ (>95%), OR+(>90%), HER2 equivocal, FISH negative. Ki-67 20%. She was seen by Dr Arteaga in May 2019. MRI breast on 5/16/19 showed a 13 mm x 10 mm x 10 mm mass at 3 oclock correlating with the prior finding. There is another 9 mm x 7 mm x 8 mm mass at 6 oclock in the right breast. Left breast is benign. She underwent biopsy of the second mass on 5/27/19, which showed a grade 2 invasive lobular carcinoma, ER positive 70%, OR negative, HER2 negative, Ki-67 15%. Patient underwent right mastectomy and right axillary lymph node biopsy on 6/4/19. Pathology showed two tumors: #1, one 1.3 cm grade 2 invasive ductal carcinoma with lobular features, #2, one 1.7 cm grade 2 invasive lobular carcinoma. DCIS present, grade 3, with necrosis. LCIS. Margins are negative. lymphovascular invasion was not identified. One sentinel lymph node removed, with isolated tumor cells. Extranodal extention not identified. mp T1c sn N0 (i+). Receptors: #1: ER positive 95%, OR positive 90%, HER2 FISH negative, Ki67 20%. #2, ER positive 70%, OR negative, HER2 negative, Ki-67 15%. She returns today to discuss further management. She currently feels  well. She had some mild sweating on her forehead in her last 50s. She had a hysterectomy more than 20 years ago. She had [facility] genetic test done which was negative. Oncotype DX of her 1.7 cm ILC showed an RS of 15, distant recurrence rate at 9 years is 4%, benefit from chemo is <1%. Oncotype DX of her 1.3 cm IDC is pending.   2. Oncotype DX of her 1.3 cm IDC showed RS of 24, distant recurrence rate at 9 years is 10%, benefit from chemotherapy <1%    Interval History:   Ms Toledo returns to discuss test results.  Oncotype DX of her 1.3 cm IDC showed RS of 24, distant recurrence rate at 9 years is 10%, benefit from chemotherapy <1%. She feels well. +nervous. Denies other complaints.         ECO    ROS:   A ten-point system review is obtained and negative except for what was stated in the Interval History.     Physical Examination:   Vital signs reviewed.   General: well hydrated, well developed, in no acute distress  HEENT: normocephalic, PERRLA, EOMI, anicteric sclerae, oropharynx clear  Neck: supple, no JVD, thyromegaly, cervical or supraclavicular lymphadenopathy  Lungs: clear breath sounds bilaterally, no wheezing, rales, or rhonchi  Heart: RRR, no M/R/G  Abdomen: soft, no tenderness, non-distended, no hepatosplenomegaly, mass, or hernia. BS present  Extremities: no clubbing, cyanosis, or edema  Skin: no rash, ulcer, or open wounds  Neuro: alert and oriented x 4, no focal neuro deficit  Psych: pleasant and appropriate mood and affect  Breast: bilateral breasts no abnormal skin nodules or masses. No axillary LAD.     Objective:     Laboratory Data:  Labs reviewed. Vitamin D normal at 32.       Assessment and Plan:     1. Malignant neoplasm of overlapping sites of right breast in female, estrogen receptor positive    2. Aromatase inhibitor use    3. Menopause    4. Tachycardia      1.2  - Ms Toledo is a 64 yo woman with Stage IA (mp T1cN0 (I+)M0) Right breast cancer s/p R mastectomy, SLNB and immediate  reconstruction on 6/4/19. She had a 1.3 cm invasive ductal carcinoma and a 1.7 cm invasive lobular carcinoma. Both tumors ER positive, HER2 negative. She had isolated tumor cells in one sentinel lymph node, which is stage N0 (I+).  Oncotype DX score of the 1.7 cm ILC showed an RS of 15, distant recurrence rate at 9 years is 4%, benefit from chemo is <1%. Oncotype DX of her 1.3 cm IDC showed RS of 24, distant recurrence rate at 9 years is 10%, benefit from chemotherapy <1%  - discussed with Ms Toledo re Oncotype DX result. No benefit from chemotherapy. I recommend adjuvant endocrine therapy with letrozole x 7-10 years  - The side effects of letrozole were discussed with patient, which include but are not limited to hot flashes, mood swings, vaginal dryness, night sweats, high cholesterol, joint pain, muscle aches, weight gain, nausea, bone loss, increased risk of cardiovascular or cerebrovascular events. Handouts provided to patient. Prescriptions sent to pharmacy.   - return in one month for toxicity check    3.  - recc daily OTC vitamin D [id number] u and calcium 1500-5711 mg  - bone density on return    4.  - chronic. HR has been elevated on multiple office visits  - EKG last year showed sinus rhythm  - f/u with PCP    Follow-up:     Return in one month  Knows to call in the interval if any problems arise.    Electronically signed by Jake Petersen

## 2019-08-12 ENCOUNTER — OFFICE VISIT (OUTPATIENT)
Dept: HEMATOLOGY/ONCOLOGY | Facility: CLINIC | Age: 64
End: 2019-08-12
Payer: COMMERCIAL

## 2019-08-12 VITALS
OXYGEN SATURATION: 99 % | BODY MASS INDEX: 27.14 KG/M2 | SYSTOLIC BLOOD PRESSURE: 122 MMHG | RESPIRATION RATE: 16 BRPM | WEIGHT: 168.88 LBS | HEART RATE: 126 BPM | HEIGHT: 66 IN | TEMPERATURE: 98 F | DIASTOLIC BLOOD PRESSURE: 84 MMHG

## 2019-08-12 DIAGNOSIS — R00.0 TACHYCARDIA: ICD-10-CM

## 2019-08-12 DIAGNOSIS — C50.811 MALIGNANT NEOPLASM OF OVERLAPPING SITES OF RIGHT BREAST IN FEMALE, ESTROGEN RECEPTOR POSITIVE: Primary | ICD-10-CM

## 2019-08-12 DIAGNOSIS — Z78.0 MENOPAUSE: ICD-10-CM

## 2019-08-12 DIAGNOSIS — Z79.811 AROMATASE INHIBITOR USE: ICD-10-CM

## 2019-08-12 DIAGNOSIS — Z17.0 MALIGNANT NEOPLASM OF OVERLAPPING SITES OF RIGHT BREAST IN FEMALE, ESTROGEN RECEPTOR POSITIVE: Primary | ICD-10-CM

## 2019-08-12 PROCEDURE — 99999 PR PBB SHADOW E&M-EST. PATIENT-LVL III: ICD-10-PCS | Mod: PBBFAC,,, | Performed by: INTERNAL MEDICINE

## 2019-08-12 PROCEDURE — 99999 PR PBB SHADOW E&M-EST. PATIENT-LVL III: CPT | Mod: PBBFAC,,, | Performed by: INTERNAL MEDICINE

## 2019-08-12 PROCEDURE — 99215 OFFICE O/P EST HI 40 MIN: CPT | Mod: S$GLB,,, | Performed by: INTERNAL MEDICINE

## 2019-08-12 PROCEDURE — 3008F BODY MASS INDEX DOCD: CPT | Mod: CPTII,S$GLB,, | Performed by: INTERNAL MEDICINE

## 2019-08-12 PROCEDURE — 3008F PR BODY MASS INDEX (BMI) DOCUMENTED: ICD-10-PCS | Mod: CPTII,S$GLB,, | Performed by: INTERNAL MEDICINE

## 2019-08-12 PROCEDURE — 99215 PR OFFICE/OUTPT VISIT, EST, LEVL V, 40-54 MIN: ICD-10-PCS | Mod: S$GLB,,, | Performed by: INTERNAL MEDICINE

## 2019-08-12 RX ORDER — LETROZOLE 2.5 MG/1
2.5 TABLET, FILM COATED ORAL DAILY
Qty: 90 TABLET | Refills: 3 | Status: SHIPPED | OUTPATIENT
Start: 2019-08-12 | End: 2020-07-30 | Stop reason: SDUPTHER

## 2019-08-20 ENCOUNTER — TELEPHONE (OUTPATIENT)
Dept: FAMILY MEDICINE | Facility: CLINIC | Age: 64
End: 2019-08-20

## 2019-08-20 NOTE — TELEPHONE ENCOUNTER
----- Message from Jake Petersen MD sent at 8/12/2019  3:54 PM CDT -----  It was noted to me that Ms Toledo has tachycardia almost every time she goes to see a doctor. It has been for a while now. I am not sure if it is from periactin. Will defer to you for further workup. Thanks very much.

## 2019-11-22 ENCOUNTER — HOSPITAL ENCOUNTER (OUTPATIENT)
Dept: RADIOLOGY | Facility: OTHER | Age: 64
Discharge: HOME | End: 2019-11-22
Attending: INTERNAL MEDICINE
Payer: COMMERCIAL

## 2019-11-22 DIAGNOSIS — Z78.0 MENOPAUSE: ICD-10-CM

## 2019-11-22 PROCEDURE — 77080 DEXA BONE DENSITY SPINE HIP: ICD-10-PCS | Mod: 26,,, | Performed by: RADIOLOGY

## 2019-11-22 PROCEDURE — 77080 DXA BONE DENSITY AXIAL: CPT | Mod: TC

## 2019-11-22 PROCEDURE — 77080 DXA BONE DENSITY AXIAL: CPT | Mod: 26,,, | Performed by: RADIOLOGY

## 2019-11-27 ENCOUNTER — DOCUMENTATION ONLY (OUTPATIENT)
Dept: SURGERY | Facility: CLINIC | Age: 64
End: 2019-11-27

## 2019-12-26 ENCOUNTER — OFFICE VISIT (OUTPATIENT)
Dept: URGENT CARE | Facility: CLINIC | Age: 64
End: 2019-12-26
Payer: COMMERCIAL

## 2019-12-26 VITALS
RESPIRATION RATE: 20 BRPM | HEART RATE: 104 BPM | OXYGEN SATURATION: 100 % | WEIGHT: 168 LBS | SYSTOLIC BLOOD PRESSURE: 134 MMHG | BODY MASS INDEX: 27 KG/M2 | DIASTOLIC BLOOD PRESSURE: 87 MMHG | TEMPERATURE: 98 F | HEIGHT: 66 IN

## 2019-12-26 DIAGNOSIS — M25.562 RECURRENT PAIN OF LEFT KNEE: ICD-10-CM

## 2019-12-26 DIAGNOSIS — R20.2 LEG PARESTHESIA: Primary | ICD-10-CM

## 2019-12-26 PROCEDURE — 99214 OFFICE O/P EST MOD 30 MIN: CPT | Mod: S$GLB,,, | Performed by: PHYSICIAN ASSISTANT

## 2019-12-26 PROCEDURE — 99214 PR OFFICE/OUTPT VISIT, EST, LEVL IV, 30-39 MIN: ICD-10-PCS | Mod: S$GLB,,, | Performed by: PHYSICIAN ASSISTANT

## 2019-12-26 RX ORDER — METHYLPREDNISOLONE 4 MG/1
TABLET ORAL
Qty: 1 PACKAGE | Refills: 0 | Status: SHIPPED | OUTPATIENT
Start: 2019-12-26 | End: 2020-01-14

## 2019-12-26 NOTE — PATIENT INSTRUCTIONS
"- Rest.    - Drink plenty of fluids.    - Acetaminophen (tylenol) or Ibuprofen (advil,motrin) as directed as needed for fever/pain. Avoid tylenol if you have a history of liver disease. Do not take ibuprofen if you have a history of GI bleeding, kidney disease, or if you take blood thinners.     - Apply warm compresses to the affected area for 20 minutes at a time.    - You received a steroid pack today.  This can elevate your blood pressure, elevate your blood sugar, water weight gain, nervous energy, redness to the face and dimpling of the skin where the shot goes in.   - Do not use steroids more than 3 times per year.   - If you have diabetes, please check you blood sugar frequently.  - If you have high blood pressure, please check your blood pressure frequently.     - Follow up with your PCP or specialty clinic as directed in the next 1-2 weeks if not improved or as needed.  I have placed an urgent referral for you to follow up with your PCP.  You can call [phone] to schedule an appointment with the appropriate provider.    - Go to the ER or seek medical attention immediately if you develop new or worsening symptoms.    - You must understand that you have received an Urgent Care treatment only and that you may be released before all of your medical problems are known or treated.   - You, the patient, will arrange for follow up care as instructed.   - If your condition worsens or fails to improve we recommend that you receive another evaluation at the ER immediately or contact your PCP to discuss your concerns or return here.       Paraesthesias  Paraesthesia is a burning or prickling sensation that is sometimes felt in the hands, arms, legs or feet. It can also occur in other parts of the body. It can also feel like tingling or numbness, skin crawling, or itching. The feeling is not comfortable, but it is not painful. (The "pins and needles" feeling that happens when a foot or hand "falls asleep" is a " temporary paraesthesia.)  Paraesthesias that last or come and go may be caused by medical issues that need to be treated. These include stroke, a bulging disk pressing on a nerve, a trapped nerve, vitamin deficiencies, or even certain medicines.  Tests are often done. These tests may include blood tests, X-ray, CT (computerized tomography) scan, or a muscle test (electromyography). Depending on the cause, treatment may include physical therapy.  Home care  · Tell the healthcare provider about all medicines you take. This includes prescription and over-the-counter medicines, vitamins, and herbs. Ask if any of the medicines may be causing your problems. Do not make any changes to prescription medicines without talking to your healthcare provider first.  · You may be prescribed medicines to help relieve the tingling feeling or for pain. Take all medicines as directed.  · A numb hand or foot may be more prone to injury. To help protect it:  ¨ Always use oven mitts.  ¨ Test water with an unaffected hand or foot.  ¨ Use caution when trimming nails. File sharp areas.  ¨ Wear shoes that fit well to avoid pressure points, blisters, and ulcers.  ¨ Inspect your hands and feet carefully (including the soles of your feet and between your toes) at least once a week. If you see red areas, sores, or other problems, tell your healthcare provider.  Follow-up care  Follow up with your doctor or as advised by our staff. You may need further testing or evaluation.  When to seek medical advice  Call your healthcare provider right away if any of the following occur:  · Numbness or weakness of the face, one arm, or one leg  · Slurred speech, confusion, trouble speaking, walking, or seeing  · Severe headache, fainting spell, dizziness, or seizure  · Chest, arm, neck, or upper back pain  · Loss of bladder or bowel control  · Open wound with redness, swelling, or pus  Date Last Reviewed: 9/25/2015  © 5414-0791 [employer] [address] [id number]. All rights reserved. This information is not intended as a substitute for professional medical care. Always follow your healthcare professional's instructions.        Understanding Restless Legs Syndrome    Are you ever annoyed by a creeping or itching feeling in your legs? Do you often feel an urge to move your legs while sitting or lying in bed? This can keep you from falling asleep at night. You may then feel tired during the day. If you have these problems, talk to your health care provider. He or she can suggest a treatment plan and help you find ways to sleep better.  Restless legs syndrome (RLS)  RLS is a creeping, crawly, or jumpy feeling in the legs with an urge to move them. Symptoms of RLS often occur during periods of inactivity, such as when you sit or lie down at night. This discomfort can keep you from falling asleep. RLS is more common in older people and tends to run in families. Overuse of caffeine or alcohol may make symptoms worse. Iron deficiency, diabetes, or kidney problems can contribute to RLS.  Periodic limb movement syndrome (PLMS)  PLMS is sudden, repetitive leg jerking during sleep. The person you sleep with is often the one who notices it. Your legs may jerk many times during the night. You and your partner may both have trouble sleeping and feel tired in the morning. PLMS shouldnt be confused with the normal leg or body twitching many people have when first falling asleep.  Treating these problems  If these problems are causing disrupted sleep and daytime symptoms, treatment may be needed. Possible treatments may include:  · Avoiding medications like antidepressants, antinausea medications, and antipsychotic medications.   · Prescribed medications.  · Lifestyle changes, such as controlling caffeine intake, alcohol, and smoking.  Date Last Reviewed: 7/18/2015  © 1145-4727 [employer]. [address] [facility], [place] [address]. All  rights reserved. This information is not intended as a substitute for professional medical care. Always follow your healthcare professional's instructions.

## 2019-12-26 NOTE — PROGRESS NOTES
"Subjective:       Patient ID: Jaja Toledo is a 64 y.o. female.    Vitals:  height is 5' 6" (1.676 m) and weight is 76.2 kg (168 lb). Her temperature is 98.1 °F (36.7 °C). Her blood pressure is 134/87 and her pulse is 104. Her respiration is 20 and oxygen saturation is 100%.     Chief Complaint: Pain    Patient presents for sensation that has been intermittent for the past 2-3 months of burning/stinging sensation that only occurs at night in her left leg.  She has difficulties describing the sensation.  She denies numbness.  She states that it feels better when she moves or rubs the leg.  This occurs intermittently and does not happen every night.  She states that for several months she has had intermittent left knee pain which she considers to be from arthritis that began a few months ago but has resolved and she has not had in the past few weeks..  No knee injury, swelling, erythema, warmth.  She has never had this occur in the past.  She Has had surgery on this leg in 2009 for masses of the soft tissue the removed from her left foot and had a skin graft from her left thigh.    Pain   This is a new problem. The current episode started more than 1 month ago. The problem has been unchanged. Pertinent negatives include no arthralgias, chest pain, chills, congestion, coughing, diaphoresis, fatigue, fever, headaches, joint swelling, myalgias, nausea, rash, sore throat, vertigo, vomiting or weakness. Exacerbated by: laying down, She has tried NSAIDs for the symptoms. The treatment provided moderate relief.       Constitution: Negative for chills, sweating, fatigue, fever and unexpected weight change.   HENT: Negative for congestion and sore throat.    Neck: Negative for painful lymph nodes.   Cardiovascular: Negative for chest pain and leg swelling.   Eyes: Negative for double vision and blurred vision.   Respiratory: Negative for cough and shortness of breath.    Gastrointestinal: Negative for nausea, vomiting and " diarrhea.   Genitourinary: Negative for dysuria, frequency, urgency, urine decreased, flank pain, bladder incontinence and history of kidney stones.   Musculoskeletal: Positive for pain. Negative for trauma, joint pain, joint swelling, abnormal ROM of joint, arthritis, gout, back pain, muscle cramps, muscle ache and history of spine disorder.   Skin: Negative for color change, pale, rash and bruising.   Allergic/Immunologic: Negative for seasonal allergies.   Neurological: Negative for dizziness, history of vertigo, light-headedness, passing out and headaches.   Hematologic/Lymphatic: Negative for swollen lymph nodes.   Psychiatric/Behavioral: Negative for nervous/anxious, sleep disturbance and depression. The patient is not nervous/anxious.        Objective:      Physical Exam   Constitutional: She is oriented to person, place, and time. She appears well-developed and well-nourished. She is cooperative.  Non-toxic appearance. She does not appear ill. No distress.   HENT:   Head: Normocephalic and atraumatic. Head is without abrasion, without contusion and without laceration.   Right Ear: Hearing, tympanic membrane, external ear and ear canal normal. No hemotympanum.   Left Ear: Hearing, tympanic membrane, external ear and ear canal normal. No hemotympanum.   Nose: Nose normal. No mucosal edema, rhinorrhea or nasal deformity. No epistaxis. Right sinus exhibits no maxillary sinus tenderness and no frontal sinus tenderness. Left sinus exhibits no maxillary sinus tenderness and no frontal sinus tenderness.   Mouth/Throat: Uvula is midline, oropharynx is clear and moist and mucous membranes are normal. No trismus in the jaw. Normal dentition. No uvula swelling. No posterior oropharyngeal erythema.   Eyes: Pupils are equal, round, and reactive to light. Conjunctivae, EOM and lids are normal. Right eye exhibits no discharge. Left eye exhibits no discharge. No scleral icterus.   Neck: Trachea normal, normal range of  motion, full passive range of motion without pain and phonation normal. Neck supple. No spinous process tenderness and no muscular tenderness present. No neck rigidity. No tracheal deviation present.   Cardiovascular: Normal rate, regular rhythm, normal heart sounds, intact distal pulses and normal pulses.   Pulmonary/Chest: Effort normal and breath sounds normal. No respiratory distress.   Abdominal: Soft. Normal appearance and bowel sounds are normal. She exhibits no distension, no pulsatile midline mass and no mass. There is no tenderness.   Musculoskeletal: Normal range of motion. She exhibits no edema.        Left knee: Normal.        Left ankle: Normal.        Left upper leg: Normal. She exhibits no tenderness, no bony tenderness, no swelling, no edema, no deformity and no laceration.        Left lower leg: Normal.        Legs:       Left foot: There is deformity (surgical). There is normal range of motion, no tenderness, no bony tenderness, no swelling, normal capillary refill, no crepitus and no laceration.        Feet:    Neurological: She is alert and oriented to person, place, and time. She has normal strength. No cranial nerve deficit or sensory deficit. She exhibits normal muscle tone. She displays no seizure activity. Coordination normal. GCS eye subscore is 4. GCS verbal subscore is 5. GCS motor subscore is 6.   Skin: Skin is warm, dry, intact, not diaphoretic and not pale. Capillary refill takes less than 2 seconds. not left upper leg and not left footabrasion, burn, bruising and ecchymosis  Psychiatric: She has a normal mood and affect. Her speech is normal and behavior is normal. Judgment and thought content normal. Cognition and memory are normal.   Nursing note and vitals reviewed.        Patient is exam is unremarkable.  Will treat with Medrol Dosepak if sensation is cause from nerve compression due to inflammation.  Instructed to follow up closely with PCP because of history of soft tissue  masses, as soft tissue imaging, nerve conduction study, or further evaluation may be necessary to rule this out.  Assessment:       1. Leg paresthesia    2. Recurrent pain of left knee        Plan:         Leg paresthesia  -     methylPREDNISolone (MEDROL DOSEPACK) 4 mg tablet; use as directed  Dispense: 1 Package; Refill: 0  -     Ambulatory referral to Internal Medicine    Recurrent pain of left knee  -     methylPREDNISolone (MEDROL DOSEPACK) 4 mg tablet; use as directed  Dispense: 1 Package; Refill: 0      Patient Instructions   - Rest.    - Drink plenty of fluids.    - Acetaminophen (tylenol) or Ibuprofen (advil,motrin) as directed as needed for fever/pain. Avoid tylenol if you have a history of liver disease. Do not take ibuprofen if you have a history of GI bleeding, kidney disease, or if you take blood thinners.     - Apply warm compresses to the affected area for 20 minutes at a time.    - You received a steroid pack today.  This can elevate your blood pressure, elevate your blood sugar, water weight gain, nervous energy, redness to the face and dimpling of the skin where the shot goes in.   - Do not use steroids more than 3 times per year.   - If you have diabetes, please check you blood sugar frequently.  - If you have high blood pressure, please check your blood pressure frequently.     - Follow up with your PCP or specialty clinic as directed in the next 1-2 weeks if not improved or as needed.  I have placed an urgent referral for you to follow up with your PCP.  You can call [phone] to schedule an appointment with the appropriate provider.    - Go to the ER or seek medical attention immediately if you develop new or worsening symptoms.    - You must understand that you have received an Urgent Care treatment only and that you may be released before all of your medical problems are known or treated.   - You, the patient, will arrange for follow up care as instructed.   - If your condition worsens  "or fails to improve we recommend that you receive another evaluation at the ER immediately or contact your PCP to discuss your concerns or return here.       Paraesthesias  Paraesthesia is a burning or prickling sensation that is sometimes felt in the hands, arms, legs or feet. It can also occur in other parts of the body. It can also feel like tingling or numbness, skin crawling, or itching. The feeling is not comfortable, but it is not painful. (The "pins and needles" feeling that happens when a foot or hand "falls asleep" is a temporary paraesthesia.)  Paraesthesias that last or come and go may be caused by medical issues that need to be treated. These include stroke, a bulging disk pressing on a nerve, a trapped nerve, vitamin deficiencies, or even certain medicines.  Tests are often done. These tests may include blood tests, X-ray, CT (computerized tomography) scan, or a muscle test (electromyography). Depending on the cause, treatment may include physical therapy.  Home care  · Tell the healthcare provider about all medicines you take. This includes prescription and over-the-counter medicines, vitamins, and herbs. Ask if any of the medicines may be causing your problems. Do not make any changes to prescription medicines without talking to your healthcare provider first.  · You may be prescribed medicines to help relieve the tingling feeling or for pain. Take all medicines as directed.  · A numb hand or foot may be more prone to injury. To help protect it:  ¨ Always use oven mitts.  ¨ Test water with an unaffected hand or foot.  ¨ Use caution when trimming nails. File sharp areas.  ¨ Wear shoes that fit well to avoid pressure points, blisters, and ulcers.  ¨ Inspect your hands and feet carefully (including the soles of your feet and between your toes) at least once a week. If you see red areas, sores, or other problems, tell your healthcare provider.  Follow-up care  Follow up with your doctor or as advised by " our staff. You may need further testing or evaluation.  When to seek medical advice  Call your healthcare provider right away if any of the following occur:  · Numbness or weakness of the face, one arm, or one leg  · Slurred speech, confusion, trouble speaking, walking, or seeing  · Severe headache, fainting spell, dizziness, or seizure  · Chest, arm, neck, or upper back pain  · Loss of bladder or bowel control  · Open wound with redness, swelling, or pus  Date Last Reviewed: 9/25/2015 © 2000-2017 [employer]. [address] [place] [id number]. All rights reserved. This information is not intended as a substitute for professional medical care. Always follow your healthcare professional's instructions.        Understanding Restless Legs Syndrome    Are you ever annoyed by a creeping or itching feeling in your legs? Do you often feel an urge to move your legs while sitting or lying in bed? This can keep you from falling asleep at night. You may then feel tired during the day. If you have these problems, talk to your health care provider. He or she can suggest a treatment plan and help you find ways to sleep better.  Restless legs syndrome (RLS)  RLS is a creeping, crawly, or jumpy feeling in the legs with an urge to move them. Symptoms of RLS often occur during periods of inactivity, such as when you sit or lie down at night. This discomfort can keep you from falling asleep. RLS is more common in older people and tends to run in families. Overuse of caffeine or alcohol may make symptoms worse. Iron deficiency, diabetes, or kidney problems can contribute to RLS.  Periodic limb movement syndrome (PLMS)  PLMS is sudden, repetitive leg jerking during sleep. The person you sleep with is often the one who notices it. Your legs may jerk many times during the night. You and your partner may both have trouble sleeping and feel tired in the morning. PLMS shouldnt be confused with the normal leg or body  twitching many people have when first falling asleep.  Treating these problems  If these problems are causing disrupted sleep and daytime symptoms, treatment may be needed. Possible treatments may include:  · Avoiding medications like antidepressants, antinausea medications, and antipsychotic medications.   · Prescribed medications.  · Lifestyle changes, such as controlling caffeine intake, alcohol, and smoking.  Date Last Reviewed: 7/18/2015  © 2863-1313 [employer]. [address] [place] [address], [place] [address]. All rights reserved. This information is not intended as a substitute for professional medical care. Always follow your healthcare professional's instructions.

## 2019-12-27 ENCOUNTER — TELEPHONE (OUTPATIENT)
Dept: HEMATOLOGY/ONCOLOGY | Facility: CLINIC | Age: 64
End: 2019-12-27

## 2019-12-27 ENCOUNTER — TELEPHONE (OUTPATIENT)
Dept: FAMILY MEDICINE | Facility: CLINIC | Age: 64
End: 2019-12-27

## 2019-12-27 NOTE — TELEPHONE ENCOUNTER
Called Ms Tre to inform her of the f/u appointment scheduled with Dr Petersen on Jan 07,2020 @ 3:00 pm. No answer when called. Message left giving the above appointment information.

## 2019-12-27 NOTE — TELEPHONE ENCOUNTER
----- Message from Daniel Chatterjee sent at 12/27/2019  9:11 AM CST -----  Hello,     This patient was seen yesterday, 12/26, at [facility] by BALDEMAR Henley, who sent an urgent referral to Internal Med with the following dx:     Leg paresthesia [R20.2]    I have scheduled the patient with the soonest available with Dr. Hurtado (1/14/20) and have put the patient on a waitlist should a sooner appointment become available, but patient asked to be seen as soon as possible.     Please contact the patient for sooner scheduling.     Thank you,   Daniel

## 2020-01-07 ENCOUNTER — OFFICE VISIT (OUTPATIENT)
Dept: HEMATOLOGY/ONCOLOGY | Facility: CLINIC | Age: 65
End: 2020-01-07
Payer: COMMERCIAL

## 2020-01-07 VITALS
RESPIRATION RATE: 16 BRPM | HEART RATE: 116 BPM | TEMPERATURE: 97 F | SYSTOLIC BLOOD PRESSURE: 132 MMHG | OXYGEN SATURATION: 100 % | HEIGHT: 66 IN | BODY MASS INDEX: 29.8 KG/M2 | DIASTOLIC BLOOD PRESSURE: 86 MMHG | WEIGHT: 185.44 LBS

## 2020-01-07 DIAGNOSIS — Z79.811 AROMATASE INHIBITOR USE: ICD-10-CM

## 2020-01-07 DIAGNOSIS — M79.605 LEFT LEG PAIN: ICD-10-CM

## 2020-01-07 DIAGNOSIS — C50.811 MALIGNANT NEOPLASM OF OVERLAPPING SITES OF RIGHT BREAST IN FEMALE, ESTROGEN RECEPTOR POSITIVE: Primary | ICD-10-CM

## 2020-01-07 DIAGNOSIS — Z17.0 MALIGNANT NEOPLASM OF OVERLAPPING SITES OF RIGHT BREAST IN FEMALE, ESTROGEN RECEPTOR POSITIVE: Primary | ICD-10-CM

## 2020-01-07 PROCEDURE — 99999 PR PBB SHADOW E&M-EST. PATIENT-LVL III: CPT | Mod: PBBFAC,,, | Performed by: INTERNAL MEDICINE

## 2020-01-07 PROCEDURE — 99214 PR OFFICE/OUTPT VISIT, EST, LEVL IV, 30-39 MIN: ICD-10-PCS | Mod: S$GLB,,, | Performed by: INTERNAL MEDICINE

## 2020-01-07 PROCEDURE — 99214 OFFICE O/P EST MOD 30 MIN: CPT | Mod: S$GLB,,, | Performed by: INTERNAL MEDICINE

## 2020-01-07 PROCEDURE — 3008F PR BODY MASS INDEX (BMI) DOCUMENTED: ICD-10-PCS | Mod: CPTII,S$GLB,, | Performed by: INTERNAL MEDICINE

## 2020-01-07 PROCEDURE — 3008F BODY MASS INDEX DOCD: CPT | Mod: CPTII,S$GLB,, | Performed by: INTERNAL MEDICINE

## 2020-01-07 PROCEDURE — 99999 PR PBB SHADOW E&M-EST. PATIENT-LVL III: ICD-10-PCS | Mod: PBBFAC,,, | Performed by: INTERNAL MEDICINE

## 2020-01-07 NOTE — PROGRESS NOTES
Subjective:       Patient ID: Jaja Toledo is a 63 y.o. female.     Chief Complaint: follow up for test results     Diagnosis:  Stage IA (mp T1cN0 (I+)M0) Right breast cancer, s/p right mastectomy on 6/4/19     Oncologic History:  1. Ms Toledo is a 64 yo postmenopausal woman who initially saw me on 7/22/19 for breast cancer. She was noted to have asymmetry in the right breast at the inner position on mammogram 3/18/19. Diagnosed mammogram on 4/11/19 showed a 11 mm high density, irregularly shaped mass with indistinct margins in the right breast at 3 oclock. US showed a 7 x 9 x 7 mm mass at 3 oclock. The right axilla was normal. Breast biopsy on 4/22/19 showed invasive ductal carcinoma, grade 3, with associated high grade DCIS. ER+ (>95%), OH+(>90%), HER2 equivocal, FISH negative. Ki-67 20%. She was seen by Dr Arteaga in May 2019. MRI breast on 5/16/19 showed a 13 mm x 10 mm x 10 mm mass at 3 oclock correlating with the prior finding. There is another 9 mm x 7 mm x 8 mm mass at 6 oclock in the right breast. Left breast is benign. She underwent biopsy of the second mass on 5/27/19, which showed a grade 2 invasive lobular carcinoma, ER positive 70%, OH negative, HER2 negative, Ki-67 15%. Patient underwent right mastectomy and right axillary lymph node biopsy on 6/4/19. Pathology showed two tumors: #1, one 1.3 cm grade 2 invasive ductal carcinoma with lobular features, #2, one 1.7 cm grade 2 invasive lobular carcinoma. DCIS present, grade 3, with necrosis. LCIS. Margins are negative. lymphovascular invasion was not identified. One sentinel lymph node removed, with isolated tumor cells. Extranodal extention not identified. mp T1c sn N0 (i+). Receptors: #1: ER positive 95%, OH positive 90%, HER2 FISH negative, Ki67 20%. #2, ER positive 70%, OH negative, HER2 negative, Ki-67 15%. She returns today to discuss further management. She currently feels well. She had some mild sweating on her forehead in her last 50s. She  had a hysterectomy more than 20 years ago. She had MECLUB genetic test done which was negative. Oncotype DX of her 1.7 cm ILC showed an RS of 15, distant recurrence rate at 9 years is 4%, benefit from chemo is <1%. Oncotype DX of her 1.3 cm IDC is pending.   2. Oncotype DX of her 1.3 cm IDC showed RS of 24, distant recurrence rate at 9 years is 10%, benefit from chemotherapy <1%  3. Started letrozole in 2019. Bone density 19 normal.      Interval History:   Ms Toledo returns for follow up. She no-showed for her appointment with me 3 times since last seen. She is taking letrozole daily. Mild hot flashes. No other side effects noted. Noted tingling in her left leg. Denies other complaints.         ECO     ROS:   A ten-point system review is obtained and negative except for what was stated in the Interval History.      Physical Examination:   Vital signs reviewed.   General: well hydrated, well developed, in no acute distress  HEENT: normocephalic, PERRLA, EOMI, anicteric sclerae, oropharynx clear  Neck: supple, no JVD, thyromegaly, cervical or supraclavicular lymphadenopathy  Lungs: clear breath sounds bilaterally, no wheezing, rales, or rhonchi  Heart: RRR, no M/R/G  Breast: s/p R mastectomy and reconstruction. No abnormal skin changes, masses or axillary LAD. L: no abnormal skin changes, masses, or axillary LAD.   Abdomen: soft, no tenderness, non-distended, no hepatosplenomegaly, mass, or hernia. BS present  Extremities: left leg trace edema  Skin: no rash, ulcer, or open wounds  Neuro: alert and oriented x 4, no focal neuro deficit  Psych: pleasant and appropriate mood and affect      Objective:      Laboratory Data:  Labs reviewed. Vitamin D normal at 32.         Assessment and Plan:      1. Malignant neoplasm of overlapping sites of right breast in female, estrogen receptor positive    2. Aromatase inhibitor use    3. Left leg pain        1.2  - Ms Toledo is a 62 yo woman with Stage IA (mp T1cN0  (I+)M0) Right breast cancer s/p R mastectomy, SLNB and immediate reconstruction on 6/4/19. She had a 1.3 cm invasive ductal carcinoma and a 1.7 cm invasive lobular carcinoma. Both tumors ER positive, HER2 negative. She had isolated tumor cells in one sentinel lymph node, which is stage N0 (I+).  Oncotype DX score of the 1.7 cm ILC showed an RS of 15, distant recurrence rate at 9 years is 4%, benefit from chemo is <1%. Oncotype DX of her 1.3 cm IDC showed RS of 24, distant recurrence rate at 9 years is 10%, benefit from chemotherapy <1%  - Has been on letrozole since Aug 2019. Tolerating it well  - c/w letrozole for at least 5 years  - c/w calcium and vit D daily  - Bone density 11/22/19 normal  - RTC in 3 months for follow up. Due for mammogram prior to return    3.  - tingling in left leg  - check US to r/o DVT. If negative, f/u with PCP on 1/14. Patient understands and agrees with the plan       Follow-up:      Return in 3 months  Knows to call in the interval if any problems arise.

## 2020-01-14 ENCOUNTER — OFFICE VISIT (OUTPATIENT)
Dept: FAMILY MEDICINE | Facility: CLINIC | Age: 65
End: 2020-01-14
Payer: COMMERCIAL

## 2020-01-14 VITALS
TEMPERATURE: 99 F | HEART RATE: 124 BPM | DIASTOLIC BLOOD PRESSURE: 82 MMHG | OXYGEN SATURATION: 98 % | SYSTOLIC BLOOD PRESSURE: 140 MMHG | WEIGHT: 186.06 LBS | HEIGHT: 66 IN | BODY MASS INDEX: 29.9 KG/M2

## 2020-01-14 DIAGNOSIS — R63.0 DECREASED APPETITE: ICD-10-CM

## 2020-01-14 DIAGNOSIS — Z17.0 MALIGNANT NEOPLASM OF OVERLAPPING SITES OF RIGHT BREAST IN FEMALE, ESTROGEN RECEPTOR POSITIVE: ICD-10-CM

## 2020-01-14 DIAGNOSIS — R20.2 NUMBNESS AND TINGLING OF LEFT LEG: ICD-10-CM

## 2020-01-14 DIAGNOSIS — C50.811 MALIGNANT NEOPLASM OF OVERLAPPING SITES OF RIGHT BREAST IN FEMALE, ESTROGEN RECEPTOR POSITIVE: ICD-10-CM

## 2020-01-14 DIAGNOSIS — N30.00 ACUTE CYSTITIS WITHOUT HEMATURIA: ICD-10-CM

## 2020-01-14 DIAGNOSIS — L29.9 PRURITIC DISORDER: ICD-10-CM

## 2020-01-14 DIAGNOSIS — L29.9 EAR ITCHING: ICD-10-CM

## 2020-01-14 DIAGNOSIS — K21.9 GASTROESOPHAGEAL REFLUX DISEASE, ESOPHAGITIS PRESENCE NOT SPECIFIED: ICD-10-CM

## 2020-01-14 DIAGNOSIS — R20.0 NUMBNESS AND TINGLING OF LEFT LEG: ICD-10-CM

## 2020-01-14 DIAGNOSIS — R39.15 URINARY URGENCY: Primary | ICD-10-CM

## 2020-01-14 LAB
BILIRUB SERPL-MCNC: ABNORMAL MG/DL
BLOOD URINE, POC: ABNORMAL
COLOR, POC UA: ABNORMAL
GLUCOSE UR QL STRIP: ABNORMAL
KETONES UR QL STRIP: ABNORMAL
LEUKOCYTE ESTERASE URINE, POC: ABNORMAL
NITRITE, POC UA: POSITIVE
PH, POC UA: 5.5
PROTEIN, POC: ABNORMAL
SPECIFIC GRAVITY, POC UA: >=1.03
UROBILINOGEN, POC UA: ABNORMAL

## 2020-01-14 PROCEDURE — 87086 URINE CULTURE/COLONY COUNT: CPT

## 2020-01-14 PROCEDURE — 99215 PR OFFICE/OUTPT VISIT, EST, LEVL V, 40-54 MIN: ICD-10-PCS | Mod: 25,S$GLB,, | Performed by: FAMILY MEDICINE

## 2020-01-14 PROCEDURE — 87077 CULTURE AEROBIC IDENTIFY: CPT

## 2020-01-14 PROCEDURE — 87186 SC STD MICRODIL/AGAR DIL: CPT

## 2020-01-14 PROCEDURE — 81001 POCT URINALYSIS, DIPSTICK OR TABLET REAGENT, AUTOMATED, WITH MICROSCOP: ICD-10-PCS | Mod: S$GLB,,, | Performed by: FAMILY MEDICINE

## 2020-01-14 PROCEDURE — 99999 PR PBB SHADOW E&M-EST. PATIENT-LVL III: CPT | Mod: PBBFAC,,, | Performed by: FAMILY MEDICINE

## 2020-01-14 PROCEDURE — 99999 PR PBB SHADOW E&M-EST. PATIENT-LVL III: ICD-10-PCS | Mod: PBBFAC,,, | Performed by: FAMILY MEDICINE

## 2020-01-14 PROCEDURE — 3008F BODY MASS INDEX DOCD: CPT | Mod: CPTII,S$GLB,, | Performed by: FAMILY MEDICINE

## 2020-01-14 PROCEDURE — 87088 URINE BACTERIA CULTURE: CPT

## 2020-01-14 PROCEDURE — 81001 URINALYSIS AUTO W/SCOPE: CPT | Mod: S$GLB,,, | Performed by: FAMILY MEDICINE

## 2020-01-14 PROCEDURE — 3008F PR BODY MASS INDEX (BMI) DOCUMENTED: ICD-10-PCS | Mod: CPTII,S$GLB,, | Performed by: FAMILY MEDICINE

## 2020-01-14 PROCEDURE — 99215 OFFICE O/P EST HI 40 MIN: CPT | Mod: 25,S$GLB,, | Performed by: FAMILY MEDICINE

## 2020-01-14 RX ORDER — CYPROHEPTADINE HYDROCHLORIDE 4 MG/1
4 TABLET ORAL 3 TIMES DAILY PRN
Qty: 30 TABLET | Refills: 3 | Status: SHIPPED | OUTPATIENT
Start: 2020-01-14 | End: 2020-01-14 | Stop reason: SDUPTHER

## 2020-01-14 RX ORDER — NITROFURANTOIN 25; 75 MG/1; MG/1
100 CAPSULE ORAL 2 TIMES DAILY
Qty: 14 CAPSULE | Refills: 0 | Status: SHIPPED | OUTPATIENT
Start: 2020-01-14 | End: 2020-01-21

## 2020-01-14 RX ORDER — HYDROXYZINE HYDROCHLORIDE 25 MG/1
TABLET, FILM COATED ORAL
Qty: 30 TABLET | Refills: 2 | Status: SHIPPED | OUTPATIENT
Start: 2020-01-14 | End: 2021-11-15

## 2020-01-14 RX ORDER — CYPROHEPTADINE HYDROCHLORIDE 4 MG/1
4 TABLET ORAL 3 TIMES DAILY PRN
Qty: 30 TABLET | Refills: 3 | Status: SHIPPED | OUTPATIENT
Start: 2020-01-14 | End: 2021-11-15

## 2020-01-14 NOTE — PROGRESS NOTES
(Portions of this note were dictated using voice recognition software and may contain dictation related errors in spelling/grammar/syntax not found on text review)    CC:   Chief Complaint   Patient presents with    Urinary Tract Infection       HPI: 64 y.o. female Last visit November 2018.  Medical history below.  In meantime since my last visit was diagnosed with breast cancer on right, ER positive.  2 masses that were biopsied, 1 invasive ductal carcinoma with associated high-grade DCIS, 2nd invasive lobular carcinoma.  Underwent right mastectomy and right axillary lymph node biopsy, lymph nodes negative.  Currently on letrozole.  Last visit with Heme-Onc reviewed from 01/07/2020.  Also had complained at that visit a tingling in the left leg.  Ultrasound was done to rule out DVT.  Advised follow-up here.  Ultrasound has not been completed yet.    Chart review, she also went to urgent care on 12/26 complaining of similar symptoms.  Was given a Medrol Dosepak, advised on rest, fluids, Tylenol or ibuprofen for pain. Thinking of possible restless leg syndrome.  She states that the symptoms were mainly present at nighttime and not during the day when walking.  Would usually just rub the leg note feel better a ventrally.  No pain associated just some tingling.  No swelling of the left leg.  No right leg symptoms.  In fact she states currently the symptoms have mostly resolved but she just wanted to have this evaluated to see if she needs any further testing done.    Of note, she does have lumbar imaging (lumbar x-ray) in June 2015 showing no evidence spondylolysis or spondylolisthesis, maintained vertebral body heights, no concerning findings.    Incidentally makes note of 1-2 weeks of urinary urgency, no hematuria or dysuria.  Had UTI in the past.  Took some azo which in the past helped her symptoms but recently has not as much as she would have thought.    Past Medical History:   Diagnosis Date    Anxiety      Chronic low back pain     Gastritis     GERD (gastroesophageal reflux disease)     History of cervical cancer     History of recurrent urinary tract infection     IFG (impaired fasting glucose)     Malignant neoplasm of overlapping sites of right breast in female, estrogen receptor positive 7/19/2019    Soft tissue tumor, malignant     sarcoma left foot       Past Surgical History:   Procedure Laterality Date    ABDOMINOPLASTY N/A 11/29/2018    Procedure: EXTENDED ABDOMINOPLASTY;  Surgeon: Mio Arriaga MD;  Location: [facility] OR;  Service: Plastics;  Laterality: N/A;    AXILLARY NODE DISSECTION Right 6/4/2019    Procedure: LYMPHADENECTOMY, AXILLARY RIGHT;  Surgeon: Sabine Arteaga MD;  [facility];  Service: General;  Laterality: Right;    BACK SURGERY      BREAST CYST EXCISION Left 1984    FOOT SURGERY  2009 or 2010    HYSTERECTOMY      INSERTION OF BREAST IMPLANT Right 6/4/2019    Procedure: INSERTION, BREAST IMPLANT RIGHT;  Surgeon: Leonard Cordero MD;  [facility];  Service: Plastics;  Laterality: Right;    INSERTION OF BREAST TISSUE EXPANDER Right 6/4/2019    Procedure: INSERTION, TISSUE EXPANDER, BREAST RIGHT;  Surgeon: Leonard Cordero MD;  [facility];  Service: Plastics;  Laterality: Right;    LIPOSUCTION W/ FAT INJECTION N/A 11/29/2018    Procedure: LIPOSUCTION, WITH FAT TRANSFER TO BUTTOCKS;  Surgeon: Mio Arriaga MD;  Location: [facility] OR;  Service: Plastics;  Laterality: N/A;    MASTOPEXY Left 6/4/2019    Procedure: MASTOPEXY LEFT;  Surgeon: Leonard Cordero MD;  [facility];  Service: Plastics;  Laterality: Left;    SENTINEL LYMPH NODE BIOPSY Right 6/4/2019    Procedure: BIOPSY, LYMPH NODE, SENTINEL RIGHT;  Surgeon: Sabine Arteaga MD;  [facility];  Service: General;  Laterality: Right;    SIMPLE MASTECTOMY Right 6/4/2019    Procedure: MASTECTOMY, SIMPLE RIGHT (CONSENT AM OF) 4.0 hr case;   Surgeon: Sabine Arteaga MD;  Location: [facility] OR [address] [place] [address];  Service: General;  Laterality: Right;    TOTAL VAGINAL HYSTERECTOMY      TUBAL LIGATION         Family History   Problem Relation Age of Onset    Breast cancer Sister         dx in     No Known Problems Mother     Throat cancer Father     Colon cancer Neg Hx     Diabetes Neg Hx     Hypertension Neg Hx     Ovarian cancer Neg Hx     Stroke Neg Hx        Social History     Tobacco Use    Smoking status: Former Smoker     Packs/day: 0.25     Years: 15.00     Pack years: 3.75     Types: Cigarettes     Last attempt to quit: 8/15/1993     Years since quittin.4    Smokeless tobacco: Never Used   Substance Use Topics    Alcohol use: Yes     Alcohol/week: 1.0 standard drinks     Types: 1 Glasses of wine per week     Comment: seldom    Drug use: No       Lab Results   Component Value Date    WBC 5.37 2019    HGB 12.9 2019    HCT 41.2 2019    MCV 87 2019     2019    CHOL 225 (H) 06/15/2018    TRIG 189 (H) 06/15/2018    HDL 38 (L) 06/15/2018    ALT 24 2019    AST 33 2019    BILITOT 0.4 2019    ALKPHOS 71 2019     2019    K 3.6 2019     2019    CREATININE 0.9 2019    ESTGFRAFRICA >60 2019    EGFRNONAA >60 2019    CALCIUM 9.9 2019    ALBUMIN 4.3 2019    BUN 10 2019    CO2 25 2019    TSH 0.448 06/15/2018    INR 1.0 2008    HGBA1C 5.7 (H) 11/15/2018    LDLCALC 149.2 06/15/2018     (H) 2019                 ROS:  GENERAL: No fever, chills, fatigability or weight loss.  SKIN: No rashes, no itching.  HEAD: No headaches.  EYES: No visual changes  EARS:  Some itching in left ear  NOSE: No congestion or rhinorrhea.  MOUTH & THROAT: No hoarseness, change in voice, or sore throat.  NODES: Denies swollen glands.  CHEST: Denies URBINA, cyanosis, wheezing, cough and sputum production.  CARDIOVASCULAR: Denies  chest pain, PND, orthopnea.  ABDOMEN: No nausea, vomiting, or changes in bowel function.  URINARY:  Above  PERIPHERAL VASCULAR: No claudication or cyanosis.  MUSCULOSKELETAL:  Above  NEUROLOGIC:  Above    Vital signs reviewed  PE:   APPEARANCE: Well nourished, well developed, in no acute distress.    HEAD: Normocephalic, atraumatic.  EYES:    Conjunctivae noninjected.  EARS:  Left TM clear, mild effusion.  No EAC abnormality  MSK/neuro:  2+ patellar, ankle reflexes bilaterally. Negative straight leg raise bilaterally. Normal fine touch, monofilament, vibratory sense of both lower extremities.  Normal proprioception.  Normal gait and station.  No lumbar spine or paraspinous tenderness to palpation.  No sacroiliac tenderness to palpation.    IMPRESSION  1. Urinary urgency    2. Acute cystitis without hematuria    3. Numbness and tingling of left leg    4. Pruritic disorder    5. Malignant neoplasm of overlapping sites of right breast in female, estrogen receptor positive    6. Ear itching    7. Decreased appetite    8. Gastroesophageal reflux disease, esophagitis presence not specified            PLAN  Tingling left leg, reviewed urgent care and hematology documentation.  has mostly resolved.  Exam is benign.  Reassurance and consideration of further exam with any progressive symptoms.  Some of her symptoms could represent restless leg syndrome although she does not have any symmetric symptoms in the opposite leg.  Planning to get ultrasound to rule out DVT from her hematologist oncologist although no unilateral swelling redness  or tenderness to palpation on exam today of urgent concern for this.    Incidentally described urinary symptoms at the end of the appointment consisting of urinary urgency.  Has had UTI in the past.  Took azo but it did not seem to completely help her symptoms.  Point of care urine testing shows nitrite positive, small amount of leukocytes, and trace blood on dipstick.  100 of glucose on  dipstick.  Will treat for UTI with Macrobid 100 mg b.i.d. for 7 days.  Culture urine, change antibiotics if needed based on culture results.  Notify if persistent symptoms    Also would like some of her prescriptions refilled.  Takes Periactin once in a while if she has significant difficulty with appetite.  Also takes hydroxyzine once in a while for itching of the skin, feels like it helped but knows not to take it with the Periactin.  She also takes Zantac as needed for acid reflux which really seems to help.  These prescriptions have been refilled    Breast cancer:  Reviewed Hematology-Oncology notes.  She Is continuing with her letrozole treatment and further surveillance through Hematology-Oncology.  Plans on scheduling ultrasound above for left leg    Ear itching:  Reassurance    Would recommend repeat medical follow-up evaluation/wellness visit in the next 6 months or sooner if needed for any other concerns..

## 2020-01-14 NOTE — LETTER
January 14, 2020      Felton Henley PA-C  [address] [address]           [facility]  [address], [address]  [facility] [address]  Phone: [phone]  Fax: [phone]          Patient: Jaja Toledo   MR Number: [id number]   YOB: 1955   Date of Visit: 1/14/2020       Dear Felton Henley:    Thank you for referring Jaja Toledo to me for evaluation. Attached you will find relevant portions of my assessment and plan of care.    If you have questions, please do not hesitate to call me. I look forward to following Jaja Toledo along with you.    Sincerely,    Andres Hurtado MD    Enclosure  CC:  No Recipients    If you would like to receive this communication electronically, please contact [email or web address] or [phone] to request more information on [employer] access.    For providers and/or their staff who would like to refer a patient to [facility], please contact us through our one-stop-shop provider referral line, [facility], at [phone].    If you feel you have received this communication in error or would no longer like to receive these types of communications, please e-mail [email or web address]

## 2020-01-14 NOTE — PATIENT INSTRUCTIONS

## 2020-01-16 ENCOUNTER — HOSPITAL ENCOUNTER (OUTPATIENT)
Dept: RADIOLOGY | Facility: OTHER | Age: 65
Discharge: HOME | End: 2020-01-16
Attending: INTERNAL MEDICINE
Payer: COMMERCIAL

## 2020-01-16 DIAGNOSIS — M79.605 LEFT LEG PAIN: ICD-10-CM

## 2020-01-16 PROCEDURE — 93971 EXTREMITY STUDY: CPT | Mod: 26,LT,, | Performed by: RADIOLOGY

## 2020-01-16 PROCEDURE — 93971 US LOWER EXTREMITY VEINS LEFT: ICD-10-PCS | Mod: 26,LT,, | Performed by: RADIOLOGY

## 2020-01-16 PROCEDURE — 93971 EXTREMITY STUDY: CPT | Mod: TC,LT

## 2020-01-17 ENCOUNTER — TELEPHONE (OUTPATIENT)
Dept: HEMATOLOGY/ONCOLOGY | Facility: CLINIC | Age: 65
End: 2020-01-17

## 2020-01-17 LAB — BACTERIA UR CULT: ABNORMAL

## 2020-01-17 NOTE — TELEPHONE ENCOUNTER
Called and spoke with Ms Toledo. Informed Ms Toledo, Dr Petersen review the results of her recent LE ultrasound and it shows no blood clot in the leg. Ms Toledo voice verbal understanding.

## 2020-01-17 NOTE — TELEPHONE ENCOUNTER
----- Message from Jake Petersen MD sent at 1/16/2020  6:43 PM CST -----  Please let patient know there is no blood clot in the leg

## 2020-01-21 ENCOUNTER — TELEPHONE (OUTPATIENT)
Dept: FAMILY MEDICINE | Facility: CLINIC | Age: 65
End: 2020-01-21

## 2020-01-21 NOTE — TELEPHONE ENCOUNTER
----- Message from Angelica Teran sent at 1/21/2020  2:26 PM CST -----  Contact: patient  Type:  Needs Medical Advice    Who Called:  Jaja  Symptoms (please be specific): UTI  Would the patient rather a call back or a response via [facility]? Call back  Best Call Back Number:  [phone]  Additional Information:  Need more UTI medication

## 2020-01-21 NOTE — TELEPHONE ENCOUNTER
Voicemail full, could not leave message.  Calling to see what symptoms patient is still having to give to Dr Hurtado to determine if he wants repeat UA/culture or send more antibiotics.

## 2020-01-22 ENCOUNTER — OFFICE VISIT (OUTPATIENT)
Dept: PLASTIC SURGERY | Facility: CLINIC | Age: 65
End: 2020-01-22
Payer: COMMERCIAL

## 2020-01-22 ENCOUNTER — TELEPHONE (OUTPATIENT)
Dept: FAMILY MEDICINE | Facility: CLINIC | Age: 65
End: 2020-01-22

## 2020-01-22 VITALS
SYSTOLIC BLOOD PRESSURE: 147 MMHG | DIASTOLIC BLOOD PRESSURE: 93 MMHG | HEART RATE: 118 BPM | WEIGHT: 185.19 LBS | BODY MASS INDEX: 29.89 KG/M2

## 2020-01-22 DIAGNOSIS — Z09 SURGERY FOLLOW-UP EXAMINATION: Primary | ICD-10-CM

## 2020-01-22 PROCEDURE — 99999 PR PBB SHADOW E&M-EST. PATIENT-LVL III: ICD-10-PCS | Mod: PBBFAC,,, | Performed by: SURGERY

## 2020-01-22 PROCEDURE — 99024 PR POST-OP FOLLOW-UP VISIT: ICD-10-PCS | Mod: S$GLB,,, | Performed by: SURGERY

## 2020-01-22 PROCEDURE — 99999 PR PBB SHADOW E&M-EST. PATIENT-LVL III: CPT | Mod: PBBFAC,,, | Performed by: SURGERY

## 2020-01-22 PROCEDURE — 99024 POSTOP FOLLOW-UP VISIT: CPT | Mod: S$GLB,,, | Performed by: SURGERY

## 2020-01-22 NOTE — TELEPHONE ENCOUNTER
Patient states the burning sensation and pain while urinating went away but as soon as she stopped the symptoms came back.  Patient is requesting refill of medication.  Do you want to do another urinalysis and culture?

## 2020-01-22 NOTE — TELEPHONE ENCOUNTER
----- Message from Regla Batista sent at 1/22/2020  1:17 PM CST -----  Contact: self / [phone]  Patient is returning a call from your office. Please advise

## 2020-01-22 NOTE — PROGRESS NOTES
Ms Toledo is a 63 yo female who underwent right breast DTI after mastectomy and left breast mastopexy on 4 June 2019.  She was last seen in our clinic on 26 June at which time we discussed revision of the right site.      Today she presents ready to discuss the revision as well as liposuction of the abdomen    She denies any changes in her health other than some weight gain.      On exam she has a well placed right implant, appropriate upper and lower pole fullness.  On the medial aspect there is a soft/tissue muscle deficit with skin and subcutaneous tissue adherent to the chest wall.  Left mastopexy incisions are well healed, nipple is a little high.  Abdomen has significant intra abdominal fat

## 2020-01-23 ENCOUNTER — TELEPHONE (OUTPATIENT)
Dept: FAMILY MEDICINE | Facility: CLINIC | Age: 65
End: 2020-01-23

## 2020-01-23 RX ORDER — NITROFURANTOIN (MACROCRYSTALS) 100 MG/1
100 CAPSULE ORAL EVERY 12 HOURS
Qty: 14 CAPSULE | Refills: 0 | Status: SHIPPED | OUTPATIENT
Start: 2020-01-23 | End: 2020-01-30

## 2020-01-23 NOTE — TELEPHONE ENCOUNTER
Mailbox full, could not leave message.  Calling to advised that a prescription for another round of antibiotics was sent to pharmacy.

## 2020-01-23 NOTE — TELEPHONE ENCOUNTER
Will send another 7 days of the Macrobid.  If again recurrence of symptoms needs to have re-culture

## 2020-01-30 ENCOUNTER — TELEPHONE (OUTPATIENT)
Dept: PLASTIC SURGERY | Facility: CLINIC | Age: 65
End: 2020-01-30

## 2020-02-03 DIAGNOSIS — Z85.3 PERSONAL HISTORY OF BREAST CANCER: Primary | ICD-10-CM

## 2020-02-25 NOTE — IP AVS SNAPSHOT
[facility]  [address]  [place] [address]  Phone: [phone]  Fax: [phone]                  Jaja Toledo   2017 12:30 PM   Cystoscopy    Description:  Female : 1955   Provider:  GILBERTO UROLOGY   Department:  [facility]           Visit Information     Date & Time Provider Department    2017 12:30 PM LAZARA MACIASY [facility]      Recent Lab Values        2011                  3:41 PM  4:29 PM  4:30 PM  7:15 PM        Urine Culture NO GROWTH - FINAL REPORT. - ENTEROCOCCUS SPECIES ... No growth           DIPHTHEROIDS ...         Color YELLOW Yellow - yellow        Specific Gravity 1.010 1.010 - 1.015        pH 5.0 7.0 - 7        Leukocytes - - - ++        Blood - - - negative        Nitrite Negative Negative - negative        Ketones Negative Negative - negative        Bilirubin - - - negative        Urobilinogen 0.2 4.0-6.0 (A) - normal        Protein - - - negative        Glucose - - - normal        Comment for Color at  3:41 PM on 2011:  If formed elements are not present in the microscopicexamination, they are NOT mentioned in the report.      Reason for Visit     Urinary Frequency           Diagnoses this Visit        Comments    Frequency of micturition                To Do List           Your Scheduled Appointments     2017  2:00 PM CST   Physical with Andres Hurtado MD   [facility] [facility] ([address]  [facility] [address] [phone]              Goals (5 Years of Data)     None           Medications                ** Verify the list of medication(s) below is accurate and up to date. Carry this with you in case of emergency. If your medications have changed, please notify your healthcare provider.             Medication List      TAKE these medications        Additional Info                      CALCIUM 500  "ORAL   Refills:  0    Instructions:  Take by mouth.     Begin Date    AM    Noon    PM    Bedtime       hydrOXYzine HCl 25 MG tablet   Commonly known as:  ATARAX   Quantity:  30 tablet   Refills:  2    Instructions:  TAKE ONE EVERY NIGHT AS NEEDED FOR ITCHING     Begin Date    AM    Noon    PM    Bedtime       hydrOXYzine pamoate 25 MG Cap   Commonly known as:  VISTARIL   Quantity:  30 capsule   Refills:  3   Dose:  25 mg    Instructions:  Take 1 capsule (25 mg total) by mouth 4 (four) times daily.     Begin Date    AM    Noon    PM    Bedtime       multivitamin capsule   Refills:  0   Dose:  1 capsule    Instructions:  Take 1 capsule by mouth once daily.     Begin Date    AM    Noon    PM    Bedtime       ranitidine 150 MG tablet   Commonly known as:  ZANTAC   Quantity:  30 tablet   Refills:  2   Dose:  150 mg    Instructions:  Take 1 tablet (150 mg total) by mouth daily as needed for Heartburn.     Begin Date    AM    Noon    PM    Bedtime               Your Vitals Were     BP Pulse Temp Resp Height Weight    136/86 88 98.2 °F (36.8 °C) (Oral) 18 5' 6" (1.676 m) 85.4 kg (188 lb 4.4 oz)    BMI                30.39 kg/m2          Allergies as of 1/9/2017     No Known Drug Allergies      Immunizations Administered on Date of Encounter - 1/9/2017     None      Orders Placed During Today's Visit      Normal Orders This Visit    Cystoscopy       Instructions    What to Expect After a Cystoscopy  For the next 24-48 hours, you may feel a mild burning when you urinate. This burning is normal and expected. Drink plenty of water to dilute the urine to help relieve the burning sensation. You may also see a small amount of blood in your urine after the procedure.    Unless you are already taking antibiotics, you may be given an antibiotic after the test to prevent infection.    Signs and Symptoms to Report  Call the [facility] at [phone] if you develop any of the following:  · Fever of 101 degrees or " higher  · Chills or persistent bleeding  · Inability to urinate       Advance Directives     An advance directive is a document which, in the event you are no longer able to make decisions for yourself, tells your healthcare team what kind of treatment you do or do not want to receive, or who you would like to make those decisions for you.  If you do not currently have an advance directive, [facility] encourages you to create one.  For more information call:  [phone]), [phone] ([phone]),  or log on to [email or web address].        [facility]     [facility] On Call Nurse Care Line - 24/7 Assistance  Registered nurses in the [facility] provide clinical advisement, health education, appointment booking, and other advisory services.  Call for this free service at [phone].        [facility] Sign-Up     Activating your [facility] account is as easy as 1-2-3!     1) Visit [email or web address], select Sign Up Now, enter this activation code and your date of birth, then select Next.  [email or web address]  Expires: 1/21/2017  9:51 AM      2) Create a username and password to use when you visit [facility] in the future and select a security question in case you lose your password and select Next.    3) Enter your e-mail address and click Sign Up!    Additional Information  If you have questions, please e-mail [email or web address] or call [phone] to talk to our [facility] staff. Remember, [facility] is NOT to be used for urgent needs. For medical emergencies, dial 911.         Language Assistance Services     ATTENTION: Language assistance services are available, free of charge. Please call [phone].      ATENCIÓN: Si habla español, tiene a sutton disposición servicios gratuitos de asistencia lingüística. Llame al [phone].     CHÚ Ý: N?u b?n nói Ti?ng Vi?t, có các d?ch v? h? tr? ngôn ng? mi?n phí dành cho b?n. G?i s? [phone].         [facility]  complies with applicable Federal civil rights laws and does not discriminate on the basis of race, color, national origin, age, disability, or sex.         Hypercholesterolemia Monitoring: I explained this is common when taking isotretinoin. We will monitor closely.

## 2020-02-28 NOTE — PLAN OF CARE
Problem: Adult Inpatient Plan of Care  Goal: Plan of Care Review  Outcome: Ongoing (interventions implemented as appropriate)  Pt in bed with no complaints voiced.  Skin warm and dry to touch.  Call light in reach, encouraged to call for assistance, safety measures remain in place.  nadn at this time.  Pain currently controlled         98.1

## 2020-03-23 ENCOUNTER — TELEPHONE (OUTPATIENT)
Dept: PLASTIC SURGERY | Facility: CLINIC | Age: 65
End: 2020-03-23

## 2020-05-01 ENCOUNTER — TELEPHONE (OUTPATIENT)
Dept: FAMILY MEDICINE | Facility: CLINIC | Age: 65
End: 2020-05-01

## 2020-05-01 RX ORDER — CEPHALEXIN 500 MG/1
500 CAPSULE ORAL 2 TIMES DAILY
Qty: 14 CAPSULE | Refills: 0 | Status: SHIPPED | OUTPATIENT
Start: 2020-05-01 | End: 2020-07-10 | Stop reason: CLARIF

## 2020-05-01 NOTE — TELEPHONE ENCOUNTER
----- Message from Bindu Taylor sent at 5/1/2020  1:14 PM CDT -----  Contact: Self [phone]  Patient would like to speak with you about needing medication for an UTI. Please advise

## 2020-05-01 NOTE — TELEPHONE ENCOUNTER
Had a positive urine culture in January.  Going but those sensitivities, will prescribe a course of Keflex twice a day for 7 days.  If no significant improvement, would advise formal testing

## 2020-05-01 NOTE — TELEPHONE ENCOUNTER
----- Message from Cristi Zapata sent at 5/1/2020  3:02 PM CDT -----  Contact: [phone]/self  Type:  Patient Returning Call    Who Called:PATIENT  Who Left Message for Patient:STEPH   Does the patient know what this is regarding?:YES  Would the patient rather a call back or a response via [facility]? CALLBACK   Best Call Back Number: [phone]  Additional Information: NONE

## 2020-06-08 ENCOUNTER — ANESTHESIA EVENT (OUTPATIENT)
Dept: SURGERY | Facility: HOSPITAL | Age: 65
End: 2020-06-08
Payer: COMMERCIAL

## 2020-06-08 ENCOUNTER — HOSPITAL ENCOUNTER (OUTPATIENT)
Dept: RADIOLOGY | Facility: OTHER | Age: 65
Discharge: HOME | End: 2020-06-08
Attending: INTERNAL MEDICINE
Payer: COMMERCIAL

## 2020-06-08 DIAGNOSIS — C50.811 MALIGNANT NEOPLASM OF OVERLAPPING SITES OF RIGHT BREAST IN FEMALE, ESTROGEN RECEPTOR POSITIVE: ICD-10-CM

## 2020-06-08 DIAGNOSIS — Z85.3 PERSONAL HISTORY OF BREAST CANCER: Primary | ICD-10-CM

## 2020-06-08 DIAGNOSIS — Z17.0 MALIGNANT NEOPLASM OF OVERLAPPING SITES OF RIGHT BREAST IN FEMALE, ESTROGEN RECEPTOR POSITIVE: ICD-10-CM

## 2020-06-08 PROCEDURE — 77065 MAMMO DIGITAL DIAGNOSTIC LEFT WITH TOMOSYNTHESIS_CAD: ICD-10-PCS | Mod: 26,LT,, | Performed by: RADIOLOGY

## 2020-06-08 PROCEDURE — 77061 MAMMO DIGITAL DIAGNOSTIC LEFT WITH TOMOSYNTHESIS_CAD: ICD-10-PCS | Mod: 26,LT,, | Performed by: RADIOLOGY

## 2020-06-08 PROCEDURE — 77065 DX MAMMO INCL CAD UNI: CPT | Mod: TC,LT

## 2020-06-08 PROCEDURE — 77065 DX MAMMO INCL CAD UNI: CPT | Mod: 26,LT,, | Performed by: RADIOLOGY

## 2020-06-08 PROCEDURE — 77061 BREAST TOMOSYNTHESIS UNI: CPT | Mod: 26,LT,, | Performed by: RADIOLOGY

## 2020-06-08 PROCEDURE — 77061 BREAST TOMOSYNTHESIS UNI: CPT | Mod: TC,LT

## 2020-06-18 DIAGNOSIS — Z01.818 PRE-OP TESTING: Primary | ICD-10-CM

## 2020-07-09 DIAGNOSIS — Z01.818 PRE-OP TESTING: Primary | ICD-10-CM

## 2020-07-10 ENCOUNTER — TELEPHONE (OUTPATIENT)
Dept: PREADMISSION TESTING | Facility: HOSPITAL | Age: 65
End: 2020-07-10

## 2020-07-10 NOTE — TELEPHONE ENCOUNTER
----- Message from Neyda Mora RN sent at 7/10/2020  2:00 PM CDT -----  Regarding: preop appt.  Sx date-7/20-Need-Lab-HGB(ordered), prior to the surgery date.. Thank you. PJ

## 2020-07-10 NOTE — ANESTHESIA PAT ROS NOTE
07/10/2020  Jaja Toledo is a 64 y.o., female.      Pre-op Assessment          Review of Systems  Anesthesia Hx:  No problems with previous Anesthesia  Denies Family Hx of Anesthesia complications.   Denies Personal Hx of Anesthesia complications.   Social:  Social Alcohol Use, Former Smoker    Hematology/Oncology:         -- Cancer in past history: Breast and Other (see Oncology comments) surgery and radiation  Oncology Comments: Cancer in foot-left-Surgery/& Radiation/Breast-Only surgery     EENT/Dental:   Top-Dental bridge/wears contacts   Cardiovascular:   Exercise tolerance: good Stationary bike-used 3x/wk   Pulmonary:   Snoring   Renal/:   UTI-x3 months ago-treated with antibx.   Hepatic/GI:   GERD    Musculoskeletal:   Chronic low back pain      Neyda Mora RN 7/10/20    Anesthesia Assessment: Preoperative EQUATION    Planned Procedure: Procedure(s) (LRB):  MASTOPEXY LEFT (Left)  BREAST REVISION SURGERY RIGHT (Right)  Requested Anesthesia Type:General  Surgeon: Leonard Cordero MD  Service: Plastics  Known or anticipated Date of Surgery:7/20/2020    Surgeon notes: reviewed and Personal history of breast cancer    Previous anesthesia records:6/4/20-Mastectomy,Simple Right Biopsy, Lymph Node, [place] Right Lymphadenectomy, Axillary Right Mastopexy Left Insertion, Tissue Expander, Breast Right Insertion, Breast Implant Right-General-tolerated procedure well and no apparent anesthetic complications  Anesthesia Hx:  No problems with previous Anesthesia  History of prior surgery of interest to airway management or planning: Denies Family Hx of Anesthesia complications.   Denies Personal Hx of Anesthesia complications.   Last PCP note: 6-12 months ago , within [facility] Focused-UTI-Dr. Andres Hurtado-1/14/20  Subspecialty notes: Hematology/Oncology, Urology, Breast Surgery Visit/OB &  GYN    Other important co-morbidities: GERD    Medical History    Diagnosis Date Comment Source   Anxiety   Provider   Chronic low back pain   Provider   Gastritis   Provider   GERD (gastroesophageal reflux disease)   Provider   History of cervical cancer   Provider   History of recurrent urinary tract infection   Provider   IFG (impaired fasting glucose)   Provider   Malignant neoplasm of overlapping sites of right breast in female, estrogen receptor positive 7/19/2019  Provider   Soft tissue tumor, malignant  sarcoma left foot        Tests already available:  Results have been reviewed. CXR & EKG-11/15/18  Plan:Phone pending      Testing:  Hemoglobin     Patient  has previously scheduled Medical Appointment:Appointments on 7/17/20, prior to the surgery date.    Navigation: Phone Completed                        Tests Scheduled. Lab-HGB on 7/17/20 @ 12:30p-pending             Consults scheduled.None needed at this time.             Results will be tracked by Preop Clinic.              Neyda Mora RN  7/10/20    Addendum:Lab-HGB-7/17/20-done & reviewed. Neyda Mora RN 7/17/20

## 2020-07-15 ENCOUNTER — TELEPHONE (OUTPATIENT)
Dept: PLASTIC SURGERY | Facility: CLINIC | Age: 65
End: 2020-07-15

## 2020-07-15 NOTE — TELEPHONE ENCOUNTER
Attempted to call pt to coordinate for document .  Pt was not available at the time of the call  I left a detailed VM asking pt to call PLS office at her convenience.     ----- Message from Leonard Gomez sent at 7/15/2020  9:59 AM CDT -----  Patient called to speak w/ someone regarding picking up her FMLA forms, requesting callback    Callback: [phone]

## 2020-07-17 ENCOUNTER — LAB VISIT (OUTPATIENT)
Dept: SURGERY | Facility: CLINIC | Age: 65
End: 2020-07-17
Payer: COMMERCIAL

## 2020-07-17 ENCOUNTER — LAB VISIT (OUTPATIENT)
Dept: LAB | Facility: HOSPITAL | Age: 65
End: 2020-07-17
Attending: ANESTHESIOLOGY
Payer: COMMERCIAL

## 2020-07-17 ENCOUNTER — TELEPHONE (OUTPATIENT)
Dept: PLASTIC SURGERY | Facility: CLINIC | Age: 65
End: 2020-07-17

## 2020-07-17 DIAGNOSIS — Z01.818 PREOP TESTING: ICD-10-CM

## 2020-07-17 DIAGNOSIS — Z01.818 PRE-OP TESTING: ICD-10-CM

## 2020-07-17 LAB — HGB BLD-MCNC: 12 G/DL (ref 12–16)

## 2020-07-17 PROCEDURE — 36415 COLL VENOUS BLD VENIPUNCTURE: CPT

## 2020-07-17 PROCEDURE — U0003 INFECTIOUS AGENT DETECTION BY NUCLEIC ACID (DNA OR RNA); SEVERE ACUTE RESPIRATORY SYNDROME CORONAVIRUS 2 (SARS-COV-2) (CORONAVIRUS DISEASE [COVID-19]), AMPLIFIED PROBE TECHNIQUE, MAKING USE OF HIGH THROUGHPUT TECHNOLOGIES AS DESCRIBED BY CMS-2020-01-R: HCPCS

## 2020-07-17 PROCEDURE — 85018 HEMOGLOBIN: CPT

## 2020-07-17 NOTE — TELEPHONE ENCOUNTER
Attempted to contact patient about arrival time for surgery Monday July 20. Left a Detailed voice message that included the location, time, and reminding them to stay NPO after midnight. Also gave her number to call the clinic back with further questions

## 2020-07-18 LAB — SARS-COV-2 RNA RESP QL NAA+PROBE: NOT DETECTED

## 2020-07-19 NOTE — ANESTHESIA PREPROCEDURE EVALUATION
[facility]  Anesthesia Pre-Operative Evaluation         Patient Name: Jaja Toledo  YOB: 1955  MRN: [id number]    SUBJECTIVE:     Pre-operative evaluation for Procedure(s) (LRB):  MASTOPEXY LEFT (Left)  BREAST REVISION SURGERY RIGHT (Right)     07/19/2020    Jaja Toledo is a 64 y.o. female w/ a significant PMHx of right breast cancer s/p masectomy, gerd,       Prev airway: Direct laryngoscopy; Inserted by: Anesthesia Resident; Airway Device: Endotracheal Tube; Mask Ventilation: Easy - oral; Intubated: Postinduction; Blade: Lyle #2; Airway Device Size: 7.5; Style: Cuffed; Cuff Inflation: Minimal occlusive pressure; Placement Verified By: Auscultation, Capnometry, ETT Condensation; Grade: Grade I; Complicating Factors: None; Intubation Findings: Positive EtCO2, Bilateral breath sounds, Atraumatic/Condition of teeth unchanged;  Depth of Insertion: 22; Securment: Lips; Complications: None; Breath Sounds: Equal           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      Current Outpatient Medications on File Prior to Encounter   Medication Sig Dispense Refill    calcium carbonate (CALCIUM 500 ORAL) Take by mouth once daily.       cyproheptadine (PERIACTIN) 4 mg tablet Take 1 tablet (4 mg total) by mouth 3 (three) times daily as needed (decreased appetite). 30 tablet 3    hydrOXYzine HCl (ATARAX) 25  MG tablet TAKE ONE EVERY NIGHT AS NEEDED FOR ITCHING 30 tablet 2    letrozole (FEMARA) 2.5 mg Tab Take 1 tablet (2.5 mg total) by mouth once daily. 90 tablet 3    multivitamin capsule Take 1 capsule by mouth once daily.           Past Surgical History:   Procedure Laterality Date    ABDOMINOPLASTY N/A 11/29/2018    Procedure: EXTENDED ABDOMINOPLASTY;  Surgeon: Mio Arriaga MD;  Location: [facility] OR;  Service: Plastics;  Laterality: N/A;    AXILLARY NODE DISSECTION Right 6/4/2019    Procedure: LYMPHADENECTOMY, AXILLARY RIGHT;  Surgeon: Sabine Arteaga MD;  Location: [facility] OR [address] [place] [address];  Service: General;  Laterality: Right;    BACK SURGERY      BREAST CYST EXCISION Left 1984    FOOT SURGERY  2009 or 2010    HYSTERECTOMY      INSERTION OF BREAST IMPLANT Right 6/4/2019    Procedure: INSERTION, BREAST IMPLANT RIGHT;  Surgeon: Leonard Cordero MD;  Location: [facility] OR [address] [place] [address];  Service: Plastics;  Laterality: Right;    INSERTION OF BREAST TISSUE EXPANDER Right 6/4/2019    Procedure: INSERTION, TISSUE EXPANDER, BREAST RIGHT;  Surgeon: Leonard Cordero MD;  [facility];  Service: Plastics;  Laterality: Right;    LIPOSUCTION W/ FAT INJECTION N/A 11/29/2018    Procedure: LIPOSUCTION, WITH FAT TRANSFER TO BUTTOCKS;  Surgeon: Mio Arriaga MD;  Location: [facility] OR;  Service: Plastics;  Laterality: N/A;    MASTOPEXY Left 6/4/2019    Procedure: MASTOPEXY LEFT;  Surgeon: Leonard Cordero MD;  [facility];  Service: Plastics;  Laterality: Left;    SENTINEL LYMPH NODE BIOPSY Right 6/4/2019    Procedure: BIOPSY, LYMPH NODE, SENTINEL RIGHT;  Surgeon: Sabine Arteaga MD;  [facility];  Service: General;  Laterality: Right;    SIMPLE MASTECTOMY Right 6/4/2019    Procedure: MASTECTOMY, SIMPLE RIGHT (CONSENT AM OF) 4.0 hr case;  Surgeon: Sabine Arteaga MD;  Location: [facility] OR [address] [place] [address];  Service: General;  Laterality: Right;    TOTAL REDUCTION MAMMOPLASTY Left 2019     TOTAL VAGINAL HYSTERECTOMY      TUBAL LIGATION         Social History     Socioeconomic History    Marital status:      Spouse name: Not on file    Number of children: Not on file    Years of education: Not on file    Highest education level: Not on file   Occupational History    Not on file   Social Needs    Financial resource strain: Not on file    Food insecurity     Worry: Not on file     Inability: Not on file    Transportation needs     Medical: Not on file     Non-medical: Not on file   Tobacco Use    Smoking status: Former Smoker     Packs/day: 0.25     Years: 15.00     Pack years: 3.75     Types: Cigarettes     Quit date: 8/15/1993     Years since quittin.9    Smokeless tobacco: Never Used   Substance and Sexual Activity    Alcohol use: Yes     Alcohol/week: 1.0 standard drinks     Types: 1 Glasses of wine per week     Comment: seldom    Drug use: No    Sexual activity: Yes     Partners: Male   Lifestyle    Physical activity     Days per week: Not on file     Minutes per session: Not on file    Stress: Not on file   Relationships    Social connections     Talks on phone: Not on file     Gets together: Not on file     Attends Buddhism service: Not on file     Active member of club or organization: Not on file     Attends meetings of clubs or organizations: Not on file     Relationship status: Not on file   Other Topics Concern    Not on file   Social History Narrative    Not on file       OBJECTIVE:     Vital Signs Range (Last 24H):         Significant Labs:  Lab Results   Component Value Date    WBC 5.37 2019    HGB 12.0 2020    HCT 41.2 2019     2019    CHOL 225 (H) 06/15/2018    TRIG 189 (H) 06/15/2018    HDL 38 (L) 06/15/2018    ALT 24 2019    AST 33 2019     2019    K 3.6 2019     2019    CREATININE 0.9 2019    BUN 10 2019    CO2 25 2019    TSH 0.448 06/15/2018    INR 1.0  09/29/2008    HGBA1C 5.7 (H) 11/15/2018       Diagnostic Studies: No relevant studies.    EKG:   No results found for this or any previous visit.    2D ECHO:  TTE:  No results found for this or any previous visit.    SUSIE:  No results found for this or any previous visit.    ASSESSMENT/PLAN:         Anesthesia Evaluation    I have reviewed the Patient Summary Reports.    I have reviewed the Nursing Notes. I have reviewed the NPO Status.   I have reviewed the Medications.     Review of Systems  Anesthesia Hx:  No problems with previous Anesthesia  History of prior surgery of interest to airway management or planning: Denies Family Hx of Anesthesia complications.   Denies Personal Hx of Anesthesia complications.   Social:  Social Alcohol Use, Former Smoker    Hematology/Oncology:         -- Cancer in past history: Breast and Other (see Oncology comments) surgery and radiation  Oncology Comments: Cancer in foot-left-Surgery/& Radiation/Breast-Only surgery     EENT/Dental:   Top-Dental bridge/wears contacts   Cardiovascular:  Cardiovascular Normal Exercise tolerance: good  Stationary bike-used 3x/wk   Pulmonary:  Pulmonary Normal Snoring   Renal/:   UTI-x3 months ago-treated with antibx.   Hepatic/GI:   GERD    Musculoskeletal:   Chronic low back pain   Endocrine:  Endocrine Normal        Physical Exam  General:  Well nourished    Airway/Jaw/Neck:  Airway Findings: Mouth Opening: Normal Tongue: Large  General Airway Assessment: Adult, Good  Mallampati: III  Improves to II with phonation.  TM Distance: Normal, at least 6 cm  Jaw/Neck Findings:     Neck ROM: Normal ROM      Dental:  Dental Findings: In tact, Lower retainer, Upper front caps   Chest/Lungs:  Chest/Lungs Clear    Heart/Vascular:  Heart Findings: Rate: Normal  Rhythm: Regular Rhythm  Sounds: Normal     Abdomen:  Abdomen Findings: Normal      Mental Status:  Mental Status Findings:  Cooperative, Alert and Oriented         Anesthesia Plan  Type of Anesthesia,  risks & benefits discussed:  Anesthesia Type:  general  Patient's Preference:   Intra-op Monitoring Plan: standard ASA monitors  Intra-op Monitoring Plan Comments:   Post Op Pain Control Plan: multimodal analgesia, IV/PO Opioids PRN and per primary service following discharge from PACU  Post Op Pain Control Plan Comments:   Induction:   IV  Beta Blocker:  Patient is not currently on a Beta-Blocker (No further documentation required).       Informed Consent: Patient understands risks and agrees with Anesthesia plan.  Questions answered. Anesthesia consent signed with patient.  ASA Score: 2     Day of Surgery Review of History & Physical: I have interviewed and examined the patient. I have reviewed the patient's H&P dated:            Ready For Surgery From Anesthesia Perspective.

## 2020-07-20 ENCOUNTER — HOSPITAL ENCOUNTER (OUTPATIENT)
Facility: HOSPITAL | Age: 65
Discharge: HOME | End: 2020-07-20
Attending: SURGERY | Admitting: SURGERY
Payer: COMMERCIAL

## 2020-07-20 ENCOUNTER — ANESTHESIA (OUTPATIENT)
Dept: SURGERY | Facility: HOSPITAL | Age: 65
End: 2020-07-20
Payer: COMMERCIAL

## 2020-07-20 VITALS
RESPIRATION RATE: 16 BRPM | WEIGHT: 175 LBS | OXYGEN SATURATION: 97 % | BODY MASS INDEX: 28.12 KG/M2 | SYSTOLIC BLOOD PRESSURE: 138 MMHG | HEART RATE: 107 BPM | TEMPERATURE: 98 F | HEIGHT: 66 IN | DIASTOLIC BLOOD PRESSURE: 83 MMHG

## 2020-07-20 DIAGNOSIS — Z85.3 PERSONAL HISTORY OF BREAST CANCER: ICD-10-CM

## 2020-07-20 PROCEDURE — 36000707: Performed by: SURGERY

## 2020-07-20 PROCEDURE — 25000003 PHARM REV CODE 250: Performed by: STUDENT IN AN ORGANIZED HEALTH CARE EDUCATION/TRAINING PROGRAM

## 2020-07-20 PROCEDURE — D9220A PRA ANESTHESIA: Mod: ,,, | Performed by: ANESTHESIOLOGY

## 2020-07-20 PROCEDURE — 88305 TISSUE EXAM BY PATHOLOGIST: CPT | Mod: 26,,, | Performed by: PATHOLOGY

## 2020-07-20 PROCEDURE — 19380 REVJ RECONSTRUCTED BREAST: CPT | Mod: LT,,, | Performed by: SURGERY

## 2020-07-20 PROCEDURE — 25000003 PHARM REV CODE 250: Performed by: SURGERY

## 2020-07-20 PROCEDURE — 36000706: Performed by: SURGERY

## 2020-07-20 PROCEDURE — 37000008 HC ANESTHESIA 1ST 15 MINUTES: Performed by: SURGERY

## 2020-07-20 PROCEDURE — 63600175 PHARM REV CODE 636 W HCPCS: Performed by: STUDENT IN AN ORGANIZED HEALTH CARE EDUCATION/TRAINING PROGRAM

## 2020-07-20 PROCEDURE — D9220A PRA ANESTHESIA: ICD-10-PCS | Mod: ,,, | Performed by: ANESTHESIOLOGY

## 2020-07-20 PROCEDURE — 37000009 HC ANESTHESIA EA ADD 15 MINS: Performed by: SURGERY

## 2020-07-20 PROCEDURE — C1729 CATH, DRAINAGE: HCPCS | Performed by: SURGERY

## 2020-07-20 PROCEDURE — 19380 PR REVISE BREAST RECONSTRUCTION: ICD-10-PCS | Mod: LT,,, | Performed by: SURGERY

## 2020-07-20 PROCEDURE — 71000015 HC POSTOP RECOV 1ST HR: Performed by: SURGERY

## 2020-07-20 PROCEDURE — 71000044 HC DOSC ROUTINE RECOVERY FIRST HOUR: Performed by: SURGERY

## 2020-07-20 PROCEDURE — 88305 TISSUE EXAM BY PATHOLOGIST: ICD-10-PCS | Mod: 26,,, | Performed by: PATHOLOGY

## 2020-07-20 PROCEDURE — 88305 TISSUE EXAM BY PATHOLOGIST: CPT | Performed by: PATHOLOGY

## 2020-07-20 PROCEDURE — 71000016 HC POSTOP RECOV ADDL HR: Performed by: SURGERY

## 2020-07-20 RX ORDER — HYDROMORPHONE HYDROCHLORIDE 1 MG/ML
0.4 INJECTION, SOLUTION INTRAMUSCULAR; INTRAVENOUS; SUBCUTANEOUS EVERY 5 MIN PRN
Status: DISCONTINUED | OUTPATIENT
Start: 2020-07-20 | End: 2020-07-20 | Stop reason: HOSPADM

## 2020-07-20 RX ORDER — CLINDAMYCIN PHOSPHATE 900 MG/50ML
900 INJECTION, SOLUTION INTRAVENOUS
Status: DISCONTINUED | OUTPATIENT
Start: 2020-07-20 | End: 2020-07-20

## 2020-07-20 RX ORDER — SODIUM CHLORIDE 0.9 % (FLUSH) 0.9 %
10 SYRINGE (ML) INJECTION
Status: DISCONTINUED | OUTPATIENT
Start: 2020-07-20 | End: 2020-07-20 | Stop reason: HOSPADM

## 2020-07-20 RX ORDER — CLINDAMYCIN HYDROCHLORIDE 300 MG/1
300 CAPSULE ORAL EVERY 8 HOURS
Qty: 30 CAPSULE | Refills: 0 | Status: SHIPPED | OUTPATIENT
Start: 2020-07-20 | End: 2020-07-30

## 2020-07-20 RX ORDER — ONDANSETRON 2 MG/ML
4 INJECTION INTRAMUSCULAR; INTRAVENOUS EVERY 12 HOURS PRN
Status: DISCONTINUED | OUTPATIENT
Start: 2020-07-20 | End: 2020-07-20 | Stop reason: HOSPADM

## 2020-07-20 RX ORDER — BACITRACIN 50000 [IU]/1
INJECTION, POWDER, FOR SOLUTION INTRAMUSCULAR
Status: DISCONTINUED | OUTPATIENT
Start: 2020-07-20 | End: 2020-07-20 | Stop reason: HOSPADM

## 2020-07-20 RX ORDER — LIDOCAINE HCL/PF 100 MG/5ML
SYRINGE (ML) INTRAVENOUS
Status: DISCONTINUED | OUTPATIENT
Start: 2020-07-20 | End: 2020-07-20

## 2020-07-20 RX ORDER — FENTANYL CITRATE 50 UG/ML
25 INJECTION, SOLUTION INTRAMUSCULAR; INTRAVENOUS EVERY 5 MIN PRN
Status: DISCONTINUED | OUTPATIENT
Start: 2020-07-20 | End: 2020-07-20 | Stop reason: HOSPADM

## 2020-07-20 RX ORDER — MIDAZOLAM HYDROCHLORIDE 1 MG/ML
INJECTION, SOLUTION INTRAMUSCULAR; INTRAVENOUS
Status: DISCONTINUED | OUTPATIENT
Start: 2020-07-20 | End: 2020-07-20

## 2020-07-20 RX ORDER — HYDROCODONE BITARTRATE AND ACETAMINOPHEN 5; 325 MG/1; MG/1
1 TABLET ORAL EVERY 4 HOURS PRN
Status: DISCONTINUED | OUTPATIENT
Start: 2020-07-20 | End: 2020-07-20 | Stop reason: HOSPADM

## 2020-07-20 RX ORDER — DEXAMETHASONE SODIUM PHOSPHATE 4 MG/ML
INJECTION, SOLUTION INTRA-ARTICULAR; INTRALESIONAL; INTRAMUSCULAR; INTRAVENOUS; SOFT TISSUE
Status: DISCONTINUED | OUTPATIENT
Start: 2020-07-20 | End: 2020-07-20

## 2020-07-20 RX ORDER — ACETAMINOPHEN 10 MG/ML
INJECTION, SOLUTION INTRAVENOUS
Status: DISCONTINUED | OUTPATIENT
Start: 2020-07-20 | End: 2020-07-20

## 2020-07-20 RX ORDER — PROPOFOL 10 MG/ML
VIAL (ML) INTRAVENOUS
Status: DISCONTINUED | OUTPATIENT
Start: 2020-07-20 | End: 2020-07-20

## 2020-07-20 RX ORDER — PHENYLEPHRINE HYDROCHLORIDE 10 MG/ML
INJECTION INTRAVENOUS
Status: DISCONTINUED | OUTPATIENT
Start: 2020-07-20 | End: 2020-07-20

## 2020-07-20 RX ORDER — MUPIROCIN 20 MG/G
OINTMENT TOPICAL
Status: DISCONTINUED | OUTPATIENT
Start: 2020-07-20 | End: 2020-07-20 | Stop reason: HOSPADM

## 2020-07-20 RX ORDER — MUPIROCIN 20 MG/G
OINTMENT TOPICAL 2 TIMES DAILY
Status: DISCONTINUED | OUTPATIENT
Start: 2020-07-20 | End: 2020-07-20 | Stop reason: HOSPADM

## 2020-07-20 RX ORDER — FENTANYL CITRATE 50 UG/ML
INJECTION, SOLUTION INTRAMUSCULAR; INTRAVENOUS
Status: DISCONTINUED | OUTPATIENT
Start: 2020-07-20 | End: 2020-07-20

## 2020-07-20 RX ORDER — OXYCODONE AND ACETAMINOPHEN 5; 325 MG/1; MG/1
1 TABLET ORAL EVERY 4 HOURS PRN
Qty: 42 TABLET | Refills: 0 | Status: SHIPPED | OUTPATIENT
Start: 2020-07-20 | End: 2020-07-27

## 2020-07-20 RX ORDER — SODIUM CHLORIDE 9 MG/ML
INJECTION, SOLUTION INTRAVENOUS CONTINUOUS PRN
Status: DISCONTINUED | OUTPATIENT
Start: 2020-07-20 | End: 2020-07-20

## 2020-07-20 RX ORDER — LIDOCAINE HYDROCHLORIDE 10 MG/ML
1 INJECTION, SOLUTION EPIDURAL; INFILTRATION; INTRACAUDAL; PERINEURAL ONCE
Status: DISCONTINUED | OUTPATIENT
Start: 2020-07-20 | End: 2020-07-20 | Stop reason: HOSPADM

## 2020-07-20 RX ORDER — IPRATROPIUM BROMIDE AND ALBUTEROL SULFATE 2.5; .5 MG/3ML; MG/3ML
3 SOLUTION RESPIRATORY (INHALATION) ONCE
Status: DISCONTINUED | OUTPATIENT
Start: 2020-07-20 | End: 2020-07-20 | Stop reason: HOSPADM

## 2020-07-20 RX ORDER — CEFAZOLIN SODIUM 1 G/3ML
2 INJECTION, POWDER, FOR SOLUTION INTRAMUSCULAR; INTRAVENOUS ONCE
Status: DISCONTINUED | OUTPATIENT
Start: 2020-07-20 | End: 2020-07-20 | Stop reason: HOSPADM

## 2020-07-20 RX ORDER — ONDANSETRON 2 MG/ML
INJECTION INTRAMUSCULAR; INTRAVENOUS
Status: DISCONTINUED | OUTPATIENT
Start: 2020-07-20 | End: 2020-07-20

## 2020-07-20 RX ORDER — ROCURONIUM BROMIDE 10 MG/ML
INJECTION, SOLUTION INTRAVENOUS
Status: DISCONTINUED | OUTPATIENT
Start: 2020-07-20 | End: 2020-07-20

## 2020-07-20 RX ORDER — MIDAZOLAM HYDROCHLORIDE 1 MG/ML
0.5 INJECTION INTRAMUSCULAR; INTRAVENOUS
Status: DISCONTINUED | OUTPATIENT
Start: 2020-07-20 | End: 2020-07-20 | Stop reason: HOSPADM

## 2020-07-20 RX ORDER — CEFAZOLIN SODIUM 1 G/3ML
2 INJECTION, POWDER, FOR SOLUTION INTRAMUSCULAR; INTRAVENOUS
Status: DISCONTINUED | OUTPATIENT
Start: 2020-07-20 | End: 2020-07-20

## 2020-07-20 RX ORDER — BACITRACIN ZINC 500 UNIT/G
OINTMENT (GRAM) TOPICAL
Status: DISCONTINUED | OUTPATIENT
Start: 2020-07-20 | End: 2020-07-20 | Stop reason: HOSPADM

## 2020-07-20 RX ORDER — KETAMINE HCL IN 0.9 % NACL 50 MG/5 ML
SYRINGE (ML) INTRAVENOUS
Status: DISCONTINUED | OUTPATIENT
Start: 2020-07-20 | End: 2020-07-20

## 2020-07-20 RX ADMIN — DEXAMETHASONE SODIUM PHOSPHATE 4 MG: 4 INJECTION, SOLUTION INTRAMUSCULAR; INTRAVENOUS at 08:07

## 2020-07-20 RX ADMIN — MIDAZOLAM HYDROCHLORIDE 2 MG: 1 INJECTION, SOLUTION INTRAMUSCULAR; INTRAVENOUS at 07:07

## 2020-07-20 RX ADMIN — ROCURONIUM BROMIDE 10 MG: 10 INJECTION, SOLUTION INTRAVENOUS at 08:07

## 2020-07-20 RX ADMIN — SUGAMMADEX 200 MG: 100 INJECTION, SOLUTION INTRAVENOUS at 09:07

## 2020-07-20 RX ADMIN — ROCURONIUM BROMIDE 40 MG: 10 INJECTION, SOLUTION INTRAVENOUS at 07:07

## 2020-07-20 RX ADMIN — SODIUM CHLORIDE, SODIUM GLUCONATE, SODIUM ACETATE, POTASSIUM CHLORIDE, MAGNESIUM CHLORIDE, SODIUM PHOSPHATE, DIBASIC, AND POTASSIUM PHOSPHATE: .53; .5; .37; .037; .03; .012; .00082 INJECTION, SOLUTION INTRAVENOUS at 08:07

## 2020-07-20 RX ADMIN — ONDANSETRON 4 MG: 2 INJECTION, SOLUTION INTRAMUSCULAR; INTRAVENOUS at 09:07

## 2020-07-20 RX ADMIN — LIDOCAINE HYDROCHLORIDE 70 MG: 20 INJECTION, SOLUTION INTRAVENOUS at 07:07

## 2020-07-20 RX ADMIN — SODIUM CHLORIDE, SODIUM GLUCONATE, SODIUM ACETATE, POTASSIUM CHLORIDE, MAGNESIUM CHLORIDE, SODIUM PHOSPHATE, DIBASIC, AND POTASSIUM PHOSPHATE: .53; .5; .37; .037; .03; .012; .00082 INJECTION, SOLUTION INTRAVENOUS at 09:07

## 2020-07-20 RX ADMIN — FENTANYL CITRATE 100 MCG: 50 INJECTION, SOLUTION INTRAMUSCULAR; INTRAVENOUS at 07:07

## 2020-07-20 RX ADMIN — SODIUM CHLORIDE: 0.9 INJECTION, SOLUTION INTRAVENOUS at 07:07

## 2020-07-20 RX ADMIN — PHENYLEPHRINE HYDROCHLORIDE 200 MCG: 10 INJECTION INTRAVENOUS at 08:07

## 2020-07-20 RX ADMIN — MUPIROCIN: 20 OINTMENT TOPICAL at 07:07

## 2020-07-20 RX ADMIN — ACETAMINOPHEN 1000 MG: 10 INJECTION, SOLUTION INTRAVENOUS at 09:07

## 2020-07-20 RX ADMIN — Medication 20 MG: at 07:07

## 2020-07-20 RX ADMIN — PROPOFOL 190 MG: 10 INJECTION, EMULSION INTRAVENOUS at 07:07

## 2020-07-20 NOTE — TRANSFER OF CARE
"Anesthesia Transfer of Care Note    Patient: Jaja Toledo    Procedure(s) Performed: Procedure(s) (LRB):  MASTOPEXY LEFT (Left)  BREAST REVISION SURGERY RIGHT (Right)    Patient location: PACU    Anesthesia Type: general    Transport from OR: Transported from OR on 6-10 L/min O2 by face mask with adequate spontaneous ventilation    Post pain: adequate analgesia    Post assessment: no apparent anesthetic complications    Post vital signs: stable    Level of consciousness: awake    Nausea/Vomiting: no nausea/vomiting    Complications: none          Last vitals:   Visit Vitals  /77 (BP Location: Right arm, Patient Position: Lying)   Pulse (!) 111   Temp 37 °C (98.6 °F) (Oral)   Resp 15   Ht 5' 6" (1.676 m)   Wt 79.4 kg (175 lb)   SpO2 96%   Breastfeeding No   BMI 28.25 kg/m²     "

## 2020-07-20 NOTE — ANESTHESIA PROCEDURE NOTES
Intubation  Performed by: Bhavin Cesar MD  Authorized by: Blanquita Helton MD     Intubation:     Induction:  Intravenous    Intubated:  Postinduction    Mask Ventilation:  Easy with oral airway    Attempts:  1    Attempted By:  Resident anesthesiologist    Method of Intubation:  Direct    Blade:  Lisa 3    Laryngeal View Grade: Grade I - full view of chords      Difficult Airway Encountered?: No      Complications:  None    Airway Device:  Oral endotracheal tube    Airway Device Size:  7.0    Style/Cuff Inflation:  Cuffed    Inflation Amount (mL):  7    Tube secured:  22    Secured at:  The lips    Placement Verified By:  Capnometry    Complicating Factors:  None    Findings Post-Intubation:  BS equal bilateral

## 2020-07-20 NOTE — BRIEF OP NOTE
[facility]  Brief Operative Note    Surgery Date: 7/20/2020     Surgeon(s) and Role:     * Leonard Cordero MD - Primary     * Jurgen Bowden MD - Fellow    Assisting Surgeon: None    Pre-op Diagnosis:  Personal history of breast cancer [Z85.3]    Post-op Diagnosis:  Post-Op Diagnosis Codes:     * Personal history of breast cancer [Z85.3]    Procedure(s) (LRB):  MASTOPEXY LEFT (Left)  BREAST REVISION SURGERY RIGHT (Right)    Anesthesia: General    Description of the findings of the procedure(s): See op note for details    Estimated Blood Loss: * No values recorded between 7/20/2020  8:05 AM and 7/20/2020  9:43 AM *         Specimens:   Specimen (12h ago, onward)    None            Discharge Note    OUTCOME: Patient tolerated treatment/procedure well without complication and is now ready for discharge.    DISPOSITION: Home or Self Care    FINAL DIAGNOSIS:  Personal history of breast cancer    FOLLOWUP: In clinic    DISCHARGE INSTRUCTIONS:    Discharge Procedure Orders   Diet general     Call MD for:  temperature >100.4     Call MD for:  persistent nausea and vomiting     Call MD for:  severe uncontrolled pain     Call MD for:  difficulty breathing, headache or visual disturbances     Call MD for:  redness, tenderness, or signs of infection (pain, swelling, redness, odor or green/yellow discharge around incision site)     Call MD for:  hives     Call MD for:  persistent dizziness or light-headedness     Call MD for:  extreme fatigue     Wound care routine (specify)   Order Comments: Wound care routine:  - bacitracin to nipple 3 times daily  - wear bra at all times other than when showering  - shower in 48 hours  - f/u in clinic in 1 week

## 2020-07-20 NOTE — H&P
History & Physical    SUBJECTIVE:     History of Present Illness:  Patient is a 64 y.o. female w/ a significant PMHx of right breast cancer s/p masectomy, gerd, presents today for right breast mastopexy, left breast revision surgery, and any other indicated procedures.    No interval changes in medical history.    No chief complaint on file.      Review of patient's allergies indicates:   Allergen Reactions    No known drug allergies        No current facility-administered medications for this encounter.        Past Medical History:   Diagnosis Date    Anxiety     Chronic low back pain     Gastritis     GERD (gastroesophageal reflux disease)     History of cervical cancer     History of recurrent urinary tract infection     IFG (impaired fasting glucose)     Malignant neoplasm of overlapping sites of right breast in female, estrogen receptor positive 7/19/2019    Soft tissue tumor, malignant     sarcoma left foot     Past Surgical History:   Procedure Laterality Date    ABDOMINOPLASTY N/A 11/29/2018    Procedure: EXTENDED ABDOMINOPLASTY;  Surgeon: Mio Arriaga MD;  Location: [facility] OR;  Service: Plastics;  Laterality: N/A;    AXILLARY NODE DISSECTION Right 6/4/2019    Procedure: LYMPHADENECTOMY, AXILLARY RIGHT;  Surgeon: Sabine Arteaga MD;  Location: [facility] OR [address] [place] [address];  Service: General;  Laterality: Right;    BACK SURGERY      BREAST CYST EXCISION Left 1984    FOOT SURGERY  2009 or 2010    HYSTERECTOMY      INSERTION OF BREAST IMPLANT Right 6/4/2019    Procedure: INSERTION, BREAST IMPLANT RIGHT;  Surgeon: Leonard Cordero MD;  Location: [facility] OR [address] [place] [address];  Service: Plastics;  Laterality: Right;    INSERTION OF BREAST TISSUE EXPANDER Right 6/4/2019    Procedure: INSERTION, TISSUE EXPANDER, BREAST RIGHT;  Surgeon: Leonard Cordero MD;  Location: [facility] OR [address] [place] [address];  Service: Plastics;  Laterality: Right;    LIPOSUCTION W/ FAT INJECTION N/A 11/29/2018    Procedure: LIPOSUCTION, WITH FAT  "TRANSFER TO BUTTOCKS;  Surgeon: Mio Arriaga MD;  Location: [facility] OR;  Service: Plastics;  Laterality: N/A;    MASTOPEXY Left 2019    Procedure: MASTOPEXY LEFT;  Surgeon: Leonard Cordero MD;  Location: [facility] OR [address] [place] [address];  Service: Plastics;  Laterality: Left;    SENTINEL LYMPH NODE BIOPSY Right 2019    Procedure: BIOPSY, LYMPH NODE, SENTINEL RIGHT;  Surgeon: Sabine Arteaga MD;  [facility];  Service: General;  Laterality: Right;    SIMPLE MASTECTOMY Right 2019    Procedure: MASTECTOMY, SIMPLE RIGHT (CONSENT AM OF) 4.0 hr case;  Surgeon: Sabine Arteaga MD;  Location: [facility] OR [address] [place] [address];  Service: General;  Laterality: Right;    TOTAL REDUCTION MAMMOPLASTY Left     TOTAL VAGINAL HYSTERECTOMY      TUBAL LIGATION       Family History   Problem Relation Age of Onset    Breast cancer Sister         dx in     No Known Problems Mother     Throat cancer Father     Colon cancer Neg Hx     Diabetes Neg Hx     Hypertension Neg Hx     Ovarian cancer Neg Hx     Stroke Neg Hx     Anesthesia problems Neg Hx      Social History     Tobacco Use    Smoking status: Former Smoker     Packs/day: 0.25     Years: 15.00     Pack years: 3.75     Types: Cigarettes     Quit date: 8/15/1993     Years since quittin.9    Smokeless tobacco: Never Used   Substance Use Topics    Alcohol use: Yes     Alcohol/week: 1.0 standard drinks     Types: 1 Glasses of wine per week     Comment: seldom    Drug use: No        Review of Systems:  Review of Systems   All other systems reviewed and are negative.      OBJECTIVE:     Vital Signs (Most Recent)     5' 6.5" (1.689 m)  80.7 kg (178 lb)     Physical Exam:  Physical Exam  Vitals signs reviewed.   Constitutional:       General: She is not in acute distress.     Appearance: She is normal weight. She is not toxic-appearing.   HENT:      Head: Normocephalic and atraumatic.      Right Ear: External ear normal.      Left Ear: External ear " normal.      Nose: Nose normal.      Mouth/Throat:      Mouth: Mucous membranes are moist.      Pharynx: Oropharynx is clear.   Eyes:      General: No scleral icterus.     Pupils: Pupils are equal, round, and reactive to light.   Neck:      Musculoskeletal: Normal range of motion.   Cardiovascular:      Rate and Rhythm: Normal rate.      Pulses: Normal pulses.   Pulmonary:      Effort: Pulmonary effort is normal. No respiratory distress.   Abdominal:      General: Abdomen is flat. Bowel sounds are normal.   Musculoskeletal: Normal range of motion.   Skin:     General: Skin is warm and dry.   Neurological:      General: No focal deficit present.      Mental Status: She is alert. Mental status is at baseline.   Psychiatric:         Mood and Affect: Mood normal.         Behavior: Behavior normal.         ASSESSMENT/PLAN:     Jaja Toledo is a 63 y/o female with a PMH of breast cancer s/p mastectomy presenting today for right breast mastopexy and left breast revision surgery.  -to OR today for previously described procedures and all other indicated procedures

## 2020-07-21 NOTE — ANESTHESIA POSTPROCEDURE EVALUATION
Anesthesia Post Evaluation    Patient: Jaja Toledo    Procedure(s) Performed: Procedure(s) (LRB):  MASTOPEXY LEFT (Left)  BREAST REVISION SURGERY RIGHT (Right)    Final Anesthesia Type: general    Patient location during evaluation: PACU  Patient participation: Yes- Able to Participate  Level of consciousness: awake and alert  Post-procedure vital signs: reviewed and stable  Pain management: adequate  Airway patency: patent    PONV status at discharge: No PONV  Anesthetic complications: no      Cardiovascular status: blood pressure returned to baseline  Respiratory status: unassisted  Hydration status: euvolemic  Follow-up not needed.          Vitals Value Taken Time   /83 07/20/20 1216   Temp 36.8 °C (98.3 °F) 07/20/20 0945   Pulse 107 07/20/20 1230   Resp 16 07/20/20 1230   SpO2 97 % 07/20/20 1230         No case tracking events are documented in the log.      Pain/Cathy Score: Cathy Score: 9 (7/20/2020 10:15 AM)

## 2020-07-21 NOTE — OP NOTE
Date of surgery 07/20/2020  Preoperative diagnosis history of breast cancer  Postoperative diagnosis is the same  Procedure performed  1.  Left breast lateral capsulorrhaphy  2.  Left breast medial extensive capsulotomy  3.  Creation of dermal flaps measuring approximately 8 cm x 2 cm  Surgeon Consuelo  Anesthesia general  Complications none  Drains none  Blood loss approximately 100 cc    The patient was evaluated in the preoperative holding area.  Again I discussed with her that due to the fact that she has had radiation on the right side and has elected against my advice to have implant based reconstruction. We will try to get her somewhat symmetrical with the opposite side.  I discussed with her that this will take multiple surgeries.  She understands that fully.  The risks and possible complications of the procedure including but not limited to infection, bleeding, scarring, loss of the implant and total reconstruction, open wounds breast asymmetry breast deformity need for further surgery were all explained to the patient.  Patient signed informed consent.    The patient was taken the operative room placed in supine position after adequate general endotracheal anesthesia was prepped and draped in a normal sterile fashion.  The patient had a medial deficit that was large.  She had skin attached to the pectoralis major muscle.  I thought that the best approach would be a 2 tier to approach.  Would be to de epithelialize a large portion of skin medially and fold flap over the stiff thickened up in that medial region.  This in combination with a lateral capsulorrhaphy an open capsulotomy medially.    The inframammary incision was made extending medially.  The upper incision along that deep crevice was then made in this segment was then de-epithelialized.  Next, the implant and capsule were exposed an extensive medial capsulotomy was performed freeing up the total implant.  Next, the implant was taken out placed in  antibiotic solution.  A lighted retractor was then used to perform a lateral ports capsulorrhaphy.  Next, an extensive capsulotomy was then performed anteriorly and superiorly.  The implant was then replaced.  Hemostasis had been achieved using the Bovie.  The breast flap was then closed over the de-epithelialized dermal flap closed using 2-0 Vicryl 3-0 Monocryl running 4-0 Monocryl subcuticular suture.  There were no complications end dictation

## 2020-07-22 LAB
FINAL PATHOLOGIC DIAGNOSIS: NORMAL
GROSS: NORMAL

## 2020-07-29 ENCOUNTER — OFFICE VISIT (OUTPATIENT)
Dept: PLASTIC SURGERY | Facility: CLINIC | Age: 65
End: 2020-07-29
Payer: COMMERCIAL

## 2020-07-29 VITALS
BODY MASS INDEX: 29.16 KG/M2 | HEIGHT: 66 IN | DIASTOLIC BLOOD PRESSURE: 77 MMHG | SYSTOLIC BLOOD PRESSURE: 137 MMHG | WEIGHT: 181.44 LBS | HEART RATE: 107 BPM

## 2020-07-29 DIAGNOSIS — Z09 SURGERY FOLLOW-UP EXAMINATION: Primary | ICD-10-CM

## 2020-07-29 PROCEDURE — 99024 PR POST-OP FOLLOW-UP VISIT: ICD-10-PCS | Mod: S$GLB,,, | Performed by: SURGERY

## 2020-07-29 PROCEDURE — 99024 POSTOP FOLLOW-UP VISIT: CPT | Mod: S$GLB,,, | Performed by: SURGERY

## 2020-07-29 PROCEDURE — 99999 PR PBB SHADOW E&M-EST. PATIENT-LVL III: CPT | Mod: PBBFAC,,, | Performed by: SURGERY

## 2020-07-29 PROCEDURE — 99999 PR PBB SHADOW E&M-EST. PATIENT-LVL III: ICD-10-PCS | Mod: PBBFAC,,, | Performed by: SURGERY

## 2020-07-29 NOTE — PROGRESS NOTES
Subjective:      Jaja Toledo is a 64 y.o. year old female who presents to the Plastic Surgery Clinic on 07/29/2020 for follow up visit.   Pt had right breast DTI after mastectomy and left breast mastopexy in 6/2019. On 7/20/20, pt underwent left breast lateral capsulorrhaphy, left breast capsulotomy, and creation of dermal flaps.  Denies fever, chills, nausea, vomiting, or other systemic signs of infection.    Vitals:    07/29/20 1008   BP: 137/77   Pulse: 107        Review of patient's allergies indicates:   Allergen Reactions    No known drug allergies        Current Outpatient Medications on File Prior to Visit   Medication Sig Dispense Refill    calcium carbonate (CALCIUM 500 ORAL) Take by mouth once daily.       clindamycin (CLEOCIN) 300 MG capsule Take 1 capsule (300 mg total) by mouth every 8 (eight) hours. for 10 days 30 capsule 0    cyproheptadine (PERIACTIN) 4 mg tablet Take 1 tablet (4 mg total) by mouth 3 (three) times daily as needed (decreased appetite). 30 tablet 3    hydrOXYzine HCl (ATARAX) 25 MG tablet TAKE ONE EVERY NIGHT AS NEEDED FOR ITCHING 30 tablet 2    letrozole (FEMARA) 2.5 mg Tab Take 1 tablet (2.5 mg total) by mouth once daily. 90 tablet 3    multivitamin capsule Take 1 capsule by mouth once daily.         No current facility-administered medications on file prior to visit.        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, including hip    IFG (impaired fasting glucose)    Localized adiposity of abdomen    Breast mass    Malignant neoplasm of overlapping sites of right breast in female, estrogen receptor positive    Personal history of breast cancer       Past Surgical History:   Procedure Laterality Date    ABDOMINOPLASTY N/A 11/29/2018    Procedure: EXTENDED ABDOMINOPLASTY;  Surgeon: Mio Arriaga MD;  Location: [facility] OR;   Service: Plastics;  Laterality: N/A;    AXILLARY NODE DISSECTION Right 6/4/2019    Procedure: LYMPHADENECTOMY, AXILLARY RIGHT;  Surgeon: Sabine Arteaga MD;  Location: [address];  Service: General;  Laterality: Right;    BACK SURGERY      BREAST CYST EXCISION Left 1984    BREAST REVISION SURGERY Right 7/20/2020    Procedure: BREAST REVISION SURGERY RIGHT;  Surgeon: Leonard Cordero MD;  Location: [address];  Service: Plastics;  Laterality: Right;    FOOT SURGERY  2009 or 2010    HYSTERECTOMY      INSERTION OF BREAST IMPLANT Right 6/4/2019    Procedure: INSERTION, BREAST IMPLANT RIGHT;  Surgeon: Leonard Cordero MD;  Location: [address];  Service: Plastics;  Laterality: Right;    INSERTION OF BREAST TISSUE EXPANDER Right 6/4/2019    Procedure: INSERTION, TISSUE EXPANDER, BREAST RIGHT;  Surgeon: Leonard Cordero MD;  Location: [address];  Service: Plastics;  Laterality: Right;    LIPOSUCTION W/ FAT INJECTION N/A 11/29/2018    Procedure: LIPOSUCTION, WITH FAT TRANSFER TO BUTTOCKS;  Surgeon: Mio Arriaga MD;  Location: [facility];  Service: Plastics;  Laterality: N/A;    MASTOPEXY Left 6/4/2019    Procedure: MASTOPEXY LEFT;  Surgeon: Leonard Cordero MD;  Location: [address];  Service: Plastics;  Laterality: Left;    MASTOPEXY Left 7/20/2020    Procedure: MASTOPEXY LEFT;  Surgeon: Leonard Cordero MD;  Location: [address];  Service: Plastics;  Laterality: Left;    SENTINEL LYMPH NODE BIOPSY Right 6/4/2019    Procedure: BIOPSY, LYMPH NODE, SENTINEL RIGHT;  Surgeon: Sabine Arteaga MD;  Location: [address];  Service: General;  Laterality: Right;    SIMPLE MASTECTOMY Right 6/4/2019    Procedure: MASTECTOMY, SIMPLE RIGHT (CONSENT AM OF) 4.0 hr case;  Surgeon: Sabine Arteaga MD;  Location: [address];  Service: General;  Laterality: Right;    TOTAL REDUCTION MAMMOPLASTY Left 2019    TOTAL VAGINAL HYSTERECTOMY  1996    TUBAL LIGATION          Social History     Socioeconomic History    Marital status:      Spouse name: Not on file    Number of children: Not on file    Years of education: Not on file    Highest education level: Not on file   Occupational History    Not on file   Social Needs    Financial resource strain: Not on file    Food insecurity     Worry: Not on file     Inability: Not on file    Transportation needs     Medical: Not on file     Non-medical: Not on file   Tobacco Use    Smoking status: Former Smoker     Packs/day: 0.25     Years: 15.00     Pack years: 3.75     Types: Cigarettes     Quit date: 8/15/1993     Years since quittin.9    Smokeless tobacco: Never Used   Substance and Sexual Activity    Alcohol use: Yes     Alcohol/week: 1.0 standard drinks     Types: 1 Glasses of wine per week     Comment: seldom    Drug use: No    Sexual activity: Yes     Partners: Male   Lifestyle    Physical activity     Days per week: Not on file     Minutes per session: Not on file    Stress: Not on file   Relationships    Social connections     Talks on phone: Not on file     Gets together: Not on file     Attends Sabianism service: Not on file     Active member of club or organization: Not on file     Attends meetings of clubs or organizations: Not on file     Relationship status: Not on file   Other Topics Concern    Not on file   Social History Narrative    Not on file           Review of Systems: n/a    Objective:     Physical Exam:  Vitals:    20 1008   BP: 137/77   Pulse: 107       WD WN NAD  VSS  Normal resp effort  Breast: nipple asymmetry with left nipple 2-cm below right side, right sided breast implant in place with contraction        Assessment:       No diagnosis found.    Plan:   64 y.o. female status post right breast DTI after mastectomy and left breast mastopexy in 2019. On 20, pt underwent left breast lateral capsulorrhaphy, left breast capsulotomy, and creation of dermal flap;  now with nipple asymmetry and right sided contraction.    - discussed plan of care w/ patient to schedule for surgery in 6-8 weeks for lowering of left breast as well as likely fat grafting of right breast  - Return to clinic in 3 weeks      All questions were answered. The patient was advised to call the clinic with any questions or concerns prior to their next visit.

## 2020-07-30 ENCOUNTER — OFFICE VISIT (OUTPATIENT)
Dept: HEMATOLOGY/ONCOLOGY | Facility: CLINIC | Age: 65
End: 2020-07-30
Payer: COMMERCIAL

## 2020-07-30 VITALS
RESPIRATION RATE: 18 BRPM | TEMPERATURE: 98 F | DIASTOLIC BLOOD PRESSURE: 84 MMHG | WEIGHT: 180.75 LBS | BODY MASS INDEX: 29.05 KG/M2 | OXYGEN SATURATION: 98 % | HEIGHT: 66 IN | SYSTOLIC BLOOD PRESSURE: 130 MMHG | HEART RATE: 123 BPM

## 2020-07-30 DIAGNOSIS — C50.811 MALIGNANT NEOPLASM OF OVERLAPPING SITES OF RIGHT BREAST IN FEMALE, ESTROGEN RECEPTOR POSITIVE: Primary | ICD-10-CM

## 2020-07-30 DIAGNOSIS — E55.9 VITAMIN D DEFICIENCY: ICD-10-CM

## 2020-07-30 DIAGNOSIS — Z79.811 AROMATASE INHIBITOR USE: ICD-10-CM

## 2020-07-30 DIAGNOSIS — Z17.0 MALIGNANT NEOPLASM OF OVERLAPPING SITES OF RIGHT BREAST IN FEMALE, ESTROGEN RECEPTOR POSITIVE: Primary | ICD-10-CM

## 2020-07-30 PROCEDURE — 99999 PR PBB SHADOW E&M-EST. PATIENT-LVL III: CPT | Mod: PBBFAC,,, | Performed by: INTERNAL MEDICINE

## 2020-07-30 PROCEDURE — 99214 OFFICE O/P EST MOD 30 MIN: CPT | Mod: S$GLB,,, | Performed by: INTERNAL MEDICINE

## 2020-07-30 PROCEDURE — 99999 PR PBB SHADOW E&M-EST. PATIENT-LVL III: ICD-10-PCS | Mod: PBBFAC,,, | Performed by: INTERNAL MEDICINE

## 2020-07-30 PROCEDURE — 3008F BODY MASS INDEX DOCD: CPT | Mod: CPTII,S$GLB,, | Performed by: INTERNAL MEDICINE

## 2020-07-30 PROCEDURE — 3008F PR BODY MASS INDEX (BMI) DOCUMENTED: ICD-10-PCS | Mod: CPTII,S$GLB,, | Performed by: INTERNAL MEDICINE

## 2020-07-30 PROCEDURE — 99214 PR OFFICE/OUTPT VISIT, EST, LEVL IV, 30-39 MIN: ICD-10-PCS | Mod: S$GLB,,, | Performed by: INTERNAL MEDICINE

## 2020-07-30 RX ORDER — LETROZOLE 2.5 MG/1
2.5 TABLET, FILM COATED ORAL DAILY
Qty: 90 TABLET | Refills: 3 | Status: SHIPPED | OUTPATIENT
Start: 2020-07-30 | End: 2020-11-30 | Stop reason: SDUPTHER

## 2020-07-30 NOTE — PROGRESS NOTES
Subjective:       Patient ID: Jaja Toledo is a 64 y.o. female.     Chief Complaint: follow up for breast cancer     Diagnosis:  Stage IA (mp T1cN0 (I+)M0) Right breast cancer, s/p right mastectomy on 6/4/19     Oncologic History:  1. Ms Toledo is a 62 yo postmenopausal woman who initially saw me on 7/22/19 for breast cancer. She was noted to have asymmetry in the right breast at the inner position on mammogram 3/18/19. Diagnosed mammogram on 4/11/19 showed a 11 mm high density, irregularly shaped mass with indistinct margins in the right breast at 3 oclock. US showed a 7 x 9 x 7 mm mass at 3 oclock. The right axilla was normal. Breast biopsy on 4/22/19 showed invasive ductal carcinoma, grade 3, with associated high grade DCIS. ER+ (>95%), NY+(>90%), HER2 equivocal, FISH negative. Ki-67 20%. She was seen by Dr Arteaga in May 2019. MRI breast on 5/16/19 showed a 13 mm x 10 mm x 10 mm mass at 3 oclock correlating with the prior finding. There is another 9 mm x 7 mm x 8 mm mass at 6 oclock in the right breast. Left breast is benign. She underwent biopsy of the second mass on 5/27/19, which showed a grade 2 invasive lobular carcinoma, ER positive 70%, NY negative, HER2 negative, Ki-67 15%. Patient underwent right mastectomy and right axillary lymph node biopsy on 6/4/19. Pathology showed two tumors: #1, one 1.3 cm grade 2 invasive ductal carcinoma with lobular features, #2, one 1.7 cm grade 2 invasive lobular carcinoma. DCIS present, grade 3, with necrosis. LCIS. Margins are negative. lymphovascular invasion was not identified. One sentinel lymph node removed, with isolated tumor cells. Extranodal extention not identified. mp T1c sn N0 (i+). Receptors: #1: ER positive 95%, NY positive 90%, HER2 FISH negative, Ki67 20%. #2, ER positive 70%, NY negative, HER2 negative, Ki-67 15%. She returns today to discuss further management. She currently feels well. She had some mild sweating on her forehead in her last 50s. She  had a hysterectomy more than 20 years ago. She had Magine genetic test done which was negative. Oncotype DX of her 1.7 cm ILC showed an RS of 15, distant recurrence rate at 9 years is 4%, benefit from chemo is <1%. Oncotype DX of her 1.3 cm IDC is pending.   2. Oncotype DX of her 1.3 cm IDC showed RS of 24, distant recurrence rate at 9 years is 10%, benefit from chemotherapy <1%  3. Started letrozole in 2019. Bone density 19 normal.      Interval History:   Ms Toledo returns for follow up. She is feeling well. Taking letrozole every day. Also taking two vit D-calcium pills a day. No complaints. Had breast surgery on 20        ECO     ROS:   A ten-point system review is obtained and negative except for what was stated in the Interval History.      Physical Examination:   Vital signs reviewed.   General: well hydrated, well developed, in no acute distress  HEENT: normocephalic, PERRLA, EOMI, anicteric sclerae, oropharynx clear  Neck: supple, no JVD, thyromegaly, cervical or supraclavicular lymphadenopathy  Lungs: clear breath sounds bilaterally, no wheezing, rales, or rhonchi  Heart: RRR, no M/R/G  Breast: s/p R mastectomy and reconstruction. No abnormal skin changes, masses or axillary LAD. L: no abnormal skin changes, masses, or axillary LAD.   Abdomen: soft, no tenderness, non-distended, no hepatosplenomegaly, mass, or hernia. BS present  Extremities: left leg trace edema  Skin: no rash, ulcer, or open wounds  Neuro: alert and oriented x 4, no focal neuro deficit  Psych: pleasant and appropriate mood and affect      Objective:      Laboratory Data:  Labs reviewed.      Imaging Data:   Mammogram 2020:  Films Compared:  Compared to: 2019 Mammo Digital Screening Bilat w/ Asif and 2018 Mammo Digital Screening Bilat with Tomosynthesis_CAD     Findings:  This procedure was performed using tomosynthesis. Computer-aided detection was utilized in the interpretation of this examination.      The left breast is heterogeneously dense, which may obscure small masses. There is no evidence of suspicious masses, microcalcifications or architectural distortion.     Impression:  No mammographic evidence of malignancy.     BI-RADS Category 1: Negative     Recommendation:  Routine screening mammogram in 1 year is recommended.     Assessment and Plan:      1. Malignant neoplasm of overlapping sites of right breast in female, estrogen receptor positive    2. Aromatase inhibitor use    3. Vitamin D deficiency        1-3  - Ms Toledo is a 62 yo woman with Stage IA (mp T1cN0 (I+)M0) Right breast cancer s/p R mastectomy, SLNB and immediate reconstruction on 6/4/19. She had a 1.3 cm invasive ductal carcinoma and a 1.7 cm invasive lobular carcinoma. Both tumors ER positive, HER2 negative. She had isolated tumor cells in one sentinel lymph node, which is stage N0 (I+).  Oncotype DX score of the 1.7 cm ILC showed an RS of 15, distant recurrence rate at 9 years is 4%, benefit from chemo is <1%. Oncotype DX of her 1.3 cm IDC showed RS of 24, distant recurrence rate at 9 years is 10%, benefit from chemotherapy <1%  - Has been on letrozole since Aug 2019. Tolerating it well  - discussed mammogram result. SRIKANTH. Next due in June 2020  - We discussed breast cancer survivorship, including regular exercise, daily baby aspirin with food, daily OTC vit D and calcium, fresh fruits and vegetables, limiting alcohol intake, avoid smoking.   - c/w letrozole for at least 5 years  - refilled letrozole today  - c/w calcium and vit D bid  - Bone density 11/22/19 normal. Next due in Nov 2021  - RTC in 3 months for follow up.     Follow-up:      Return in 3 months  All her questions were answered in the office. Knows to call in the interval if any problems arise.

## 2020-08-18 ENCOUNTER — TELEPHONE (OUTPATIENT)
Dept: PLASTIC SURGERY | Facility: CLINIC | Age: 65
End: 2020-08-18

## 2020-08-18 ENCOUNTER — TELEPHONE (OUTPATIENT)
Dept: SURGERY | Facility: CLINIC | Age: 65
End: 2020-08-18

## 2020-08-18 NOTE — TELEPHONE ENCOUNTER
Attempted to contact pt to confirm upcoming appt as well as advise of location change.  Pt will be seen at [facility], 2nd floor, Multi specialty/Pre op center.  Pt was not available at the time of the call.  Pts VM is full and no message could be left

## 2020-08-19 ENCOUNTER — OFFICE VISIT (OUTPATIENT)
Dept: PLASTIC SURGERY | Facility: CLINIC | Age: 65
End: 2020-08-19
Payer: COMMERCIAL

## 2020-08-19 ENCOUNTER — TELEPHONE (OUTPATIENT)
Dept: FAMILY MEDICINE | Facility: CLINIC | Age: 65
End: 2020-08-19

## 2020-08-19 DIAGNOSIS — Z09 SURGERY FOLLOW-UP EXAMINATION: Primary | ICD-10-CM

## 2020-08-19 DIAGNOSIS — Z12.11 COLON CANCER SCREENING: Primary | ICD-10-CM

## 2020-08-19 PROCEDURE — 99999 PR PBB SHADOW E&M-EST. PATIENT-LVL I: ICD-10-PCS | Mod: PBBFAC,,, | Performed by: SURGERY

## 2020-08-19 PROCEDURE — 99024 PR POST-OP FOLLOW-UP VISIT: ICD-10-PCS | Mod: S$GLB,,, | Performed by: SURGERY

## 2020-08-19 PROCEDURE — 99999 PR PBB SHADOW E&M-EST. PATIENT-LVL I: CPT | Mod: PBBFAC,,, | Performed by: SURGERY

## 2020-08-19 PROCEDURE — 99024 POSTOP FOLLOW-UP VISIT: CPT | Mod: S$GLB,,, | Performed by: SURGERY

## 2020-08-19 NOTE — PROGRESS NOTES
Patient presents Plastic surgery, undergoing breast reconstruction.  The patient has deficiency in the right lateral quadrant breast in the need to have either a latissimus dorsi muscle reconstruction or the Tdap.  Patient will be moved presented Plastic surgery Conference.

## 2020-08-19 NOTE — TELEPHONE ENCOUNTER
----- Message from Da Saba sent at 8/19/2020  2:05 PM CDT -----  Regarding: patient advice  Contact: patient  Pt called in regards to getting a colon test    Pt can be reached at [phone]

## 2020-08-25 ENCOUNTER — TELEPHONE (OUTPATIENT)
Dept: GASTROENTEROLOGY | Facility: CLINIC | Age: 65
End: 2020-08-25

## 2020-09-02 ENCOUNTER — TELEPHONE (OUTPATIENT)
Dept: PLASTIC SURGERY | Facility: CLINIC | Age: 65
End: 2020-09-02

## 2020-09-02 NOTE — TELEPHONE ENCOUNTER
----- Message from Zuleika Mercedes sent at 9/2/2020  9:17 AM CDT -----  Regarding: Pt  Reason: Pt calling to schedule surgery. Please call     Communication: [phone]

## 2020-09-02 NOTE — TELEPHONE ENCOUNTER
Attempted to contact pt to schedule surgery.  Pt was not available at the time of the call.  Pt's VM was full.  No message could be left.       ----- Message from Zuleika Mercedes sent at 9/2/2020  9:17 AM CDT -----  Regarding: Pt  Reason: Pt calling to schedule surgery. Please call     Communication: [phone]

## 2020-09-03 DIAGNOSIS — N65.1 BREAST ASYMMETRY FOLLOWING RECONSTRUCTIVE SURGERY: ICD-10-CM

## 2020-09-03 DIAGNOSIS — Z01.818 PRE-OP TESTING: Primary | ICD-10-CM

## 2020-09-09 ENCOUNTER — OFFICE VISIT (OUTPATIENT)
Dept: PLASTIC SURGERY | Facility: CLINIC | Age: 65
End: 2020-09-09
Payer: COMMERCIAL

## 2020-09-09 DIAGNOSIS — Z09 SURGERY FOLLOW-UP EXAMINATION: Primary | ICD-10-CM

## 2020-09-09 PROCEDURE — 99024 PR POST-OP FOLLOW-UP VISIT: ICD-10-PCS | Mod: S$GLB,,, | Performed by: SURGERY

## 2020-09-09 PROCEDURE — 99999 PR PBB SHADOW E&M-EST. PATIENT-LVL II: ICD-10-PCS | Mod: PBBFAC,,, | Performed by: SURGERY

## 2020-09-09 PROCEDURE — 99999 PR PBB SHADOW E&M-EST. PATIENT-LVL II: CPT | Mod: PBBFAC,,, | Performed by: SURGERY

## 2020-09-09 PROCEDURE — 99024 POSTOP FOLLOW-UP VISIT: CPT | Mod: S$GLB,,, | Performed by: SURGERY

## 2020-09-09 NOTE — PROGRESS NOTES
Patient presents Plastic surgery Clinic after having bilateral breast reconstruction.  Patient is here to discuss her upcoming surgery.  Informed her that we would have to delay her surgery and further least several weeks.  She understands the reason for the delay and we will reschedule.

## 2020-10-01 DIAGNOSIS — Z01.818 PRE-OP TESTING: ICD-10-CM

## 2020-10-15 DIAGNOSIS — Z12.11 SPECIAL SCREENING FOR MALIGNANT NEOPLASMS, COLON: Primary | ICD-10-CM

## 2020-10-15 RX ORDER — POLYETHYLENE GLYCOL 3350, SODIUM SULFATE ANHYDROUS, SODIUM BICARBONATE, SODIUM CHLORIDE, POTASSIUM CHLORIDE 236; 22.74; 6.74; 5.86; 2.97 G/4L; G/4L; G/4L; G/4L; G/4L
4 POWDER, FOR SOLUTION ORAL ONCE
Qty: 4000 ML | Refills: 0 | Status: SHIPPED | OUTPATIENT
Start: 2020-10-15 | End: 2020-10-15

## 2020-10-16 ENCOUNTER — LAB VISIT (OUTPATIENT)
Dept: URGENT CARE | Facility: CLINIC | Age: 65
End: 2020-10-16
Payer: COMMERCIAL

## 2020-10-16 DIAGNOSIS — Z01.818 PRE-OP TESTING: ICD-10-CM

## 2020-10-16 LAB — SARS-COV-2 RNA RESP QL NAA+PROBE: NOT DETECTED

## 2020-10-16 PROCEDURE — U0003 INFECTIOUS AGENT DETECTION BY NUCLEIC ACID (DNA OR RNA); SEVERE ACUTE RESPIRATORY SYNDROME CORONAVIRUS 2 (SARS-COV-2) (CORONAVIRUS DISEASE [COVID-19]), AMPLIFIED PROBE TECHNIQUE, MAKING USE OF HIGH THROUGHPUT TECHNOLOGIES AS DESCRIBED BY CMS-2020-01-R: HCPCS

## 2020-10-18 NOTE — H&P
[facility] H and P    CC: colon cancer screening    HPI 65 y.o. female who presents for colonoscopy for colon cancer screening.    Past Medical History  Past Medical History:   Diagnosis Date    Anxiety     Chronic low back pain     Gastritis     GERD (gastroesophageal reflux disease)     History of cervical cancer     History of recurrent urinary tract infection     IFG (impaired fasting glucose)     Malignant neoplasm of overlapping sites of right breast in female, estrogen receptor positive 7/19/2019    Soft tissue tumor, malignant     sarcoma left foot         Review of Systems  General ROS: negative for chills, fever or weight loss  Cardiovascular ROS: no chest pain or dyspnea on exertion  Gastrointestinal ROS: no abdominal pain, change in bowel habits, or black/ bloody stools    Physical Examination  There were no vitals taken for this visit.  General appearance: alert, cooperative, no distress  HENT: Normocephalic, atraumatic, neck symmetrical, no nasal discharge   Lungs: clear to auscultation bilaterally, no dullness to percussion bilaterally  Heart: regular rate and rhythm without rub; no displacement of the PMI   Abdomen: soft, non-tender; bowel sounds normoactive; no organomegaly  Extremities: extremities symmetric; no clubbing, cyanosis, or edema  Neurologic: Alert and oriented X 3, normal strength, normal coordination and gait    Labs:  Lab Results   Component Value Date    WBC 5.37 07/22/2019    HGB 12.0 07/17/2020    HCT 41.2 07/22/2019    MCV 87 07/22/2019     07/22/2019         CMP  Sodium   Date Value Ref Range Status   07/22/2019 142 136 - 145 mmol/L Final     Potassium   Date Value Ref Range Status   07/22/2019 3.6 3.5 - 5.1 mmol/L Final     Chloride   Date Value Ref Range Status   07/22/2019 108 95 - 110 mmol/L Final     CO2   Date Value Ref Range Status   07/22/2019 25 23 - 29 mmol/L Final     Glucose   Date Value Ref Range Status   07/22/2019 113 (H) 70 - 110  mg/dL Final     BUN, Bld   Date Value Ref Range Status   07/22/2019 10 8 - 23 mg/dL Final     Creatinine   Date Value Ref Range Status   07/22/2019 0.9 0.5 - 1.4 mg/dL Final     Calcium   Date Value Ref Range Status   07/22/2019 9.9 8.7 - 10.5 mg/dL Final     Total Protein   Date Value Ref Range Status   07/22/2019 8.6 (H) 6.0 - 8.4 g/dL Final     Albumin   Date Value Ref Range Status   07/22/2019 4.3 3.5 - 5.2 g/dL Final     Total Bilirubin   Date Value Ref Range Status   07/22/2019 0.4 0.1 - 1.0 mg/dL Final     Comment:     For infants and newborns, interpretation of results should be based  on gestational age, weight and in agreement with clinical  observations.  Premature Infant recommended reference ranges:  Up to 24 hours.............<8.0 mg/dL  Up to 48 hours............<12.0 mg/dL  3-5 days..................<15.0 mg/dL  6-29 days.................<15.0 mg/dL       Alkaline Phosphatase   Date Value Ref Range Status   07/22/2019 71 55 - 135 U/L Final     AST   Date Value Ref Range Status   07/22/2019 33 10 - 40 U/L Final     ALT   Date Value Ref Range Status   07/22/2019 24 10 - 44 U/L Final     Anion Gap   Date Value Ref Range Status   07/22/2019 9 8 - 16 mmol/L Final     eGFR if    Date Value Ref Range Status   07/22/2019 >60 >60 mL/min/1.73 m^2 Final     eGFR if non    Date Value Ref Range Status   07/22/2019 >60 >60 mL/min/1.73 m^2 Final     Comment:     Calculation used to obtain the estimated glomerular filtration  rate (eGFR) is the CKD-EPI equation.            Assessment:   1. Colon cancer screening        Plan:  Proceed to colonoscopy.    Chanel Saenz MD

## 2020-10-19 ENCOUNTER — ANESTHESIA (OUTPATIENT)
Dept: ENDOSCOPY | Facility: HOSPITAL | Age: 65
End: 2020-10-19
Payer: COMMERCIAL

## 2020-10-19 ENCOUNTER — ANESTHESIA EVENT (OUTPATIENT)
Dept: ENDOSCOPY | Facility: HOSPITAL | Age: 65
End: 2020-10-19
Payer: COMMERCIAL

## 2020-10-19 ENCOUNTER — HOSPITAL ENCOUNTER (OUTPATIENT)
Facility: HOSPITAL | Age: 65
Discharge: HOME | End: 2020-10-19
Attending: INTERNAL MEDICINE | Admitting: INTERNAL MEDICINE
Payer: COMMERCIAL

## 2020-10-19 VITALS
HEART RATE: 81 BPM | DIASTOLIC BLOOD PRESSURE: 95 MMHG | HEIGHT: 66 IN | RESPIRATION RATE: 14 BRPM | OXYGEN SATURATION: 99 % | SYSTOLIC BLOOD PRESSURE: 154 MMHG | BODY MASS INDEX: 28.93 KG/M2 | TEMPERATURE: 98 F | WEIGHT: 180 LBS

## 2020-10-19 DIAGNOSIS — Z12.11 COLON CANCER SCREENING: Primary | ICD-10-CM

## 2020-10-19 PROCEDURE — 88305 TISSUE EXAM BY PATHOLOGIST: ICD-10-PCS | Mod: 26,,, | Performed by: PATHOLOGY

## 2020-10-19 PROCEDURE — 45380 PR COLONOSCOPY,BIOPSY: ICD-10-PCS | Mod: 59,,, | Performed by: INTERNAL MEDICINE

## 2020-10-19 PROCEDURE — E9220 PRA ENDO ANESTHESIA: ICD-10-PCS | Mod: 33,,, | Performed by: NURSE ANESTHETIST, CERTIFIED REGISTERED

## 2020-10-19 PROCEDURE — 27201012 HC FORCEPS, HOT/COLD, DISP: Performed by: INTERNAL MEDICINE

## 2020-10-19 PROCEDURE — E9220 PRA ENDO ANESTHESIA: HCPCS | Mod: 33,,, | Performed by: NURSE ANESTHETIST, CERTIFIED REGISTERED

## 2020-10-19 PROCEDURE — 25000003 PHARM REV CODE 250: Performed by: NURSE ANESTHETIST, CERTIFIED REGISTERED

## 2020-10-19 PROCEDURE — 45380 COLONOSCOPY AND BIOPSY: CPT | Performed by: INTERNAL MEDICINE

## 2020-10-19 PROCEDURE — 45380 COLONOSCOPY AND BIOPSY: CPT | Mod: 59,,, | Performed by: INTERNAL MEDICINE

## 2020-10-19 PROCEDURE — 88305 TISSUE EXAM BY PATHOLOGIST: CPT | Mod: 26,,, | Performed by: PATHOLOGY

## 2020-10-19 PROCEDURE — 63600175 PHARM REV CODE 636 W HCPCS: Performed by: NURSE ANESTHETIST, CERTIFIED REGISTERED

## 2020-10-19 PROCEDURE — 37000009 HC ANESTHESIA EA ADD 15 MINS: Performed by: INTERNAL MEDICINE

## 2020-10-19 PROCEDURE — 45385 COLONOSCOPY W/LESION REMOVAL: CPT | Mod: 33,,, | Performed by: INTERNAL MEDICINE

## 2020-10-19 PROCEDURE — 45385 PR COLONOSCOPY,REMV LESN,SNARE: ICD-10-PCS | Mod: 33,,, | Performed by: INTERNAL MEDICINE

## 2020-10-19 PROCEDURE — 27201089 HC SNARE, DISP (ANY): Performed by: INTERNAL MEDICINE

## 2020-10-19 PROCEDURE — 45385 COLONOSCOPY W/LESION REMOVAL: CPT | Performed by: INTERNAL MEDICINE

## 2020-10-19 PROCEDURE — 37000008 HC ANESTHESIA 1ST 15 MINUTES: Performed by: INTERNAL MEDICINE

## 2020-10-19 PROCEDURE — 25000003 PHARM REV CODE 250: Performed by: INTERNAL MEDICINE

## 2020-10-19 PROCEDURE — 88305 TISSUE EXAM BY PATHOLOGIST: CPT | Performed by: PATHOLOGY

## 2020-10-19 RX ORDER — PROPOFOL 10 MG/ML
VIAL (ML) INTRAVENOUS
Status: DISCONTINUED | OUTPATIENT
Start: 2020-10-19 | End: 2020-10-19

## 2020-10-19 RX ORDER — LIDOCAINE HYDROCHLORIDE 20 MG/ML
INJECTION INTRAVENOUS
Status: DISCONTINUED | OUTPATIENT
Start: 2020-10-19 | End: 2020-10-19

## 2020-10-19 RX ORDER — SODIUM CHLORIDE 9 MG/ML
INJECTION, SOLUTION INTRAVENOUS CONTINUOUS
Status: DISCONTINUED | OUTPATIENT
Start: 2020-10-19 | End: 2020-10-19 | Stop reason: HOSPADM

## 2020-10-19 RX ORDER — PROPOFOL 10 MG/ML
VIAL (ML) INTRAVENOUS CONTINUOUS PRN
Status: DISCONTINUED | OUTPATIENT
Start: 2020-10-19 | End: 2020-10-19

## 2020-10-19 RX ORDER — SODIUM CHLORIDE 0.9 % (FLUSH) 0.9 %
10 SYRINGE (ML) INJECTION
Status: DISCONTINUED | OUTPATIENT
Start: 2020-10-19 | End: 2020-10-19 | Stop reason: HOSPADM

## 2020-10-19 RX ADMIN — LIDOCAINE HYDROCHLORIDE 50 MG: 20 INJECTION, SOLUTION INTRAVENOUS at 10:10

## 2020-10-19 RX ADMIN — PROPOFOL 80 MG: 10 INJECTION, EMULSION INTRAVENOUS at 10:10

## 2020-10-19 RX ADMIN — PROPOFOL 150 MCG/KG/MIN: 10 INJECTION, EMULSION INTRAVENOUS at 10:10

## 2020-10-19 RX ADMIN — SODIUM CHLORIDE: 0.9 INJECTION, SOLUTION INTRAVENOUS at 10:10

## 2020-10-19 NOTE — [employer] PATIENT INSTRUCTIONS
Discharge Summary/Instructions after an Endoscopic Procedure  Patient Name: Jaja Toledo  Patient MRN: [id number]  Patient YOB: 1955 Monday, October 19, 2020  Chanel Saenz MD  RESTRICTIONS:  During your procedure today, you received medications for sedation.  These   medications may affect your judgment, balance and coordination.  Therefore,   for 24 hours, you have the following restrictions:   - DO NOT drive a car, operate machinery, make legal/financial decisions,   sign important papers or drink alcohol.    ACTIVITY:  Today: no heavy lifting, straining or running due to procedural   sedation/anesthesia.  The following day: return to full activity including work.  DIET:  Eat and drink normally unless instructed otherwise.     TREATMENT FOR COMMON SIDE EFFECTS:  - Mild abdominal pain, nausea, belching, bloating or excessive gas:  rest,   eat lightly and use a heating pad.  - Sore Throat: treat with throat lozenges and/or gargle with warm salt   water.  - Because air was used during the procedure, expelling large amounts of air   from your rectum or belching is normal.  - If a bowel prep was taken, you may not have a bowel movement for 1-3 days.    This is normal.  SYMPTOMS TO WATCH FOR AND REPORT TO YOUR PHYSICIAN:  1. Abdominal pain or bloating, other than gas cramps.  2. Chest pain.  3. Back pain.  4. Signs of infection such as: chills or fever occurring within 24 hours   after the procedure.  5. Rectal bleeding, which would show as bright red, maroon, or black stools.   (A tablespoon of blood from the rectum is not serious, especially if   hemorrhoids are present.)  6. Vomiting.  7. Weakness or dizziness.  GO DIRECTLY TO THE NEAREST EMERGENCY ROOM IF YOU HAVE ANY OF THE FOLLOWING:      Difficulty breathing              Chills and/or fever over 101 F   Persistent vomiting and/or vomiting blood   Severe abdominal pain   Severe chest pain   Black, tarry stools   Bleeding- more than one  tablespoon   Any other symptom or condition that you feel may need urgent attention  Your doctor recommends these additional instructions:  If any biopsies were taken, your doctors clinic will contact you in 1 to 2   weeks with any results.  - Discharge patient to home.   - Resume previous diet today.   - Continue present medications.   - Await pathology results.   - Repeat colonoscopy in 3 years for surveillance.  For questions, problems or results please call your physician - Chanel Saenz MD at Work:  [phone].  [facility], EMERGENCY ROOM PHONE NUMBER: [phone]  IF A COMPLICATION OR EMERGENCY SITUATION ARISES AND YOU ARE UNABLE TO REACH   YOUR PHYSICIAN - GO DIRECTLY TO THE EMERGENCY ROOM.  Chanel Saenz MD  10/19/2020 12:00:05 PM  This report has been verified and signed electronically.  [employer]

## 2020-10-19 NOTE — ANESTHESIA PREPROCEDURE EVALUATION
10/19/2020  Jaja Toledo is a 65 y.o., female.    Anesthesia Evaluation    I have reviewed the Patient Summary Reports.      I have reviewed the Medications.     Review of Systems  Anesthesia Hx:  No problems with previous Anesthesia Denies Hx of Anesthetic complications  Denies Family Hx of Anesthesia complications.   Denies Personal Hx of Anesthesia complications.   Social:  Former Smoker    Hematology/Oncology:  Hematology Normal   Oncology Normal     EENT/Dental:EENT/Dental Normal   Cardiovascular:  Cardiovascular Normal Exercise tolerance: good     Pulmonary:  Pulmonary Normal    Renal/:  Renal/ Normal     Hepatic/GI:   GERD    Musculoskeletal:  Musculoskeletal Normal    Neurological:  Neurology Normal    Endocrine:  Endocrine Normal    Dermatological:  Skin Normal    Psych:  Psychiatric Normal           Physical Exam  General:  Well nourished    Airway/Jaw/Neck:  Airway Findings: Mouth Opening: Normal Tongue: Normal  General Airway Assessment: Adult  Mallampati: II  TM Distance: Normal, at least 6 cm      Dental:  Dental Findings: In tact   Chest/Lungs:  Chest/Lungs Findings: Clear to auscultation              Anesthesia Plan  Type of Anesthesia, risks & benefits discussed:  Anesthesia Type:  general  Patient's Preference:   Intra-op Monitoring Plan:   Intra-op Monitoring Plan Comments:   Post Op Pain Control Plan:   Post Op Pain Control Plan Comments:   Induction:   IV  Beta Blocker:  Patient is not currently on a Beta-Blocker (No further documentation required).       Informed Consent: Patient understands risks and agrees with Anesthesia plan.  Questions answered. Anesthesia consent signed with patient.  ASA Score: 2     Day of Surgery Review of History & Physical:            Ready For Surgery From Anesthesia Perspective.

## 2020-10-19 NOTE — TRANSFER OF CARE
"Anesthesia Transfer of Care Note    Patient: Jaja Toledo    Procedure(s) Performed: Procedure(s) (LRB):  COLONOSCOPY (N/A)    Patient location: PACU    Anesthesia Type: general    Transport from OR: Transported from OR on room air with adequate spontaneous ventilation    Post pain: adequate analgesia    Post assessment: no apparent anesthetic complications and tolerated procedure well    Post vital signs: stable    Level of consciousness: awake and alert    Nausea/Vomiting: no nausea/vomiting    Complications: none    Transfer of care protocol was followed      Last vitals:   Visit Vitals  BP (!) 160/89 (BP Location: Left arm, Patient Position: Lying)   Pulse 105   Temp 36.4 °C (97.5 °F) (Temporal)   Resp 20   Ht 5' 6" (1.676 m)   Wt 81.6 kg (180 lb)   SpO2 99%   Breastfeeding No   BMI 29.05 kg/m²     "

## 2020-10-19 NOTE — ANESTHESIA POSTPROCEDURE EVALUATION
Anesthesia Post Evaluation    Patient: Jaja Toledo    Procedure(s) Performed: Procedure(s) (LRB):  COLONOSCOPY (N/A)    Final Anesthesia Type: general    Patient location during evaluation: PACU  Patient participation: Yes- Able to Participate  Level of consciousness: awake and alert and oriented  Post-procedure vital signs: reviewed and stable  Pain management: adequate  Airway patency: patent    PONV status at discharge: No PONV  Anesthetic complications: no      Cardiovascular status: blood pressure returned to baseline and hemodynamically stable  Respiratory status: unassisted and spontaneous ventilation  Hydration status: euvolemic  Follow-up not needed.          Vitals Value Taken Time   /89 10/19/20 1156   Temp 36.4 °C (97.5 °F) 10/19/20 1156   Pulse 105 10/19/20 1156   Resp 20 10/19/20 1156   SpO2 99 % 10/19/20 1156         No case tracking events are documented in the log.      Pain/Cathy Score: No data recorded

## 2020-10-26 LAB
FINAL PATHOLOGIC DIAGNOSIS: NORMAL
GROSS: NORMAL
Lab: NORMAL

## 2020-10-31 ENCOUNTER — TELEPHONE (OUTPATIENT)
Dept: ENDOSCOPY | Facility: HOSPITAL | Age: 65
End: 2020-10-31

## 2020-10-31 NOTE — TELEPHONE ENCOUNTER
----- Message from Chanel Saenz MD sent at 10/30/2020  3:29 PM CDT -----  Please let Ms. Toledo know that all of the polyps removed during her colonoscopy were not cancerous.  She will need a repeat colonoscopy in 3 years.

## 2020-11-16 ENCOUNTER — LAB VISIT (OUTPATIENT)
Dept: LAB | Facility: OTHER | Age: 65
End: 2020-11-16
Attending: INTERNAL MEDICINE
Payer: COMMERCIAL

## 2020-11-16 DIAGNOSIS — E55.9 VITAMIN D DEFICIENCY: ICD-10-CM

## 2020-11-16 PROCEDURE — 36415 COLL VENOUS BLD VENIPUNCTURE: CPT

## 2020-11-16 PROCEDURE — 82306 VITAMIN D 25 HYDROXY: CPT

## 2020-11-17 LAB — 25(OH)D3+25(OH)D2 SERPL-MCNC: 33 NG/ML (ref 30–96)

## 2020-11-30 ENCOUNTER — OFFICE VISIT (OUTPATIENT)
Dept: HEMATOLOGY/ONCOLOGY | Facility: CLINIC | Age: 65
End: 2020-11-30
Payer: COMMERCIAL

## 2020-11-30 VITALS
HEART RATE: 119 BPM | SYSTOLIC BLOOD PRESSURE: 138 MMHG | WEIGHT: 192.44 LBS | BODY MASS INDEX: 30.93 KG/M2 | HEIGHT: 66 IN | RESPIRATION RATE: 20 BRPM | TEMPERATURE: 98 F | DIASTOLIC BLOOD PRESSURE: 86 MMHG | OXYGEN SATURATION: 97 %

## 2020-11-30 DIAGNOSIS — E55.9 VITAMIN D DEFICIENCY: ICD-10-CM

## 2020-11-30 DIAGNOSIS — C50.811 MALIGNANT NEOPLASM OF OVERLAPPING SITES OF RIGHT BREAST IN FEMALE, ESTROGEN RECEPTOR POSITIVE: Primary | ICD-10-CM

## 2020-11-30 DIAGNOSIS — Z17.0 MALIGNANT NEOPLASM OF OVERLAPPING SITES OF RIGHT BREAST IN FEMALE, ESTROGEN RECEPTOR POSITIVE: Primary | ICD-10-CM

## 2020-11-30 DIAGNOSIS — Z79.811 AROMATASE INHIBITOR USE: ICD-10-CM

## 2020-11-30 PROCEDURE — 99999 PR PBB SHADOW E&M-EST. PATIENT-LVL III: CPT | Mod: PBBFAC,,, | Performed by: INTERNAL MEDICINE

## 2020-11-30 PROCEDURE — 3008F PR BODY MASS INDEX (BMI) DOCUMENTED: ICD-10-PCS | Mod: CPTII,S$GLB,, | Performed by: INTERNAL MEDICINE

## 2020-11-30 PROCEDURE — 1126F AMNT PAIN NOTED NONE PRSNT: CPT | Mod: S$GLB,,, | Performed by: INTERNAL MEDICINE

## 2020-11-30 PROCEDURE — 99214 PR OFFICE/OUTPT VISIT, EST, LEVL IV, 30-39 MIN: ICD-10-PCS | Mod: S$GLB,,, | Performed by: INTERNAL MEDICINE

## 2020-11-30 PROCEDURE — 99999 PR PBB SHADOW E&M-EST. PATIENT-LVL III: ICD-10-PCS | Mod: PBBFAC,,, | Performed by: INTERNAL MEDICINE

## 2020-11-30 PROCEDURE — 3008F BODY MASS INDEX DOCD: CPT | Mod: CPTII,S$GLB,, | Performed by: INTERNAL MEDICINE

## 2020-11-30 PROCEDURE — 99214 OFFICE O/P EST MOD 30 MIN: CPT | Mod: S$GLB,,, | Performed by: INTERNAL MEDICINE

## 2020-11-30 PROCEDURE — 1126F PR PAIN SEVERITY QUANTIFIED, NO PAIN PRESENT: ICD-10-PCS | Mod: S$GLB,,, | Performed by: INTERNAL MEDICINE

## 2020-11-30 RX ORDER — LETROZOLE 2.5 MG/1
2.5 TABLET, FILM COATED ORAL DAILY
Qty: 90 TABLET | Refills: 3 | Status: SHIPPED | OUTPATIENT
Start: 2020-11-30 | End: 2021-11-30

## 2020-11-30 NOTE — PROGRESS NOTES
Subjective:       Patient ID: Jaja Toledo is a 65 y.o. female.     Chief Complaint: follow up for breast cancer     Diagnosis:  Stage IA (mp T1cN0 (I+)M0) Right breast cancer, s/p right mastectomy on 6/4/19     Oncologic History:  1. Ms Toledo is a 64 yo postmenopausal woman who initially saw me on 7/22/19 for breast cancer. She was noted to have asymmetry in the right breast at the inner position on mammogram 3/18/19. Diagnosed mammogram on 4/11/19 showed a 11 mm high density, irregularly shaped mass with indistinct margins in the right breast at 3 oclock. US showed a 7 x 9 x 7 mm mass at 3 oclock. The right axilla was normal. Breast biopsy on 4/22/19 showed invasive ductal carcinoma, grade 3, with associated high grade DCIS. ER+ (>95%), WY+(>90%), HER2 equivocal, FISH negative. Ki-67 20%. She was seen by Dr Arteaga in May 2019. MRI breast on 5/16/19 showed a 13 mm x 10 mm x 10 mm mass at 3 oclock correlating with the prior finding. There is another 9 mm x 7 mm x 8 mm mass at 6 oclock in the right breast. Left breast is benign. She underwent biopsy of the second mass on 5/27/19, which showed a grade 2 invasive lobular carcinoma, ER positive 70%, WY negative, HER2 negative, Ki-67 15%. Patient underwent right mastectomy and right axillary lymph node biopsy on 6/4/19. Pathology showed two tumors: #1, one 1.3 cm grade 2 invasive ductal carcinoma with lobular features, #2, one 1.7 cm grade 2 invasive lobular carcinoma. DCIS present, grade 3, with necrosis. LCIS. Margins are negative. lymphovascular invasion was not identified. One sentinel lymph node removed, with isolated tumor cells. Extranodal extention not identified. mp T1c sn N0 (i+). Receptors: #1: ER positive 95%, WY positive 90%, HER2 FISH negative, Ki67 20%. #2, ER positive 70%, WY negative, HER2 negative, Ki-67 15%. She returns today to discuss further management. She currently feels well. She had some mild sweating on her forehead in her last 50s. She  had a hysterectomy more than 20 years ago. She had [place] genetic test done which was negative. Oncotype DX of her 1.7 cm ILC showed an RS of 15, distant recurrence rate at 9 years is 4%, benefit from chemo is <1%. Oncotype DX of her 1.3 cm IDC is pending.   2. Oncotype DX of her 1.3 cm IDC showed RS of 24, distant recurrence rate at 9 years is 10%, benefit from chemotherapy <1%  3. Started letrozole in 2019. Bone density 19 normal.      Interval History:   Ms Toledo returns for follow up. Has been out of letrozole for one month. Taking vit D and calcium.  Had colonoscopy in Oct 2020 with 8 small polyps removed.      ECO     ROS:   A ten-point system review is obtained and negative except for what was stated in the Interval History.      Physical Examination:   Vital signs reviewed.   General: well hydrated, well developed, in no acute distress  HEENT: normocephalic, PERRLA, EOMI, anicteric sclerae, oropharynx clear  Neck: supple, no JVD, thyromegaly, cervical or supraclavicular lymphadenopathy  Lungs: clear breath sounds bilaterally, no wheezing, rales, or rhonchi  Heart: RRR, no M/R/G  Breast: s/p R mastectomy and reconstruction. No abnormal skin changes, masses or axillary LAD. L: no abnormal skin changes, masses, or axillary LAD.   Abdomen: soft, no tenderness, non-distended, no hepatosplenomegaly, mass, or hernia. BS present  Extremities: left leg trace edema  Skin: no rash, ulcer, or open wounds  Neuro: alert and oriented x 4, no focal neuro deficit  Psych: pleasant and appropriate mood and affect      Objective:      Laboratory Data:  Labs reviewed. vit D level normal     Imaging Data:   Mammogram 2020:  Films Compared:  Compared to: 2019 Mammo Digital Screening Bilat w/ Asif and 2018 Mammo Digital Screening Bilat with Tomosynthesis_CAD     Findings:  This procedure was performed using tomosynthesis. Computer-aided detection was utilized in the interpretation of this  examination.     The left breast is heterogeneously dense, which may obscure small masses. There is no evidence of suspicious masses, microcalcifications or architectural distortion.     Impression:  No mammographic evidence of malignancy.     BI-RADS Category 1: Negative     Recommendation:  Routine screening mammogram in 1 year is recommended.     Assessment and Plan:      1. Malignant neoplasm of overlapping sites of right breast in female, estrogen receptor positive    2. Aromatase inhibitor use    3. Vitamin D deficiency        1-3  - Ms Toledo is a 64 yo woman with Stage IA (mp T1cN0 (I+)M0) Right breast cancer s/p R mastectomy, SLNB and immediate reconstruction on 6/4/19. She had a 1.3 cm invasive ductal carcinoma and a 1.7 cm invasive lobular carcinoma. Both tumors ER positive, HER2 negative. She had isolated tumor cells in one sentinel lymph node, which is stage N0 (I+).  Oncotype DX score of the 1.7 cm ILC showed an RS of 15, distant recurrence rate at 9 years is 4%, benefit from chemo is <1%. Oncotype DX of her 1.3 cm IDC showed RS of 24, distant recurrence rate at 9 years is 10%, benefit from chemotherapy <1%  - Has been on letrozole since Aug 2019. Tolerating it well  - next due for mammogram in June 2021  - c/w letrozole for at least 5 years  - discussed with patient that I prescribed 3-month supply of letrozole with 3 refills in July this year. Refilled again today and asked her to  the prescription. Asked patient to call my office should she run out of letrozole again. Patient understands and agrees with the plan.   - c/w calcium and vit D bid  - Bone density 11/22/19 normal. Next due in Nov 2021  - RTC in 3 months for follow up.      Follow-up:      Return in 3 months  All her questions were answered in the office. Knows to call in the interval if any problems arise.

## 2020-12-29 ENCOUNTER — TELEPHONE (OUTPATIENT)
Dept: FAMILY MEDICINE | Facility: CLINIC | Age: 65
End: 2020-12-29

## 2020-12-29 NOTE — TELEPHONE ENCOUNTER
Returned patient's call.  No answer, call was forwarded to automated voice messaging system.  Left message on voicemail.

## 2020-12-29 NOTE — TELEPHONE ENCOUNTER
----- Message from Evie Rodgers sent at 12/28/2020  9:30 AM CST -----  Regarding: Call back  Contact: [phone]  Patient is calling to talk to nurse in regards to having tremors on her hands and would like to see if that is normal or what test does she have to get to get them looked at.

## 2021-01-15 ENCOUNTER — OFFICE VISIT (OUTPATIENT)
Dept: FAMILY MEDICINE | Facility: CLINIC | Age: 66
End: 2021-01-15
Payer: COMMERCIAL

## 2021-01-15 VITALS
DIASTOLIC BLOOD PRESSURE: 80 MMHG | HEART RATE: 133 BPM | BODY MASS INDEX: 30.18 KG/M2 | OXYGEN SATURATION: 97 % | HEIGHT: 66 IN | WEIGHT: 187.81 LBS | SYSTOLIC BLOOD PRESSURE: 130 MMHG

## 2021-01-15 DIAGNOSIS — R25.1 TREMOR: ICD-10-CM

## 2021-01-15 DIAGNOSIS — R73.01 IFG (IMPAIRED FASTING GLUCOSE): ICD-10-CM

## 2021-01-15 DIAGNOSIS — R00.0 TACHYCARDIA: ICD-10-CM

## 2021-01-15 DIAGNOSIS — R39.15 URINARY URGENCY: Primary | ICD-10-CM

## 2021-01-15 PROCEDURE — 99214 PR OFFICE/OUTPT VISIT, EST, LEVL IV, 30-39 MIN: ICD-10-PCS | Mod: 25,S$GLB,, | Performed by: FAMILY MEDICINE

## 2021-01-15 PROCEDURE — 3008F PR BODY MASS INDEX (BMI) DOCUMENTED: ICD-10-PCS | Mod: CPTII,S$GLB,, | Performed by: FAMILY MEDICINE

## 2021-01-15 PROCEDURE — 93010 EKG 12-LEAD: ICD-10-PCS | Mod: S$GLB,,, | Performed by: INTERNAL MEDICINE

## 2021-01-15 PROCEDURE — 99999 PR PBB SHADOW E&M-EST. PATIENT-LVL III: ICD-10-PCS | Mod: PBBFAC,,, | Performed by: FAMILY MEDICINE

## 2021-01-15 PROCEDURE — 3008F BODY MASS INDEX DOCD: CPT | Mod: CPTII,S$GLB,, | Performed by: FAMILY MEDICINE

## 2021-01-15 PROCEDURE — 99999 PR PBB SHADOW E&M-EST. PATIENT-LVL III: CPT | Mod: PBBFAC,,, | Performed by: FAMILY MEDICINE

## 2021-01-15 PROCEDURE — 93010 ELECTROCARDIOGRAM REPORT: CPT | Mod: S$GLB,,, | Performed by: INTERNAL MEDICINE

## 2021-01-15 PROCEDURE — 93005 EKG 12-LEAD: ICD-10-PCS | Mod: S$GLB,,, | Performed by: FAMILY MEDICINE

## 2021-01-15 PROCEDURE — 99214 OFFICE O/P EST MOD 30 MIN: CPT | Mod: 25,S$GLB,, | Performed by: FAMILY MEDICINE

## 2021-01-15 PROCEDURE — 93005 ELECTROCARDIOGRAM TRACING: CPT | Mod: S$GLB,,, | Performed by: FAMILY MEDICINE

## 2021-01-15 RX ORDER — NITROFURANTOIN 25; 75 MG/1; MG/1
100 CAPSULE ORAL 2 TIMES DAILY
Qty: 14 CAPSULE | Refills: 0 | Status: SHIPPED | OUTPATIENT
Start: 2021-01-15 | End: 2021-02-13 | Stop reason: ALTCHOICE

## 2021-02-13 ENCOUNTER — OFFICE VISIT (OUTPATIENT)
Dept: URGENT CARE | Facility: CLINIC | Age: 66
End: 2021-02-13
Payer: COMMERCIAL

## 2021-02-13 VITALS
HEART RATE: 115 BPM | SYSTOLIC BLOOD PRESSURE: 132 MMHG | OXYGEN SATURATION: 96 % | HEIGHT: 66 IN | RESPIRATION RATE: 16 BRPM | WEIGHT: 187 LBS | TEMPERATURE: 98 F | DIASTOLIC BLOOD PRESSURE: 88 MMHG | BODY MASS INDEX: 30.05 KG/M2

## 2021-02-13 DIAGNOSIS — R35.0 URINARY FREQUENCY: ICD-10-CM

## 2021-02-13 DIAGNOSIS — R30.0 DYSURIA: ICD-10-CM

## 2021-02-13 DIAGNOSIS — N30.01 ACUTE CYSTITIS WITH HEMATURIA: Primary | ICD-10-CM

## 2021-02-13 LAB
BILIRUB UR QL STRIP: NEGATIVE
GLUCOSE UR QL STRIP: NEGATIVE
KETONES UR QL STRIP: POSITIVE
LEUKOCYTE ESTERASE UR QL STRIP: POSITIVE
PH, POC UA: 5 (ref 5–8)
POC BLOOD, URINE: POSITIVE
POC NITRATES, URINE: NEGATIVE
PROT UR QL STRIP: POSITIVE
SP GR UR STRIP: 1.02 (ref 1–1.03)
UROBILINOGEN UR STRIP-ACNC: POSITIVE (ref 0.1–1.1)

## 2021-02-13 PROCEDURE — 99214 OFFICE O/P EST MOD 30 MIN: CPT | Mod: 25,S$GLB,, | Performed by: NURSE PRACTITIONER

## 2021-02-13 PROCEDURE — 87086 URINE CULTURE/COLONY COUNT: CPT

## 2021-02-13 PROCEDURE — 3008F PR BODY MASS INDEX (BMI) DOCUMENTED: ICD-10-PCS | Mod: CPTII,S$GLB,, | Performed by: NURSE PRACTITIONER

## 2021-02-13 PROCEDURE — 87186 SC STD MICRODIL/AGAR DIL: CPT

## 2021-02-13 PROCEDURE — 99214 PR OFFICE/OUTPT VISIT, EST, LEVL IV, 30-39 MIN: ICD-10-PCS | Mod: 25,S$GLB,, | Performed by: NURSE PRACTITIONER

## 2021-02-13 PROCEDURE — 81003 POCT URINALYSIS, DIPSTICK, MANUAL, W/O SCOPE: ICD-10-PCS | Mod: QW,S$GLB,, | Performed by: NURSE PRACTITIONER

## 2021-02-13 PROCEDURE — 87088 URINE BACTERIA CULTURE: CPT

## 2021-02-13 PROCEDURE — 3008F BODY MASS INDEX DOCD: CPT | Mod: CPTII,S$GLB,, | Performed by: NURSE PRACTITIONER

## 2021-02-13 PROCEDURE — 81003 URINALYSIS AUTO W/O SCOPE: CPT | Mod: QW,S$GLB,, | Performed by: NURSE PRACTITIONER

## 2021-02-13 PROCEDURE — 87077 CULTURE AEROBIC IDENTIFY: CPT

## 2021-02-13 RX ORDER — IBUPROFEN 800 MG/1
800 TABLET ORAL 3 TIMES DAILY
Qty: 30 TABLET | Refills: 0 | Status: SHIPPED | OUTPATIENT
Start: 2021-02-13 | End: 2021-11-15

## 2021-02-13 RX ORDER — NITROFURANTOIN 25; 75 MG/1; MG/1
100 CAPSULE ORAL 2 TIMES DAILY
Qty: 14 CAPSULE | Refills: 0 | Status: SHIPPED | OUTPATIENT
Start: 2021-02-13 | End: 2021-02-20

## 2021-02-16 LAB — BACTERIA UR CULT: ABNORMAL

## 2021-02-18 ENCOUNTER — TELEPHONE (OUTPATIENT)
Dept: URGENT CARE | Facility: CLINIC | Age: 66
End: 2021-02-18

## 2021-02-22 ENCOUNTER — HOSPITAL ENCOUNTER (EMERGENCY)
Facility: OTHER | Age: 66
Discharge: HOME | End: 2021-02-22
Attending: EMERGENCY MEDICINE
Payer: COMMERCIAL

## 2021-02-22 VITALS
OXYGEN SATURATION: 98 % | DIASTOLIC BLOOD PRESSURE: 81 MMHG | TEMPERATURE: 98 F | HEIGHT: 66 IN | HEART RATE: 94 BPM | SYSTOLIC BLOOD PRESSURE: 131 MMHG | WEIGHT: 185 LBS | BODY MASS INDEX: 29.73 KG/M2 | RESPIRATION RATE: 17 BRPM

## 2021-02-22 DIAGNOSIS — R10.13 EPIGASTRIC PAIN: ICD-10-CM

## 2021-02-22 DIAGNOSIS — K21.9 GASTROESOPHAGEAL REFLUX DISEASE, UNSPECIFIED WHETHER ESOPHAGITIS PRESENT: Primary | ICD-10-CM

## 2021-02-22 LAB
ALBUMIN SERPL BCP-MCNC: 3.9 G/DL (ref 3.5–5.2)
ALP SERPL-CCNC: 46 U/L (ref 55–135)
ALT SERPL W/O P-5'-P-CCNC: 22 U/L (ref 10–44)
ANION GAP SERPL CALC-SCNC: 12 MMOL/L (ref 8–16)
AST SERPL-CCNC: 25 U/L (ref 10–40)
BASOPHILS # BLD AUTO: 0.04 K/UL (ref 0–0.2)
BASOPHILS NFR BLD: 0.3 % (ref 0–1.9)
BILIRUB SERPL-MCNC: 0.4 MG/DL (ref 0.1–1)
BNP SERPL-MCNC: <10 PG/ML (ref 0–99)
BUN SERPL-MCNC: 18 MG/DL (ref 8–23)
CALCIUM SERPL-MCNC: 10.1 MG/DL (ref 8.7–10.5)
CHLORIDE SERPL-SCNC: 109 MMOL/L (ref 95–110)
CO2 SERPL-SCNC: 21 MMOL/L (ref 23–29)
CREAT SERPL-MCNC: 1.1 MG/DL (ref 0.5–1.4)
DIFFERENTIAL METHOD: ABNORMAL
EOSINOPHIL # BLD AUTO: 0.1 K/UL (ref 0–0.5)
EOSINOPHIL NFR BLD: 1.1 % (ref 0–8)
ERYTHROCYTE [DISTWIDTH] IN BLOOD BY AUTOMATED COUNT: 14.3 % (ref 11.5–14.5)
EST. GFR  (AFRICAN AMERICAN): >60 ML/MIN/1.73 M^2
EST. GFR  (NON AFRICAN AMERICAN): 54 ML/MIN/1.73 M^2
GLUCOSE SERPL-MCNC: 102 MG/DL (ref 70–110)
HCT VFR BLD AUTO: 42.5 % (ref 37–48.5)
HGB BLD-MCNC: 13.1 G/DL (ref 12–16)
IMM GRANULOCYTES # BLD AUTO: 0.05 K/UL (ref 0–0.04)
IMM GRANULOCYTES NFR BLD AUTO: 0.4 % (ref 0–0.5)
INR PPP: 1 (ref 0.8–1.2)
LYMPHOCYTES # BLD AUTO: 3.3 K/UL (ref 1–4.8)
LYMPHOCYTES NFR BLD: 27.5 % (ref 18–48)
MCH RBC QN AUTO: 27.2 PG (ref 27–31)
MCHC RBC AUTO-ENTMCNC: 30.8 G/DL (ref 32–36)
MCV RBC AUTO: 88 FL (ref 82–98)
MONOCYTES # BLD AUTO: 1.1 K/UL (ref 0.3–1)
MONOCYTES NFR BLD: 9.4 % (ref 4–15)
NEUTROPHILS # BLD AUTO: 7.3 K/UL (ref 1.8–7.7)
NEUTROPHILS NFR BLD: 61.3 % (ref 38–73)
NRBC BLD-RTO: 0 /100 WBC
PLATELET # BLD AUTO: 394 K/UL (ref 150–350)
PMV BLD AUTO: 9.1 FL (ref 9.2–12.9)
POTASSIUM SERPL-SCNC: 3.9 MMOL/L (ref 3.5–5.1)
PROT SERPL-MCNC: 8.2 G/DL (ref 6–8.4)
PROTHROMBIN TIME: 10.9 SEC (ref 9–12.5)
RBC # BLD AUTO: 4.81 M/UL (ref 4–5.4)
SODIUM SERPL-SCNC: 142 MMOL/L (ref 136–145)
TROPONIN I SERPL DL<=0.01 NG/ML-MCNC: <0.006 NG/ML (ref 0–0.03)
WBC # BLD AUTO: 11.83 K/UL (ref 3.9–12.7)

## 2021-02-22 PROCEDURE — 85610 PROTHROMBIN TIME: CPT

## 2021-02-22 PROCEDURE — 85025 COMPLETE CBC W/AUTO DIFF WBC: CPT

## 2021-02-22 PROCEDURE — 99284 EMERGENCY DEPT VISIT MOD MDM: CPT | Mod: 25

## 2021-02-22 PROCEDURE — 83880 ASSAY OF NATRIURETIC PEPTIDE: CPT

## 2021-02-22 PROCEDURE — 93010 EKG 12-LEAD: ICD-10-PCS | Mod: ,,, | Performed by: INTERNAL MEDICINE

## 2021-02-22 PROCEDURE — 93010 ELECTROCARDIOGRAM REPORT: CPT | Mod: ,,, | Performed by: INTERNAL MEDICINE

## 2021-02-22 PROCEDURE — 96360 HYDRATION IV INFUSION INIT: CPT

## 2021-02-22 PROCEDURE — 80053 COMPREHEN METABOLIC PANEL: CPT

## 2021-02-22 PROCEDURE — 25000003 PHARM REV CODE 250: Performed by: NURSE PRACTITIONER

## 2021-02-22 PROCEDURE — 93005 ELECTROCARDIOGRAM TRACING: CPT

## 2021-02-22 PROCEDURE — 84484 ASSAY OF TROPONIN QUANT: CPT

## 2021-02-22 RX ORDER — FAMOTIDINE 20 MG/1
20 TABLET, FILM COATED ORAL
Status: COMPLETED | OUTPATIENT
Start: 2021-02-22 | End: 2021-02-22

## 2021-02-22 RX ORDER — ACETAMINOPHEN 500 MG
1000 TABLET ORAL EVERY 8 HOURS PRN
Qty: 20 TABLET | Refills: 0 | Status: SHIPPED | OUTPATIENT
Start: 2021-02-22

## 2021-02-22 RX ORDER — OMEPRAZOLE 20 MG/1
20 CAPSULE, DELAYED RELEASE ORAL DAILY
Qty: 30 CAPSULE | Refills: 0 | Status: SHIPPED | OUTPATIENT
Start: 2021-02-22 | End: 2021-11-15

## 2021-02-22 RX ORDER — ACETAMINOPHEN 500 MG
1000 TABLET ORAL EVERY 8 HOURS PRN
Qty: 20 TABLET | Refills: 0 | Status: SHIPPED | OUTPATIENT
Start: 2021-02-22 | End: 2021-02-22 | Stop reason: SDUPTHER

## 2021-02-22 RX ORDER — FAMOTIDINE 20 MG/1
20 TABLET, FILM COATED ORAL 2 TIMES DAILY
Qty: 60 TABLET | Refills: 0 | Status: SHIPPED | OUTPATIENT
Start: 2021-02-22 | End: 2021-02-22 | Stop reason: ALTCHOICE

## 2021-02-22 RX ORDER — SUCRALFATE 1 G/10ML
1 SUSPENSION ORAL 4 TIMES DAILY PRN
Qty: 414 ML | Refills: 0 | Status: SHIPPED | OUTPATIENT
Start: 2021-02-22 | End: 2021-02-22 | Stop reason: ALTCHOICE

## 2021-02-22 RX ADMIN — SODIUM CHLORIDE 1000 ML: 0.9 INJECTION, SOLUTION INTRAVENOUS at 12:02

## 2021-02-22 RX ADMIN — FAMOTIDINE 20 MG: 20 TABLET ORAL at 12:02

## 2021-02-23 ENCOUNTER — TELEPHONE (OUTPATIENT)
Dept: EMERGENCY MEDICINE | Facility: OTHER | Age: 66
End: 2021-02-23

## 2021-03-29 ENCOUNTER — TELEPHONE (OUTPATIENT)
Dept: PLASTIC SURGERY | Facility: CLINIC | Age: 66
End: 2021-03-29

## 2021-03-29 ENCOUNTER — PATIENT OUTREACH (OUTPATIENT)
Dept: ADMINISTRATIVE | Facility: OTHER | Age: 66
End: 2021-03-29

## 2021-03-30 ENCOUNTER — OFFICE VISIT (OUTPATIENT)
Dept: PODIATRY | Facility: CLINIC | Age: 66
End: 2021-03-30
Payer: COMMERCIAL

## 2021-03-30 VITALS
SYSTOLIC BLOOD PRESSURE: 139 MMHG | WEIGHT: 185 LBS | BODY MASS INDEX: 29.73 KG/M2 | HEART RATE: 117 BPM | DIASTOLIC BLOOD PRESSURE: 93 MMHG | HEIGHT: 66 IN

## 2021-03-30 DIAGNOSIS — M79.672 LEFT FOOT PAIN: Primary | ICD-10-CM

## 2021-03-30 DIAGNOSIS — M25.572 LEFT ANKLE PAIN, UNSPECIFIED CHRONICITY: ICD-10-CM

## 2021-03-30 PROCEDURE — 1126F PR PAIN SEVERITY QUANTIFIED, NO PAIN PRESENT: ICD-10-PCS | Mod: S$GLB,,, | Performed by: PODIATRIST

## 2021-03-30 PROCEDURE — 99202 PR OFFICE/OUTPT VISIT, NEW, LEVL II, 15-29 MIN: ICD-10-PCS | Mod: S$GLB,,, | Performed by: PODIATRIST

## 2021-03-30 PROCEDURE — 1126F AMNT PAIN NOTED NONE PRSNT: CPT | Mod: S$GLB,,, | Performed by: PODIATRIST

## 2021-03-30 PROCEDURE — 3008F BODY MASS INDEX DOCD: CPT | Mod: CPTII,S$GLB,, | Performed by: PODIATRIST

## 2021-03-30 PROCEDURE — 99999 PR PBB SHADOW E&M-EST. PATIENT-LVL IV: ICD-10-PCS | Mod: PBBFAC,,, | Performed by: PODIATRIST

## 2021-03-30 PROCEDURE — 3008F PR BODY MASS INDEX (BMI) DOCUMENTED: ICD-10-PCS | Mod: CPTII,S$GLB,, | Performed by: PODIATRIST

## 2021-03-30 PROCEDURE — 99999 PR PBB SHADOW E&M-EST. PATIENT-LVL IV: CPT | Mod: PBBFAC,,, | Performed by: PODIATRIST

## 2021-03-30 PROCEDURE — 99202 OFFICE O/P NEW SF 15 MIN: CPT | Mod: S$GLB,,, | Performed by: PODIATRIST

## 2021-03-31 ENCOUNTER — TELEPHONE (OUTPATIENT)
Dept: PLASTIC SURGERY | Facility: CLINIC | Age: 66
End: 2021-03-31

## 2021-03-31 ENCOUNTER — APPOINTMENT (OUTPATIENT)
Dept: RADIOLOGY | Facility: OTHER | Age: 66
End: 2021-03-31
Attending: PODIATRIST
Payer: COMMERCIAL

## 2021-03-31 DIAGNOSIS — M25.572 LEFT ANKLE PAIN, UNSPECIFIED CHRONICITY: ICD-10-CM

## 2021-03-31 DIAGNOSIS — M79.672 LEFT FOOT PAIN: ICD-10-CM

## 2021-03-31 PROCEDURE — 73610 X-RAY EXAM OF ANKLE: CPT | Mod: 26,LT,, | Performed by: RADIOLOGY

## 2021-03-31 PROCEDURE — 73610 XR ANKLE COMPLETE 3 VIEW LEFT: ICD-10-PCS | Mod: 26,LT,, | Performed by: RADIOLOGY

## 2021-03-31 PROCEDURE — 73610 X-RAY EXAM OF ANKLE: CPT | Mod: TC,LT

## 2021-04-07 ENCOUNTER — OFFICE VISIT (OUTPATIENT)
Dept: PLASTIC SURGERY | Facility: CLINIC | Age: 66
End: 2021-04-07
Payer: COMMERCIAL

## 2021-04-07 VITALS
SYSTOLIC BLOOD PRESSURE: 123 MMHG | HEART RATE: 78 BPM | DIASTOLIC BLOOD PRESSURE: 87 MMHG | BODY MASS INDEX: 29.72 KG/M2 | WEIGHT: 184.94 LBS | HEIGHT: 66 IN

## 2021-04-07 DIAGNOSIS — Z85.3 PERSONAL HISTORY OF BREAST CANCER: Primary | ICD-10-CM

## 2021-04-07 PROCEDURE — 99999 PR PBB SHADOW E&M-EST. PATIENT-LVL III: ICD-10-PCS | Mod: PBBFAC,,, | Performed by: SURGERY

## 2021-04-07 PROCEDURE — 99999 PR PBB SHADOW E&M-EST. PATIENT-LVL III: CPT | Mod: PBBFAC,,, | Performed by: SURGERY

## 2021-04-07 PROCEDURE — 3008F PR BODY MASS INDEX (BMI) DOCUMENTED: ICD-10-PCS | Mod: CPTII,S$GLB,, | Performed by: SURGERY

## 2021-04-07 PROCEDURE — 1125F PR PAIN SEVERITY QUANTIFIED, PAIN PRESENT: ICD-10-PCS | Mod: S$GLB,,, | Performed by: SURGERY

## 2021-04-07 PROCEDURE — 3008F BODY MASS INDEX DOCD: CPT | Mod: CPTII,S$GLB,, | Performed by: SURGERY

## 2021-04-07 PROCEDURE — 1125F AMNT PAIN NOTED PAIN PRSNT: CPT | Mod: S$GLB,,, | Performed by: SURGERY

## 2021-04-07 PROCEDURE — 99212 PR OFFICE/OUTPT VISIT, EST, LEVL II, 10-19 MIN: ICD-10-PCS | Mod: S$GLB,,, | Performed by: SURGERY

## 2021-04-07 PROCEDURE — 99212 OFFICE O/P EST SF 10 MIN: CPT | Mod: S$GLB,,, | Performed by: SURGERY

## 2021-04-29 ENCOUNTER — TELEPHONE (OUTPATIENT)
Dept: PLASTIC SURGERY | Facility: CLINIC | Age: 66
End: 2021-04-29

## 2021-05-21 ENCOUNTER — IMMUNIZATION (OUTPATIENT)
Dept: PRIMARY CARE CLINIC | Facility: CLINIC | Age: 66
End: 2021-05-21
Payer: COMMERCIAL

## 2021-05-21 DIAGNOSIS — Z23 NEED FOR VACCINATION: Primary | ICD-10-CM

## 2021-05-21 PROCEDURE — 91300 COVID-19, MRNA, LNP-S, PF, 30 MCG/0.3 ML DOSE VACCINE: CPT | Mod: PBBFAC | Performed by: INTERNAL MEDICINE

## 2021-06-07 ENCOUNTER — TELEPHONE (OUTPATIENT)
Dept: FAMILY MEDICINE | Facility: CLINIC | Age: 66
End: 2021-06-07

## 2021-06-07 DIAGNOSIS — Z12.31 ENCOUNTER FOR SCREENING MAMMOGRAM FOR BREAST CANCER: Primary | ICD-10-CM

## 2021-06-08 ENCOUNTER — TELEPHONE (OUTPATIENT)
Dept: FAMILY MEDICINE | Facility: CLINIC | Age: 66
End: 2021-06-08

## 2021-06-09 ENCOUNTER — TELEPHONE (OUTPATIENT)
Dept: FAMILY MEDICINE | Facility: CLINIC | Age: 66
End: 2021-06-09

## 2021-06-11 ENCOUNTER — IMMUNIZATION (OUTPATIENT)
Dept: PRIMARY CARE CLINIC | Facility: CLINIC | Age: 66
End: 2021-06-11
Payer: COMMERCIAL

## 2021-06-11 DIAGNOSIS — Z23 NEED FOR VACCINATION: Primary | ICD-10-CM

## 2021-06-11 PROCEDURE — 91300 COVID-19, MRNA, LNP-S, PF, 30 MCG/0.3 ML DOSE VACCINE: CPT | Mod: S$GLB,,, | Performed by: INTERNAL MEDICINE

## 2021-06-11 PROCEDURE — 0002A COVID-19, MRNA, LNP-S, PF, 30 MCG/0.3 ML DOSE VACCINE: CPT | Mod: CV19,S$GLB,, | Performed by: INTERNAL MEDICINE

## 2021-06-11 PROCEDURE — 91300 COVID-19, MRNA, LNP-S, PF, 30 MCG/0.3 ML DOSE VACCINE: ICD-10-PCS | Mod: S$GLB,,, | Performed by: INTERNAL MEDICINE

## 2021-06-11 PROCEDURE — 0002A COVID-19, MRNA, LNP-S, PF, 30 MCG/0.3 ML DOSE VACCINE: ICD-10-PCS | Mod: CV19,S$GLB,, | Performed by: INTERNAL MEDICINE

## 2021-06-14 ENCOUNTER — HOSPITAL ENCOUNTER (OUTPATIENT)
Dept: RADIOLOGY | Facility: OTHER | Age: 66
Discharge: HOME | End: 2021-06-14
Attending: FAMILY MEDICINE
Payer: COMMERCIAL

## 2021-06-14 DIAGNOSIS — Z12.31 ENCOUNTER FOR SCREENING MAMMOGRAM FOR BREAST CANCER: ICD-10-CM

## 2021-06-14 PROCEDURE — 77063 BREAST TOMOSYNTHESIS BI: CPT | Mod: 26,,, | Performed by: RADIOLOGY

## 2021-06-14 PROCEDURE — 77067 SCR MAMMO BI INCL CAD: CPT | Mod: TC

## 2021-06-14 PROCEDURE — 77067 SCR MAMMO BI INCL CAD: CPT | Mod: 26,,, | Performed by: RADIOLOGY

## 2021-06-14 PROCEDURE — 77067 MAMMO DIGITAL SCREENING LEFT WITH TOMO: ICD-10-PCS | Mod: 26,,, | Performed by: RADIOLOGY

## 2021-06-14 PROCEDURE — 77063 MAMMO DIGITAL SCREENING LEFT WITH TOMO: ICD-10-PCS | Mod: 26,,, | Performed by: RADIOLOGY

## 2021-07-14 ENCOUNTER — OFFICE VISIT (OUTPATIENT)
Dept: OBSTETRICS AND GYNECOLOGY | Facility: CLINIC | Age: 66
End: 2021-07-14
Attending: OBSTETRICS & GYNECOLOGY
Payer: COMMERCIAL

## 2021-07-14 VITALS — WEIGHT: 192.44 LBS | HEIGHT: 66 IN | BODY MASS INDEX: 30.93 KG/M2

## 2021-07-14 DIAGNOSIS — Z12.31 VISIT FOR SCREENING MAMMOGRAM: ICD-10-CM

## 2021-07-14 DIAGNOSIS — Z01.419 WELL WOMAN EXAM WITH ROUTINE GYNECOLOGICAL EXAM: Primary | ICD-10-CM

## 2021-07-14 PROCEDURE — 3008F PR BODY MASS INDEX (BMI) DOCUMENTED: ICD-10-PCS | Mod: CPTII,S$GLB,, | Performed by: OBSTETRICS & GYNECOLOGY

## 2021-07-14 PROCEDURE — 1126F AMNT PAIN NOTED NONE PRSNT: CPT | Mod: S$GLB,,, | Performed by: OBSTETRICS & GYNECOLOGY

## 2021-07-14 PROCEDURE — 3008F BODY MASS INDEX DOCD: CPT | Mod: CPTII,S$GLB,, | Performed by: OBSTETRICS & GYNECOLOGY

## 2021-07-14 PROCEDURE — 1126F PR PAIN SEVERITY QUANTIFIED, NO PAIN PRESENT: ICD-10-PCS | Mod: S$GLB,,, | Performed by: OBSTETRICS & GYNECOLOGY

## 2021-07-14 PROCEDURE — 99396 PR PREVENTIVE VISIT,EST,40-64: ICD-10-PCS | Mod: S$GLB,,, | Performed by: OBSTETRICS & GYNECOLOGY

## 2021-07-14 PROCEDURE — 99999 PR PBB SHADOW E&M-EST. PATIENT-LVL III: ICD-10-PCS | Mod: PBBFAC,,, | Performed by: OBSTETRICS & GYNECOLOGY

## 2021-07-14 PROCEDURE — 99396 PREV VISIT EST AGE 40-64: CPT | Mod: S$GLB,,, | Performed by: OBSTETRICS & GYNECOLOGY

## 2021-07-14 PROCEDURE — 99999 PR PBB SHADOW E&M-EST. PATIENT-LVL III: CPT | Mod: PBBFAC,,, | Performed by: OBSTETRICS & GYNECOLOGY

## 2021-08-09 ENCOUNTER — OFFICE VISIT (OUTPATIENT)
Dept: URGENT CARE | Facility: CLINIC | Age: 66
End: 2021-08-09
Payer: COMMERCIAL

## 2021-08-09 VITALS
HEART RATE: 129 BPM | BODY MASS INDEX: 30.86 KG/M2 | OXYGEN SATURATION: 97 % | SYSTOLIC BLOOD PRESSURE: 136 MMHG | TEMPERATURE: 98 F | WEIGHT: 192 LBS | DIASTOLIC BLOOD PRESSURE: 83 MMHG | RESPIRATION RATE: 20 BRPM | HEIGHT: 66 IN

## 2021-08-09 DIAGNOSIS — N30.00 ACUTE CYSTITIS WITHOUT HEMATURIA: Primary | ICD-10-CM

## 2021-08-09 DIAGNOSIS — M25.561 RIGHT KNEE PAIN, UNSPECIFIED CHRONICITY: ICD-10-CM

## 2021-08-09 DIAGNOSIS — R30.0 DYSURIA: ICD-10-CM

## 2021-08-09 LAB
BILIRUB UR QL STRIP: POSITIVE
GLUCOSE UR QL STRIP: NEGATIVE
KETONES UR QL STRIP: NEGATIVE
LEUKOCYTE ESTERASE UR QL STRIP: POSITIVE
PH, POC UA: 5 (ref 5–8)
POC BLOOD, URINE: NEGATIVE
POC NITRATES, URINE: POSITIVE
PROT UR QL STRIP: POSITIVE
SP GR UR STRIP: 1.02 (ref 1–1.03)
UROBILINOGEN UR STRIP-ACNC: POSITIVE (ref 0.1–1.1)

## 2021-08-09 PROCEDURE — 87088 URINE BACTERIA CULTURE: CPT | Performed by: NURSE PRACTITIONER

## 2021-08-09 PROCEDURE — 1159F PR MEDICATION LIST DOCUMENTED IN MEDICAL RECORD: ICD-10-PCS | Mod: CPTII,S$GLB,, | Performed by: NURSE PRACTITIONER

## 2021-08-09 PROCEDURE — 3008F BODY MASS INDEX DOCD: CPT | Mod: CPTII,S$GLB,, | Performed by: NURSE PRACTITIONER

## 2021-08-09 PROCEDURE — 87186 SC STD MICRODIL/AGAR DIL: CPT | Performed by: NURSE PRACTITIONER

## 2021-08-09 PROCEDURE — 87077 CULTURE AEROBIC IDENTIFY: CPT | Performed by: NURSE PRACTITIONER

## 2021-08-09 PROCEDURE — 1160F RVW MEDS BY RX/DR IN RCRD: CPT | Mod: CPTII,S$GLB,, | Performed by: NURSE PRACTITIONER

## 2021-08-09 PROCEDURE — 1159F MED LIST DOCD IN RCRD: CPT | Mod: CPTII,S$GLB,, | Performed by: NURSE PRACTITIONER

## 2021-08-09 PROCEDURE — 81003 POCT URINALYSIS, DIPSTICK, AUTOMATED, W/O SCOPE: ICD-10-PCS | Mod: QW,S$GLB,, | Performed by: NURSE PRACTITIONER

## 2021-08-09 PROCEDURE — 87086 URINE CULTURE/COLONY COUNT: CPT | Performed by: NURSE PRACTITIONER

## 2021-08-09 PROCEDURE — 3075F PR MOST RECENT SYSTOLIC BLOOD PRESS GE 130-139MM HG: ICD-10-PCS | Mod: CPTII,S$GLB,, | Performed by: NURSE PRACTITIONER

## 2021-08-09 PROCEDURE — 3079F DIAST BP 80-89 MM HG: CPT | Mod: CPTII,S$GLB,, | Performed by: NURSE PRACTITIONER

## 2021-08-09 PROCEDURE — 73562 X-RAY EXAM OF KNEE 3: CPT | Mod: RT,S$GLB,, | Performed by: RADIOLOGY

## 2021-08-09 PROCEDURE — 3008F PR BODY MASS INDEX (BMI) DOCUMENTED: ICD-10-PCS | Mod: CPTII,S$GLB,, | Performed by: NURSE PRACTITIONER

## 2021-08-09 PROCEDURE — 99214 OFFICE O/P EST MOD 30 MIN: CPT | Mod: 25,S$GLB,, | Performed by: NURSE PRACTITIONER

## 2021-08-09 PROCEDURE — 1160F PR REVIEW ALL MEDS BY PRESCRIBER/CLIN PHARMACIST DOCUMENTED: ICD-10-PCS | Mod: CPTII,S$GLB,, | Performed by: NURSE PRACTITIONER

## 2021-08-09 PROCEDURE — 99214 PR OFFICE/OUTPT VISIT, EST, LEVL IV, 30-39 MIN: ICD-10-PCS | Mod: 25,S$GLB,, | Performed by: NURSE PRACTITIONER

## 2021-08-09 PROCEDURE — 73562 XR KNEE 3 VIEW RIGHT: ICD-10-PCS | Mod: RT,S$GLB,, | Performed by: RADIOLOGY

## 2021-08-09 PROCEDURE — 81003 URINALYSIS AUTO W/O SCOPE: CPT | Mod: QW,S$GLB,, | Performed by: NURSE PRACTITIONER

## 2021-08-09 PROCEDURE — 3075F SYST BP GE 130 - 139MM HG: CPT | Mod: CPTII,S$GLB,, | Performed by: NURSE PRACTITIONER

## 2021-08-09 PROCEDURE — 3079F PR MOST RECENT DIASTOLIC BLOOD PRESSURE 80-89 MM HG: ICD-10-PCS | Mod: CPTII,S$GLB,, | Performed by: NURSE PRACTITIONER

## 2021-08-09 RX ORDER — IBUPROFEN 800 MG/1
800 TABLET ORAL 3 TIMES DAILY PRN
Qty: 20 TABLET | Refills: 0 | Status: SHIPPED | OUTPATIENT
Start: 2021-08-09 | End: 2021-11-15

## 2021-08-09 RX ORDER — NITROFURANTOIN 25; 75 MG/1; MG/1
100 CAPSULE ORAL 2 TIMES DAILY
Qty: 14 CAPSULE | Refills: 0 | Status: SHIPPED | OUTPATIENT
Start: 2021-08-09 | End: 2021-08-16

## 2021-08-09 RX ORDER — PHENAZOPYRIDINE HYDROCHLORIDE 200 MG/1
200 TABLET, FILM COATED ORAL 3 TIMES DAILY PRN
Qty: 6 TABLET | Refills: 0 | Status: SHIPPED | OUTPATIENT
Start: 2021-08-09 | End: 2021-08-11

## 2021-08-10 ENCOUNTER — TELEPHONE (OUTPATIENT)
Dept: URGENT CARE | Facility: CLINIC | Age: 66
End: 2021-08-10

## 2021-08-12 LAB — BACTERIA UR CULT: ABNORMAL

## 2021-08-13 ENCOUNTER — TELEPHONE (OUTPATIENT)
Dept: URGENT CARE | Facility: CLINIC | Age: 66
End: 2021-08-13

## 2021-08-14 ENCOUNTER — TELEPHONE (OUTPATIENT)
Dept: URGENT CARE | Facility: CLINIC | Age: 66
End: 2021-08-14

## 2021-08-14 DIAGNOSIS — N30.90 CYSTITIS: Primary | ICD-10-CM

## 2021-08-14 RX ORDER — NITROFURANTOIN 25; 75 MG/1; MG/1
100 CAPSULE ORAL 2 TIMES DAILY
Qty: 6 CAPSULE | Refills: 0 | Status: SHIPPED | OUTPATIENT
Start: 2021-08-14 | End: 2021-08-17

## 2021-08-22 ENCOUNTER — OFFICE VISIT (OUTPATIENT)
Dept: URGENT CARE | Facility: CLINIC | Age: 66
End: 2021-08-22
Payer: COMMERCIAL

## 2021-08-22 VITALS
OXYGEN SATURATION: 97 % | WEIGHT: 192 LBS | BODY MASS INDEX: 30.86 KG/M2 | DIASTOLIC BLOOD PRESSURE: 87 MMHG | HEIGHT: 66 IN | RESPIRATION RATE: 16 BRPM | TEMPERATURE: 98 F | HEART RATE: 112 BPM | SYSTOLIC BLOOD PRESSURE: 134 MMHG

## 2021-08-22 DIAGNOSIS — N30.01 ACUTE CYSTITIS WITH HEMATURIA: Primary | ICD-10-CM

## 2021-08-22 DIAGNOSIS — R30.0 DYSURIA: ICD-10-CM

## 2021-08-22 LAB
BILIRUB UR QL STRIP: NEGATIVE
GLUCOSE UR QL STRIP: NEGATIVE
KETONES UR QL STRIP: POSITIVE
LEUKOCYTE ESTERASE UR QL STRIP: POSITIVE
PH, POC UA: 5 (ref 5–8)
POC BLOOD, URINE: POSITIVE
POC NITRATES, URINE: NEGATIVE
PROT UR QL STRIP: POSITIVE
SP GR UR STRIP: 1 (ref 1–1.03)
UROBILINOGEN UR STRIP-ACNC: 1 (ref 0.1–1.1)

## 2021-08-22 PROCEDURE — 1159F PR MEDICATION LIST DOCUMENTED IN MEDICAL RECORD: ICD-10-PCS | Mod: CPTII,S$GLB,, | Performed by: NURSE PRACTITIONER

## 2021-08-22 PROCEDURE — 3075F PR MOST RECENT SYSTOLIC BLOOD PRESS GE 130-139MM HG: ICD-10-PCS | Mod: CPTII,S$GLB,, | Performed by: NURSE PRACTITIONER

## 2021-08-22 PROCEDURE — 87186 SC STD MICRODIL/AGAR DIL: CPT | Performed by: NURSE PRACTITIONER

## 2021-08-22 PROCEDURE — 81003 URINALYSIS AUTO W/O SCOPE: CPT | Mod: QW,S$GLB,, | Performed by: NURSE PRACTITIONER

## 2021-08-22 PROCEDURE — 99213 PR OFFICE/OUTPT VISIT, EST, LEVL III, 20-29 MIN: ICD-10-PCS | Mod: 25,S$GLB,, | Performed by: NURSE PRACTITIONER

## 2021-08-22 PROCEDURE — 87088 URINE BACTERIA CULTURE: CPT | Performed by: NURSE PRACTITIONER

## 2021-08-22 PROCEDURE — 99213 OFFICE O/P EST LOW 20 MIN: CPT | Mod: 25,S$GLB,, | Performed by: NURSE PRACTITIONER

## 2021-08-22 PROCEDURE — 1160F PR REVIEW ALL MEDS BY PRESCRIBER/CLIN PHARMACIST DOCUMENTED: ICD-10-PCS | Mod: CPTII,S$GLB,, | Performed by: NURSE PRACTITIONER

## 2021-08-22 PROCEDURE — 3079F PR MOST RECENT DIASTOLIC BLOOD PRESSURE 80-89 MM HG: ICD-10-PCS | Mod: CPTII,S$GLB,, | Performed by: NURSE PRACTITIONER

## 2021-08-22 PROCEDURE — 81003 POCT URINALYSIS, DIPSTICK, AUTOMATED, W/O SCOPE: ICD-10-PCS | Mod: QW,S$GLB,, | Performed by: NURSE PRACTITIONER

## 2021-08-22 PROCEDURE — 3075F SYST BP GE 130 - 139MM HG: CPT | Mod: CPTII,S$GLB,, | Performed by: NURSE PRACTITIONER

## 2021-08-22 PROCEDURE — 1159F MED LIST DOCD IN RCRD: CPT | Mod: CPTII,S$GLB,, | Performed by: NURSE PRACTITIONER

## 2021-08-22 PROCEDURE — 87077 CULTURE AEROBIC IDENTIFY: CPT | Performed by: NURSE PRACTITIONER

## 2021-08-22 PROCEDURE — 3079F DIAST BP 80-89 MM HG: CPT | Mod: CPTII,S$GLB,, | Performed by: NURSE PRACTITIONER

## 2021-08-22 PROCEDURE — 87086 URINE CULTURE/COLONY COUNT: CPT | Performed by: NURSE PRACTITIONER

## 2021-08-22 PROCEDURE — 3008F BODY MASS INDEX DOCD: CPT | Mod: CPTII,S$GLB,, | Performed by: NURSE PRACTITIONER

## 2021-08-22 PROCEDURE — 1160F RVW MEDS BY RX/DR IN RCRD: CPT | Mod: CPTII,S$GLB,, | Performed by: NURSE PRACTITIONER

## 2021-08-22 PROCEDURE — 3008F PR BODY MASS INDEX (BMI) DOCUMENTED: ICD-10-PCS | Mod: CPTII,S$GLB,, | Performed by: NURSE PRACTITIONER

## 2021-08-22 RX ORDER — SULFAMETHOXAZOLE AND TRIMETHOPRIM 800; 160 MG/1; MG/1
1 TABLET ORAL 2 TIMES DAILY
Qty: 14 TABLET | Refills: 0 | Status: SHIPPED | OUTPATIENT
Start: 2021-08-22 | End: 2021-08-22 | Stop reason: SDUPTHER

## 2021-08-22 RX ORDER — SULFAMETHOXAZOLE AND TRIMETHOPRIM 800; 160 MG/1; MG/1
1 TABLET ORAL 2 TIMES DAILY
Qty: 14 TABLET | Refills: 0 | Status: SHIPPED | OUTPATIENT
Start: 2021-08-22 | End: 2021-10-09

## 2021-08-22 RX ORDER — PHENAZOPYRIDINE HYDROCHLORIDE 200 MG/1
200 TABLET, FILM COATED ORAL 3 TIMES DAILY PRN
Qty: 6 TABLET | Refills: 0 | Status: SHIPPED | OUTPATIENT
Start: 2021-08-22 | End: 2021-08-24

## 2021-08-22 RX ORDER — IBUPROFEN 800 MG/1
800 TABLET ORAL 3 TIMES DAILY
Qty: 6 TABLET | Refills: 0 | OUTPATIENT
Start: 2021-08-22 | End: 2021-12-17

## 2021-08-25 ENCOUNTER — TELEPHONE (OUTPATIENT)
Dept: URGENT CARE | Facility: CLINIC | Age: 66
End: 2021-08-25

## 2021-08-25 LAB — BACTERIA UR CULT: ABNORMAL

## 2021-08-28 ENCOUNTER — TELEPHONE (OUTPATIENT)
Dept: URGENT CARE | Facility: CLINIC | Age: 66
End: 2021-08-28

## 2021-09-14 ENCOUNTER — OFFICE VISIT (OUTPATIENT)
Dept: URGENT CARE | Facility: CLINIC | Age: 66
End: 2021-09-14
Payer: COMMERCIAL

## 2021-09-14 VITALS
BODY MASS INDEX: 30.53 KG/M2 | TEMPERATURE: 99 F | SYSTOLIC BLOOD PRESSURE: 120 MMHG | RESPIRATION RATE: 20 BRPM | OXYGEN SATURATION: 96 % | WEIGHT: 190 LBS | HEART RATE: 115 BPM | DIASTOLIC BLOOD PRESSURE: 77 MMHG | HEIGHT: 66 IN

## 2021-09-14 DIAGNOSIS — N39.0 URINARY TRACT INFECTION WITHOUT HEMATURIA, SITE UNSPECIFIED: Primary | ICD-10-CM

## 2021-09-14 DIAGNOSIS — N39.0 RECURRENT UTI: ICD-10-CM

## 2021-09-14 LAB
BILIRUB UR QL STRIP: POSITIVE
GLUCOSE UR QL STRIP: NEGATIVE
KETONES UR QL STRIP: NEGATIVE
LEUKOCYTE ESTERASE UR QL STRIP: POSITIVE
PH, POC UA: 5 (ref 5–8)
POC BLOOD, URINE: NEGATIVE
POC NITRATES, URINE: POSITIVE
PROT UR QL STRIP: POSITIVE
SP GR UR STRIP: 1 (ref 1–1.03)
UROBILINOGEN UR STRIP-ACNC: 4 (ref 0.1–1.1)

## 2021-09-14 PROCEDURE — 3078F PR MOST RECENT DIASTOLIC BLOOD PRESSURE < 80 MM HG: ICD-10-PCS | Mod: CPTII,S$GLB,, | Performed by: PHYSICIAN ASSISTANT

## 2021-09-14 PROCEDURE — 87086 URINE CULTURE/COLONY COUNT: CPT | Performed by: PHYSICIAN ASSISTANT

## 2021-09-14 PROCEDURE — 3008F PR BODY MASS INDEX (BMI) DOCUMENTED: ICD-10-PCS | Mod: CPTII,S$GLB,, | Performed by: PHYSICIAN ASSISTANT

## 2021-09-14 PROCEDURE — 3074F SYST BP LT 130 MM HG: CPT | Mod: CPTII,S$GLB,, | Performed by: PHYSICIAN ASSISTANT

## 2021-09-14 PROCEDURE — 3074F PR MOST RECENT SYSTOLIC BLOOD PRESSURE < 130 MM HG: ICD-10-PCS | Mod: CPTII,S$GLB,, | Performed by: PHYSICIAN ASSISTANT

## 2021-09-14 PROCEDURE — 3008F BODY MASS INDEX DOCD: CPT | Mod: CPTII,S$GLB,, | Performed by: PHYSICIAN ASSISTANT

## 2021-09-14 PROCEDURE — 1159F PR MEDICATION LIST DOCUMENTED IN MEDICAL RECORD: ICD-10-PCS | Mod: CPTII,S$GLB,, | Performed by: PHYSICIAN ASSISTANT

## 2021-09-14 PROCEDURE — 1160F RVW MEDS BY RX/DR IN RCRD: CPT | Mod: CPTII,S$GLB,, | Performed by: PHYSICIAN ASSISTANT

## 2021-09-14 PROCEDURE — 3078F DIAST BP <80 MM HG: CPT | Mod: CPTII,S$GLB,, | Performed by: PHYSICIAN ASSISTANT

## 2021-09-14 PROCEDURE — 99214 PR OFFICE/OUTPT VISIT, EST, LEVL IV, 30-39 MIN: ICD-10-PCS | Mod: 25,S$GLB,, | Performed by: PHYSICIAN ASSISTANT

## 2021-09-14 PROCEDURE — 1159F MED LIST DOCD IN RCRD: CPT | Mod: CPTII,S$GLB,, | Performed by: PHYSICIAN ASSISTANT

## 2021-09-14 PROCEDURE — 99214 OFFICE O/P EST MOD 30 MIN: CPT | Mod: 25,S$GLB,, | Performed by: PHYSICIAN ASSISTANT

## 2021-09-14 PROCEDURE — 81003 URINALYSIS AUTO W/O SCOPE: CPT | Mod: QW,S$GLB,, | Performed by: PHYSICIAN ASSISTANT

## 2021-09-14 PROCEDURE — 1160F PR REVIEW ALL MEDS BY PRESCRIBER/CLIN PHARMACIST DOCUMENTED: ICD-10-PCS | Mod: CPTII,S$GLB,, | Performed by: PHYSICIAN ASSISTANT

## 2021-09-14 PROCEDURE — 81003 POCT URINALYSIS, DIPSTICK, AUTOMATED, W/O SCOPE: ICD-10-PCS | Mod: QW,S$GLB,, | Performed by: PHYSICIAN ASSISTANT

## 2021-09-14 RX ORDER — CIPROFLOXACIN 500 MG/1
500 TABLET ORAL 2 TIMES DAILY
Qty: 14 TABLET | Refills: 0 | Status: SHIPPED | OUTPATIENT
Start: 2021-09-14 | End: 2021-09-21

## 2021-09-15 ENCOUNTER — TELEPHONE (OUTPATIENT)
Dept: URGENT CARE | Facility: CLINIC | Age: 66
End: 2021-09-15

## 2021-09-15 LAB
BACTERIA UR CULT: NORMAL
BACTERIA UR CULT: NORMAL

## 2021-10-09 ENCOUNTER — OFFICE VISIT (OUTPATIENT)
Dept: URGENT CARE | Facility: CLINIC | Age: 66
End: 2021-10-09
Payer: COMMERCIAL

## 2021-10-09 VITALS
HEIGHT: 66 IN | SYSTOLIC BLOOD PRESSURE: 118 MMHG | OXYGEN SATURATION: 96 % | WEIGHT: 175 LBS | RESPIRATION RATE: 16 BRPM | TEMPERATURE: 98 F | HEART RATE: 117 BPM | DIASTOLIC BLOOD PRESSURE: 80 MMHG | BODY MASS INDEX: 28.12 KG/M2

## 2021-10-09 DIAGNOSIS — N30.00 ACUTE CYSTITIS WITHOUT HEMATURIA: ICD-10-CM

## 2021-10-09 DIAGNOSIS — R30.9 URINARY PAIN: Primary | ICD-10-CM

## 2021-10-09 LAB
BILIRUB UR QL STRIP: POSITIVE
GLUCOSE UR QL STRIP: NEGATIVE
KETONES UR QL STRIP: NEGATIVE
LEUKOCYTE ESTERASE UR QL STRIP: POSITIVE
PH, POC UA: 5.5 (ref 5–8)
POC BLOOD, URINE: POSITIVE
POC NITRATES, URINE: POSITIVE
PROT UR QL STRIP: POSITIVE
SP GR UR STRIP: 1.02 (ref 1–1.03)
UROBILINOGEN UR STRIP-ACNC: POSITIVE (ref 0.1–1.1)

## 2021-10-09 PROCEDURE — 99213 OFFICE O/P EST LOW 20 MIN: CPT | Mod: 25,S$GLB,,

## 2021-10-09 PROCEDURE — 99213 PR OFFICE/OUTPT VISIT, EST, LEVL III, 20-29 MIN: ICD-10-PCS | Mod: 25,S$GLB,,

## 2021-10-09 PROCEDURE — 81003 POCT URINALYSIS, DIPSTICK, AUTOMATED, W/O SCOPE: ICD-10-PCS | Mod: QW,S$GLB,,

## 2021-10-09 PROCEDURE — 3079F DIAST BP 80-89 MM HG: CPT | Mod: CPTII,S$GLB,,

## 2021-10-09 PROCEDURE — 87086 URINE CULTURE/COLONY COUNT: CPT

## 2021-10-09 PROCEDURE — 3008F PR BODY MASS INDEX (BMI) DOCUMENTED: ICD-10-PCS | Mod: CPTII,S$GLB,,

## 2021-10-09 PROCEDURE — 81003 URINALYSIS AUTO W/O SCOPE: CPT | Mod: QW,S$GLB,,

## 2021-10-09 PROCEDURE — 3074F PR MOST RECENT SYSTOLIC BLOOD PRESSURE < 130 MM HG: ICD-10-PCS | Mod: CPTII,S$GLB,,

## 2021-10-09 PROCEDURE — 87186 SC STD MICRODIL/AGAR DIL: CPT

## 2021-10-09 PROCEDURE — 1159F MED LIST DOCD IN RCRD: CPT | Mod: CPTII,S$GLB,,

## 2021-10-09 PROCEDURE — 3074F SYST BP LT 130 MM HG: CPT | Mod: CPTII,S$GLB,,

## 2021-10-09 PROCEDURE — 87077 CULTURE AEROBIC IDENTIFY: CPT

## 2021-10-09 PROCEDURE — 1159F PR MEDICATION LIST DOCUMENTED IN MEDICAL RECORD: ICD-10-PCS | Mod: CPTII,S$GLB,,

## 2021-10-09 PROCEDURE — 87088 URINE BACTERIA CULTURE: CPT

## 2021-10-09 PROCEDURE — 3079F PR MOST RECENT DIASTOLIC BLOOD PRESSURE 80-89 MM HG: ICD-10-PCS | Mod: CPTII,S$GLB,,

## 2021-10-09 PROCEDURE — 3008F BODY MASS INDEX DOCD: CPT | Mod: CPTII,S$GLB,,

## 2021-10-09 RX ORDER — NITROFURANTOIN 25; 75 MG/1; MG/1
100 CAPSULE ORAL 2 TIMES DAILY
Qty: 14 CAPSULE | Refills: 0 | Status: SHIPPED | OUTPATIENT
Start: 2021-10-09 | End: 2021-10-09

## 2021-10-09 RX ORDER — NITROFURANTOIN 25; 75 MG/1; MG/1
100 CAPSULE ORAL 2 TIMES DAILY
Qty: 14 CAPSULE | Refills: 0 | Status: SHIPPED | OUTPATIENT
Start: 2021-10-09 | End: 2021-10-16

## 2021-10-09 RX ORDER — PHENAZOPYRIDINE HYDROCHLORIDE 200 MG/1
200 TABLET, FILM COATED ORAL 3 TIMES DAILY PRN
Qty: 30 TABLET | Refills: 0 | Status: SHIPPED | OUTPATIENT
Start: 2021-10-09 | End: 2021-10-09

## 2021-10-09 RX ORDER — PHENAZOPYRIDINE HYDROCHLORIDE 200 MG/1
200 TABLET, FILM COATED ORAL 3 TIMES DAILY PRN
Qty: 30 TABLET | Refills: 0 | Status: SHIPPED | OUTPATIENT
Start: 2021-10-09 | End: 2021-10-19

## 2021-10-11 LAB — BACTERIA UR CULT: ABNORMAL

## 2021-10-13 ENCOUNTER — TELEPHONE (OUTPATIENT)
Dept: URGENT CARE | Facility: CLINIC | Age: 66
End: 2021-10-13

## 2021-10-13 DIAGNOSIS — N39.0 URINARY TRACT INFECTION WITH HEMATURIA, SITE UNSPECIFIED: Primary | ICD-10-CM

## 2021-10-13 DIAGNOSIS — R31.9 URINARY TRACT INFECTION WITH HEMATURIA, SITE UNSPECIFIED: Primary | ICD-10-CM

## 2021-10-13 RX ORDER — AMOXICILLIN AND CLAVULANATE POTASSIUM 875; 125 MG/1; MG/1
1 TABLET, FILM COATED ORAL 2 TIMES DAILY
Qty: 10 TABLET | Refills: 0 | Status: SHIPPED | OUTPATIENT
Start: 2021-10-13 | End: 2021-10-18

## 2021-10-18 ENCOUNTER — OFFICE VISIT (OUTPATIENT)
Dept: UROLOGY | Facility: CLINIC | Age: 66
End: 2021-10-18
Payer: COMMERCIAL

## 2021-10-18 VITALS
WEIGHT: 181 LBS | DIASTOLIC BLOOD PRESSURE: 95 MMHG | SYSTOLIC BLOOD PRESSURE: 146 MMHG | BODY MASS INDEX: 29.09 KG/M2 | HEART RATE: 122 BPM | HEIGHT: 66 IN

## 2021-10-18 DIAGNOSIS — N39.0 RECURRENT UTI: Primary | ICD-10-CM

## 2021-10-18 PROCEDURE — 3008F BODY MASS INDEX DOCD: CPT | Mod: CPTII,S$GLB,, | Performed by: UROLOGY

## 2021-10-18 PROCEDURE — 99999 PR PBB SHADOW E&M-EST. PATIENT-LVL III: CPT | Mod: PBBFAC,,, | Performed by: UROLOGY

## 2021-10-18 PROCEDURE — 99205 PR OFFICE/OUTPT VISIT, NEW, LEVL V, 60-74 MIN: ICD-10-PCS | Mod: S$GLB,,, | Performed by: UROLOGY

## 2021-10-18 PROCEDURE — 1159F MED LIST DOCD IN RCRD: CPT | Mod: CPTII,S$GLB,, | Performed by: UROLOGY

## 2021-10-18 PROCEDURE — 3077F PR MOST RECENT SYSTOLIC BLOOD PRESSURE >= 140 MM HG: ICD-10-PCS | Mod: CPTII,S$GLB,, | Performed by: UROLOGY

## 2021-10-18 PROCEDURE — 3080F DIAST BP >= 90 MM HG: CPT | Mod: CPTII,S$GLB,, | Performed by: UROLOGY

## 2021-10-18 PROCEDURE — 1159F PR MEDICATION LIST DOCUMENTED IN MEDICAL RECORD: ICD-10-PCS | Mod: CPTII,S$GLB,, | Performed by: UROLOGY

## 2021-10-18 PROCEDURE — 3077F SYST BP >= 140 MM HG: CPT | Mod: CPTII,S$GLB,, | Performed by: UROLOGY

## 2021-10-18 PROCEDURE — 3080F PR MOST RECENT DIASTOLIC BLOOD PRESSURE >= 90 MM HG: ICD-10-PCS | Mod: CPTII,S$GLB,, | Performed by: UROLOGY

## 2021-10-18 PROCEDURE — 1160F RVW MEDS BY RX/DR IN RCRD: CPT | Mod: CPTII,S$GLB,, | Performed by: UROLOGY

## 2021-10-18 PROCEDURE — 1160F PR REVIEW ALL MEDS BY PRESCRIBER/CLIN PHARMACIST DOCUMENTED: ICD-10-PCS | Mod: CPTII,S$GLB,, | Performed by: UROLOGY

## 2021-10-18 PROCEDURE — 99999 PR PBB SHADOW E&M-EST. PATIENT-LVL III: ICD-10-PCS | Mod: PBBFAC,,, | Performed by: UROLOGY

## 2021-10-18 PROCEDURE — 99205 OFFICE O/P NEW HI 60 MIN: CPT | Mod: S$GLB,,, | Performed by: UROLOGY

## 2021-10-18 PROCEDURE — 3008F PR BODY MASS INDEX (BMI) DOCUMENTED: ICD-10-PCS | Mod: CPTII,S$GLB,, | Performed by: UROLOGY

## 2021-10-25 ENCOUNTER — HOSPITAL ENCOUNTER (OUTPATIENT)
Dept: RADIOLOGY | Facility: HOSPITAL | Age: 66
Discharge: HOME | End: 2021-10-25
Attending: UROLOGY
Payer: COMMERCIAL

## 2021-10-25 DIAGNOSIS — N39.0 RECURRENT UTI: ICD-10-CM

## 2021-10-25 PROCEDURE — 76770 US RETROPERITONEAL COMPLETE: ICD-10-PCS | Mod: 26,,, | Performed by: RADIOLOGY

## 2021-10-25 PROCEDURE — 76770 US EXAM ABDO BACK WALL COMP: CPT | Mod: 26,,, | Performed by: RADIOLOGY

## 2021-10-25 PROCEDURE — 76770 US EXAM ABDO BACK WALL COMP: CPT | Mod: TC

## 2021-10-28 ENCOUNTER — PROCEDURE VISIT (OUTPATIENT)
Dept: UROLOGY | Facility: CLINIC | Age: 66
End: 2021-10-28
Payer: COMMERCIAL

## 2021-10-28 VITALS
SYSTOLIC BLOOD PRESSURE: 139 MMHG | DIASTOLIC BLOOD PRESSURE: 91 MMHG | HEIGHT: 66 IN | RESPIRATION RATE: 16 BRPM | WEIGHT: 177.63 LBS | TEMPERATURE: 98 F | HEART RATE: 131 BPM | BODY MASS INDEX: 28.55 KG/M2

## 2021-10-28 DIAGNOSIS — N39.0 RECURRENT UTI: ICD-10-CM

## 2021-10-28 RX ORDER — LIDOCAINE HYDROCHLORIDE 20 MG/ML
JELLY TOPICAL
Status: COMPLETED | OUTPATIENT
Start: 2021-10-28 | End: 2021-10-28

## 2021-10-28 RX ADMIN — LIDOCAINE HYDROCHLORIDE: 20 JELLY TOPICAL at 03:10

## 2021-11-15 ENCOUNTER — OFFICE VISIT (OUTPATIENT)
Dept: FAMILY MEDICINE | Facility: CLINIC | Age: 66
End: 2021-11-15
Payer: COMMERCIAL

## 2021-11-15 ENCOUNTER — LAB VISIT (OUTPATIENT)
Dept: LAB | Facility: HOSPITAL | Age: 66
End: 2021-11-15
Attending: FAMILY MEDICINE
Payer: COMMERCIAL

## 2021-11-15 VITALS
HEIGHT: 66 IN | BODY MASS INDEX: 28.45 KG/M2 | TEMPERATURE: 98 F | WEIGHT: 177 LBS | HEART RATE: 112 BPM | OXYGEN SATURATION: 99 % | SYSTOLIC BLOOD PRESSURE: 136 MMHG | DIASTOLIC BLOOD PRESSURE: 86 MMHG

## 2021-11-15 DIAGNOSIS — Z17.0 MALIGNANT NEOPLASM OF OVERLAPPING SITES OF RIGHT BREAST IN FEMALE, ESTROGEN RECEPTOR POSITIVE: ICD-10-CM

## 2021-11-15 DIAGNOSIS — E78.5 DYSLIPIDEMIA: ICD-10-CM

## 2021-11-15 DIAGNOSIS — C50.811 MALIGNANT NEOPLASM OF OVERLAPPING SITES OF RIGHT BREAST IN FEMALE, ESTROGEN RECEPTOR POSITIVE: ICD-10-CM

## 2021-11-15 DIAGNOSIS — Z00.00 ROUTINE GENERAL MEDICAL EXAMINATION AT A HEALTH CARE FACILITY: Primary | ICD-10-CM

## 2021-11-15 DIAGNOSIS — Z00.00 ROUTINE GENERAL MEDICAL EXAMINATION AT A HEALTH CARE FACILITY: ICD-10-CM

## 2021-11-15 DIAGNOSIS — R73.01 IFG (IMPAIRED FASTING GLUCOSE): ICD-10-CM

## 2021-11-15 LAB
ALBUMIN SERPL BCP-MCNC: 3.8 G/DL (ref 3.5–5.2)
ALP SERPL-CCNC: 74 U/L (ref 55–135)
ALT SERPL W/O P-5'-P-CCNC: 29 U/L (ref 10–44)
ANION GAP SERPL CALC-SCNC: 11 MMOL/L (ref 8–16)
AST SERPL-CCNC: 38 U/L (ref 10–40)
BASOPHILS # BLD AUTO: 0.08 K/UL (ref 0–0.2)
BASOPHILS NFR BLD: 1.2 % (ref 0–1.9)
BILIRUB SERPL-MCNC: 0.4 MG/DL (ref 0.1–1)
BUN SERPL-MCNC: 11 MG/DL (ref 8–23)
CALCIUM SERPL-MCNC: 9.7 MG/DL (ref 8.7–10.5)
CHLORIDE SERPL-SCNC: 107 MMOL/L (ref 95–110)
CHOLEST SERPL-MCNC: 213 MG/DL (ref 120–199)
CHOLEST/HDLC SERPL: 6.9 {RATIO} (ref 2–5)
CO2 SERPL-SCNC: 23 MMOL/L (ref 23–29)
CREAT SERPL-MCNC: 0.8 MG/DL (ref 0.5–1.4)
DIFFERENTIAL METHOD: ABNORMAL
EOSINOPHIL # BLD AUTO: 0.4 K/UL (ref 0–0.5)
EOSINOPHIL NFR BLD: 5.2 % (ref 0–8)
ERYTHROCYTE [DISTWIDTH] IN BLOOD BY AUTOMATED COUNT: 13.4 % (ref 11.5–14.5)
EST. GFR  (AFRICAN AMERICAN): >60 ML/MIN/1.73 M^2
EST. GFR  (NON AFRICAN AMERICAN): >60 ML/MIN/1.73 M^2
ESTIMATED AVG GLUCOSE: 120 MG/DL (ref 68–131)
GLUCOSE SERPL-MCNC: 95 MG/DL (ref 70–110)
HBA1C MFR BLD: 5.8 % (ref 4–5.6)
HCT VFR BLD AUTO: 40.6 % (ref 37–48.5)
HDLC SERPL-MCNC: 31 MG/DL (ref 40–75)
HDLC SERPL: 14.6 % (ref 20–50)
HGB BLD-MCNC: 12.2 G/DL (ref 12–16)
IMM GRANULOCYTES # BLD AUTO: 0.02 K/UL (ref 0–0.04)
IMM GRANULOCYTES NFR BLD AUTO: 0.3 % (ref 0–0.5)
LDLC SERPL CALC-MCNC: 134.4 MG/DL (ref 63–159)
LYMPHOCYTES # BLD AUTO: 2.1 K/UL (ref 1–4.8)
LYMPHOCYTES NFR BLD: 30.4 % (ref 18–48)
MCH RBC QN AUTO: 26.8 PG (ref 27–31)
MCHC RBC AUTO-ENTMCNC: 30 G/DL (ref 32–36)
MCV RBC AUTO: 89 FL (ref 82–98)
MONOCYTES # BLD AUTO: 0.6 K/UL (ref 0.3–1)
MONOCYTES NFR BLD: 8.2 % (ref 4–15)
NEUTROPHILS # BLD AUTO: 3.8 K/UL (ref 1.8–7.7)
NEUTROPHILS NFR BLD: 54.7 % (ref 38–73)
NONHDLC SERPL-MCNC: 182 MG/DL
NRBC BLD-RTO: 0 /100 WBC
PLATELET # BLD AUTO: 396 K/UL (ref 150–450)
PMV BLD AUTO: 9.4 FL (ref 9.2–12.9)
POTASSIUM SERPL-SCNC: 3.8 MMOL/L (ref 3.5–5.1)
PROT SERPL-MCNC: 7.8 G/DL (ref 6–8.4)
RBC # BLD AUTO: 4.56 M/UL (ref 4–5.4)
SODIUM SERPL-SCNC: 141 MMOL/L (ref 136–145)
TRIGL SERPL-MCNC: 238 MG/DL (ref 30–150)
TSH SERPL DL<=0.005 MIU/L-ACNC: 0.63 UIU/ML (ref 0.4–4)
WBC # BLD AUTO: 6.87 K/UL (ref 3.9–12.7)

## 2021-11-15 PROCEDURE — 3075F SYST BP GE 130 - 139MM HG: CPT | Mod: CPTII,S$GLB,, | Performed by: FAMILY MEDICINE

## 2021-11-15 PROCEDURE — 1159F PR MEDICATION LIST DOCUMENTED IN MEDICAL RECORD: ICD-10-PCS | Mod: CPTII,S$GLB,, | Performed by: FAMILY MEDICINE

## 2021-11-15 PROCEDURE — 3075F PR MOST RECENT SYSTOLIC BLOOD PRESS GE 130-139MM HG: ICD-10-PCS | Mod: CPTII,S$GLB,, | Performed by: FAMILY MEDICINE

## 2021-11-15 PROCEDURE — 85025 COMPLETE CBC W/AUTO DIFF WBC: CPT | Performed by: FAMILY MEDICINE

## 2021-11-15 PROCEDURE — 99999 PR PBB SHADOW E&M-EST. PATIENT-LVL III: ICD-10-PCS | Mod: PBBFAC,,, | Performed by: FAMILY MEDICINE

## 2021-11-15 PROCEDURE — 3008F BODY MASS INDEX DOCD: CPT | Mod: CPTII,S$GLB,, | Performed by: FAMILY MEDICINE

## 2021-11-15 PROCEDURE — 1159F MED LIST DOCD IN RCRD: CPT | Mod: CPTII,S$GLB,, | Performed by: FAMILY MEDICINE

## 2021-11-15 PROCEDURE — 99396 PR PREVENTIVE VISIT,EST,40-64: ICD-10-PCS | Mod: S$GLB,,, | Performed by: FAMILY MEDICINE

## 2021-11-15 PROCEDURE — 83036 HEMOGLOBIN GLYCOSYLATED A1C: CPT | Performed by: FAMILY MEDICINE

## 2021-11-15 PROCEDURE — 1160F RVW MEDS BY RX/DR IN RCRD: CPT | Mod: CPTII,S$GLB,, | Performed by: FAMILY MEDICINE

## 2021-11-15 PROCEDURE — 3079F DIAST BP 80-89 MM HG: CPT | Mod: CPTII,S$GLB,, | Performed by: FAMILY MEDICINE

## 2021-11-15 PROCEDURE — 3079F PR MOST RECENT DIASTOLIC BLOOD PRESSURE 80-89 MM HG: ICD-10-PCS | Mod: CPTII,S$GLB,, | Performed by: FAMILY MEDICINE

## 2021-11-15 PROCEDURE — 80053 COMPREHEN METABOLIC PANEL: CPT | Performed by: FAMILY MEDICINE

## 2021-11-15 PROCEDURE — 3008F PR BODY MASS INDEX (BMI) DOCUMENTED: ICD-10-PCS | Mod: CPTII,S$GLB,, | Performed by: FAMILY MEDICINE

## 2021-11-15 PROCEDURE — 99999 PR PBB SHADOW E&M-EST. PATIENT-LVL III: CPT | Mod: PBBFAC,,, | Performed by: FAMILY MEDICINE

## 2021-11-15 PROCEDURE — 36415 COLL VENOUS BLD VENIPUNCTURE: CPT | Performed by: FAMILY MEDICINE

## 2021-11-15 PROCEDURE — 1160F PR REVIEW ALL MEDS BY PRESCRIBER/CLIN PHARMACIST DOCUMENTED: ICD-10-PCS | Mod: CPTII,S$GLB,, | Performed by: FAMILY MEDICINE

## 2021-11-15 PROCEDURE — 99396 PREV VISIT EST AGE 40-64: CPT | Mod: S$GLB,,, | Performed by: FAMILY MEDICINE

## 2021-11-15 PROCEDURE — 84443 ASSAY THYROID STIM HORMONE: CPT | Performed by: FAMILY MEDICINE

## 2021-11-15 PROCEDURE — 80061 LIPID PANEL: CPT | Performed by: FAMILY MEDICINE

## 2021-11-15 RX ORDER — HYDROXYZINE HYDROCHLORIDE 25 MG/1
TABLET, FILM COATED ORAL
Qty: 30 TABLET | Refills: 2 | Status: SHIPPED | OUTPATIENT
Start: 2021-11-15 | End: 2022-08-25

## 2021-11-15 RX ORDER — CYPROHEPTADINE HYDROCHLORIDE 2 MG/5ML
4 SOLUTION ORAL
Qty: 473 ML | Refills: 1 | Status: SHIPPED | OUTPATIENT
Start: 2021-11-15 | End: 2022-05-31 | Stop reason: CLARIF

## 2021-12-10 NOTE — TELEPHONE ENCOUNTER
Attempted to return call for symtpoms.  Sounded like someone picked up but no one answered me saying hello.  Called back and went straight to voicemail.  Trying to call to get symptoms.    yes

## 2021-12-17 ENCOUNTER — HOSPITAL ENCOUNTER (EMERGENCY)
Facility: OTHER | Age: 66
Discharge: HOME | End: 2021-12-17
Attending: EMERGENCY MEDICINE
Payer: COMMERCIAL

## 2021-12-17 VITALS
DIASTOLIC BLOOD PRESSURE: 85 MMHG | SYSTOLIC BLOOD PRESSURE: 181 MMHG | HEIGHT: 66 IN | OXYGEN SATURATION: 97 % | WEIGHT: 176 LBS | TEMPERATURE: 98 F | HEART RATE: 100 BPM | RESPIRATION RATE: 18 BRPM | BODY MASS INDEX: 28.28 KG/M2

## 2021-12-17 DIAGNOSIS — K59.00 CONSTIPATION: ICD-10-CM

## 2021-12-17 DIAGNOSIS — R10.30 LOWER ABDOMINAL PAIN: Primary | ICD-10-CM

## 2021-12-17 LAB
BACTERIA #/AREA URNS HPF: ABNORMAL /HPF
BILIRUB UR QL STRIP: NEGATIVE
CLARITY UR: CLEAR
COLOR UR: YELLOW
CTP QC/QA: YES
GLUCOSE UR QL STRIP: NEGATIVE
HGB UR QL STRIP: NEGATIVE
KETONES UR QL STRIP: NEGATIVE
LEUKOCYTE ESTERASE UR QL STRIP: ABNORMAL
MICROSCOPIC COMMENT: ABNORMAL
NITRITE UR QL STRIP: NEGATIVE
PH UR STRIP: 7 [PH] (ref 5–8)
PROT UR QL STRIP: NEGATIVE
RBC #/AREA URNS HPF: 3 /HPF (ref 0–4)
SARS-COV-2 RDRP RESP QL NAA+PROBE: NEGATIVE
SP GR UR STRIP: 1.02 (ref 1–1.03)
SQUAMOUS #/AREA URNS HPF: 8 /HPF
URN SPEC COLLECT METH UR: ABNORMAL
UROBILINOGEN UR STRIP-ACNC: NEGATIVE EU/DL
WBC #/AREA URNS HPF: 4 /HPF (ref 0–5)

## 2021-12-17 PROCEDURE — 63600175 PHARM REV CODE 636 W HCPCS: Performed by: NURSE PRACTITIONER

## 2021-12-17 PROCEDURE — U0002 COVID-19 LAB TEST NON-CDC: HCPCS | Performed by: NURSE PRACTITIONER

## 2021-12-17 PROCEDURE — 91300 PHARM REV CODE 636 W HCPCS: CPT | Performed by: NURSE PRACTITIONER

## 2021-12-17 PROCEDURE — 0004A HC IMMUNIZ ADMIN, SARS-COV-2 COVID-19 VACC, 30MCG/0.3ML, BOOSTER DOSE: CPT | Performed by: NURSE PRACTITIONER

## 2021-12-17 PROCEDURE — 99284 EMERGENCY DEPT VISIT MOD MDM: CPT | Mod: 25

## 2021-12-17 PROCEDURE — 25000003 PHARM REV CODE 250: Performed by: NURSE PRACTITIONER

## 2021-12-17 PROCEDURE — 81000 URINALYSIS NONAUTO W/SCOPE: CPT | Performed by: NURSE PRACTITIONER

## 2021-12-17 RX ORDER — DICYCLOMINE HYDROCHLORIDE 20 MG/1
20 TABLET ORAL 2 TIMES DAILY
Qty: 20 TABLET | Refills: 0 | Status: SHIPPED | OUTPATIENT
Start: 2021-12-17 | End: 2022-01-16

## 2021-12-17 RX ORDER — IBUPROFEN 600 MG/1
600 TABLET ORAL
Status: COMPLETED | OUTPATIENT
Start: 2021-12-17 | End: 2021-12-17

## 2021-12-17 RX ORDER — DICYCLOMINE HYDROCHLORIDE 10 MG/1
20 CAPSULE ORAL
Status: COMPLETED | OUTPATIENT
Start: 2021-12-17 | End: 2021-12-17

## 2021-12-17 RX ORDER — IBUPROFEN 600 MG/1
600 TABLET ORAL EVERY 6 HOURS PRN
Qty: 20 TABLET | Refills: 0 | Status: SHIPPED | OUTPATIENT
Start: 2021-12-17

## 2021-12-17 RX ORDER — SYRING-NEEDL,DISP,INSUL,0.3 ML 29 G X1/2"
296 SYRINGE, EMPTY DISPOSABLE MISCELLANEOUS ONCE
Qty: 295 ML | Refills: 0 | Status: SHIPPED | OUTPATIENT
Start: 2021-12-17 | End: 2021-12-17

## 2021-12-17 RX ADMIN — IBUPROFEN 600 MG: 600 TABLET, FILM COATED ORAL at 03:12

## 2021-12-17 RX ADMIN — RNA INGREDIENT BNT-162B2 0.3 ML: 0.23 INJECTION, SUSPENSION INTRAMUSCULAR at 03:12

## 2021-12-17 RX ADMIN — DICYCLOMINE HYDROCHLORIDE 20 MG: 10 CAPSULE ORAL at 03:12

## 2021-12-30 ENCOUNTER — PATIENT OUTREACH (OUTPATIENT)
Dept: INTERNAL MEDICINE | Facility: CLINIC | Age: 66
End: 2021-12-30
Payer: COMMERCIAL

## 2022-01-24 ENCOUNTER — TELEPHONE (OUTPATIENT)
Dept: PLASTIC SURGERY | Facility: CLINIC | Age: 67
End: 2022-01-24
Payer: COMMERCIAL

## 2022-01-25 DIAGNOSIS — C50.811 MALIGNANT NEOPLASM OF OVERLAPPING SITES OF RIGHT BREAST IN FEMALE, ESTROGEN RECEPTOR POSITIVE: Primary | ICD-10-CM

## 2022-01-25 DIAGNOSIS — Z17.0 MALIGNANT NEOPLASM OF OVERLAPPING SITES OF RIGHT BREAST IN FEMALE, ESTROGEN RECEPTOR POSITIVE: Primary | ICD-10-CM

## 2022-01-25 RX ORDER — LETROZOLE 2.5 MG/1
2.5 TABLET, FILM COATED ORAL DAILY
Qty: 90 TABLET | Refills: 3 | Status: SHIPPED | OUTPATIENT
Start: 2022-01-25 | End: 2023-01-09

## 2022-02-10 ENCOUNTER — TELEPHONE (OUTPATIENT)
Dept: HEMATOLOGY/ONCOLOGY | Facility: CLINIC | Age: 67
End: 2022-02-10
Payer: COMMERCIAL

## 2022-02-14 ENCOUNTER — LAB VISIT (OUTPATIENT)
Dept: LAB | Facility: HOSPITAL | Age: 67
End: 2022-02-14
Attending: INTERNAL MEDICINE
Payer: COMMERCIAL

## 2022-02-14 ENCOUNTER — OFFICE VISIT (OUTPATIENT)
Dept: HEMATOLOGY/ONCOLOGY | Facility: CLINIC | Age: 67
End: 2022-02-14
Payer: COMMERCIAL

## 2022-02-14 VITALS
RESPIRATION RATE: 18 BRPM | HEART RATE: 118 BPM | HEIGHT: 66 IN | OXYGEN SATURATION: 98 % | BODY MASS INDEX: 27.19 KG/M2 | WEIGHT: 169.19 LBS | SYSTOLIC BLOOD PRESSURE: 146 MMHG | DIASTOLIC BLOOD PRESSURE: 81 MMHG | TEMPERATURE: 99 F

## 2022-02-14 DIAGNOSIS — C50.811 MALIGNANT NEOPLASM OF OVERLAPPING SITES OF RIGHT BREAST IN FEMALE, ESTROGEN RECEPTOR POSITIVE: Primary | ICD-10-CM

## 2022-02-14 DIAGNOSIS — E55.9 VITAMIN D DEFICIENCY: ICD-10-CM

## 2022-02-14 DIAGNOSIS — T45.1X5A CANCER TREATMENT-INDUCED BONE LOSS: ICD-10-CM

## 2022-02-14 DIAGNOSIS — Z17.0 MALIGNANT NEOPLASM OF OVERLAPPING SITES OF RIGHT BREAST IN FEMALE, ESTROGEN RECEPTOR POSITIVE: Primary | ICD-10-CM

## 2022-02-14 DIAGNOSIS — M89.8X9 CANCER TREATMENT-INDUCED BONE LOSS: ICD-10-CM

## 2022-02-14 DIAGNOSIS — Z79.811 AROMATASE INHIBITOR USE: ICD-10-CM

## 2022-02-14 PROCEDURE — 1159F MED LIST DOCD IN RCRD: CPT | Mod: CPTII,S$GLB,, | Performed by: INTERNAL MEDICINE

## 2022-02-14 PROCEDURE — 36415 COLL VENOUS BLD VENIPUNCTURE: CPT | Performed by: INTERNAL MEDICINE

## 2022-02-14 PROCEDURE — 3077F SYST BP >= 140 MM HG: CPT | Mod: CPTII,S$GLB,, | Performed by: INTERNAL MEDICINE

## 2022-02-14 PROCEDURE — 99999 PR PBB SHADOW E&M-EST. PATIENT-LVL IV: CPT | Mod: PBBFAC,,, | Performed by: INTERNAL MEDICINE

## 2022-02-14 PROCEDURE — 99214 PR OFFICE/OUTPT VISIT, EST, LEVL IV, 30-39 MIN: ICD-10-PCS | Mod: S$GLB,,, | Performed by: INTERNAL MEDICINE

## 2022-02-14 PROCEDURE — 3077F PR MOST RECENT SYSTOLIC BLOOD PRESSURE >= 140 MM HG: ICD-10-PCS | Mod: CPTII,S$GLB,, | Performed by: INTERNAL MEDICINE

## 2022-02-14 PROCEDURE — 3008F PR BODY MASS INDEX (BMI) DOCUMENTED: ICD-10-PCS | Mod: CPTII,S$GLB,, | Performed by: INTERNAL MEDICINE

## 2022-02-14 PROCEDURE — 99999 PR PBB SHADOW E&M-EST. PATIENT-LVL IV: ICD-10-PCS | Mod: PBBFAC,,, | Performed by: INTERNAL MEDICINE

## 2022-02-14 PROCEDURE — 1160F RVW MEDS BY RX/DR IN RCRD: CPT | Mod: CPTII,S$GLB,, | Performed by: INTERNAL MEDICINE

## 2022-02-14 PROCEDURE — 1159F PR MEDICATION LIST DOCUMENTED IN MEDICAL RECORD: ICD-10-PCS | Mod: CPTII,S$GLB,, | Performed by: INTERNAL MEDICINE

## 2022-02-14 PROCEDURE — 99214 OFFICE O/P EST MOD 30 MIN: CPT | Mod: S$GLB,,, | Performed by: INTERNAL MEDICINE

## 2022-02-14 PROCEDURE — 3079F PR MOST RECENT DIASTOLIC BLOOD PRESSURE 80-89 MM HG: ICD-10-PCS | Mod: CPTII,S$GLB,, | Performed by: INTERNAL MEDICINE

## 2022-02-14 PROCEDURE — 3008F BODY MASS INDEX DOCD: CPT | Mod: CPTII,S$GLB,, | Performed by: INTERNAL MEDICINE

## 2022-02-14 PROCEDURE — 1160F PR REVIEW ALL MEDS BY PRESCRIBER/CLIN PHARMACIST DOCUMENTED: ICD-10-PCS | Mod: CPTII,S$GLB,, | Performed by: INTERNAL MEDICINE

## 2022-02-14 PROCEDURE — 82652 VIT D 1 25-DIHYDROXY: CPT | Performed by: INTERNAL MEDICINE

## 2022-02-14 PROCEDURE — 3079F DIAST BP 80-89 MM HG: CPT | Mod: CPTII,S$GLB,, | Performed by: INTERNAL MEDICINE

## 2022-02-14 NOTE — PROGRESS NOTES
Subjective:       Patient ID: Jaja Toledo is a 66 y.o. female.     Chief Complaint: follow up for breast cancer     Diagnosis:  Stage IA (mp T1cN0 (I+)M0) Right breast cancer, s/p right mastectomy on 6/4/19     Oncologic History:  1. Ms Toledo is a 64 yo postmenopausal woman who initially saw me on 7/22/19 for breast cancer. She was noted to have asymmetry in the right breast at the inner position on mammogram 3/18/19. Diagnosed mammogram on 4/11/19 showed a 11 mm high density, irregularly shaped mass with indistinct margins in the right breast at 3 oclock. US showed a 7 x 9 x 7 mm mass at 3 oclock. The right axilla was normal. Breast biopsy on 4/22/19 showed invasive ductal carcinoma, grade 3, with associated high grade DCIS. ER+ (>95%), CA+(>90%), HER2 equivocal, FISH negative. Ki-67 20%. She was seen by Dr Arteaga in May 2019. MRI breast on 5/16/19 showed a 13 mm x 10 mm x 10 mm mass at 3 oclock correlating with the prior finding. There is another 9 mm x 7 mm x 8 mm mass at 6 oclock in the right breast. Left breast is benign. She underwent biopsy of the second mass on 5/27/19, which showed a grade 2 invasive lobular carcinoma, ER positive 70%, CA negative, HER2 negative, Ki-67 15%. Patient underwent right mastectomy and right axillary lymph node biopsy on 6/4/19. Pathology showed two tumors: #1, one 1.3 cm grade 2 invasive ductal carcinoma with lobular features, #2, one 1.7 cm grade 2 invasive lobular carcinoma. DCIS present, grade 3, with necrosis. LCIS. Margins are negative. lymphovascular invasion was not identified. One sentinel lymph node removed, with isolated tumor cells. Extranodal extention not identified. mp T1c sn N0 (i+). Receptors: #1: ER positive 95%, CA positive 90%, HER2 FISH negative, Ki67 20%. #2, ER positive 70%, CA negative, HER2 negative, Ki-67 15%. She returns today to discuss further management. She currently feels well. She had some mild sweating on her forehead in her last 50s. She  had a hysterectomy more than 20 years ago. She had [place] genetic test done which was negative. Oncotype DX of her 1.7 cm ILC showed an RS of 15, distant recurrence rate at 9 years is 4%, benefit from chemo is <1%. Oncotype DX of her 1.3 cm IDC is pending.   2. Oncotype DX of her 1.3 cm IDC showed RS of 24, distant recurrence rate at 9 years is 10%, benefit from chemotherapy <1%  3. Started letrozole in 2019. Bone density 19 normal.      Interval History:   Ms Toledo returns for follow up. She has been taking letrozole without interruption. Takes calcium intermittently.      ECO     ROS:   A ten-point system review is obtained and negative except for what was stated in the Interval History.      Physical Examination:   Vital signs reviewed.   General: well hydrated, well developed, in no acute distress  HEENT: normocephalic, PERRLA, EOMI, anicteric sclerae, oropharynx clear  Neck: supple, no JVD, thyromegaly, cervical or supraclavicular lymphadenopathy  Lungs: clear breath sounds bilaterally, no wheezing, rales, or rhonchi  Heart: RRR, no M/R/G  Breast: s/p R mastectomy and reconstruction. No abnormal skin changes, masses or axillary LAD. L: no abnormal skin changes, masses, or axillary LAD.   Abdomen: soft, no tenderness, non-distended, no hepatosplenomegaly, mass, or hernia. BS present  Extremities: left leg trace edema  Skin: no rash, ulcer, or open wounds  Neuro: alert and oriented x 4, no focal neuro deficit  Psych: pleasant and appropriate mood and affect      Objective:      Laboratory Data:  Labs reviewed. CBC, CMP unremarkable     Imaging Data:   Mammogram 2021:  Impression:   No mammographic evidence of malignancy.     BI-RADS Category 1: Negative     Recommendation:  Routine screening mammogram in 1 year is recommended.        Assessment and Plan:      1. Malignant neoplasm of overlapping sites of right breast in female, estrogen receptor positive    2. Aromatase inhibitor use    3.  Vitamin D deficiency    4. Cancer treatment-induced bone loss        1-2  - Ms Tre is a 67 yo woman with Stage IA (mp T1cN0 (I+)M0) Right breast cancer s/p R mastectomy, SLNB and immediate reconstruction on 6/4/19. She had a 1.3 cm invasive ductal carcinoma and a 1.7 cm invasive lobular carcinoma. Both tumors ER positive, HER2 negative. She had isolated tumor cells in one sentinel lymph node, which is stage N0 (I+).  Oncotype DX score of the 1.7 cm ILC showed an RS of 15, distant recurrence rate at 9 years is 4%, benefit from chemo is <1%. Oncotype DX of her 1.3 cm IDC showed RS of 24, distant recurrence rate at 9 years is 10%, benefit from chemotherapy <1%  - Has been on letrozole since Aug 2019. Tolerating it well  - continue with letrozole for 5 years  - next due for mammogram in June 2022. Will schedule    3.4  - check vit D level today  - Bone density 11/22/19 normal. Was due in Nov 2021  - schedule bone density    Follow-up:      Return in 6 months  All her questions were answered in the office. Knows to call in the interval if any problems arise.    Route Chart for Scheduling    Med Onc Chart Routing      Follow up with physician 6 months.   Follow up with [facility]    Labs    Imaging Other   Bone density now. Mammogram in June 2022   Pharmacy appointment    Other referrals

## 2022-02-17 ENCOUNTER — TELEPHONE (OUTPATIENT)
Dept: HEMATOLOGY/ONCOLOGY | Facility: CLINIC | Age: 67
End: 2022-02-17
Payer: COMMERCIAL

## 2022-02-17 LAB — 1,25(OH)2D3 SERPL-MCNC: 35 PG/ML (ref 20–79)

## 2022-02-21 ENCOUNTER — TELEPHONE (OUTPATIENT)
Dept: HEMATOLOGY/ONCOLOGY | Facility: CLINIC | Age: 67
End: 2022-02-21
Payer: COMMERCIAL

## 2022-02-21 NOTE — TELEPHONE ENCOUNTER
"----- Message from Aleida Yadav RN sent at 2/17/2022 11:58 AM CST -----    ----- Message -----  From: Jorge Up  Sent: 2/17/2022  11:52 AM CST  To: Nicola Joseph Staff    Consult/Advisory:          Name Of Caller: Self      Contact Preference?: [phone]         Provider Name: Nicola      Does patient feel the need to be seen today? No      What is the nature of the call?: Returning call to Kajal about upcoming DEXA appt and schedule MMG appt for June.          Additional Notes:  "Thank you for all that you do for our patients'"        "

## 2022-03-18 ENCOUNTER — TELEPHONE (OUTPATIENT)
Dept: PLASTIC SURGERY | Facility: CLINIC | Age: 67
End: 2022-03-18
Payer: COMMERCIAL

## 2022-04-12 ENCOUNTER — TELEPHONE (OUTPATIENT)
Dept: PLASTIC SURGERY | Facility: CLINIC | Age: 67
End: 2022-04-12
Payer: COMMERCIAL

## 2022-04-12 NOTE — TELEPHONE ENCOUNTER
Attempted to contact pt to discuss upcoming appointment.  Pt was not available at the time of the call. Pt did not have a VM set up.  No message could be left. Appointment will be cancelled.

## 2022-04-20 ENCOUNTER — OFFICE VISIT (OUTPATIENT)
Dept: PLASTIC SURGERY | Facility: CLINIC | Age: 67
End: 2022-04-20
Payer: COMMERCIAL

## 2022-04-20 VITALS
WEIGHT: 169 LBS | DIASTOLIC BLOOD PRESSURE: 77 MMHG | OXYGEN SATURATION: 98 % | SYSTOLIC BLOOD PRESSURE: 144 MMHG | HEIGHT: 66 IN | BODY MASS INDEX: 27.16 KG/M2 | HEART RATE: 103 BPM

## 2022-04-20 DIAGNOSIS — Z85.3 PERSONAL HISTORY OF BREAST CANCER: Primary | ICD-10-CM

## 2022-04-20 PROCEDURE — 3008F PR BODY MASS INDEX (BMI) DOCUMENTED: ICD-10-PCS | Mod: CPTII,S$GLB,, | Performed by: SURGERY

## 2022-04-20 PROCEDURE — 1160F RVW MEDS BY RX/DR IN RCRD: CPT | Mod: CPTII,S$GLB,, | Performed by: SURGERY

## 2022-04-20 PROCEDURE — 1159F PR MEDICATION LIST DOCUMENTED IN MEDICAL RECORD: ICD-10-PCS | Mod: CPTII,S$GLB,, | Performed by: SURGERY

## 2022-04-20 PROCEDURE — 1159F MED LIST DOCD IN RCRD: CPT | Mod: CPTII,S$GLB,, | Performed by: SURGERY

## 2022-04-20 PROCEDURE — 99999 PR PBB SHADOW E&M-EST. PATIENT-LVL III: ICD-10-PCS | Mod: PBBFAC,,, | Performed by: SURGERY

## 2022-04-20 PROCEDURE — 1160F PR REVIEW ALL MEDS BY PRESCRIBER/CLIN PHARMACIST DOCUMENTED: ICD-10-PCS | Mod: CPTII,S$GLB,, | Performed by: SURGERY

## 2022-04-20 PROCEDURE — 3077F PR MOST RECENT SYSTOLIC BLOOD PRESSURE >= 140 MM HG: ICD-10-PCS | Mod: CPTII,S$GLB,, | Performed by: SURGERY

## 2022-04-20 PROCEDURE — 3078F PR MOST RECENT DIASTOLIC BLOOD PRESSURE < 80 MM HG: ICD-10-PCS | Mod: CPTII,S$GLB,, | Performed by: SURGERY

## 2022-04-20 PROCEDURE — 99213 OFFICE O/P EST LOW 20 MIN: CPT | Mod: S$GLB,,, | Performed by: SURGERY

## 2022-04-20 PROCEDURE — 3077F SYST BP >= 140 MM HG: CPT | Mod: CPTII,S$GLB,, | Performed by: SURGERY

## 2022-04-20 PROCEDURE — 99999 PR PBB SHADOW E&M-EST. PATIENT-LVL III: CPT | Mod: PBBFAC,,, | Performed by: SURGERY

## 2022-04-20 PROCEDURE — 3008F BODY MASS INDEX DOCD: CPT | Mod: CPTII,S$GLB,, | Performed by: SURGERY

## 2022-04-20 PROCEDURE — 3078F DIAST BP <80 MM HG: CPT | Mod: CPTII,S$GLB,, | Performed by: SURGERY

## 2022-04-20 PROCEDURE — 99213 PR OFFICE/OUTPT VISIT, EST, LEVL III, 20-29 MIN: ICD-10-PCS | Mod: S$GLB,,, | Performed by: SURGERY

## 2022-04-20 RX ORDER — DIAZEPAM 10 MG/1
TABLET ORAL
COMMUNITY
Start: 2022-04-11 | End: 2022-05-31 | Stop reason: CLARIF

## 2022-04-20 NOTE — PROGRESS NOTES
Patient presents [facility] after having a right breast reconstruction several years ago.  The wound was implant based reconstruction.  Patient has a hollowing in the right axillary region and needs to undergo a Tdap flap.  We will go ahead and schedule this.

## 2022-04-30 ENCOUNTER — ANESTHESIA EVENT (OUTPATIENT)
Dept: SURGERY | Facility: HOSPITAL | Age: 67
End: 2022-04-30
Payer: COMMERCIAL

## 2022-05-21 ENCOUNTER — OFFICE VISIT (OUTPATIENT)
Dept: URGENT CARE | Facility: CLINIC | Age: 67
End: 2022-05-21
Payer: COMMERCIAL

## 2022-05-21 VITALS
DIASTOLIC BLOOD PRESSURE: 85 MMHG | HEART RATE: 104 BPM | OXYGEN SATURATION: 96 % | BODY MASS INDEX: 27.16 KG/M2 | WEIGHT: 169 LBS | SYSTOLIC BLOOD PRESSURE: 138 MMHG | HEIGHT: 66 IN | TEMPERATURE: 98 F | RESPIRATION RATE: 18 BRPM

## 2022-05-21 DIAGNOSIS — G89.29 CHRONIC RIGHT SHOULDER PAIN: Primary | ICD-10-CM

## 2022-05-21 DIAGNOSIS — M25.511 CHRONIC RIGHT SHOULDER PAIN: Primary | ICD-10-CM

## 2022-05-21 PROCEDURE — 3075F SYST BP GE 130 - 139MM HG: CPT | Mod: CPTII,S$GLB,,

## 2022-05-21 PROCEDURE — 99214 PR OFFICE/OUTPT VISIT, EST, LEVL IV, 30-39 MIN: ICD-10-PCS | Mod: S$GLB,,,

## 2022-05-21 PROCEDURE — 3075F PR MOST RECENT SYSTOLIC BLOOD PRESS GE 130-139MM HG: ICD-10-PCS | Mod: CPTII,S$GLB,,

## 2022-05-21 PROCEDURE — 3079F DIAST BP 80-89 MM HG: CPT | Mod: CPTII,S$GLB,,

## 2022-05-21 PROCEDURE — 3079F PR MOST RECENT DIASTOLIC BLOOD PRESSURE 80-89 MM HG: ICD-10-PCS | Mod: CPTII,S$GLB,,

## 2022-05-21 PROCEDURE — 3008F BODY MASS INDEX DOCD: CPT | Mod: CPTII,S$GLB,,

## 2022-05-21 PROCEDURE — 99214 OFFICE O/P EST MOD 30 MIN: CPT | Mod: S$GLB,,,

## 2022-05-21 PROCEDURE — 1159F PR MEDICATION LIST DOCUMENTED IN MEDICAL RECORD: ICD-10-PCS | Mod: CPTII,S$GLB,,

## 2022-05-21 PROCEDURE — 1160F PR REVIEW ALL MEDS BY PRESCRIBER/CLIN PHARMACIST DOCUMENTED: ICD-10-PCS | Mod: CPTII,S$GLB,,

## 2022-05-21 PROCEDURE — 1160F RVW MEDS BY RX/DR IN RCRD: CPT | Mod: CPTII,S$GLB,,

## 2022-05-21 PROCEDURE — 3008F PR BODY MASS INDEX (BMI) DOCUMENTED: ICD-10-PCS | Mod: CPTII,S$GLB,,

## 2022-05-21 PROCEDURE — 1159F MED LIST DOCD IN RCRD: CPT | Mod: CPTII,S$GLB,,

## 2022-05-21 RX ORDER — NAPROXEN 500 MG/1
500 TABLET ORAL 2 TIMES DAILY
Qty: 10 TABLET | Refills: 0 | Status: SHIPPED | OUTPATIENT
Start: 2022-05-21 | End: 2022-05-26

## 2022-05-21 RX ORDER — NAPROXEN 500 MG/1
500 TABLET ORAL 2 TIMES DAILY
Qty: 10 TABLET | Refills: 0 | Status: SHIPPED | OUTPATIENT
Start: 2022-05-21 | End: 2022-05-21

## 2022-05-21 NOTE — PATIENT INSTRUCTIONS
Take Naprosyn as prescribed.   A neurology referral has been made for you.  Go to ED if you develop SOB, CP, severe shoulder pain, increased numbness or tingling to fingers. Confusion, paralysis of limb, facial droop, or weakness to limbs.

## 2022-05-21 NOTE — PROGRESS NOTES
"Subjective:       Patient ID: Jaja Toledo is a 66 y.o. female.    Vitals:  height is 5' 6" (1.676 m) and weight is 76.7 kg (169 lb). Her oral temperature is 98.3 °F (36.8 °C). Her blood pressure is 138/85 and her pulse is 104. Her respiration is 18 and oxygen saturation is 96%.     Chief Complaint: Tingling (Tingling in fingers)    Shoulder Pain   The pain is present in the right shoulder. This is a new problem. The current episode started more than 1 month ago. There has been no history of extremity trauma. The problem occurs constantly. The problem has been unchanged. The quality of the pain is described as aching. The pain is at a severity of 7/10. The pain is moderate. Associated symptoms include tingling. Pertinent negatives include no fever or limited range of motion. The symptoms are aggravated by activity. She has tried NSAIDS and acetaminophen for the symptoms. The treatment provided mild relief. Family history does not include arthritis. There is no history of gout.       Constitution: Negative for activity change, appetite change, fever and generalized weakness.   HENT: Negative for ear pain, postnasal drip, sinus pain, sinus pressure and sore throat.    Neck: Negative for neck pain and neck stiffness.   Cardiovascular: Negative for chest pain, palpitations, sob on exertion and passing out.   Respiratory: Negative for cough, sputum production, shortness of breath, stridor and wheezing.    Endocrine: hair loss, heat intolerance, excessive thirst and excessive urination.   Musculoskeletal: Positive for pain and joint pain. Negative for trauma, joint swelling, abnormal ROM of joint, arthritis, gout, back pain, muscle cramps, muscle ache and history of spine disorder.   Skin: Negative for rash.   Neurological: Positive for tingling. Negative for dizziness, light-headedness, passing out, disorientation and altered mental status.        Reports tingling to bilateral finger tips.    Psychiatric/Behavioral: " Negative for altered mental status, disorientation and confusion.       Objective:      Physical Exam   Constitutional: She is oriented to person, place, and time. She appears well-developed. She is cooperative.  Non-toxic appearance. She does not appear ill. No distress. She is not intubated.   HENT:   Head: Normocephalic and atraumatic.   Ears:   Right Ear: Hearing, tympanic membrane, external ear and ear canal normal.   Left Ear: Hearing, tympanic membrane, external ear and ear canal normal.   Nose: Nose normal. No mucosal edema, rhinorrhea or nasal deformity. No epistaxis. Right sinus exhibits no maxillary sinus tenderness and no frontal sinus tenderness. Left sinus exhibits no maxillary sinus tenderness and no frontal sinus tenderness.   Mouth/Throat: Uvula is midline, oropharynx is clear and moist and mucous membranes are normal. No trismus in the jaw. Normal dentition. No uvula swelling. No posterior oropharyngeal erythema.   Eyes: Conjunctivae and lids are normal. No visual field deficit is present. Right eye exhibits no discharge. Left eye exhibits no discharge. No scleral icterus.   Neck: Trachea normal and phonation normal. Neck supple.   Cardiovascular: Normal rate, regular rhythm, S1 normal, S2 normal, normal heart sounds and normal pulses.   Pulmonary/Chest: Effort normal and breath sounds normal. No accessory muscle usage or stridor. No apnea, no tachypnea and no bradypnea. She is not intubated. No respiratory distress. She has no decreased breath sounds. She has no wheezes. She has no rhonchi. She has no rales.   Abdominal: Normal appearance and bowel sounds are normal. She exhibits no distension and no mass. Soft. There is no abdominal tenderness.   Musculoskeletal: Normal range of motion.         General: No deformity. Normal range of motion.      Right shoulder: Normal. She exhibits normal range of motion (PT with full ROM on examination, PT reports some pain when turning arm like pouring a cup  and having arm extended. ), no tenderness, no bony tenderness, no swelling, no effusion, no crepitus, no deformity, no laceration, normal pulse and normal strength.      Left shoulder: She exhibits normal range of motion, no tenderness, no bony tenderness, no swelling, no effusion, no crepitus, no deformity, no laceration, normal pulse and normal strength.      Right elbow: Normal.She exhibits normal range of motion, no swelling, no effusion, no deformity and no laceration. No tenderness found.      Left elbow: Normal. She exhibits normal range of motion, no swelling, no effusion, no deformity and no laceration. No tenderness found.      Right wrist: Normal. She exhibits normal range of motion, no tenderness, no bony tenderness, no swelling, no effusion, no crepitus, no deformity and no laceration.      Left wrist: Normal. She exhibits normal range of motion, no tenderness, no bony tenderness, no swelling, no effusion, no crepitus, no deformity and no laceration.      Right upper arm: Normal. She exhibits no tenderness, no bony tenderness, no swelling, no edema, no deformity and no laceration.      Left upper arm: Normal. She exhibits no tenderness, no bony tenderness, no swelling, no edema, no deformity and no laceration.      Right forearm: Normal. She exhibits no tenderness, no bony tenderness, no swelling, no edema, no deformity and no laceration.      Left forearm: Normal. She exhibits no tenderness, no bony tenderness, no swelling, no edema, no deformity and no laceration.      Right hand: Normal. She exhibits normal range of motion, no tenderness, no bony tenderness, normal two-point discrimination, normal capillary refill, no deformity, no laceration and no swelling. Normal sensation noted. Normal strength noted.      Left hand: Normal. She exhibits normal range of motion, no tenderness, no bony tenderness, normal two-point discrimination, normal capillary refill, no deformity, no laceration and no swelling.  Normal sensation noted. Normal strength noted.   Neurological: no focal deficit. She is alert and oriented to person, place, and time. She has normal motor skills, normal sensation, normal reflexes and intact cranial nerves. She displays no weakness, no atrophy, no tremor, facial symmetry and no dysarthria. No cranial nerve deficit or sensory deficit. She exhibits normal muscle tone. She has a normal Finger-Nose-Finger Test and a normal Tandem Gait Test. Coordination: Romberg sign negative, Heel to shin test normal and Rapid alternating movements normal. She shows no pronator drift. She displays no seizure activity. Gait and coordination normal. Coordination and gait normal.   Skin: Skin is warm, dry, intact, not diaphoretic and not pale.   Psychiatric: Her speech is normal and behavior is normal. Judgment and thought content normal.   Nursing note and vitals reviewed.        Assessment:       1. Chronic right shoulder pain          Plan:        Discussed shoulder pain with PT and likely strain of shoulder or possible bursitis. PT will take Naprosyn for pain as prescribed and FU with PCP if pain in shoulder does not improve. PT under strict ED precautions and will go to ED, if PT develops SOB, CP, severe shoulder pain, increased numbness or tingling to fingers. Confusion, paralysis of limb, facial droop, or weakness to limbs. Discussed neurology referral for chronic tingling to fingers. PT agrees with plan and verbalizes understanding. Ambulatory from clinic NAD.   Chronic right shoulder pain  -     Ambulatory referral/consult to Neurology  -     naproxen (NAPROSYN) 500 MG tablet; Take 1 tablet (500 mg total) by mouth 2 (two) times daily. for 5 days  Dispense: 10 tablet; Refill: 0

## 2022-05-31 ENCOUNTER — TELEPHONE (OUTPATIENT)
Dept: PREADMISSION TESTING | Facility: HOSPITAL | Age: 67
End: 2022-05-31
Payer: COMMERCIAL

## 2022-05-31 NOTE — TELEPHONE ENCOUNTER
----- Message from Neyda Mora RN sent at 5/31/2022  3:54 PM CDT -----  Regarding: preop appt.  Sx is on 6/6/22-Need:HGB(ordered), prior to the surgery date. Thank you. LEIGHANN

## 2022-05-31 NOTE — ANESTHESIA PAT ROS NOTE
05/31/2022  Jaja Toledo is a 66 y.o., female.      Pre-op Assessment          Review of Systems  Anesthesia Hx:  No problems with previous Anesthesia  Denies Family Hx of Anesthesia complications.   Denies Personal Hx of Anesthesia complications.   Social:  Social Alcohol Use, Former Smoker    Hematology/Oncology:  Hematology Normal       -- Cancer in past history:  Other (see Oncology comments) surgery  Oncology Comments: 2009-Tumor in foot-(left)/Breast-right-Surgery only     EENT/Dental:   Reading glasses/Top permnt. bridge   Cardiovascular:   Exercise tolerance: good Climbing stairs daily/Housework/Walking daily   Pulmonary:  Pulmonary Normal    Renal/:  Renal/ Normal     Hepatic/GI:   GERD History   Musculoskeletal:  Musculoskeletal Normal    Neurological:  Neurology Normal    Endocrine:  Endocrine Normal    Dermatological:  Skin Normal    Psych:  Psychiatric Normal       Neyda Mora RN  5/31/22       Anesthesia Assessment: Preoperative EQUATION    Planned Procedure: Procedure(s) (LRB):  RECONSTRUCTION, BREAST (Right)  Requested Anesthesia Type:General  Surgeon: Leonard Cordero MD  Service: Plastics  Known or anticipated Date of Surgery:6/6/2022    Surgeon notes: reviewed and Personal history of breast cancer     Previous anesthesia records:10/19/20-Colonoscopy-General-no apparent anesthetic complications and tolerated procedure well-no nausea/vomiting    Anesthesia Hx:  No problems with previous Anesthesia Denies Hx of Anesthetic complications  Denies Family Hx of Anesthesia complications.   Denies Personal Hx of Anesthesia complications.     Airway/Jaw/Neck:  Airway Findings: Mouth Opening: Normal Tongue: Normal  General Airway Assessment: Adult  Mallampati: II  TM Distance: Normal, at least 6 cm       Last PCP note: 6-12 months ago , within [facility] , 11/15/21-Andres Hurtado  MD-[facility]-Routine genral medical examination at a health care facility +3 more Dx     Subspecialty notes: Hematology/Oncology, Breast surgery visit/OBGYN visit/ Podiatry visit    Other important co-morbidities: GERD and Personal history of breast cancer    Medical History    Diagnosis Date Comment Source   Anxiety      Chronic low back pain      Gastritis      GERD (gastroesophageal reflux disease)      History of cervical cancer      History of recurrent urinary tract infection      IFG (impaired fasting glucose)      Malignant neoplasm of overlapping sites of right breast in female, estrogen receptor positive 7/19/2019     Soft tissue tumor, malignant  sarcoma left foot        Tests already available:  Results have been reviewed. CXR-2/22/21/ EKG-2/22/21      Plan: Phone pending     Testing:  Hemoglobin        Patient  has previously scheduled Medical Appointment:None    Navigation:Phone Completed                        Tests Scheduled. Lab-HGB on 6/3/22, done &  reviewed by anesthesia-Dr. Lou.             Consults scheduled.None needed at this time.                 Neyda Mora RN  5/31/22 & 6/2/22

## 2022-06-02 ENCOUNTER — LAB VISIT (OUTPATIENT)
Dept: LAB | Facility: HOSPITAL | Age: 67
End: 2022-06-02
Attending: ANESTHESIOLOGY
Payer: COMMERCIAL

## 2022-06-02 DIAGNOSIS — Z01.818 PREOP TESTING: ICD-10-CM

## 2022-06-02 LAB — HGB BLD-MCNC: 11.4 G/DL (ref 12–16)

## 2022-06-02 PROCEDURE — 36415 COLL VENOUS BLD VENIPUNCTURE: CPT | Performed by: ANESTHESIOLOGY

## 2022-06-02 PROCEDURE — 85018 HEMOGLOBIN: CPT | Performed by: ANESTHESIOLOGY

## 2022-06-03 ENCOUNTER — TELEPHONE (OUTPATIENT)
Dept: PLASTIC SURGERY | Facility: CLINIC | Age: 67
End: 2022-06-03
Payer: COMMERCIAL

## 2022-06-03 NOTE — TELEPHONE ENCOUNTER
Contacted pt to discuss arrival time for surgery on Monday, June 6.  Pt advised to arrive on the 2nd floor, [facility] for 9:00A. Pt also reminded not to eat or drink after midnight. Pt verbalized understanding.

## 2022-06-06 ENCOUNTER — ANESTHESIA (OUTPATIENT)
Dept: SURGERY | Facility: HOSPITAL | Age: 67
End: 2022-06-06
Payer: COMMERCIAL

## 2022-06-06 ENCOUNTER — HOSPITAL ENCOUNTER (OUTPATIENT)
Facility: HOSPITAL | Age: 67
Discharge: HOME | End: 2022-06-06
Attending: SURGERY | Admitting: SURGERY
Payer: COMMERCIAL

## 2022-06-06 VITALS
BODY MASS INDEX: 28.12 KG/M2 | HEART RATE: 81 BPM | SYSTOLIC BLOOD PRESSURE: 124 MMHG | WEIGHT: 175 LBS | HEIGHT: 66 IN | OXYGEN SATURATION: 94 % | DIASTOLIC BLOOD PRESSURE: 73 MMHG | RESPIRATION RATE: 17 BRPM | TEMPERATURE: 98 F

## 2022-06-06 DIAGNOSIS — Z98.890 HISTORY OF BREAST RECONSTRUCTION: Primary | ICD-10-CM

## 2022-06-06 DIAGNOSIS — Z17.0 MALIGNANT NEOPLASM OF OVERLAPPING SITES OF RIGHT BREAST IN FEMALE, ESTROGEN RECEPTOR POSITIVE: ICD-10-CM

## 2022-06-06 DIAGNOSIS — C50.811 MALIGNANT NEOPLASM OF OVERLAPPING SITES OF RIGHT BREAST IN FEMALE, ESTROGEN RECEPTOR POSITIVE: ICD-10-CM

## 2022-06-06 PROCEDURE — 19361 BRST RCNSTJ LATSMS DRSI FLAP: CPT | Mod: ,,, | Performed by: SURGERY

## 2022-06-06 PROCEDURE — 71000016 HC POSTOP RECOV ADDL HR: Performed by: SURGERY

## 2022-06-06 PROCEDURE — 63600175 PHARM REV CODE 636 W HCPCS: Performed by: ANESTHESIOLOGY

## 2022-06-06 PROCEDURE — D9220A PRA ANESTHESIA: ICD-10-PCS | Mod: ,,, | Performed by: ANESTHESIOLOGY

## 2022-06-06 PROCEDURE — 27201423 OPTIME MED/SURG SUP & DEVICES STERILE SUPPLY: Performed by: SURGERY

## 2022-06-06 PROCEDURE — 71000045 HC DOSC ROUTINE RECOVERY EA ADD'L HR: Performed by: SURGERY

## 2022-06-06 PROCEDURE — 36000708 HC OR TIME LEV III 1ST 15 MIN: Performed by: SURGERY

## 2022-06-06 PROCEDURE — 37000009 HC ANESTHESIA EA ADD 15 MINS: Performed by: SURGERY

## 2022-06-06 PROCEDURE — 71000044 HC DOSC ROUTINE RECOVERY FIRST HOUR: Performed by: SURGERY

## 2022-06-06 PROCEDURE — 36000709 HC OR TIME LEV III EA ADD 15 MIN: Performed by: SURGERY

## 2022-06-06 PROCEDURE — 37000008 HC ANESTHESIA 1ST 15 MINUTES: Performed by: SURGERY

## 2022-06-06 PROCEDURE — 25000003 PHARM REV CODE 250: Performed by: ANESTHESIOLOGY

## 2022-06-06 PROCEDURE — 71000015 HC POSTOP RECOV 1ST HR: Performed by: SURGERY

## 2022-06-06 PROCEDURE — C1729 CATH, DRAINAGE: HCPCS | Performed by: SURGERY

## 2022-06-06 PROCEDURE — 63600175 PHARM REV CODE 636 W HCPCS: Performed by: STUDENT IN AN ORGANIZED HEALTH CARE EDUCATION/TRAINING PROGRAM

## 2022-06-06 PROCEDURE — D9220A PRA ANESTHESIA: Mod: ,,, | Performed by: ANESTHESIOLOGY

## 2022-06-06 RX ORDER — ONDANSETRON 2 MG/ML
INJECTION INTRAMUSCULAR; INTRAVENOUS
Status: DISCONTINUED | OUTPATIENT
Start: 2022-06-06 | End: 2022-06-06

## 2022-06-06 RX ORDER — HYDROMORPHONE HYDROCHLORIDE 2 MG/ML
INJECTION, SOLUTION INTRAMUSCULAR; INTRAVENOUS; SUBCUTANEOUS
Status: DISCONTINUED | OUTPATIENT
Start: 2022-06-06 | End: 2022-06-06

## 2022-06-06 RX ORDER — MIDAZOLAM HYDROCHLORIDE 1 MG/ML
INJECTION, SOLUTION INTRAMUSCULAR; INTRAVENOUS
Status: DISCONTINUED | OUTPATIENT
Start: 2022-06-06 | End: 2022-06-06

## 2022-06-06 RX ORDER — DEXMEDETOMIDINE HYDROCHLORIDE 100 UG/ML
INJECTION, SOLUTION INTRAVENOUS
Status: DISCONTINUED | OUTPATIENT
Start: 2022-06-06 | End: 2022-06-06

## 2022-06-06 RX ORDER — CEFAZOLIN SODIUM/WATER 2 G/20 ML
2 SYRINGE (ML) INTRAVENOUS ONCE
Status: COMPLETED | OUTPATIENT
Start: 2022-06-06 | End: 2022-06-06

## 2022-06-06 RX ORDER — CEPHALEXIN 500 MG/1
500 CAPSULE ORAL EVERY 6 HOURS
Qty: 28 CAPSULE | Refills: 0 | Status: SHIPPED | OUTPATIENT
Start: 2022-06-06 | End: 2022-06-13

## 2022-06-06 RX ORDER — OXYCODONE HYDROCHLORIDE 5 MG/1
5 TABLET ORAL
Status: DISCONTINUED | OUTPATIENT
Start: 2022-06-06 | End: 2022-06-06 | Stop reason: HOSPADM

## 2022-06-06 RX ORDER — SODIUM CHLORIDE 0.9 % (FLUSH) 0.9 %
10 SYRINGE (ML) INJECTION
Status: DISCONTINUED | OUTPATIENT
Start: 2022-06-06 | End: 2022-06-06 | Stop reason: HOSPADM

## 2022-06-06 RX ORDER — PHENYLEPHRINE HCL IN 0.9% NACL 1 MG/10 ML
SYRINGE (ML) INTRAVENOUS
Status: DISCONTINUED | OUTPATIENT
Start: 2022-06-06 | End: 2022-06-06

## 2022-06-06 RX ORDER — HYDROMORPHONE HYDROCHLORIDE 1 MG/ML
0.2 INJECTION, SOLUTION INTRAMUSCULAR; INTRAVENOUS; SUBCUTANEOUS EVERY 5 MIN PRN
Status: DISCONTINUED | OUTPATIENT
Start: 2022-06-06 | End: 2022-06-06 | Stop reason: HOSPADM

## 2022-06-06 RX ORDER — HALOPERIDOL 5 MG/ML
0.5 INJECTION INTRAMUSCULAR EVERY 10 MIN PRN
Status: DISCONTINUED | OUTPATIENT
Start: 2022-06-06 | End: 2022-06-06 | Stop reason: HOSPADM

## 2022-06-06 RX ORDER — OXYCODONE AND ACETAMINOPHEN 5; 325 MG/1; MG/1
1 TABLET ORAL EVERY 4 HOURS PRN
Qty: 28 TABLET | Refills: 0 | Status: SHIPPED | OUTPATIENT
Start: 2022-06-06 | End: 2023-01-10

## 2022-06-06 RX ORDER — LIDOCAINE HYDROCHLORIDE 20 MG/ML
INJECTION, SOLUTION EPIDURAL; INFILTRATION; INTRACAUDAL; PERINEURAL
Status: DISCONTINUED | OUTPATIENT
Start: 2022-06-06 | End: 2022-06-06

## 2022-06-06 RX ORDER — ROCURONIUM BROMIDE 10 MG/ML
INJECTION, SOLUTION INTRAVENOUS
Status: DISCONTINUED | OUTPATIENT
Start: 2022-06-06 | End: 2022-06-06

## 2022-06-06 RX ORDER — BACITRACIN 500 [USP'U]/G
OINTMENT TOPICAL 3 TIMES DAILY
Qty: 28 G | Refills: 0 | Status: SHIPPED | OUTPATIENT
Start: 2022-06-06

## 2022-06-06 RX ORDER — ACETAMINOPHEN 500 MG
1000 TABLET ORAL ONCE
Status: DISCONTINUED | OUTPATIENT
Start: 2022-06-06 | End: 2022-06-06 | Stop reason: HOSPADM

## 2022-06-06 RX ORDER — NEOSTIGMINE METHYLSULFATE 0.5 MG/ML
INJECTION, SOLUTION INTRAVENOUS
Status: DISCONTINUED | OUTPATIENT
Start: 2022-06-06 | End: 2022-06-06

## 2022-06-06 RX ORDER — FENTANYL CITRATE 50 UG/ML
INJECTION, SOLUTION INTRAMUSCULAR; INTRAVENOUS
Status: DISCONTINUED | OUTPATIENT
Start: 2022-06-06 | End: 2022-06-06

## 2022-06-06 RX ORDER — DEXAMETHASONE SODIUM PHOSPHATE 4 MG/ML
INJECTION, SOLUTION INTRA-ARTICULAR; INTRALESIONAL; INTRAMUSCULAR; INTRAVENOUS; SOFT TISSUE
Status: DISCONTINUED | OUTPATIENT
Start: 2022-06-06 | End: 2022-06-06

## 2022-06-06 RX ORDER — FAMOTIDINE 10 MG/ML
INJECTION INTRAVENOUS
Status: DISCONTINUED | OUTPATIENT
Start: 2022-06-06 | End: 2022-06-06

## 2022-06-06 RX ORDER — SODIUM CHLORIDE 9 MG/ML
INJECTION, SOLUTION INTRAVENOUS CONTINUOUS
Status: DISCONTINUED | OUTPATIENT
Start: 2022-06-06 | End: 2022-06-06 | Stop reason: HOSPADM

## 2022-06-06 RX ORDER — PROPOFOL 10 MG/ML
VIAL (ML) INTRAVENOUS
Status: DISCONTINUED | OUTPATIENT
Start: 2022-06-06 | End: 2022-06-06

## 2022-06-06 RX ORDER — LIDOCAINE HYDROCHLORIDE 10 MG/ML
1 INJECTION, SOLUTION EPIDURAL; INFILTRATION; INTRACAUDAL; PERINEURAL ONCE AS NEEDED
Status: DISCONTINUED | OUTPATIENT
Start: 2022-06-06 | End: 2022-06-06 | Stop reason: HOSPADM

## 2022-06-06 RX ADMIN — HYDROMORPHONE HYDROCHLORIDE 0.4 MG: 2 INJECTION, SOLUTION INTRAMUSCULAR; INTRAVENOUS; SUBCUTANEOUS at 12:06

## 2022-06-06 RX ADMIN — ONDANSETRON 4 MG: 2 INJECTION INTRAMUSCULAR; INTRAVENOUS at 01:06

## 2022-06-06 RX ADMIN — LIDOCAINE HYDROCHLORIDE 5 ML: 20 INJECTION, SOLUTION EPIDURAL; INFILTRATION; INTRACAUDAL at 11:06

## 2022-06-06 RX ADMIN — GLYCOPYRROLATE 0.4 MG: 0.2 INJECTION, SOLUTION INTRAMUSCULAR; INTRAVENOUS at 01:06

## 2022-06-06 RX ADMIN — Medication 100 MCG: at 12:06

## 2022-06-06 RX ADMIN — SODIUM CHLORIDE: 0.9 INJECTION, SOLUTION INTRAVENOUS at 11:06

## 2022-06-06 RX ADMIN — DEXMEDETOMIDINE HYDROCHLORIDE 8 MCG: 100 INJECTION, SOLUTION INTRAVENOUS at 12:06

## 2022-06-06 RX ADMIN — NEOSTIGMINE METHYLSULFATE 3 MG: 0.5 INJECTION, SOLUTION INTRAVENOUS at 01:06

## 2022-06-06 RX ADMIN — ROCURONIUM BROMIDE 50 MG: 10 INJECTION INTRAVENOUS at 11:06

## 2022-06-06 RX ADMIN — FAMOTIDINE 20 MG: 10 INJECTION INTRAVENOUS at 11:06

## 2022-06-06 RX ADMIN — PROPOFOL 150 MG: 10 INJECTION, EMULSION INTRAVENOUS at 11:06

## 2022-06-06 RX ADMIN — MIDAZOLAM 2 MG: 1 INJECTION INTRAMUSCULAR; INTRAVENOUS at 11:06

## 2022-06-06 RX ADMIN — DEXAMETHASONE SODIUM PHOSPHATE 8 MG: 4 INJECTION INTRA-ARTICULAR; INTRALESIONAL; INTRAMUSCULAR; INTRAVENOUS; SOFT TISSUE at 11:06

## 2022-06-06 RX ADMIN — ROCURONIUM BROMIDE 20 MG: 10 INJECTION INTRAVENOUS at 12:06

## 2022-06-06 RX ADMIN — DEXMEDETOMIDINE HYDROCHLORIDE 8 MCG: 100 INJECTION, SOLUTION INTRAVENOUS at 01:06

## 2022-06-06 RX ADMIN — Medication 2 G: at 11:06

## 2022-06-06 RX ADMIN — FENTANYL CITRATE 100 MCG: 50 INJECTION INTRAMUSCULAR; INTRAVENOUS at 11:06

## 2022-06-06 NOTE — PLAN OF CARE
Reviewed discharge instructions with patient and caregiver. No questions or concerns noted. Patient's vitals are stable, patient ambulated to the bathroom and voided.

## 2022-06-06 NOTE — ANESTHESIA PROCEDURE NOTES
Intubation    Date/Time: 6/6/2022 11:37 AM  Performed by: Michael Michael MD  Authorized by: Michael Michael MD     Intubation:     Induction:  Intravenous    Intubated:  Postinduction    Mask Ventilation:  Easy with oral airway    Attempts:  1    Attempted By:  Staff anesthesiologist    Method of Intubation:  Direct    Blade:  Lyle 2    Laryngeal View Grade: Grade I - full view of cords      Difficult Airway Encountered?: No      Complications:  None    Airway Device:  Oral endotracheal tube    Airway Device Size:  7.5    Style/Cuff Inflation:  Cuffed    Inflation Amount (mL):  5    Tube secured:  22    Secured at:  The lips    Placement Verified By:  Capnometry    Complicating Factors:  None    Findings Post-Intubation:  BS equal bilateral and atraumatic/condition of teeth unchanged

## 2022-06-06 NOTE — ANESTHESIA PREPROCEDURE EVALUATION
06/06/2022  Jaja Toledo is a 66 y.o., female.      Pre-op Assessment    I have reviewed the Patient Summary Reports.     I have reviewed the Nursing Notes. I have reviewed the NPO Status.   I have reviewed the Medications.     Review of Systems  Anesthesia Hx:  No problems with previous Anesthesia  History of prior surgery of interest to airway management or planning: Denies Family Hx of Anesthesia complications.   Denies Personal Hx of Anesthesia complications.   Social:  Social Alcohol Use, Former Smoker    Hematology/Oncology:         -- Cancer in past history: Breast and Other (see Oncology comments) surgery and radiation  Oncology Comments: Cancer in foot-left-Surgery/& Radiation/Breast-Only surgery     EENT/Dental:   Top-Dental bridge/wears contacts   Cardiovascular:  Cardiovascular Normal Exercise tolerance: good  Stationary bike-used 3x/wk   Pulmonary:  Pulmonary Normal Snoring   Renal/:   UTI-x3 months ago-treated with antibx.   Hepatic/GI:   GERD    Musculoskeletal:   Chronic low back pain   Endocrine:  Endocrine Normal        Physical Exam  General: Well nourished and Cooperative    Airway:  Mallampati: II   Mouth Opening: Normal  Neck ROM: Normal ROM    Chest/Lungs:  Clear to auscultation, Normal Respiratory Rate    Heart:  Rate: Normal  Rhythm: Regular Rhythm    Abdomen:  Normal        Anesthesia Plan  Type of Anesthesia, risks & benefits discussed:    Anesthesia Type: Gen ETT  Intra-op Monitoring Plan: Standard ASA Monitors  Post Op Pain Control Plan: multimodal analgesia and IV/PO Opioids PRN  Induction:  IV  Informed Consent: Informed consent signed with the Patient and all parties understand the risks and agree with anesthesia plan.  All questions answered.   ASA Score: 2    Ready For Surgery From Anesthesia Perspective.     .

## 2022-06-06 NOTE — TRANSFER OF CARE
"Anesthesia Transfer of Care Note    Patient: Jaja Toledo    Procedure(s) Performed: Procedure(s) (LRB):  RECONSTRUCTION, BREAST (Right)  RECONSTRUCTION, BREAST RADICAL DORSAL ARTERY  FLAP (Right)    Patient location: PACU    Anesthesia Type: general    Transport from OR: Transported from OR on 6-10 L/min O2 by face mask with adequate spontaneous ventilation    Post pain: adequate analgesia    Post assessment: no apparent anesthetic complications    Post vital signs: stable    Level of consciousness: awake and alert    Nausea/Vomiting: no nausea/vomiting    Complications: none    Transfer of care protocol was followed      Last vitals:   Visit Vitals  /74 (BP Location: Left arm, Patient Position: Lying)   Pulse 77   Temp 36.2 °C (97.2 °F) (Temporal)   Resp 16   Ht 5' 6" (1.676 m)   Wt 79.4 kg (175 lb)   SpO2 98%   Breastfeeding No   BMI 28.25 kg/m²     "

## 2022-06-06 NOTE — PATIENT INSTRUCTIONS
Postoperative Instructions  No heavy lifting greater than 10 pounds or a gallon of milk  Do not strain to have a bowel movement  No strenuous exercise  You may sponge bathe  No driving while you are on narcotic pain medications or if your larkin  catheter is in place  No showering until 48 hours after surgery or 48 hours after last drain removed.    Call the doctor if:  Temperature is greater than 101F  Persistent vomiting and inability to keep food down  Inability to pee    If you have a drain, please record the output and color and bring sheet with you to your appointment.  Please record total daily drain output on sheet of paper.    Please take oral antibiotics 4 times per day for 7 days.  You may remove dressings on wounds after 24 hours. You may shower after 48 hours.    Return to clinic in 1 week for wound check and post-op follow up.

## 2022-06-06 NOTE — BRIEF OP NOTE
[facility] - Surgery ([facility])  Brief Operative Note    Surgery Date: 6/6/2022     Surgeon(s) and Role:     * Leonard Cordero MD - Primary     * Ranjit Rodriguez MD - Resident - Assisting        Pre-op Diagnosis:  Personal history of breast cancer [Z85.3]    Post-op Diagnosis:  Post-Op Diagnosis Codes:     * Personal history of breast cancer [Z85.3]    Procedure(s) (LRB):  RECONSTRUCTION, BREAST (Right)  RECONSTRUCTION, BREAST RADICAL DORSAL ARTERY  FLAP (Right)    Anesthesia: General    Operative Findings: Successful TDAP flap through one incision closed in 3 layers; minimal blood loss; 1 GEETA drain, no specimens. See op note for further details.    Estimated Blood Loss: * No values recorded between 6/6/2022 12:19 PM and 6/6/2022  2:09 PM *         Specimens:   Specimen (24h ago, onward)            None            Discharge Note    OUTCOME: Patient tolerated treatment/procedure well without complication and is now ready for discharge.    DISPOSITION: Home or Self Care    FINAL DIAGNOSIS:  History of breast reconstruction    FOLLOWUP: In clinic in 1 week    DISCHARGE INSTRUCTIONS:    Discharge Procedure Orders   Notify your health care provider if you experience any of the following:  temperature >100.4     Notify your health care provider if you experience any of the following:  persistent nausea and vomiting or diarrhea     Notify your health care provider if you experience any of the following:  severe uncontrolled pain     Notify your health care provider if you experience any of the following:  redness, tenderness, or signs of infection (pain, swelling, redness, odor or green/yellow discharge around incision site)     Notify your health care provider if you experience any of the following:  worsening rash     Notify your health care provider if you experience any of the following:  persistent dizziness, light-headedness, or visual disturbances

## 2022-06-06 NOTE — H&P
[facility] - Surgery (2nd Fl)  History & Physical  Plastic Surgery    SUBJECTIVE:     Chief Complaint/Reason for Admission: revision of right breast reconstruction    History of Present Illness:  Patient is a 66 y.o. female presents to Plastic surgery Clinic after having a right breast reconstruction several years ago.  The wound was implant based reconstruction.  Patient has a hollowing in the right axillary region and needs to undergo a Tdap flap.  We will go ahead and schedule this.    No medications prior to admission.       Review of patient's allergies indicates:   Allergen Reactions    No known drug allergies        Past Medical History:   Diagnosis Date    Anxiety     Chronic low back pain     Gastritis     GERD (gastroesophageal reflux disease)     History of cervical cancer     History of recurrent urinary tract infection     IFG (impaired fasting glucose)     Malignant neoplasm of overlapping sites of right breast in female, estrogen receptor positive 7/19/2019    Soft tissue tumor, malignant     sarcoma left foot     Past Surgical History:   Procedure Laterality Date    ABDOMINOPLASTY N/A 11/29/2018    Procedure: EXTENDED ABDOMINOPLASTY;  Surgeon: Mio Arriaga MD;  Location: [facility] OR;  Service: Plastics;  Laterality: N/A;    AXILLARY NODE DISSECTION Right 6/4/2019    Procedure: LYMPHADENECTOMY, AXILLARY RIGHT;  Surgeon: Sabine Arteaga MD;  Location: [facility] OR 2ND FLR;  Service: General;  Laterality: Right;    BACK SURGERY      BREAST CYST EXCISION Left 1984    BREAST REVISION SURGERY Right 7/20/2020    Procedure: BREAST REVISION SURGERY RIGHT;  Surgeon: Leonard Cordero MD;  Location: [facility] OR 2ND FLR;  Service: Plastics;  Laterality: Right;    COLONOSCOPY N/A 10/19/2020    Procedure: COLONOSCOPY;  Surgeon: Chanel Saenz MD;  Location: [facility] ENDO (4TH FLR);  Service: Endoscopy;  Laterality: N/A;  covid test 10/16-[facility] urgent care    FOOT SURGERY  2009 or 2010    INSERTION OF  BREAST IMPLANT Right 2019    Procedure: INSERTION, BREAST IMPLANT RIGHT;  Surgeon: Leonard Cordero MD;  [facility];  Service: Plastics;  Laterality: Right;    INSERTION OF BREAST TISSUE EXPANDER Right 2019    Procedure: INSERTION, TISSUE EXPANDER, BREAST RIGHT;  Surgeon: Leonard Cordero MD;  [facility];  Service: Plastics;  Laterality: Right;    LIPOSUCTION W/ FAT INJECTION N/A 2018    Procedure: LIPOSUCTION, WITH FAT TRANSFER TO BUTTOCKS;  Surgeon: Mio Arriaga MD;  Location: [facility] OR;  Service: Plastics;  Laterality: N/A;    MASTOPEXY Left 2019    Procedure: MASTOPEXY LEFT;  Surgeon: Leonard Cordero MD;  [facility];  Service: Plastics;  Laterality: Left;    MASTOPEXY Left 2020    Procedure: MASTOPEXY LEFT;  Surgeon: Leonard Cordero MD;  [facility];  Service: Plastics;  Laterality: Left;    SENTINEL LYMPH NODE BIOPSY Right 2019    Procedure: BIOPSY, LYMPH NODE, SENTINEL RIGHT;  Surgeon: Sabine Arteaga MD;  Location: [facility] OR [address] [place] [address];  Service: General;  Laterality: Right;    SIMPLE MASTECTOMY Right 2019    Procedure: MASTECTOMY, SIMPLE RIGHT (CONSENT AM OF) 4.0 hr case;  Surgeon: Sabine Arteaga MD;  Location: [facility] OR [address] [place] [address];  Service: General;  Laterality: Right;    TOTAL REDUCTION MAMMOPLASTY Left     TOTAL VAGINAL HYSTERECTOMY      TUBAL LIGATION       Family History   Problem Relation Age of Onset    Breast cancer Sister         dx in     No Known Problems Mother     Throat cancer Father     Colon cancer Neg Hx     Diabetes Neg Hx     Hypertension Neg Hx     Ovarian cancer Neg Hx     Stroke Neg Hx     Anesthesia problems Neg Hx      Social History     Tobacco Use    Smoking status: Former Smoker     Packs/day: 0.25     Years: 15.00     Pack years: 3.75     Types: Cigarettes     Quit date: 8/15/1993     Years since quittin.8    Smokeless tobacco:  Never Used   Substance Use Topics    Alcohol use: Yes     Alcohol/week: 1.0 standard drink     Types: 1 Glasses of wine per week     Comment: seldom    Drug use: No            OBJECTIVE:     Vital Signs (Most Recent):       Physical Exam:  Gen AOx3    ASSESSMENT/PLAN:     66 F with history of right breast reconstruction with asymmetery and hallowing   - Right sided pedicled TDAP for revision of right breast reconstruction      Agree with previously written h and p there have been no changes     Patient explained all risks and benefits of procedure

## 2022-06-07 NOTE — OP NOTE
Date of surgery 06/06/2022  Preoperative diagnosis history of breast cancer  Postoperative diagnosis the same  Procedure performed  1. Fasciocutaneous  flap reconstruction of the right lateral breast based on the thoracodorsal artery and vein.  Surgeon Consuelo  Anesthesia general  Complications none  Drains none    The patient was evaluated the preoperative holding area.  Markings were made for the procedure.  Patient was taken to the operative room placed in supine position after adequate general anesthesia was prepped and draped in a normal sterile fashion she had been positioned in the left lateral decubitus position.  Using a Doppler a  was found proximally 2 cm posterior to the latissimus dorsi muscle an 8 cm inferior to the axillary crease.  Was a strong Doppler signal.  Our flap was designed based on this .  Measured approximately 15 cm in length approximately 6 cm in width.  Incisions were made deepened down to subcutaneous tissue.  Both the superior and inferior incisions were taken down to the muscle.  Next, we began the dissection posterior to anterior.  Approaching the pedicle we switched to the bipolar.  The  was then followed through the muscle some muscle was taken with the .  This was proximally 3 4 mm.  The  was then followed to the thoracodorsal vessel.  The entire flap was then freed up.  A tunnel was created the entire lateral portion of the capsule of the breast implant was exposed.  The flap was then rotated proximally 180° and then inset and secured using Vicryl sutures.  Doppler exam showed excellent flow through the .  The flap was then de-epithelialized.  Very good bright red blood was noted.  Next, the incision was then closed over the flap completely using 2-0 Vicryl 3-0 Monocryl followed by running 4-0 Monocryl subcuticular suture.  There were no complications with this procedure and dictation

## 2022-06-07 NOTE — ANESTHESIA POSTPROCEDURE EVALUATION
Anesthesia Post Evaluation    Patient: Jaja Toledo    Procedure(s) Performed: Procedure(s) (LRB):  RECONSTRUCTION, BREAST (Right)  RECONSTRUCTION, BREAST RADICAL DORSAL ARTERY  FLAP (Right)    Final Anesthesia Type: general      Patient location during evaluation: PACU  Patient participation: Yes- Able to Participate  Level of consciousness: awake and alert  Post-procedure vital signs: reviewed and stable  Pain management: adequate  Airway patency: patent    PONV status at discharge: No PONV  Anesthetic complications: no      Cardiovascular status: blood pressure returned to baseline  Respiratory status: unassisted  Hydration status: euvolemic  Follow-up not needed.          Vitals Value Taken Time   /73 06/06/22 1631   Temp 36.4 °C (97.5 °F) 06/06/22 1630   Pulse 75 06/06/22 1718   Resp 20 06/06/22 1717   SpO2 95 % 06/06/22 1718   Vitals shown include unvalidated device data.      No case tracking events are documented in the log.      Pain/Cathy Score: Cathy Score: 10 (6/6/2022  4:15 PM)

## 2022-06-15 ENCOUNTER — HOSPITAL ENCOUNTER (OUTPATIENT)
Dept: RADIOLOGY | Facility: HOSPITAL | Age: 67
Discharge: HOME | End: 2022-06-15
Attending: INTERNAL MEDICINE
Payer: COMMERCIAL

## 2022-06-15 ENCOUNTER — OFFICE VISIT (OUTPATIENT)
Dept: PLASTIC SURGERY | Facility: CLINIC | Age: 67
End: 2022-06-15
Payer: COMMERCIAL

## 2022-06-15 VITALS
DIASTOLIC BLOOD PRESSURE: 105 MMHG | WEIGHT: 175 LBS | HEIGHT: 66 IN | HEART RATE: 116 BPM | BODY MASS INDEX: 28.12 KG/M2 | SYSTOLIC BLOOD PRESSURE: 176 MMHG

## 2022-06-15 DIAGNOSIS — Z09 SURGERY FOLLOW-UP EXAMINATION: Primary | ICD-10-CM

## 2022-06-15 DIAGNOSIS — C50.811 MALIGNANT NEOPLASM OF OVERLAPPING SITES OF RIGHT BREAST IN FEMALE, ESTROGEN RECEPTOR POSITIVE: ICD-10-CM

## 2022-06-15 DIAGNOSIS — Z17.0 MALIGNANT NEOPLASM OF OVERLAPPING SITES OF RIGHT BREAST IN FEMALE, ESTROGEN RECEPTOR POSITIVE: ICD-10-CM

## 2022-06-15 PROCEDURE — 1160F RVW MEDS BY RX/DR IN RCRD: CPT | Mod: CPTII,S$GLB,, | Performed by: SURGERY

## 2022-06-15 PROCEDURE — 3008F BODY MASS INDEX DOCD: CPT | Mod: CPTII,S$GLB,, | Performed by: SURGERY

## 2022-06-15 PROCEDURE — 99999 PR PBB SHADOW E&M-EST. PATIENT-LVL III: CPT | Mod: PBBFAC,,, | Performed by: SURGERY

## 2022-06-15 PROCEDURE — 3077F SYST BP >= 140 MM HG: CPT | Mod: CPTII,S$GLB,, | Performed by: SURGERY

## 2022-06-15 PROCEDURE — 77065 DX MAMMO INCL CAD UNI: CPT | Mod: TC,LT

## 2022-06-15 PROCEDURE — 1160F PR REVIEW ALL MEDS BY PRESCRIBER/CLIN PHARMACIST DOCUMENTED: ICD-10-PCS | Mod: CPTII,S$GLB,, | Performed by: SURGERY

## 2022-06-15 PROCEDURE — 1159F MED LIST DOCD IN RCRD: CPT | Mod: CPTII,S$GLB,, | Performed by: SURGERY

## 2022-06-15 PROCEDURE — 77065 MAMMO DIGITAL DIAGNOSTIC LEFT WITH TOMO: ICD-10-PCS | Mod: 26,LT,, | Performed by: RADIOLOGY

## 2022-06-15 PROCEDURE — 77061 MAMMO DIGITAL DIAGNOSTIC LEFT WITH TOMO: ICD-10-PCS | Mod: 26,LT,, | Performed by: RADIOLOGY

## 2022-06-15 PROCEDURE — 3080F PR MOST RECENT DIASTOLIC BLOOD PRESSURE >= 90 MM HG: ICD-10-PCS | Mod: CPTII,S$GLB,, | Performed by: SURGERY

## 2022-06-15 PROCEDURE — 1159F PR MEDICATION LIST DOCUMENTED IN MEDICAL RECORD: ICD-10-PCS | Mod: CPTII,S$GLB,, | Performed by: SURGERY

## 2022-06-15 PROCEDURE — 77065 DX MAMMO INCL CAD UNI: CPT | Mod: 26,LT,, | Performed by: RADIOLOGY

## 2022-06-15 PROCEDURE — 99024 PR POST-OP FOLLOW-UP VISIT: ICD-10-PCS | Mod: S$GLB,,, | Performed by: SURGERY

## 2022-06-15 PROCEDURE — 77061 BREAST TOMOSYNTHESIS UNI: CPT | Mod: 26,LT,, | Performed by: RADIOLOGY

## 2022-06-15 PROCEDURE — 3080F DIAST BP >= 90 MM HG: CPT | Mod: CPTII,S$GLB,, | Performed by: SURGERY

## 2022-06-15 PROCEDURE — 3077F PR MOST RECENT SYSTOLIC BLOOD PRESSURE >= 140 MM HG: ICD-10-PCS | Mod: CPTII,S$GLB,, | Performed by: SURGERY

## 2022-06-15 PROCEDURE — 99024 POSTOP FOLLOW-UP VISIT: CPT | Mod: S$GLB,,, | Performed by: SURGERY

## 2022-06-15 PROCEDURE — 99999 PR PBB SHADOW E&M-EST. PATIENT-LVL III: ICD-10-PCS | Mod: PBBFAC,,, | Performed by: SURGERY

## 2022-06-15 PROCEDURE — 3008F PR BODY MASS INDEX (BMI) DOCUMENTED: ICD-10-PCS | Mod: CPTII,S$GLB,, | Performed by: SURGERY

## 2022-06-15 NOTE — PROGRESS NOTES
History & Physical  Plastic Surgery Clinic    SUBJECTIVE:     Chief complaint: 1 week post op right breast TDAP    History of Present Illness:  Patient is a 66 y.o. F presents 1 week following right TDAP or breast reconstruction. She is doing well, she denies any fever chills. Swelling still around the surgical site. She has a drain in place with approx 15mL of serous fluid/24 hrs.     Past Medical History:  Past Medical History:   Diagnosis Date    Anxiety     Chronic low back pain     Gastritis     GERD (gastroesophageal reflux disease)     History of cervical cancer     History of recurrent urinary tract infection     IFG (impaired fasting glucose)     Malignant neoplasm of overlapping sites of right breast in female, estrogen receptor positive 7/19/2019    Soft tissue tumor, malignant     sarcoma left foot       Past Surgical History:  Past Surgical History:   Procedure Laterality Date    ABDOMINOPLASTY N/A 11/29/2018    Procedure: EXTENDED ABDOMINOPLASTY;  Surgeon: Mio Arriaga MD;  Location: [facility] OR;  Service: Plastics;  Laterality: N/A;    AXILLARY NODE DISSECTION Right 6/4/2019    Procedure: LYMPHADENECTOMY, AXILLARY RIGHT;  Surgeon: Sabine Arteaga MD;  Location: [facility] OR [address] [place] [address];  Service: General;  Laterality: Right;    BACK SURGERY      BREAST CYST EXCISION Left 1984    BREAST RECONSTRUCTION Right 6/6/2022    Procedure: RECONSTRUCTION, BREAST;  Surgeon: Leonard Cordero MD;  [address]  Service: Plastics;  Laterality: Right;  T-DAP    BREAST REVISION SURGERY Right 7/20/2020    Procedure: BREAST REVISION SURGERY RIGHT;  Surgeon: Leonard Cordero MD;  [facility];  Service: Plastics;  Laterality: Right;    COLONOSCOPY N/A 10/19/2020    Procedure: COLONOSCOPY;  Surgeon: Chanel Saenz MD;  Location: [facility] ENDO (4TH FLR);  Service: Endoscopy;  Laterality: N/A;  covid test 10/16-[facility]    FOOT SURGERY  2009 or 2010    INSERTION OF  BREAST IMPLANT Right 6/4/2019    Procedure: INSERTION, BREAST IMPLANT RIGHT;  Surgeon: Leonard Cordero MD;  [facility];  Service: Plastics;  Laterality: Right;    INSERTION OF BREAST TISSUE EXPANDER Right 6/4/2019    Procedure: INSERTION, TISSUE EXPANDER, BREAST RIGHT;  Surgeon: Leonard Cordero MD;  [facility];  Service: Plastics;  Laterality: Right;    LIPOSUCTION W/ FAT INJECTION N/A 11/29/2018    Procedure: LIPOSUCTION, WITH FAT TRANSFER TO BUTTOCKS;  Surgeon: Mio Arriaga MD;  Location: [facility] OR;  Service: Plastics;  Laterality: N/A;    MASTOPEXY Left 6/4/2019    Procedure: MASTOPEXY LEFT;  Surgeon: Leonard Cordero MD;  [facility];  Service: Plastics;  Laterality: Left;    MASTOPEXY Left 7/20/2020    Procedure: MASTOPEXY LEFT;  Surgeon: Leonard Cordero MD;  [facility];  Service: Plastics;  Laterality: Left;    SENTINEL LYMPH NODE BIOPSY Right 6/4/2019    Procedure: BIOPSY, LYMPH NODE, SENTINEL RIGHT;  Surgeon: Sabine Arteaga MD;  Location: [facility] OR [address] [place] [id number];  Service: General;  Laterality: Right;    SIMPLE MASTECTOMY Right 6/4/2019    Procedure: MASTECTOMY, SIMPLE RIGHT (CONSENT AM OF) 4.0 hr case;  Surgeon: Sabine Arteaga MD;  Location: [facility] OR [address] [place] [id number];  Service: General;  Laterality: Right;    TOTAL REDUCTION MAMMOPLASTY Left 2019    TOTAL VAGINAL HYSTERECTOMY  1996    TUBAL LIGATION  1994       Family History:  Family History   Problem Relation Age of Onset    Breast cancer Sister         dx in 2006    No Known Problems Mother     Throat cancer Father     Colon cancer Neg Hx     Diabetes Neg Hx     Hypertension Neg Hx     Ovarian cancer Neg Hx     Stroke Neg Hx     Anesthesia problems Neg Hx        Social History:  Social History     Socioeconomic History    Marital status:    Tobacco Use    Smoking status: Former Smoker     Packs/day: 0.25     Years: 15.00     Pack years: 3.75     Types:  "Cigarettes     Quit date: 8/15/1993     Years since quittin.8    Smokeless tobacco: Never Used   Substance and Sexual Activity    Alcohol use: Yes     Alcohol/week: 1.0 standard drink     Types: 1 Glasses of wine per week     Comment: seldom    Drug use: No    Sexual activity: Yes     Partners: Male       Medications:  Current Outpatient Medications   Medication Sig Dispense Refill    acetaminophen (TYLENOL) 500 MG tablet Take 2 tablets (1,000 mg total) by mouth every 8 (eight) hours as needed for Pain. 20 tablet 0    bacitracin 500 unit/gram ointment Apply topically 3 (three) times daily. 28 g 0    calcium carbonate (CALCIUM 500 ORAL) Take by mouth once daily.       hydrOXYzine HCL (ATARAX) 25 MG tablet TAKE ONE EVERY NIGHT AS NEEDED FOR ITCHING 30 tablet 2    ibuprofen (ADVIL,MOTRIN) 600 MG tablet Take 1 tablet (600 mg total) by mouth every 6 (six) hours as needed for Pain. 20 tablet 0    letrozole (FEMARA) 2.5 mg Tab Take 1 tablet (2.5 mg total) by mouth once daily. 90 tablet 3    multivitamin capsule Take 1 capsule by mouth once daily.      oxyCODONE-acetaminophen (PERCOCET) 5-325 mg per tablet Take 1 tablet by mouth every 4 (four) hours as needed for Pain. 28 tablet 0     No current facility-administered medications for this visit.       Allergies:  Review of patient's allergies indicates:   Allergen Reactions    No known drug allergies        Review of Systems:  Negative except for HPI.      OBJECTIVE:     BP (!) 176/105 (BP Location: Right arm, Patient Position: Sitting)   Pulse (!) 116   Ht 5' 6" (1.676 m)   Wt 79.4 kg (175 lb)   BMI 28.25 kg/m²     Physical Exam:  Constitutional: Oriented to person, place, and time. Appears well-developed and well-nourished.   Right sided breast lateral fullness , surgical scars well healed, NAC not symmetric bilaterally        ASSESSMENT/PLAN:     66 y.o. F 1 week post op from right TDAP doing well  - follow up 3 weeks         "

## 2022-06-20 ENCOUNTER — TELEPHONE (OUTPATIENT)
Dept: FAMILY MEDICINE | Facility: CLINIC | Age: 67
End: 2022-06-20
Payer: COMMERCIAL

## 2022-06-20 NOTE — TELEPHONE ENCOUNTER
----- Message from Leigh Hall sent at 6/20/2022 10:16 AM CDT -----  Type:  Same Day Appointment Request    Caller is requesting a same day appointment.  Caller declined first available appointment listed below.    Name of Caller:pt  When is the first available appointment?06/21/22  Symptoms:tingling in both hands  Best Call Back Number:[phone]  Additional Information:

## 2022-06-22 ENCOUNTER — OFFICE VISIT (OUTPATIENT)
Dept: FAMILY MEDICINE | Facility: CLINIC | Age: 67
End: 2022-06-22
Payer: COMMERCIAL

## 2022-06-22 VITALS
OXYGEN SATURATION: 100 % | BODY MASS INDEX: 26.65 KG/M2 | SYSTOLIC BLOOD PRESSURE: 112 MMHG | WEIGHT: 165.81 LBS | TEMPERATURE: 98 F | HEART RATE: 101 BPM | DIASTOLIC BLOOD PRESSURE: 82 MMHG | HEIGHT: 66 IN

## 2022-06-22 DIAGNOSIS — R63.0 DECREASED APPETITE: ICD-10-CM

## 2022-06-22 DIAGNOSIS — M25.511 CHRONIC RIGHT SHOULDER PAIN: ICD-10-CM

## 2022-06-22 DIAGNOSIS — G89.29 CHRONIC RIGHT SHOULDER PAIN: ICD-10-CM

## 2022-06-22 DIAGNOSIS — R20.2 PARESTHESIA OF BOTH HANDS: Primary | ICD-10-CM

## 2022-06-22 PROCEDURE — 3079F DIAST BP 80-89 MM HG: CPT | Mod: CPTII,S$GLB,, | Performed by: FAMILY MEDICINE

## 2022-06-22 PROCEDURE — 99999 PR PBB SHADOW E&M-EST. PATIENT-LVL IV: CPT | Mod: PBBFAC,,, | Performed by: FAMILY MEDICINE

## 2022-06-22 PROCEDURE — 99214 PR OFFICE/OUTPT VISIT, EST, LEVL IV, 30-39 MIN: ICD-10-PCS | Mod: S$GLB,,, | Performed by: FAMILY MEDICINE

## 2022-06-22 PROCEDURE — 3008F BODY MASS INDEX DOCD: CPT | Mod: CPTII,S$GLB,, | Performed by: FAMILY MEDICINE

## 2022-06-22 PROCEDURE — 99999 PR PBB SHADOW E&M-EST. PATIENT-LVL IV: ICD-10-PCS | Mod: PBBFAC,,, | Performed by: FAMILY MEDICINE

## 2022-06-22 PROCEDURE — 1159F MED LIST DOCD IN RCRD: CPT | Mod: CPTII,S$GLB,, | Performed by: FAMILY MEDICINE

## 2022-06-22 PROCEDURE — 3008F PR BODY MASS INDEX (BMI) DOCUMENTED: ICD-10-PCS | Mod: CPTII,S$GLB,, | Performed by: FAMILY MEDICINE

## 2022-06-22 PROCEDURE — 3074F SYST BP LT 130 MM HG: CPT | Mod: CPTII,S$GLB,, | Performed by: FAMILY MEDICINE

## 2022-06-22 PROCEDURE — 99214 OFFICE O/P EST MOD 30 MIN: CPT | Mod: S$GLB,,, | Performed by: FAMILY MEDICINE

## 2022-06-22 PROCEDURE — 3079F PR MOST RECENT DIASTOLIC BLOOD PRESSURE 80-89 MM HG: ICD-10-PCS | Mod: CPTII,S$GLB,, | Performed by: FAMILY MEDICINE

## 2022-06-22 PROCEDURE — 1159F PR MEDICATION LIST DOCUMENTED IN MEDICAL RECORD: ICD-10-PCS | Mod: CPTII,S$GLB,, | Performed by: FAMILY MEDICINE

## 2022-06-22 PROCEDURE — 3074F PR MOST RECENT SYSTOLIC BLOOD PRESSURE < 130 MM HG: ICD-10-PCS | Mod: CPTII,S$GLB,, | Performed by: FAMILY MEDICINE

## 2022-06-22 RX ORDER — NAPROXEN 500 MG/1
500 TABLET ORAL 2 TIMES DAILY WITH MEALS
Qty: 60 TABLET | Refills: 0 | Status: SHIPPED | OUTPATIENT
Start: 2022-06-22 | End: 2022-07-22

## 2022-06-22 RX ORDER — MEGESTROL ACETATE 40 MG/ML
200 SUSPENSION ORAL DAILY
Qty: 150 ML | Refills: 0 | Status: SHIPPED | OUTPATIENT
Start: 2022-06-22 | End: 2023-01-10

## 2022-06-22 NOTE — PROGRESS NOTES
(Portions of this note were dictated using voice recognition software and may contain dictation related errors in spelling/grammar/syntax not found on text review)    CC:   Chief Complaint   Patient presents with    Tingling       HPI: 66 y.o. female, pt of Dr Hurtado, presented with tingling. She has medical history significant for chronic low back pain, gastroesophageal reflux disease, malignant neoplasm of right breast, estrogen receptor positive.  She reports having tingling in both hands started almost a month ago, it is located on fingertips bilaterally on all the fingers of the hands, she describes as funny sensation and fingers are sleepy, she rubs her hands to relieve the feeling, her grasp is intact, denies having any weakness in hand , she was seen in urgent care on 05/21 with same complaint and referral was sent to neurology, however, she was not contacted yet. She also has chronic right shoulder pain which she thinks is improved now, needs refills on naproxen. She also reports decreased appetite and requesting refill for megace syrup which she was taking before. She denies cough, SOB, chest pain, N/V, abd pain,changes in bowel habits.     Past Medical History:   Diagnosis Date    Anxiety     Chronic low back pain     Gastritis     GERD (gastroesophageal reflux disease)     History of cervical cancer     History of recurrent urinary tract infection     IFG (impaired fasting glucose)     Malignant neoplasm of overlapping sites of right breast in female, estrogen receptor positive 7/19/2019    Soft tissue tumor, malignant     sarcoma left foot       Past Surgical History:   Procedure Laterality Date    ABDOMINOPLASTY N/A 11/29/2018    Procedure: EXTENDED ABDOMINOPLASTY;  Surgeon: Mio Arriaga MD;  Location: [facility] OR;  Service: Plastics;  Laterality: N/A;    AXILLARY NODE DISSECTION Right 6/4/2019    Procedure: LYMPHADENECTOMY, AXILLARY RIGHT;  Surgeon: Sabine Arteaga MD;  Location: [facility] OR  2ND FLR;  Service: General;  Laterality: Right;    BACK SURGERY      BREAST CYST EXCISION Left 1984    BREAST RECONSTRUCTION Right 6/6/2022    Procedure: RECONSTRUCTION, BREAST;  Surgeon: Leonard Cordero MD;  Location: [facility] OR 2ND FLR;  Service: Plastics;  Laterality: Right;  T-DAP    BREAST REVISION SURGERY Right 7/20/2020    Procedure: BREAST REVISION SURGERY RIGHT;  Surgeon: Leonard Cordero MD;  [facility];  Service: Plastics;  Laterality: Right;    COLONOSCOPY N/A 10/19/2020    Procedure: COLONOSCOPY;  Surgeon: Chanel Saenz MD;  Location: [facility] ENDO (4TH FLR);  Service: Endoscopy;  Laterality: N/A;  covid test 10/16-[facility] urgent care    FOOT SURGERY  2009 or 2010    INSERTION OF BREAST IMPLANT Right 6/4/2019    Procedure: INSERTION, BREAST IMPLANT RIGHT;  Surgeon: Leonard Cordero MD;  Location: [facility] OR 2ND FLR;  Service: Plastics;  Laterality: Right;    INSERTION OF BREAST TISSUE EXPANDER Right 6/4/2019    Procedure: INSERTION, TISSUE EXPANDER, BREAST RIGHT;  Surgeon: Leonard Cordero MD;  [facility];  Service: Plastics;  Laterality: Right;    LIPOSUCTION W/ FAT INJECTION N/A 11/29/2018    Procedure: LIPOSUCTION, WITH FAT TRANSFER TO BUTTOCKS;  Surgeon: Mio Arriaga MD;  [facility];  Service: Plastics;  Laterality: N/A;    MASTOPEXY Left 6/4/2019    Procedure: MASTOPEXY LEFT;  Surgeon: Leonard Cordero MD;  [address];  Service: Plastics;  Laterality: Left;    MASTOPEXY Left 7/20/2020    Procedure: MASTOPEXY LEFT;  Surgeon: Leonard Cordero MD;  [facility];  Service: Plastics;  Laterality: Left;    SENTINEL LYMPH NODE BIOPSY Right 6/4/2019    Procedure: BIOPSY, LYMPH NODE, SENTINEL RIGHT;  Surgeon: Sabine Arteaga MD;  Location: [facility] OR 2ND FLR;  Service: General;  Laterality: Right;    SIMPLE MASTECTOMY Right 6/4/2019    Procedure: MASTECTOMY, SIMPLE RIGHT (CONSENT AM OF) 4.0 hr  case;  Surgeon: Sabine Arteaga MD;  Location: [facility] OR [address] [place] [address];  Service: General;  Laterality: Right;    TOTAL REDUCTION MAMMOPLASTY Left 2019    TOTAL VAGINAL HYSTERECTOMY  1996    TUBAL LIGATION         Family History   Problem Relation Age of Onset    Breast cancer Sister         dx in     No Known Problems Mother     Throat cancer Father     Colon cancer Neg Hx     Diabetes Neg Hx     Hypertension Neg Hx     Ovarian cancer Neg Hx     Stroke Neg Hx     Anesthesia problems Neg Hx        Social History     Tobacco Use    Smoking status: Former Smoker     Packs/day: 0.25     Years: 15.00     Pack years: 3.75     Types: Cigarettes     Quit date: 8/15/1993     Years since quittin.8    Smokeless tobacco: Never Used   Substance Use Topics    Alcohol use: Yes     Alcohol/week: 1.0 standard drink     Types: 1 Glasses of wine per week     Comment: seldom    Drug use: No       Lab Results   Component Value Date    WBC 6.87 11/15/2021    HGB 11.4 (L) 2022    HCT 40.6 11/15/2021    MCV 89 11/15/2021     11/15/2021    CHOL 213 (H) 11/15/2021    TRIG 238 (H) 11/15/2021    HDL 31 (L) 11/15/2021    ALT 29 11/15/2021    AST 38 11/15/2021    BILITOT 0.4 11/15/2021    ALKPHOS 74 11/15/2021     11/15/2021    K 3.8 11/15/2021     11/15/2021    CREATININE 0.8 11/15/2021    ESTGFRAFRICA >60 11/15/2021    EGFRNONAA >60 11/15/2021    CALCIUM 9.7 11/15/2021    ALBUMIN 3.8 11/15/2021    BUN 11 11/15/2021    CO2 23 11/15/2021    TSH 0.627 11/15/2021    INR 1.0 2021    HGBA1C 5.8 (H) 11/15/2021    LDLCALC 134.4 11/15/2021    GLU 95 11/15/2021    [id number] 33 2020             Vital signs reviewed  PE:   APPEARANCE: Well nourished, well developed, in no acute distress.    HEAD: Normocephalic, atraumatic.  EYES: EOMI.  Conjunctivae noninjected.  NOSE: Mucosa pink. Airway clear.  MOUTH & THROAT: No tonsillar enlargement. No pharyngeal erythema or exudate.   NECK: Supple with no  cervical lymphadenopathy.    CHEST: Good inspiratory effort. Lungs clear to auscultation with no wheezes or crackles.  CARDIOVASCULAR: Normal S1, S2. No rubs, murmurs, or gallops.  ABDOMEN: Bowel sounds normal. Not distended. Soft. No tenderness or masses. No organomegaly.  EXTREMITIES: No edema, cyanosis, or clubbing.  HANDS: normal strength and sensations      Review of Systems   Constitutional: Negative for chills, fatigue and fever.   HENT: Negative.    Respiratory: Negative for cough, shortness of breath and wheezing.    Cardiovascular: Negative for chest pain, palpitations and leg swelling.   Gastrointestinal: Negative.    Genitourinary: Negative.    Musculoskeletal: Positive for arthralgias.   Neurological: Positive for numbness.   Psychiatric/Behavioral: Negative.    All other systems reviewed and are negative.      IMPRESSION  1. Paresthesia of both hands    2. Chronic right shoulder pain    3. Decreased appetite          PLAN       1. Paresthesia of both hands  She will most likely need nerve conduction test, since she wants to see neurology, will send the referral for further evaluation and testing    - Ambulatory referral/consult to Neurology; Future      2. Chronic right shoulder pain  - naproxen (NAPROSYN) 500 MG tablet; Take 1 tablet (500 mg total) by mouth 2 (two) times daily with meals.  Dispense: 60 tablet; Refill: 0      3. Decreased appetite  - megestroL (MEGACE) 400 mg/10 mL (40 mg/mL) Susp; Take 5 mLs (200 mg total) by mouth once daily.  Dispense: 150 mL; Refill: 0        Age/demographic appropriate health maintenance:    Health Maintenance Due   Topic Date Due    Shingles Vaccine (1 of 2) Never done    COVID-19 Vaccine (4 - Booster for Pfizer series) 04/17/2022         Ana Franks  6/22/2022

## 2022-07-06 ENCOUNTER — OFFICE VISIT (OUTPATIENT)
Dept: PLASTIC SURGERY | Facility: CLINIC | Age: 67
End: 2022-07-06
Payer: COMMERCIAL

## 2022-07-06 VITALS
HEIGHT: 66 IN | SYSTOLIC BLOOD PRESSURE: 121 MMHG | BODY MASS INDEX: 26.52 KG/M2 | HEART RATE: 86 BPM | WEIGHT: 165 LBS | DIASTOLIC BLOOD PRESSURE: 73 MMHG

## 2022-07-06 DIAGNOSIS — Z09 SURGERY FOLLOW-UP EXAMINATION: Primary | ICD-10-CM

## 2022-07-06 PROCEDURE — 1160F RVW MEDS BY RX/DR IN RCRD: CPT | Mod: CPTII,S$GLB,, | Performed by: SURGERY

## 2022-07-06 PROCEDURE — 3074F PR MOST RECENT SYSTOLIC BLOOD PRESSURE < 130 MM HG: ICD-10-PCS | Mod: CPTII,S$GLB,, | Performed by: SURGERY

## 2022-07-06 PROCEDURE — 1160F PR REVIEW ALL MEDS BY PRESCRIBER/CLIN PHARMACIST DOCUMENTED: ICD-10-PCS | Mod: CPTII,S$GLB,, | Performed by: SURGERY

## 2022-07-06 PROCEDURE — 99999 PR PBB SHADOW E&M-EST. PATIENT-LVL III: ICD-10-PCS | Mod: PBBFAC,,, | Performed by: SURGERY

## 2022-07-06 PROCEDURE — 99024 PR POST-OP FOLLOW-UP VISIT: ICD-10-PCS | Mod: S$GLB,,, | Performed by: SURGERY

## 2022-07-06 PROCEDURE — 3008F PR BODY MASS INDEX (BMI) DOCUMENTED: ICD-10-PCS | Mod: CPTII,S$GLB,, | Performed by: SURGERY

## 2022-07-06 PROCEDURE — 3078F DIAST BP <80 MM HG: CPT | Mod: CPTII,S$GLB,, | Performed by: SURGERY

## 2022-07-06 PROCEDURE — 3074F SYST BP LT 130 MM HG: CPT | Mod: CPTII,S$GLB,, | Performed by: SURGERY

## 2022-07-06 PROCEDURE — 3078F PR MOST RECENT DIASTOLIC BLOOD PRESSURE < 80 MM HG: ICD-10-PCS | Mod: CPTII,S$GLB,, | Performed by: SURGERY

## 2022-07-06 PROCEDURE — 3008F BODY MASS INDEX DOCD: CPT | Mod: CPTII,S$GLB,, | Performed by: SURGERY

## 2022-07-06 PROCEDURE — 1159F PR MEDICATION LIST DOCUMENTED IN MEDICAL RECORD: ICD-10-PCS | Mod: CPTII,S$GLB,, | Performed by: SURGERY

## 2022-07-06 PROCEDURE — 99999 PR PBB SHADOW E&M-EST. PATIENT-LVL III: CPT | Mod: PBBFAC,,, | Performed by: SURGERY

## 2022-07-06 PROCEDURE — 99024 POSTOP FOLLOW-UP VISIT: CPT | Mod: S$GLB,,, | Performed by: SURGERY

## 2022-07-06 PROCEDURE — 1159F MED LIST DOCD IN RCRD: CPT | Mod: CPTII,S$GLB,, | Performed by: SURGERY

## 2022-07-06 RX ORDER — IBUPROFEN 800 MG/1
800 TABLET ORAL EVERY 8 HOURS PRN
Status: ON HOLD | COMMUNITY
Start: 2022-06-08 | End: 2023-06-22

## 2022-07-06 NOTE — PROGRESS NOTES
Patient presents Plastic surgery Clinic after being having a Tdap  flap to the right breast.  She has done well although she still needs some fat grafting there is a small separation between the implant and the Tdap flap.  This can easily be filled with free fat.  She will return to the clinic in 2 months.

## 2022-07-21 ENCOUNTER — TELEPHONE (OUTPATIENT)
Dept: OBSTETRICS AND GYNECOLOGY | Facility: CLINIC | Age: 67
End: 2022-07-21
Payer: COMMERCIAL

## 2022-07-21 NOTE — TELEPHONE ENCOUNTER
----- Message from Chandana Hill sent at 7/21/2022  3:30 PM CDT -----      Name of Who is Calling: BART VALLE [[id number]]      What is the request in detail: Pt returned call to the office.Please contact to further discuss and advise.          Can the clinic reply by [facility]: N      What [facility] to [facility]: [phone]

## 2022-07-22 ENCOUNTER — OFFICE VISIT (OUTPATIENT)
Dept: HEMATOLOGY/ONCOLOGY | Facility: CLINIC | Age: 67
End: 2022-07-22
Payer: COMMERCIAL

## 2022-07-22 ENCOUNTER — TELEPHONE (OUTPATIENT)
Dept: NEUROLOGY | Facility: CLINIC | Age: 67
End: 2022-07-22
Payer: COMMERCIAL

## 2022-07-22 VITALS
HEIGHT: 66 IN | TEMPERATURE: 98 F | OXYGEN SATURATION: 100 % | BODY MASS INDEX: 26.47 KG/M2 | SYSTOLIC BLOOD PRESSURE: 110 MMHG | HEART RATE: 106 BPM | DIASTOLIC BLOOD PRESSURE: 71 MMHG | RESPIRATION RATE: 18 BRPM | WEIGHT: 164.69 LBS

## 2022-07-22 DIAGNOSIS — Z79.811 AROMATASE INHIBITOR USE: ICD-10-CM

## 2022-07-22 DIAGNOSIS — C50.811 MALIGNANT NEOPLASM OF OVERLAPPING SITES OF RIGHT BREAST IN FEMALE, ESTROGEN RECEPTOR POSITIVE: Primary | ICD-10-CM

## 2022-07-22 DIAGNOSIS — Z78.0 MENOPAUSE: ICD-10-CM

## 2022-07-22 DIAGNOSIS — R20.0 NUMBNESS: ICD-10-CM

## 2022-07-22 DIAGNOSIS — Z17.0 MALIGNANT NEOPLASM OF OVERLAPPING SITES OF RIGHT BREAST IN FEMALE, ESTROGEN RECEPTOR POSITIVE: Primary | ICD-10-CM

## 2022-07-22 DIAGNOSIS — R63.0 POOR APPETITE: ICD-10-CM

## 2022-07-22 PROCEDURE — 3078F PR MOST RECENT DIASTOLIC BLOOD PRESSURE < 80 MM HG: ICD-10-PCS | Mod: CPTII,S$GLB,, | Performed by: INTERNAL MEDICINE

## 2022-07-22 PROCEDURE — 99214 PR OFFICE/OUTPT VISIT, EST, LEVL IV, 30-39 MIN: ICD-10-PCS | Mod: S$GLB,,, | Performed by: INTERNAL MEDICINE

## 2022-07-22 PROCEDURE — 99999 PR PBB SHADOW E&M-EST. PATIENT-LVL V: CPT | Mod: PBBFAC,,, | Performed by: INTERNAL MEDICINE

## 2022-07-22 PROCEDURE — 1160F RVW MEDS BY RX/DR IN RCRD: CPT | Mod: CPTII,S$GLB,, | Performed by: INTERNAL MEDICINE

## 2022-07-22 PROCEDURE — 3008F PR BODY MASS INDEX (BMI) DOCUMENTED: ICD-10-PCS | Mod: CPTII,S$GLB,, | Performed by: INTERNAL MEDICINE

## 2022-07-22 PROCEDURE — 1159F PR MEDICATION LIST DOCUMENTED IN MEDICAL RECORD: ICD-10-PCS | Mod: CPTII,S$GLB,, | Performed by: INTERNAL MEDICINE

## 2022-07-22 PROCEDURE — 3008F BODY MASS INDEX DOCD: CPT | Mod: CPTII,S$GLB,, | Performed by: INTERNAL MEDICINE

## 2022-07-22 PROCEDURE — 1160F PR REVIEW ALL MEDS BY PRESCRIBER/CLIN PHARMACIST DOCUMENTED: ICD-10-PCS | Mod: CPTII,S$GLB,, | Performed by: INTERNAL MEDICINE

## 2022-07-22 PROCEDURE — 99214 OFFICE O/P EST MOD 30 MIN: CPT | Mod: S$GLB,,, | Performed by: INTERNAL MEDICINE

## 2022-07-22 PROCEDURE — 3074F PR MOST RECENT SYSTOLIC BLOOD PRESSURE < 130 MM HG: ICD-10-PCS | Mod: CPTII,S$GLB,, | Performed by: INTERNAL MEDICINE

## 2022-07-22 PROCEDURE — 3078F DIAST BP <80 MM HG: CPT | Mod: CPTII,S$GLB,, | Performed by: INTERNAL MEDICINE

## 2022-07-22 PROCEDURE — 1159F MED LIST DOCD IN RCRD: CPT | Mod: CPTII,S$GLB,, | Performed by: INTERNAL MEDICINE

## 2022-07-22 PROCEDURE — 99999 PR PBB SHADOW E&M-EST. PATIENT-LVL V: ICD-10-PCS | Mod: PBBFAC,,, | Performed by: INTERNAL MEDICINE

## 2022-07-22 PROCEDURE — 3074F SYST BP LT 130 MM HG: CPT | Mod: CPTII,S$GLB,, | Performed by: INTERNAL MEDICINE

## 2022-07-22 RX ORDER — CYPROHEPTADINE HYDROCHLORIDE 4 MG/1
4 TABLET ORAL 3 TIMES DAILY PRN
Qty: 90 TABLET | Refills: 2 | Status: SHIPPED | OUTPATIENT
Start: 2022-07-22 | End: 2022-12-09

## 2022-07-22 NOTE — TELEPHONE ENCOUNTER
----- Message from Kathie Platt sent at 7/22/2022 11:42 AM CDT -----  Regarding: pt needing appointment  Good morning, Dr Petersen is requesting patient get an appointment with Neurology. Can you please assist with getting them scheduled.    Thank you,   Jero Layne

## 2022-08-14 ENCOUNTER — HOSPITAL ENCOUNTER (EMERGENCY)
Facility: HOSPITAL | Age: 67
Discharge: HOME | End: 2022-08-14
Payer: COMMERCIAL

## 2022-08-14 VITALS
RESPIRATION RATE: 18 BRPM | OXYGEN SATURATION: 99 % | WEIGHT: 166 LBS | DIASTOLIC BLOOD PRESSURE: 86 MMHG | BODY MASS INDEX: 26.68 KG/M2 | HEIGHT: 66 IN | HEART RATE: 90 BPM | SYSTOLIC BLOOD PRESSURE: 150 MMHG | TEMPERATURE: 99 F

## 2022-08-14 DIAGNOSIS — R10.9 ABDOMINAL PAIN: ICD-10-CM

## 2022-08-14 DIAGNOSIS — K59.00 CONSTIPATION, UNSPECIFIED CONSTIPATION TYPE: Primary | ICD-10-CM

## 2022-08-14 LAB
ALBUMIN SERPL BCP-MCNC: 4.4 G/DL (ref 3.5–5.2)
ALP SERPL-CCNC: 57 U/L (ref 55–135)
ALT SERPL W/O P-5'-P-CCNC: 26 U/L (ref 10–44)
ANION GAP SERPL CALC-SCNC: 12 MMOL/L (ref 8–16)
AST SERPL-CCNC: 38 U/L (ref 10–40)
BASOPHILS # BLD AUTO: 0.06 K/UL (ref 0–0.2)
BASOPHILS NFR BLD: 0.7 % (ref 0–1.9)
BILIRUB SERPL-MCNC: 0.5 MG/DL (ref 0.1–1)
BILIRUB UR QL STRIP: NEGATIVE
BUN SERPL-MCNC: 19 MG/DL (ref 8–23)
CALCIUM SERPL-MCNC: 9.8 MG/DL (ref 8.7–10.5)
CHLORIDE SERPL-SCNC: 108 MMOL/L (ref 95–110)
CLARITY UR: ABNORMAL
CO2 SERPL-SCNC: 21 MMOL/L (ref 23–29)
COLOR UR: YELLOW
CREAT SERPL-MCNC: 0.9 MG/DL (ref 0.5–1.4)
DIFFERENTIAL METHOD: ABNORMAL
EOSINOPHIL # BLD AUTO: 0.3 K/UL (ref 0–0.5)
EOSINOPHIL NFR BLD: 3.3 % (ref 0–8)
ERYTHROCYTE [DISTWIDTH] IN BLOOD BY AUTOMATED COUNT: 15.7 % (ref 11.5–14.5)
EST. GFR  (NO RACE VARIABLE): >60 ML/MIN/1.73 M^2
GLUCOSE SERPL-MCNC: 76 MG/DL (ref 70–110)
GLUCOSE UR QL STRIP: NEGATIVE
HCT VFR BLD AUTO: 38.9 % (ref 37–48.5)
HGB BLD-MCNC: 11.6 G/DL (ref 12–16)
HGB UR QL STRIP: NEGATIVE
IMM GRANULOCYTES # BLD AUTO: 0.02 K/UL (ref 0–0.04)
IMM GRANULOCYTES NFR BLD AUTO: 0.2 % (ref 0–0.5)
KETONES UR QL STRIP: NEGATIVE
LEUKOCYTE ESTERASE UR QL STRIP: ABNORMAL
LIPASE SERPL-CCNC: 39 U/L (ref 4–60)
LYMPHOCYTES # BLD AUTO: 3.6 K/UL (ref 1–4.8)
LYMPHOCYTES NFR BLD: 41.4 % (ref 18–48)
MCH RBC QN AUTO: 27 PG (ref 27–31)
MCHC RBC AUTO-ENTMCNC: 29.8 G/DL (ref 32–36)
MCV RBC AUTO: 91 FL (ref 82–98)
MICROSCOPIC COMMENT: ABNORMAL
MONOCYTES # BLD AUTO: 0.8 K/UL (ref 0.3–1)
MONOCYTES NFR BLD: 9.2 % (ref 4–15)
NEUTROPHILS # BLD AUTO: 3.9 K/UL (ref 1.8–7.7)
NEUTROPHILS NFR BLD: 45.2 % (ref 38–73)
NITRITE UR QL STRIP: NEGATIVE
NRBC BLD-RTO: 0 /100 WBC
PH UR STRIP: 6 [PH] (ref 5–8)
PLATELET # BLD AUTO: 356 K/UL (ref 150–450)
PMV BLD AUTO: 9 FL (ref 9.2–12.9)
POTASSIUM SERPL-SCNC: 4 MMOL/L (ref 3.5–5.1)
PROT SERPL-MCNC: 8.4 G/DL (ref 6–8.4)
PROT UR QL STRIP: ABNORMAL
RBC # BLD AUTO: 4.29 M/UL (ref 4–5.4)
SODIUM SERPL-SCNC: 141 MMOL/L (ref 136–145)
SP GR UR STRIP: >1.03 (ref 1–1.03)
SQUAMOUS #/AREA URNS HPF: 1 /HPF
URN SPEC COLLECT METH UR: ABNORMAL
UROBILINOGEN UR STRIP-ACNC: NEGATIVE EU/DL
WBC # BLD AUTO: 8.72 K/UL (ref 3.9–12.7)
WBC #/AREA URNS HPF: 27 /HPF (ref 0–5)

## 2022-08-14 PROCEDURE — 85025 COMPLETE CBC W/AUTO DIFF WBC: CPT | Performed by: NURSE PRACTITIONER

## 2022-08-14 PROCEDURE — 80053 COMPREHEN METABOLIC PANEL: CPT | Performed by: NURSE PRACTITIONER

## 2022-08-14 PROCEDURE — 83690 ASSAY OF LIPASE: CPT | Performed by: NURSE PRACTITIONER

## 2022-08-14 PROCEDURE — 87086 URINE CULTURE/COLONY COUNT: CPT | Performed by: NURSE PRACTITIONER

## 2022-08-14 PROCEDURE — 81000 URINALYSIS NONAUTO W/SCOPE: CPT | Performed by: NURSE PRACTITIONER

## 2022-08-14 PROCEDURE — 99284 EMERGENCY DEPT VISIT MOD MDM: CPT | Mod: 25

## 2022-08-14 RX ORDER — LACTULOSE 10 G/15ML
20 SOLUTION ORAL EVERY 6 HOURS PRN
Qty: 240 ML | Refills: 0 | Status: SHIPPED | OUTPATIENT
Start: 2022-08-14 | End: 2023-01-10

## 2022-08-14 RX ORDER — DOCUSATE SODIUM 100 MG/1
100 CAPSULE, LIQUID FILLED ORAL 2 TIMES DAILY
Qty: 60 CAPSULE | Refills: 0 | Status: SHIPPED | OUTPATIENT
Start: 2022-08-14

## 2022-08-14 NOTE — FIRST PROVIDER EVALUATION
"Medical screening exam completed.  I have conducted a focused provider triage encounter, findings are as follows:    Brief history of present illness:  Lower abdominal cramping, constipation; denies fever, N/V    Vitals:    08/14/22 1619   BP: 138/87   Pulse: (!) 123   Resp: 17   Temp: 98.2 °F (36.8 °C)   SpO2: 100%   Weight: 75.3 kg (166 lb)   Height: 5' 6" (1.676 m)       Pertinent physical exam:  Lower abdominal pain    Brief workup plan:  Urinalysis and labs    Preliminary workup initiated; this workup will be continued and followed by the physician or advanced practice provider that is assigned to the patient when roomed.  "

## 2022-08-15 NOTE — ED PROVIDER NOTES
Encounter Date: 8/14/2022       History     Chief Complaint   Patient presents with    Abdominal Pain     Reports lower abdominal pain and lower back pain since yesterday described as cramping and inhibits ambulation causing her to lean to left side. Denies dysuria. Denies trauma. Last BM on Thursday after using Milk of magnesia and enema. Denies nausea or vomiting.      HPI   Patient presenting to ED for evaluation of abdominal pain since yesterday.  Pain located across bilateral lower abdomen with radiation to low back, described as aching and cramping in nature, worse with standing up straight and ambulating, no specific alleviating factors.  Patient says she has somewhat chronic issues related to constipation, typically uses Milk of Magnesia 1-2 times per week to help stimulate bowel movements, last BM was 3-4 days ago.  Pain fluctuates without specific aggravating or alleviating factors, currently symptoms are minimal.  Still passing flatus.  No other specific complaints at this time.      Review of patient's allergies indicates:   Allergen Reactions    No known drug allergies      Past Medical History:   Diagnosis Date    Anxiety     Chronic low back pain     Gastritis     GERD (gastroesophageal reflux disease)     History of cervical cancer     History of recurrent urinary tract infection     IFG (impaired fasting glucose)     Malignant neoplasm of overlapping sites of right breast in female, estrogen receptor positive 7/19/2019    Soft tissue tumor, malignant     sarcoma left foot     Past Surgical History:   Procedure Laterality Date    ABDOMINOPLASTY N/A 11/29/2018    Procedure: EXTENDED ABDOMINOPLASTY;  Surgeon: Mio Arriaga MD;  Location: [facility] OR;  Service: Plastics;  Laterality: N/A;    AXILLARY NODE DISSECTION Right 6/4/2019    Procedure: LYMPHADENECTOMY, AXILLARY RIGHT;  Surgeon: Sabine Arteaga MD;  Location: [address];  Service: General;  Laterality: Right;    BACK SURGERY       BREAST CYST EXCISION Left 1984    BREAST RECONSTRUCTION Right 6/6/2022    Procedure: RECONSTRUCTION, BREAST;  Surgeon: Leonard Cordero MD;  [facility];  Service: Plastics;  Laterality: Right;  T-DAP    BREAST REVISION SURGERY Right 7/20/2020    Procedure: BREAST REVISION SURGERY RIGHT;  Surgeon: Leonard Cordero MD;  [facility];  Service: Plastics;  Laterality: Right;    COLONOSCOPY N/A 10/19/2020    Procedure: COLONOSCOPY;  Surgeon: Chanel Saenz MD;  Location: [facility] ENDO (4TH FLR);  Service: Endoscopy;  Laterality: N/A;  covid test 10/16[facility] urgent care    FOOT SURGERY  2009 or 2010    INSERTION OF BREAST IMPLANT Right 6/4/2019    Procedure: INSERTION, BREAST IMPLANT RIGHT;  Surgeon: Leonard Cordero MD;  [facility];  Service: Plastics;  Laterality: Right;    INSERTION OF BREAST TISSUE EXPANDER Right 6/4/2019    Procedure: INSERTION, TISSUE EXPANDER, BREAST RIGHT;  Surgeon: Leonard Cordero MD;  [address]  Service: Plastics;  Laterality: Right;    LIPOSUCTION W/ FAT INJECTION N/A 11/29/2018    Procedure: LIPOSUCTION, WITH FAT TRANSFER TO BUTTOCKS;  Surgeon: Mio Arriaga MD;  Location: [facility];  Service: Plastics;  Laterality: N/A;    MASTOPEXY Left 6/4/2019    Procedure: MASTOPEXY LEFT;  Surgeon: Leonard Cordero MD;  Location: [address];  Service: Plastics;  Laterality: Left;    MASTOPEXY Left 7/20/2020    Procedure: MASTOPEXY LEFT;  Surgeon: Leonard Cordero MD;  Location: [address];  Service: Plastics;  Laterality: Left;    SENTINEL LYMPH NODE BIOPSY Right 6/4/2019    Procedure: BIOPSY, LYMPH NODE, SENTINEL RIGHT;  Surgeon: Sabine Arteaga MD;  Location: [address];  Service: General;  Laterality: Right;    SIMPLE MASTECTOMY Right 6/4/2019    Procedure: MASTECTOMY, SIMPLE RIGHT (CONSENT AM OF) 4.0 hr case;  Surgeon: Sabine Arteaga MD;  Location: [address];   Service: General;  Laterality: Right;    TOTAL REDUCTION MAMMOPLASTY Left 2019    TOTAL VAGINAL HYSTERECTOMY  1996    TUBAL LIGATION       Family History   Problem Relation Age of Onset    Breast cancer Sister         dx in     No Known Problems Mother     Throat cancer Father     Colon cancer Neg Hx     Diabetes Neg Hx     Hypertension Neg Hx     Ovarian cancer Neg Hx     Stroke Neg Hx     Anesthesia problems Neg Hx      Social History     Tobacco Use    Smoking status: Former Smoker     Packs/day: 0.25     Years: 15.00     Pack years: 3.75     Types: Cigarettes     Quit date: 8/15/1993     Years since quittin.0    Smokeless tobacco: Never Used   Substance Use Topics    Alcohol use: Yes     Alcohol/week: 1.0 standard drink     Types: 1 Glasses of wine per week     Comment: seldom    Drug use: No     Review of Systems   Constitutional: Negative for chills and fever.   HENT: Negative for congestion and rhinorrhea.    Eyes: Negative for pain.   Respiratory: Negative for cough and shortness of breath.    Cardiovascular: Negative for chest pain.   Gastrointestinal: Positive for abdominal pain and constipation. Negative for abdominal distention, blood in stool, nausea and vomiting.   Genitourinary: Negative for dysuria and flank pain.   Musculoskeletal: Positive for back pain.   Allergic/Immunologic: Negative for immunocompromised state.   Neurological: Negative for light-headedness and headaches.   Hematological: Does not bruise/bleed easily.       Physical Exam     Initial Vitals [22 1619]   BP Pulse Resp Temp SpO2   138/87 (!) 123 17 98.2 °F (36.8 °C) 100 %      MAP       --         Physical Exam    Constitutional: She appears well-developed. No distress.   HENT:   Head: Normocephalic.   Eyes: EOM are normal. Pupils are equal, round, and reactive to light.   Neck:   Normal range of motion.  Cardiovascular: Normal rate, regular rhythm and normal heart sounds.   Pulmonary/Chest: Breath  sounds normal.   Abdominal: Abdomen is soft. Bowel sounds are normal. She exhibits no distension. There is no abdominal tenderness. There is no rebound and no guarding.   Musculoskeletal:         General: Normal range of motion.      Cervical back: Normal range of motion.     Neurological: She is alert and oriented to person, place, and time.   Skin: Skin is warm and dry.   Psychiatric: She has a normal mood and affect.         ED Course   Procedures  Labs Reviewed   CBC W/ AUTO DIFFERENTIAL - Abnormal; Notable for the following components:       Result Value    Hemoglobin 11.6 (*)     MCHC 29.8 (*)     RDW 15.7 (*)     MPV 9.0 (*)     All other components within normal limits   COMPREHENSIVE METABOLIC PANEL - Abnormal; Notable for the following components:    CO2 21 (*)     All other components within normal limits   URINALYSIS, REFLEX TO URINE CULTURE - Abnormal; Notable for the following components:    Appearance, UA Hazy (*)     Specific Gravity, UA >1.030 (*)     Protein, UA Trace (*)     Leukocytes, UA 3+ (*)     All other components within normal limits    Narrative:     Specimen Source->Urine   URINALYSIS MICROSCOPIC - Abnormal; Notable for the following components:    WBC, UA 27 (*)     All other components within normal limits    Narrative:     Specimen Source->Urine   CULTURE, URINE    Narrative:     Specimen Source->Urine   LIPASE          Imaging Results          X-Ray Abdomen Flat And Erect (Final result)  Result time 08/14/22 22:15:57    Final result by Jorge Perez DO (08/14/22 22:15:57)                 Impression:      Large amount of stool, correlate for constipation.      Electronically signed by: Jorge Perez  Date:    08/14/2022  Time:    22:15             Narrative:    EXAMINATION:  XR ABDOMEN FLAT AND ERECT    CLINICAL HISTORY:  Unspecified abdominal pain    TECHNIQUE:  Flat and erect AP views of the abdomen were performed.    COMPARISON:  12/17/2021.    FINDINGS:  There is a  nonobstructive bowel gas pattern.  There is a large amount of stool.  There is no free intraperitoneal air.  There is no abnormal calcification. The visualized osseous structures are intact.  The visualized lung bases are clear.                                 Medications - No data to display    MDM:  Labs overall unremarkable, abdominal series xrays shows large amount of stool without findings of obstruction.  Patient offered enema in ED but declined, will give Rx for stool softener and laxative for home use.  Does not appear to be indication for further emergent testing, observation, or hospitalization at this time.  Patient appears stable for and is comfortable with discharge home.  Advised to follow-up with PCP for outpatient recheck.  Signs and symptoms that would warrant immediate return to ED were reviewed prior to discharge.      Clinical Impression:   Final diagnoses:  [R10.9] Abdominal pain  [R10.9] Abdominal pain - Constipation  [K59.00] Constipation, unspecified constipation type (Primary)        ED Disposition Condition    Discharge Stable        ED Prescriptions     Medication Sig Dispense Start Date End Date Auth. Provider    docusate sodium (COLACE) 100 MG capsule Take 1 capsule (100 mg total) by mouth 2 (two) times daily. 60 capsule 8/14/2022  Pawel Pacheco MD    lactulose (CHRONULAC) 20 gram/30 mL Soln Take 30 mLs (20 g total) by mouth every 6 (six) hours as needed (Constipation). 240 mL 8/14/2022  Pawel Pacheco MD        Follow-up Information     Follow up With Specialties Details Why Contact Info    Andres Hurtado MD Family Medicine Schedule an appointment as soon as possible for a visit  Follow up with your primary care physician for outpatient recheck. [address] [facility]  [address]  [facility] [address]  [phone]      [facility] Emergency Dept Emergency Medicine  Return to the ED sooner for any new or worsening symptoms or for any other concerns. [address] [facility]  [address]  [phone]           Pawel Pacheco MD  08/22/22 2003

## 2022-08-15 NOTE — ED NOTES
Patient received in ER with C/O lower abdominal/lower back pain. Pt confirms pain has been intermittent for the last week, yesterday was the worst. Pt denies NV, states can go to the bathroom without difficulty. Pt states she has had a UTI in the past.       present      Pain:  Rated 10/10.     Psychosocial:  Patient is calm and cooperative.  Patients insight and judgement are appropriate to situation.  Appears clean, well maintained, with clothing appropriate to environment.  No evidence of delusions, hallucinations, or psychosis.     Neuro:  Eyes open spontaneously.  Awake, alert, oriented x 4.  Speech clear and appropriate.  Tolerating saliva secretions well.  Able to follow commands, demonstrating ability to actively and appropriately communicate within context of current conversation.  Symmetrical facial muscles.  Moving all extremities well with no noted weakness.  Adequate muscle tone present.    Movement is purposeful.  No evidence of impaired sensation.      Airway:  Bilateral chest rise and fall.  RR regular and non-labored.  Air entry patent and clear x 5 lobes of the lungs.  No crepitus or subcutaneous emphysema noted on palpation.       Circulatory:  Skin warm, dry, and pink.  Apical and radial pulses strong and regular.  Capillary refill/skin blanching less than 3 seconds to distal of 4 extremities.     Abdomen:  Abdomen soft and non-distended.  Positive normo-active bowel sounds x 4 quadrants.       Urinary:  Patient denies pain, frequency, or urgency.  Voids independently.  Reports urine appears jairo/yellow in color.     Extremities:  No redness, heat, swelling, deformity, or pain.     Skin:  Intact with no bruising/discolorations noted.

## 2022-08-15 NOTE — ED NOTES
Pt reports feeling better. Pt denies pain at this time. Pt encouraged to hydrate and get plenty of fluids. Pt discharged with all personal belongings and paperwork.

## 2022-08-16 LAB — BACTERIA UR CULT: NO GROWTH

## 2022-09-13 ENCOUNTER — OFFICE VISIT (OUTPATIENT)
Dept: OBSTETRICS AND GYNECOLOGY | Facility: CLINIC | Age: 67
End: 2022-09-13
Attending: OBSTETRICS & GYNECOLOGY
Payer: COMMERCIAL

## 2022-09-13 VITALS
BODY MASS INDEX: 27.25 KG/M2 | SYSTOLIC BLOOD PRESSURE: 141 MMHG | HEIGHT: 66 IN | DIASTOLIC BLOOD PRESSURE: 94 MMHG | WEIGHT: 169.56 LBS

## 2022-09-13 DIAGNOSIS — Z01.419 WELL WOMAN EXAM WITH ROUTINE GYNECOLOGICAL EXAM: Primary | ICD-10-CM

## 2022-09-13 PROBLEM — N63.0 BREAST MASS: Status: RESOLVED | Noted: 2019-06-04 | Resolved: 2022-09-13

## 2022-09-13 PROBLEM — Z12.11 COLON CANCER SCREENING: Status: RESOLVED | Noted: 2020-10-19 | Resolved: 2022-09-13

## 2022-09-13 PROBLEM — Z98.890 HISTORY OF BREAST RECONSTRUCTION: Status: RESOLVED | Noted: 2022-06-06 | Resolved: 2022-09-13

## 2022-09-13 PROBLEM — Z85.3 PERSONAL HISTORY OF BREAST CANCER: Status: RESOLVED | Noted: 2020-07-20 | Resolved: 2022-09-13

## 2022-09-13 PROCEDURE — 99396 PREV VISIT EST AGE 40-64: CPT | Mod: S$GLB,,, | Performed by: OBSTETRICS & GYNECOLOGY

## 2022-09-13 PROCEDURE — 99999 PR PBB SHADOW E&M-EST. PATIENT-LVL III: ICD-10-PCS | Mod: PBBFAC,,, | Performed by: OBSTETRICS & GYNECOLOGY

## 2022-09-13 PROCEDURE — 3008F PR BODY MASS INDEX (BMI) DOCUMENTED: ICD-10-PCS | Mod: CPTII,S$GLB,, | Performed by: OBSTETRICS & GYNECOLOGY

## 2022-09-13 PROCEDURE — 3080F PR MOST RECENT DIASTOLIC BLOOD PRESSURE >= 90 MM HG: ICD-10-PCS | Mod: CPTII,S$GLB,, | Performed by: OBSTETRICS & GYNECOLOGY

## 2022-09-13 PROCEDURE — 99396 PR PREVENTIVE VISIT,EST,40-64: ICD-10-PCS | Mod: S$GLB,,, | Performed by: OBSTETRICS & GYNECOLOGY

## 2022-09-13 PROCEDURE — 1160F RVW MEDS BY RX/DR IN RCRD: CPT | Mod: CPTII,S$GLB,, | Performed by: OBSTETRICS & GYNECOLOGY

## 2022-09-13 PROCEDURE — 1160F PR REVIEW ALL MEDS BY PRESCRIBER/CLIN PHARMACIST DOCUMENTED: ICD-10-PCS | Mod: CPTII,S$GLB,, | Performed by: OBSTETRICS & GYNECOLOGY

## 2022-09-13 PROCEDURE — 3077F SYST BP >= 140 MM HG: CPT | Mod: CPTII,S$GLB,, | Performed by: OBSTETRICS & GYNECOLOGY

## 2022-09-13 PROCEDURE — 1159F PR MEDICATION LIST DOCUMENTED IN MEDICAL RECORD: ICD-10-PCS | Mod: CPTII,S$GLB,, | Performed by: OBSTETRICS & GYNECOLOGY

## 2022-09-13 PROCEDURE — 3077F PR MOST RECENT SYSTOLIC BLOOD PRESSURE >= 140 MM HG: ICD-10-PCS | Mod: CPTII,S$GLB,, | Performed by: OBSTETRICS & GYNECOLOGY

## 2022-09-13 PROCEDURE — 1159F MED LIST DOCD IN RCRD: CPT | Mod: CPTII,S$GLB,, | Performed by: OBSTETRICS & GYNECOLOGY

## 2022-09-13 PROCEDURE — 99999 PR PBB SHADOW E&M-EST. PATIENT-LVL III: CPT | Mod: PBBFAC,,, | Performed by: OBSTETRICS & GYNECOLOGY

## 2022-09-13 PROCEDURE — 3080F DIAST BP >= 90 MM HG: CPT | Mod: CPTII,S$GLB,, | Performed by: OBSTETRICS & GYNECOLOGY

## 2022-09-13 PROCEDURE — 3008F BODY MASS INDEX DOCD: CPT | Mod: CPTII,S$GLB,, | Performed by: OBSTETRICS & GYNECOLOGY

## 2022-09-13 NOTE — PROGRESS NOTES
SUBJECTIVE:   66 y.o. female   for annual routine Pap and checkup. No LMP recorded. Patient has had a hysterectomy..  She complains of constipation.        Past Medical History:   Diagnosis Date    Anxiety     Chronic low back pain     Gastritis     GERD (gastroesophageal reflux disease)     History of cervical cancer     History of recurrent urinary tract infection     IFG (impaired fasting glucose)     Malignant neoplasm of overlapping sites of right breast in female, estrogen receptor positive 2019    Soft tissue tumor, malignant     sarcoma left foot     Past Surgical History:   Procedure Laterality Date    ABDOMINOPLASTY N/A 2018    Procedure: EXTENDED ABDOMINOPLASTY;  Surgeon: Mio Arriaga MD;  Location: [facility];  Service: Plastics;  Laterality: N/A;    AXILLARY NODE DISSECTION Right 2019    Procedure: LYMPHADENECTOMY, AXILLARY RIGHT;  Surgeon: Sabine Arteaga MD;  Location: [facility] OR [address] [place] [address];  Service: General;  Laterality: Right;    BACK SURGERY      BREAST CYST EXCISION Left 1984    BREAST RECONSTRUCTION Right 2022    Procedure: RECONSTRUCTION, BREAST;  Surgeon: Leonard Cordero MD;  [facility];  Service: Plastics;  Laterality: Right;  T-DAP    BREAST REVISION SURGERY Right 2020    Procedure: BREAST REVISION SURGERY RIGHT;  Surgeon: Leonard Cordero MD;  Location: [facility] OR 2ND FLR;  Service: Plastics;  Laterality: Right;    COLONOSCOPY N/A 10/19/2020    Procedure: COLONOSCOPY;  Surgeon: Chanel Saenz MD;  Location: [facility] ENDO (4TH FLR);  Service: Endoscopy;  Laterality: N/A;  covid test 10/16-[facility] urgent care    FOOT SURGERY   or     INSERTION OF BREAST IMPLANT Right 2019    Procedure: INSERTION, BREAST IMPLANT RIGHT;  Surgeon: Leonard Cordero MD;  [facility];  Service: Plastics;  Laterality: Right;    INSERTION OF BREAST TISSUE EXPANDER Right 2019    Procedure: INSERTION, TISSUE EXPANDER, BREAST RIGHT;   Surgeon: Leonard Cordero MD;  Location: [facility] OR [facility] [employer] FLR;  Service: Plastics;  Laterality: Right;    LIPOSUCTION W/ FAT INJECTION N/A 2018    Procedure: LIPOSUCTION, WITH FAT TRANSFER TO BUTTOCKS;  Surgeon: Mio Arriaga MD;  Location: [facility] OR;  Service: Plastics;  Laterality: N/A;    MASTOPEXY Left 2019    Procedure: MASTOPEXY LEFT;  Surgeon: Leonard Cordero MD;  [facility];  Service: Plastics;  Laterality: Left;    MASTOPEXY Left 2020    Procedure: MASTOPEXY LEFT;  Surgeon: Leonard Cordero MD;  [facility];  Service: Plastics;  Laterality: Left;    SENTINEL LYMPH NODE BIOPSY Right 2019    Procedure: BIOPSY, LYMPH NODE, SENTINEL RIGHT;  Surgeon: Sabine Arteaga MD;  [facility];  Service: General;  Laterality: Right;    SIMPLE MASTECTOMY Right 2019    Procedure: MASTECTOMY, SIMPLE RIGHT (CONSENT AM OF) 4.0 hr case;  Surgeon: Sabine Arteaga MD;  [address]  Service: General;  Laterality: Right;    TOTAL REDUCTION MAMMOPLASTY Left     TOTAL VAGINAL HYSTERECTOMY  1996    TUBAL LIGATION       Social History     Socioeconomic History    Marital status:    Tobacco Use    Smoking status: Former     Packs/day: 0.25     Years: 15.00     Pack years: 3.75     Types: Cigarettes     Quit date: 8/15/1993     Years since quittin.0    Smokeless tobacco: Never   Substance and Sexual Activity    Alcohol use: Yes     Alcohol/week: 1.0 standard drink     Types: 1 Glasses of wine per week     Comment: seldom    Drug use: No    Sexual activity: Yes     Partners: Male     Family History   Problem Relation Age of Onset    Breast cancer Sister         dx in     No Known Problems Mother     Throat cancer Father     Colon cancer Neg Hx     Diabetes Neg Hx     Hypertension Neg Hx     Ovarian cancer Neg Hx     Stroke Neg Hx     Anesthesia problems Neg Hx      OB History    Para Term  AB Living   6 6 3      3   SAB IAB Ectopic Multiple Live Births           3      # Outcome Date GA Lbr Chris/2nd Weight Sex Delivery Anes PTL Lv   6 Para 11/05/94    M    AZIZA   5 Para 10/21/84    F    AZIZA   4 Para 09/17/76    F    AZIZA   3 Term            2 Term            1 Term                  Current Outpatient Medications   Medication Sig Dispense Refill    acetaminophen (TYLENOL) 500 MG tablet Take 2 tablets (1,000 mg total) by mouth every 8 (eight) hours as needed for Pain. 20 tablet 0    calcium carbonate (CALCIUM 500 ORAL) Take by mouth once daily.       cyproheptadine (PERIACTIN) 4 mg tablet Take 1 tablet (4 mg total) by mouth 3 (three) times daily as needed (decreased appetite). 90 tablet 2    docusate sodium (COLACE) 100 MG capsule Take 1 capsule (100 mg total) by mouth 2 (two) times daily. 60 capsule 0    ibuprofen (ADVIL,MOTRIN) 800 MG tablet Take 800 mg by mouth every 8 (eight) hours as needed.      lactulose (CHRONULAC) 20 gram/30 mL Soln Take 30 mLs (20 g total) by mouth every 6 (six) hours as needed (Constipation). 240 mL 0    letrozole (FEMARA) 2.5 mg Tab Take 1 tablet (2.5 mg total) by mouth once daily. 90 tablet 3    multivitamin capsule Take 1 capsule by mouth once daily.      bacitracin 500 unit/gram ointment Apply topically 3 (three) times daily. (Patient not taking: Reported on 9/13/2022) 28 g 0    hydrOXYzine HCL (ATARAX) 25 MG tablet TAKE 1 TABLET BY MOUTH EVERY NIGHT AS NEEDED FOR ITCHING (Patient not taking: Reported on 9/13/2022) 30 tablet 2    ibuprofen (ADVIL,MOTRIN) 600 MG tablet Take 1 tablet (600 mg total) by mouth every 6 (six) hours as needed for Pain. 20 tablet 0    megestroL (MEGACE) 400 mg/10 mL (40 mg/mL) Susp Take 5 mLs (200 mg total) by mouth once daily. (Patient not taking: Reported on 9/13/2022) 150 mL 0    oxyCODONE-acetaminophen (PERCOCET) 5-325 mg per tablet Take 1 tablet by mouth every 4 (four) hours as needed for Pain. (Patient not taking: Reported on 9/13/2022) 28 tablet 0     No current  "facility-administered medications for this visit.     Allergies: No known drug allergies     ROS:  Constitutional: no weight loss, weight gain, fever, fatigue  Eyes:  No vision changes, glasses/contacts  ENT/Mouth: No ulcers, sinus problems, ears ringing, headache  Cardiovascular: No inability to lie flat, chest pain, exercise intolerance, swelling, heart palpitations  Respiratory: No wheezing, coughing blood, shortness of breath, or cough  Gastrointestinal: No diarrhea, bloody stool, nausea/vomiting, constipation, gas, hemorrhoids  Genitourinary: No blood in urine, painful urination, urgency of urination, frequency of urination, incomplete emptying, incontinence, abnormal bleeding, painful periods, heavy periods, vaginal discharge, vaginal odor, painful intercourse, sexual problems, bleeding after intercourse.  Musculoskeletal: No muscle weakness  Skin/Breast: No painful breasts, nipple discharge, masses, rash, ulcers  Neurological: No passing out, seizures, numbness, headache  Endocrine: No diabetes, hypothyroid, hyperthyroid, hot flashes, hair loss, abnormal hair growth, ance  Psychiatric: No depression, crying  Hematologic: No bruises, bleeding, swollen lymph nodes, anemia.      OBJECTIVE:   The patient appears well, alert, oriented x 3, in no distress.  BP (!) 141/94   Ht 5' 6" (1.676 m)   Wt 76.9 kg (169 lb 8.5 oz)   BMI 27.36 kg/m²   NECK: no thyromegaly, trachea midline  SKIN: no acne, striae, hirsutism  BREAST EXAM: left breast normal without mass, skin or nipple changes or axillary nodes, right mastectomy  ABDOMEN: no hernias, masses, or hepatosplenomegaly  GENITALIA: normal external genitalia, no erythema, no discharge  URETHRA: normal urethra, normal urethral meatus  VAGINA: mucosal atrophy  CERVIX: absent  UTERUS: uterus absent  ADNEXA: no mass, fullness, tenderness    \  ASSESSMENT:   El was seen today for well woman.    Diagnoses and all orders for this visit:    Well woman exam with routine " "gynecological exam    For the constipation, push fluids and maintain a high fiber diet with plenty of roughage. Drink 6-8 large glasses of water daily to avoid constipation in the future. Fruits that start with "p" are high in fiber and good for constipation (i.e. Peaches, plums, pears, pineapple).  Avoid bananas, rice, and bread.  Baked sweet and white potatoes are high in fiber as well as dried fruits and cereals.  Milk of magnesia and miralax may be used a necessary.  Call if symptoms persist or worsen.      "

## 2022-11-01 ENCOUNTER — TELEPHONE (OUTPATIENT)
Dept: NEUROLOGY | Facility: CLINIC | Age: 67
End: 2022-11-01
Payer: COMMERCIAL

## 2022-11-01 NOTE — TELEPHONE ENCOUNTER
I called Jaja to confirm her appointment on Thursday, 11/3/22 at 10:40am with Dr. Izaguirre, but there was no answer and the voicemail box is full.

## 2022-11-03 ENCOUNTER — OFFICE VISIT (OUTPATIENT)
Dept: NEUROLOGY | Facility: CLINIC | Age: 67
End: 2022-11-03
Payer: COMMERCIAL

## 2022-11-03 VITALS
BODY MASS INDEX: 27.25 KG/M2 | SYSTOLIC BLOOD PRESSURE: 122 MMHG | HEART RATE: 116 BPM | WEIGHT: 169.56 LBS | DIASTOLIC BLOOD PRESSURE: 83 MMHG | HEIGHT: 66 IN

## 2022-11-03 DIAGNOSIS — G56.03 BILATERAL CARPAL TUNNEL SYNDROME: ICD-10-CM

## 2022-11-03 PROCEDURE — 1159F MED LIST DOCD IN RCRD: CPT | Mod: CPTII,S$GLB,, | Performed by: PSYCHIATRY & NEUROLOGY

## 2022-11-03 PROCEDURE — 1160F PR REVIEW ALL MEDS BY PRESCRIBER/CLIN PHARMACIST DOCUMENTED: ICD-10-PCS | Mod: CPTII,S$GLB,, | Performed by: PSYCHIATRY & NEUROLOGY

## 2022-11-03 PROCEDURE — 1160F RVW MEDS BY RX/DR IN RCRD: CPT | Mod: CPTII,S$GLB,, | Performed by: PSYCHIATRY & NEUROLOGY

## 2022-11-03 PROCEDURE — 99999 PR PBB SHADOW E&M-EST. PATIENT-LVL IV: CPT | Mod: PBBFAC,,, | Performed by: PSYCHIATRY & NEUROLOGY

## 2022-11-03 PROCEDURE — 99203 PR OFFICE/OUTPT VISIT, NEW, LEVL III, 30-44 MIN: ICD-10-PCS | Mod: S$GLB,,, | Performed by: PSYCHIATRY & NEUROLOGY

## 2022-11-03 PROCEDURE — 1101F PT FALLS ASSESS-DOCD LE1/YR: CPT | Mod: CPTII,S$GLB,, | Performed by: PSYCHIATRY & NEUROLOGY

## 2022-11-03 PROCEDURE — 3288F FALL RISK ASSESSMENT DOCD: CPT | Mod: CPTII,S$GLB,, | Performed by: PSYCHIATRY & NEUROLOGY

## 2022-11-03 PROCEDURE — 3288F PR FALLS RISK ASSESSMENT DOCUMENTED: ICD-10-PCS | Mod: CPTII,S$GLB,, | Performed by: PSYCHIATRY & NEUROLOGY

## 2022-11-03 PROCEDURE — 99203 OFFICE O/P NEW LOW 30 MIN: CPT | Mod: S$GLB,,, | Performed by: PSYCHIATRY & NEUROLOGY

## 2022-11-03 PROCEDURE — 3079F PR MOST RECENT DIASTOLIC BLOOD PRESSURE 80-89 MM HG: ICD-10-PCS | Mod: CPTII,S$GLB,, | Performed by: PSYCHIATRY & NEUROLOGY

## 2022-11-03 PROCEDURE — 3008F PR BODY MASS INDEX (BMI) DOCUMENTED: ICD-10-PCS | Mod: CPTII,S$GLB,, | Performed by: PSYCHIATRY & NEUROLOGY

## 2022-11-03 PROCEDURE — 1101F PR PT FALLS ASSESS DOC 0-1 FALLS W/OUT INJ PAST YR: ICD-10-PCS | Mod: CPTII,S$GLB,, | Performed by: PSYCHIATRY & NEUROLOGY

## 2022-11-03 PROCEDURE — 3079F DIAST BP 80-89 MM HG: CPT | Mod: CPTII,S$GLB,, | Performed by: PSYCHIATRY & NEUROLOGY

## 2022-11-03 PROCEDURE — 1159F PR MEDICATION LIST DOCUMENTED IN MEDICAL RECORD: ICD-10-PCS | Mod: CPTII,S$GLB,, | Performed by: PSYCHIATRY & NEUROLOGY

## 2022-11-03 PROCEDURE — 3074F SYST BP LT 130 MM HG: CPT | Mod: CPTII,S$GLB,, | Performed by: PSYCHIATRY & NEUROLOGY

## 2022-11-03 PROCEDURE — 99999 PR PBB SHADOW E&M-EST. PATIENT-LVL IV: ICD-10-PCS | Mod: PBBFAC,,, | Performed by: PSYCHIATRY & NEUROLOGY

## 2022-11-03 PROCEDURE — 3074F PR MOST RECENT SYSTOLIC BLOOD PRESSURE < 130 MM HG: ICD-10-PCS | Mod: CPTII,S$GLB,, | Performed by: PSYCHIATRY & NEUROLOGY

## 2022-11-03 PROCEDURE — 3008F BODY MASS INDEX DOCD: CPT | Mod: CPTII,S$GLB,, | Performed by: PSYCHIATRY & NEUROLOGY

## 2022-11-03 NOTE — PROGRESS NOTES
[facility] NEUROLOGY  [facility], [place]    Date: 11/3/22  Patient Name: Jaja Toledo   MRN: [id number]   PCP: Andres Hurtado  Referring Provider: Ana Franks MD    Chief Complaint:  Paresthesias in the hands  Subjective:   Patient seen in consultation at the request of Ana Franks MD for the evaluation of paresthesias. A copy of this note will be sent to the referring physician.        HPI:   Ms. Jaja Toledo is a 67 y.o. female presenting for evaluation of paresthesias in the bilateral hands.  The patient shares that she has been experiencing numbness, pins/needles and tingling throughout the bilateral hands for over a year.  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  Feels the need to shake her hands during times of tingling and numbness.  Endorses change in strength.  Denies dropping objects frequently.    PAST MEDICAL HISTORY:  Past Medical History:   Diagnosis Date    Anxiety     Chronic low back pain     Gastritis     GERD (gastroesophageal reflux disease)     History of cervical cancer     History of recurrent urinary tract infection     IFG (impaired fasting glucose)     Malignant neoplasm of overlapping sites of right breast in female, estrogen receptor positive 7/19/2019    Soft tissue tumor, malignant     sarcoma left foot       PAST SURGICAL HISTORY:  Past Surgical History:   Procedure Laterality Date    ABDOMINOPLASTY N/A 11/29/2018    Procedure: EXTENDED ABDOMINOPLASTY;  Surgeon: Mio Arriaga MD;  Location: [facility] OR;  Service: Plastics;  Laterality: N/A;    AXILLARY NODE DISSECTION Right 6/4/2019    Procedure: LYMPHADENECTOMY, AXILLARY RIGHT;  Surgeon: Sabine Arteaga MD;  Location: [facility] OR [address] [place] [address];  Service: General;  Laterality: Right;    BACK SURGERY      BREAST CYST EXCISION Left 1984    BREAST RECONSTRUCTION Right 6/6/2022    Procedure: RECONSTRUCTION, BREAST;  Surgeon: Leonard Cordero MD;  Location: [facility] OR [address] [place] [address];  Service:  Plastics;  Laterality: Right;  T-DAP    BREAST REVISION SURGERY Right 7/20/2020    Procedure: BREAST REVISION SURGERY RIGHT;  Surgeon: Leonard Cordero MD;  [facility];  Service: Plastics;  Laterality: Right;    COLONOSCOPY N/A 10/19/2020    Procedure: COLONOSCOPY;  Surgeon: Chanel Saenz MD;  Location: [facility] ENDO (4TH FLR);  Service: Endoscopy;  Laterality: N/A;  covid test 10/16[facility] urgent care    FOOT SURGERY  2009 or 2010    INSERTION OF BREAST IMPLANT Right 6/4/2019    Procedure: INSERTION, BREAST IMPLANT RIGHT;  Surgeon: Leonard Cordero MD;  [address];  Service: Plastics;  Laterality: Right;    INSERTION OF BREAST TISSUE EXPANDER Right 6/4/2019    Procedure: INSERTION, TISSUE EXPANDER, BREAST RIGHT;  Surgeon: Leonard Cordero MD;  Location: [address];  Service: Plastics;  Laterality: Right;    LIPOSUCTION W/ FAT INJECTION N/A 11/29/2018    Procedure: LIPOSUCTION, WITH FAT TRANSFER TO BUTTOCKS;  Surgeon: Mio Arriaga MD;  Location: [facility] OR;  Service: Plastics;  Laterality: N/A;    MASTOPEXY Left 6/4/2019    Procedure: MASTOPEXY LEFT;  Surgeon: Leonard Cordero MD;  Location: [address];  Service: Plastics;  Laterality: Left;    MASTOPEXY Left 7/20/2020    Procedure: MASTOPEXY LEFT;  Surgeon: Leonard Cordero MD;  Location: [address];  Service: Plastics;  Laterality: Left;    SENTINEL LYMPH NODE BIOPSY Right 6/4/2019    Procedure: BIOPSY, LYMPH NODE, SENTINEL RIGHT;  Surgeon: Sabine Arteaga MD;  Location: [address];  Service: General;  Laterality: Right;    SIMPLE MASTECTOMY Right 6/4/2019    Procedure: MASTECTOMY, SIMPLE RIGHT (CONSENT AM OF) 4.0 hr case;  Surgeon: Sabine Arteaga MD;  Location: [address];  Service: General;  Laterality: Right;    TOTAL REDUCTION MAMMOPLASTY Left 2019    TOTAL VAGINAL HYSTERECTOMY  1996    TUBAL LIGATION  1994       CURRENT MEDS:  Current Outpatient Medications   Medication  Sig Dispense Refill    acetaminophen (TYLENOL) 500 MG tablet Take 2 tablets (1,000 mg total) by mouth every 8 (eight) hours as needed for Pain. 20 tablet 0    calcium carbonate (CALCIUM 500 ORAL) Take by mouth once daily.       cyproheptadine (PERIACTIN) 4 mg tablet Take 1 tablet (4 mg total) by mouth 3 (three) times daily as needed (decreased appetite). 90 tablet 2    ibuprofen (ADVIL,MOTRIN) 600 MG tablet Take 1 tablet (600 mg total) by mouth every 6 (six) hours as needed for Pain. 20 tablet 0    ibuprofen (ADVIL,MOTRIN) 800 MG tablet Take 800 mg by mouth every 8 (eight) hours as needed.      multivitamin capsule Take 1 capsule by mouth once daily.      bacitracin 500 unit/gram ointment Apply topically 3 (three) times daily. (Patient not taking: Reported on 9/13/2022) 28 g 0    docusate sodium (COLACE) 100 MG capsule Take 1 capsule (100 mg total) by mouth 2 (two) times daily. (Patient not taking: Reported on 11/3/2022) 60 capsule 0    hydrOXYzine HCL (ATARAX) 25 MG tablet TAKE 1 TABLET BY MOUTH EVERY NIGHT AS NEEDED FOR ITCHING (Patient not taking: Reported on 9/13/2022) 30 tablet 2    lactulose (CHRONULAC) 20 gram/30 mL Soln Take 30 mLs (20 g total) by mouth every 6 (six) hours as needed (Constipation). (Patient not taking: Reported on 11/3/2022) 240 mL 0    letrozole (FEMARA) 2.5 mg Tab Take 1 tablet (2.5 mg total) by mouth once daily. (Patient not taking: Reported on 11/3/2022.) 90 tablet 3    megestroL (MEGACE) 400 mg/10 mL (40 mg/mL) Susp Take 5 mLs (200 mg total) by mouth once daily. (Patient not taking: Reported on 9/13/2022) 150 mL 0    oxyCODONE-acetaminophen (PERCOCET) 5-325 mg per tablet Take 1 tablet by mouth every 4 (four) hours as needed for Pain. (Patient not taking: Reported on 9/13/2022) 28 tablet 0     No current facility-administered medications for this visit.       ALLERGIES:  Review of patient's allergies indicates:   Allergen Reactions    No known drug allergies        FAMILY  "HISTORY:  Family History   Problem Relation Age of Onset    Breast cancer Sister         dx in 2006    No Known Problems Mother     Throat cancer Father     Colon cancer Neg Hx     Diabetes Neg Hx     Hypertension Neg Hx     Ovarian cancer Neg Hx     Stroke Neg Hx     Anesthesia problems Neg Hx        SOCIAL HISTORY:  Social History     Tobacco Use    Smoking status: Former     Packs/day: 0.25     Years: 15.00     Pack years: 3.75     Types: Cigarettes     Quit date: 8/15/1993     Years since quittin.2    Smokeless tobacco: Never   Substance Use Topics    Alcohol use: Yes     Alcohol/week: 1.0 standard drink     Types: 1 Glasses of wine per week     Comment: seldom    Drug use: No       Review of Systems:  Gen: no fever, no chills, no generalized feeling of weakness   HEENT: no double vision, no blurred vision, no eye pain, no eye exudates.   Heart: no chest pain, no SOB    Lungs: no SOB, no cough    MSK: no weakness of legs, intact ROM    ABD: no abd pain, no N/V/D/C, no difficulty with defecation.    Extremities: No leg pain, no edema.       Objective:     Vitals:    22 1048   BP: 122/83   BP Location: Left arm   Patient Position: Sitting   Pulse: (!) 116   Weight: 76.9 kg (169 lb 8.5 oz)   Height: 5' 6" (1.676 m)     General: female in NAD, alert and awake, Aox3, well groomed. ?    ? ?    HEENT: Head is NC/AT EOMI, pupil size: 4 mm B/L, no nystagmus noted; hearing grossly intact b/l. Mucous membrane moist, uvula midline, no pharyngeal erythema, exudates or discharges.      Neck: Supple. no nuchal rigidity.      Cardiovascular: well perfused, no cyanosis        Respiratory: Symmetric chest rise noted       Musculoskeletal: Muscle tone noted to be adequate for patient age, muscle mass is WNL. No spontaneous movements or fasciculations noted during this examination.       Extremities: No pedal edema or calf tenderness. No cogwheel rigidity noted on B/L UE extremities.       Neurological Examination.  "   Mental status: AA&O x3; Affect/mood is euthymic/congruent; no aphasia noted during examination. Patient answers simple questions appropriately & follows simple commands; no dysarthria or expressive aphasia; no tammie-neglect or extinction. Vocabulary/word finding: excellent.       Cranial Nerves: II-XII grossly intact.      Muscle Function: Tone WNL and Muscle bulk WNL.   strength 4/5 bilaterally, otherwise 5/5 throughout     Sensory: ?Intact to light touch throughout     Reflexes:  2/4 throughout     Coordination: no dysmetria (finger to nose negative)     Gait: adequate casual gait with stride length and arm swing WNL.  Stable without the use of cane or walker    Normal rapid alternating movements bilaterally  (+) Tinel sign bilaterally    Assessment:   Jaja Toledo is a 67 y.o. female presenting for evaluation of paresthesias in the bilateral hands.  Physical examination consistent with bilateral carpal tunnel syndrome.  We will initiate EMG/NCV for further case definition.  We will also initiate nightly use of wrist braces.    Plan:     Problem List Items Addressed This Visit    None  Visit Diagnoses       Bilateral carpal tunnel syndrome        Relevant Orders    EMG W/ ULTRASOUND AND NERVE CONDUCTION TEST 2 Extremities    WRIST BRACE FOR HOME USE          - nightly use of bilateral wrist braces  - emg/ncv for grading of severity  - consider orthopedics consult if found to be moderate to severe  - patient was counseled we would follow up by chart message or phone call regarding results when they become available    I spent a total of 30 minutes on the day of the visit. This includes face to face time and non-face to face time preparing to see the patient (eg, review of tests), obtaining and/or reviewing separately obtained history, documenting clinical information in the electronic or other health record, independently interpreting results and communicating results to the patient/family/caregiver, or care  coordinator.    A dictation device was used to produce this document. Use of such devices sometimes results in grammatical errors or replacement of words that sound similarly.    Henry Izaguirre, DO

## 2022-12-30 ENCOUNTER — PROCEDURE VISIT (OUTPATIENT)
Dept: NEUROLOGY | Facility: CLINIC | Age: 67
End: 2022-12-30
Payer: COMMERCIAL

## 2022-12-30 DIAGNOSIS — G56.03 BILATERAL CARPAL TUNNEL SYNDROME: ICD-10-CM

## 2022-12-30 PROCEDURE — 95913 NRV CNDJ TEST 13/> STUDIES: CPT | Mod: S$GLB,,, | Performed by: PSYCHIATRY & NEUROLOGY

## 2022-12-30 PROCEDURE — 95913 PR NERVE CONDUCTION STUDY; 13 OR MORE STUDIES: ICD-10-PCS | Mod: S$GLB,,, | Performed by: PSYCHIATRY & NEUROLOGY

## 2023-01-10 ENCOUNTER — OFFICE VISIT (OUTPATIENT)
Dept: FAMILY MEDICINE | Facility: CLINIC | Age: 68
End: 2023-01-10
Payer: COMMERCIAL

## 2023-01-10 VITALS
TEMPERATURE: 98 F | BODY MASS INDEX: 28.38 KG/M2 | WEIGHT: 176.56 LBS | HEART RATE: 107 BPM | HEIGHT: 66 IN | SYSTOLIC BLOOD PRESSURE: 120 MMHG | OXYGEN SATURATION: 98 % | DIASTOLIC BLOOD PRESSURE: 82 MMHG

## 2023-01-10 DIAGNOSIS — Z00.00 ROUTINE GENERAL MEDICAL EXAMINATION AT A HEALTH CARE FACILITY: Primary | ICD-10-CM

## 2023-01-10 DIAGNOSIS — R73.01 IFG (IMPAIRED FASTING GLUCOSE): ICD-10-CM

## 2023-01-10 DIAGNOSIS — K21.9 GASTROESOPHAGEAL REFLUX DISEASE, UNSPECIFIED WHETHER ESOPHAGITIS PRESENT: ICD-10-CM

## 2023-01-10 DIAGNOSIS — Z85.3 HISTORY OF BREAST CANCER: ICD-10-CM

## 2023-01-10 DIAGNOSIS — C50.811 MALIGNANT NEOPLASM OF OVERLAPPING SITES OF RIGHT BREAST IN FEMALE, ESTROGEN RECEPTOR POSITIVE: ICD-10-CM

## 2023-01-10 DIAGNOSIS — Z17.0 MALIGNANT NEOPLASM OF RIGHT BREAST IN FEMALE, ESTROGEN RECEPTOR POSITIVE, UNSPECIFIED SITE OF BREAST: ICD-10-CM

## 2023-01-10 DIAGNOSIS — Z12.11 COLON CANCER SCREENING: ICD-10-CM

## 2023-01-10 DIAGNOSIS — Z17.0 MALIGNANT NEOPLASM OF OVERLAPPING SITES OF RIGHT BREAST IN FEMALE, ESTROGEN RECEPTOR POSITIVE: ICD-10-CM

## 2023-01-10 DIAGNOSIS — C50.911 MALIGNANT NEOPLASM OF RIGHT BREAST IN FEMALE, ESTROGEN RECEPTOR POSITIVE, UNSPECIFIED SITE OF BREAST: ICD-10-CM

## 2023-01-10 PROCEDURE — 1101F PT FALLS ASSESS-DOCD LE1/YR: CPT | Mod: CPTII,S$GLB,, | Performed by: FAMILY MEDICINE

## 2023-01-10 PROCEDURE — 1159F PR MEDICATION LIST DOCUMENTED IN MEDICAL RECORD: ICD-10-PCS | Mod: CPTII,S$GLB,, | Performed by: FAMILY MEDICINE

## 2023-01-10 PROCEDURE — 99999 PR PBB SHADOW E&M-EST. PATIENT-LVL IV: CPT | Mod: PBBFAC,,, | Performed by: FAMILY MEDICINE

## 2023-01-10 PROCEDURE — 3079F PR MOST RECENT DIASTOLIC BLOOD PRESSURE 80-89 MM HG: ICD-10-PCS | Mod: CPTII,S$GLB,, | Performed by: FAMILY MEDICINE

## 2023-01-10 PROCEDURE — 3074F SYST BP LT 130 MM HG: CPT | Mod: CPTII,S$GLB,, | Performed by: FAMILY MEDICINE

## 2023-01-10 PROCEDURE — 3079F DIAST BP 80-89 MM HG: CPT | Mod: CPTII,S$GLB,, | Performed by: FAMILY MEDICINE

## 2023-01-10 PROCEDURE — 99397 PR PREVENTIVE VISIT,EST,65 & OVER: ICD-10-PCS | Mod: S$GLB,,, | Performed by: FAMILY MEDICINE

## 2023-01-10 PROCEDURE — 3288F PR FALLS RISK ASSESSMENT DOCUMENTED: ICD-10-PCS | Mod: CPTII,S$GLB,, | Performed by: FAMILY MEDICINE

## 2023-01-10 PROCEDURE — 3008F BODY MASS INDEX DOCD: CPT | Mod: CPTII,S$GLB,, | Performed by: FAMILY MEDICINE

## 2023-01-10 PROCEDURE — 3008F PR BODY MASS INDEX (BMI) DOCUMENTED: ICD-10-PCS | Mod: CPTII,S$GLB,, | Performed by: FAMILY MEDICINE

## 2023-01-10 PROCEDURE — 1126F PR PAIN SEVERITY QUANTIFIED, NO PAIN PRESENT: ICD-10-PCS | Mod: CPTII,S$GLB,, | Performed by: FAMILY MEDICINE

## 2023-01-10 PROCEDURE — 1126F AMNT PAIN NOTED NONE PRSNT: CPT | Mod: CPTII,S$GLB,, | Performed by: FAMILY MEDICINE

## 2023-01-10 PROCEDURE — 3074F PR MOST RECENT SYSTOLIC BLOOD PRESSURE < 130 MM HG: ICD-10-PCS | Mod: CPTII,S$GLB,, | Performed by: FAMILY MEDICINE

## 2023-01-10 PROCEDURE — 1101F PR PT FALLS ASSESS DOC 0-1 FALLS W/OUT INJ PAST YR: ICD-10-PCS | Mod: CPTII,S$GLB,, | Performed by: FAMILY MEDICINE

## 2023-01-10 PROCEDURE — 99999 PR PBB SHADOW E&M-EST. PATIENT-LVL IV: ICD-10-PCS | Mod: PBBFAC,,, | Performed by: FAMILY MEDICINE

## 2023-01-10 PROCEDURE — 1159F MED LIST DOCD IN RCRD: CPT | Mod: CPTII,S$GLB,, | Performed by: FAMILY MEDICINE

## 2023-01-10 PROCEDURE — 99397 PER PM REEVAL EST PAT 65+ YR: CPT | Mod: S$GLB,,, | Performed by: FAMILY MEDICINE

## 2023-01-10 PROCEDURE — 3288F FALL RISK ASSESSMENT DOCD: CPT | Mod: CPTII,S$GLB,, | Performed by: FAMILY MEDICINE

## 2023-01-10 RX ORDER — MIRTAZAPINE 7.5 MG/1
7.5 TABLET, FILM COATED ORAL NIGHTLY
Qty: 30 TABLET | Refills: 11 | Status: SHIPPED | OUTPATIENT
Start: 2023-01-10 | End: 2024-04-01

## 2023-01-10 NOTE — PROGRESS NOTES
(Portions of this note were dictated using voice recognition software and may contain dictation related errors in spelling/grammar/syntax not found on text review)    CC:   Chief Complaint   Patient presents with    Annual Exam         HPI: 67 y.o. female Last annual exam in 11/2021    IFG, last labs as below.  have discussed lifestyle modification    GERD    Right-sided breast cancer status post right mastectomy 2019, ER/TN positive.  On letrozole    Sinus tachycardia trauma noted on prior visits.  EKG done prior shows sinus tachycardia no other concerning arrhythmias.  No stimulating medications.    Had a prescription for cyproheptadine in the past she would use for appetite since she feels like she does not eat very well.  This medicine used to help with her appetite however states that he has not had as much effect.  At 1 point got Megace.  Had discussed in the past trial of mirtazapine at night      Occasional itching for which she takes hydroxyzine, this is not all the time.  She knows not to take it with the cyproheptadine    Saw Neurology in November 2022 for symptoms relating to bilateral carpal tunnel syndrome.  EMG/NCV was done and was normal.  Advised bilateral wrist braces every night    Past Medical History:   Diagnosis Date    Anxiety     Chronic low back pain     Gastritis     GERD (gastroesophageal reflux disease)     History of cervical cancer     History of recurrent urinary tract infection     IFG (impaired fasting glucose)     Malignant neoplasm of overlapping sites of right breast in female, estrogen receptor positive 7/19/2019    Soft tissue tumor, malignant     sarcoma left foot       Past Surgical History:   Procedure Laterality Date    ABDOMINOPLASTY N/A 11/29/2018    Procedure: EXTENDED ABDOMINOPLASTY;  Surgeon: Mio Arriaga MD;  Location: [facility];  Service: Plastics;  Laterality: N/A;    AXILLARY NODE DISSECTION Right 6/4/2019    Procedure: LYMPHADENECTOMY, AXILLARY RIGHT;  Surgeon:  Sabine Arteaga MD;  Location: [facility] OR 2ND FLR;  Service: General;  Laterality: Right;    BACK SURGERY      BREAST CYST EXCISION Left 1984    BREAST RECONSTRUCTION Right 6/6/2022    Procedure: RECONSTRUCTION, BREAST;  Surgeon: Leonard Cordero MD;  Location: [facility] OR 2ND FLR;  Service: Plastics;  Laterality: Right;  T-DAP    BREAST REVISION SURGERY Right 7/20/2020    Procedure: BREAST REVISION SURGERY RIGHT;  Surgeon: Leonard Cordero MD;  Location: [facility] OR 2ND FLR;  Service: Plastics;  Laterality: Right;    COLONOSCOPY N/A 10/19/2020    Procedure: COLONOSCOPY;  Surgeon: Chanel Saenz MD;  Location: [facility] ENDO (4TH FLR);  Service: Endoscopy;  Laterality: N/A;  covid test 10/16[facility] urgent care    FOOT SURGERY  2009 or 2010    INSERTION OF BREAST IMPLANT Right 6/4/2019    Procedure: INSERTION, BREAST IMPLANT RIGHT;  Surgeon: Leonard Cordero MD;  [facility];  Service: Plastics;  Laterality: Right;    INSERTION OF BREAST TISSUE EXPANDER Right 6/4/2019    Procedure: INSERTION, TISSUE EXPANDER, BREAST RIGHT;  Surgeon: Leonard Cordero MD;  [facility];  Service: Plastics;  Laterality: Right;    LIPOSUCTION W/ FAT INJECTION N/A 11/29/2018    Procedure: LIPOSUCTION, WITH FAT TRANSFER TO BUTTOCKS;  Surgeon: Mio Arriaga MD;  Location: [facility];  Service: Plastics;  Laterality: N/A;    MASTOPEXY Left 6/4/2019    Procedure: MASTOPEXY LEFT;  Surgeon: Leonard Cordero MD;  [facility];  Service: Plastics;  Laterality: Left;    MASTOPEXY Left 7/20/2020    Procedure: MASTOPEXY LEFT;  Surgeon: Leonard Cordero MD;  [facility];  Service: Plastics;  Laterality: Left;    SENTINEL LYMPH NODE BIOPSY Right 6/4/2019    Procedure: BIOPSY, LYMPH NODE, SENTINEL RIGHT;  Surgeon: Sabine Arteaga MD;  [facility];  Service: General;  Laterality: Right;    SIMPLE MASTECTOMY Right 6/4/2019    Procedure: MASTECTOMY, SIMPLE RIGHT  (CONSENT AM OF) 4.0 hr case;  Surgeon: Sabine Arteaga MD;  Location: [facility] OR [address] [place] [address];  Service: General;  Laterality: Right;    TOTAL REDUCTION MAMMOPLASTY Left 2019    TOTAL VAGINAL HYSTERECTOMY  1996    TUBAL LIGATION         Family History   Problem Relation Age of Onset    Breast cancer Sister         dx in     No Known Problems Mother     Throat cancer Father     Colon cancer Neg Hx     Diabetes Neg Hx     Hypertension Neg Hx     Ovarian cancer Neg Hx     Stroke Neg Hx     Anesthesia problems Neg Hx        Social History     Socioeconomic History    Marital status:    Tobacco Use    Smoking status: Former     Packs/day: 0.25     Years: 15.00     Pack years: 3.75     Types: Cigarettes     Quit date: 8/15/1993     Years since quittin.4    Smokeless tobacco: Never   Substance and Sexual Activity    Alcohol use: Yes     Alcohol/week: 1.0 standard drink     Types: 1 Glasses of wine per week     Comment: seldom    Drug use: No    Sexual activity: Yes     Partners: Male          Lab Results   Component Value Date    WBC 8.72 2022    HGB 11.6 (L) 2022    HCT 38.9 2022    MCV 91 2022     2022    CHOL 213 (H) 11/15/2021    TRIG 238 (H) 11/15/2021    HDL 31 (L) 11/15/2021    ALT 26 2022    AST 38 2022    BILITOT 0.5 2022    ALKPHOS 57 2022     2022    K 4.0 2022     2022    CREATININE 0.9 2022    ESTGFRAFRICA >60 11/15/2021    EGFRNONAA >60 11/15/2021    CALCIUM 9.8 2022    ALBUMIN 4.4 2022    BUN 19 2022    CO2 21 (L) 2022    TSH 0.627 11/15/2021    INR 1.0 2021    HGBA1C 5.8 (H) 11/15/2021    LDLCALC 134.4 11/15/2021    GLU 76 2022    [id number] 33 2020                       Vital signs reviewed  PE:   APPEARANCE: Well nourished, well developed, in no acute distress.    HEAD: Normocephalic, atraumatic.  EYES: PERRL. EOMI.   Conjunctivae noninjected.  EARS: TM's  intact. Light reflex normal. No retraction or perforation.    NOSE: Mucosa pink. Airway clear.  MOUTH & THROAT: No tonsillar enlargement. No pharyngeal erythema or exudate.   NECK: Supple with no cervical lymphadenopathy.  No carotid bruits.  No thyromegaly  CHEST: Good inspiratory effort. Lungs clear to auscultation with no wheezes or crackles.  CARDIOVASCULAR: Normal S1, S2. No rubs, murmurs, or gallops.  ABDOMEN: Bowel sounds normal. Not distended. Soft. No tenderness or masses. No organomegaly.  EXTREMITIES: No edema       IMPRESSION  1. Routine general medical examination at a health care facility    2. IFG (impaired fasting glucose)    3. Gastroesophageal reflux disease, unspecified whether esophagitis present    4. History of breast cancer          PLAN    labs as Below    IFG and dyslipidemia:  Dietary and exercise modification      Trial of mirtazapine 7.5 mg nightly for appetite.  Can titrate upward depending on effect.             Orders Placed This Encounter   Procedures    CBC Auto Differential    Comprehensive Metabolic Panel    Lipid Panel    TSH    Hemoglobin A1C           SCREENINGS  Colonoscopy October 2020, multiple polyps identified.  Repeat in 3 years.     GYN: Dr. Christianson , pap smear November 2016, status post hysterectomy     Mammogram June 2022  DEXA 2019, femoral neck left-0.8., femoral neck right-0.2., lumbar 0.4, rpt scheduled      Immunizations   Tetanus  2017  COVID x3, eligible for bivalent booster  Flu:   declines  Zoster:  Can get at pharmacy.

## 2023-01-10 NOTE — PATIENT INSTRUCTIONS
Check with your pharmacy regarding getting the tetanus (Tdap) vaccine (once every 10 years)    You are eligible for the updated covid booster vaccine. You can schedule at [email or web address], call [phone] or visit [email or web address] for available locations and times. You could also get this at a local pharmacy.    Update labs  Orders Placed This Encounter   Procedures    CBC Auto Differential    Comprehensive Metabolic Panel    Lipid Panel    TSH    Hemoglobin A1C

## 2023-01-11 ENCOUNTER — APPOINTMENT (OUTPATIENT)
Dept: RADIOLOGY | Facility: CLINIC | Age: 68
End: 2023-01-11
Attending: INTERNAL MEDICINE
Payer: COMMERCIAL

## 2023-01-11 DIAGNOSIS — Z78.0 MENOPAUSE: ICD-10-CM

## 2023-01-11 PROCEDURE — 77080 DEXA BONE DENSITY SPINE HIP: ICD-10-PCS | Mod: 26,,, | Performed by: INTERNAL MEDICINE

## 2023-01-11 PROCEDURE — 77080 DXA BONE DENSITY AXIAL: CPT | Mod: 26,,, | Performed by: INTERNAL MEDICINE

## 2023-01-11 PROCEDURE — 77080 DXA BONE DENSITY AXIAL: CPT | Mod: TC,PO

## 2023-01-23 ENCOUNTER — LAB VISIT (OUTPATIENT)
Dept: LAB | Facility: HOSPITAL | Age: 68
End: 2023-01-23
Attending: FAMILY MEDICINE
Payer: COMMERCIAL

## 2023-01-23 DIAGNOSIS — K21.9 GASTROESOPHAGEAL REFLUX DISEASE, UNSPECIFIED WHETHER ESOPHAGITIS PRESENT: ICD-10-CM

## 2023-01-23 DIAGNOSIS — Z00.00 ROUTINE GENERAL MEDICAL EXAMINATION AT A HEALTH CARE FACILITY: ICD-10-CM

## 2023-01-23 DIAGNOSIS — Z85.3 HISTORY OF BREAST CANCER: ICD-10-CM

## 2023-01-23 DIAGNOSIS — R73.01 IFG (IMPAIRED FASTING GLUCOSE): ICD-10-CM

## 2023-01-23 LAB
ALBUMIN SERPL BCP-MCNC: 3.7 G/DL (ref 3.5–5.2)
ALP SERPL-CCNC: 82 U/L (ref 55–135)
ALT SERPL W/O P-5'-P-CCNC: 32 U/L (ref 10–44)
ANION GAP SERPL CALC-SCNC: 8 MMOL/L (ref 8–16)
AST SERPL-CCNC: 35 U/L (ref 10–40)
BASOPHILS # BLD AUTO: 0.08 K/UL (ref 0–0.2)
BASOPHILS NFR BLD: 1.3 % (ref 0–1.9)
BILIRUB SERPL-MCNC: 0.2 MG/DL (ref 0.1–1)
BUN SERPL-MCNC: 17 MG/DL (ref 8–23)
CALCIUM SERPL-MCNC: 9.9 MG/DL (ref 8.7–10.5)
CHLORIDE SERPL-SCNC: 107 MMOL/L (ref 95–110)
CHOLEST SERPL-MCNC: 215 MG/DL (ref 120–199)
CHOLEST/HDLC SERPL: 6.9 {RATIO} (ref 2–5)
CO2 SERPL-SCNC: 25 MMOL/L (ref 23–29)
CREAT SERPL-MCNC: 0.8 MG/DL (ref 0.5–1.4)
DIFFERENTIAL METHOD: ABNORMAL
EOSINOPHIL # BLD AUTO: 0.3 K/UL (ref 0–0.5)
EOSINOPHIL NFR BLD: 4.4 % (ref 0–8)
ERYTHROCYTE [DISTWIDTH] IN BLOOD BY AUTOMATED COUNT: 13.2 % (ref 11.5–14.5)
EST. GFR  (NO RACE VARIABLE): >60 ML/MIN/1.73 M^2
ESTIMATED AVG GLUCOSE: 120 MG/DL (ref 68–131)
GLUCOSE SERPL-MCNC: 93 MG/DL (ref 70–110)
HBA1C MFR BLD: 5.8 % (ref 4–5.6)
HCT VFR BLD AUTO: 42.4 % (ref 37–48.5)
HDLC SERPL-MCNC: 31 MG/DL (ref 40–75)
HDLC SERPL: 14.4 % (ref 20–50)
HGB BLD-MCNC: 12.3 G/DL (ref 12–16)
IMM GRANULOCYTES # BLD AUTO: 0.02 K/UL (ref 0–0.04)
IMM GRANULOCYTES NFR BLD AUTO: 0.3 % (ref 0–0.5)
LDLC SERPL CALC-MCNC: 139.4 MG/DL (ref 63–159)
LYMPHOCYTES # BLD AUTO: 2.4 K/UL (ref 1–4.8)
LYMPHOCYTES NFR BLD: 37.8 % (ref 18–48)
MCH RBC QN AUTO: 26.6 PG (ref 27–31)
MCHC RBC AUTO-ENTMCNC: 29 G/DL (ref 32–36)
MCV RBC AUTO: 92 FL (ref 82–98)
MONOCYTES # BLD AUTO: 0.5 K/UL (ref 0.3–1)
MONOCYTES NFR BLD: 8.3 % (ref 4–15)
NEUTROPHILS # BLD AUTO: 3 K/UL (ref 1.8–7.7)
NEUTROPHILS NFR BLD: 47.9 % (ref 38–73)
NONHDLC SERPL-MCNC: 184 MG/DL
NRBC BLD-RTO: 0 /100 WBC
PLATELET # BLD AUTO: 332 K/UL (ref 150–450)
PMV BLD AUTO: 8.9 FL (ref 9.2–12.9)
POTASSIUM SERPL-SCNC: 4.1 MMOL/L (ref 3.5–5.1)
PROT SERPL-MCNC: 7.9 G/DL (ref 6–8.4)
RBC # BLD AUTO: 4.63 M/UL (ref 4–5.4)
SODIUM SERPL-SCNC: 140 MMOL/L (ref 136–145)
T4 FREE SERPL-MCNC: 0.94 NG/DL (ref 0.71–1.51)
TRIGL SERPL-MCNC: 223 MG/DL (ref 30–150)
TSH SERPL DL<=0.005 MIU/L-ACNC: 0.35 UIU/ML (ref 0.4–4)
WBC # BLD AUTO: 6.35 K/UL (ref 3.9–12.7)

## 2023-01-23 PROCEDURE — 84439 ASSAY OF FREE THYROXINE: CPT | Performed by: FAMILY MEDICINE

## 2023-01-23 PROCEDURE — 83036 HEMOGLOBIN GLYCOSYLATED A1C: CPT | Performed by: FAMILY MEDICINE

## 2023-01-23 PROCEDURE — 36415 COLL VENOUS BLD VENIPUNCTURE: CPT | Performed by: FAMILY MEDICINE

## 2023-01-23 PROCEDURE — 85025 COMPLETE CBC W/AUTO DIFF WBC: CPT | Performed by: FAMILY MEDICINE

## 2023-01-23 PROCEDURE — 80053 COMPREHEN METABOLIC PANEL: CPT | Performed by: FAMILY MEDICINE

## 2023-01-23 PROCEDURE — 80061 LIPID PANEL: CPT | Performed by: FAMILY MEDICINE

## 2023-01-23 PROCEDURE — 84443 ASSAY THYROID STIM HORMONE: CPT | Performed by: FAMILY MEDICINE

## 2023-01-25 ENCOUNTER — TELEPHONE (OUTPATIENT)
Dept: FAMILY MEDICINE | Facility: CLINIC | Age: 68
End: 2023-01-25
Payer: COMMERCIAL

## 2023-01-25 DIAGNOSIS — R79.89 ABNORMAL TSH: Primary | ICD-10-CM

## 2023-01-25 NOTE — TELEPHONE ENCOUNTER
Pls tell pt that  r labs were mostly normal.  Blood sugars in  diabetes testing is in the borderline range.  Healthy diet and exercise should help with this.  Thyroid test was very slightly abnormal but sometimes this level can fluctuate on labs.  I would just recommend a repeat thyroid test in the next 3 months.    Orders Placed This Encounter   Procedures    TSH

## 2023-01-27 ENCOUNTER — TELEPHONE (OUTPATIENT)
Dept: FAMILY MEDICINE | Facility: CLINIC | Age: 68
End: 2023-01-27
Payer: COMMERCIAL

## 2023-01-27 NOTE — TELEPHONE ENCOUNTER
----- Message from Stephanie Delgado sent at 1/27/2023  1:39 PM CST -----  Contact: Patient  Type:  Patient Returning Call    Who Called:Patient   Who Left Message for Patient:Aniyah Daniel MA   Would the patient rather a call back or a response via [facility]? Call back  Best Call Back Number:[phone]  Additional Information: Please assist

## 2023-01-27 NOTE — TELEPHONE ENCOUNTER
"Called patient and went over results. She did not want to schedule the labs yet. She said she will call back in March to schedule.  She did ask for a prescription, which she said that you had written in the past, for a "syrup" to help her have an appetite. Please advise.  "

## 2023-01-27 NOTE — TELEPHONE ENCOUNTER
Attempted to call patient to go over results and schedule repeat non-fasting labs. Mailbox is full, unable to leave voicemail.

## 2023-01-27 NOTE — TELEPHONE ENCOUNTER
We had talked about this in detail at her appointment.  I I had sent her mirtazapine 7.5 mg  to take every evening to help with improving appetite,  is she taking this?  If she is taking but is just not helping we can increase the dose as I started her out low-dose.  ( Could go up to 15 mg nightly)

## 2023-02-01 NOTE — TELEPHONE ENCOUNTER
Attempted to call patient to go over Dr Hurtado's response. Mailbox is full, unable to leave a message.

## 2023-02-02 ENCOUNTER — TELEPHONE (OUTPATIENT)
Dept: HEMATOLOGY/ONCOLOGY | Facility: CLINIC | Age: 68
End: 2023-02-02
Payer: COMMERCIAL

## 2023-02-13 ENCOUNTER — OFFICE VISIT (OUTPATIENT)
Dept: HEMATOLOGY/ONCOLOGY | Facility: CLINIC | Age: 68
End: 2023-02-13
Payer: COMMERCIAL

## 2023-02-13 VITALS
WEIGHT: 173.5 LBS | OXYGEN SATURATION: 98 % | HEIGHT: 66 IN | BODY MASS INDEX: 27.88 KG/M2 | RESPIRATION RATE: 18 BRPM | SYSTOLIC BLOOD PRESSURE: 121 MMHG | DIASTOLIC BLOOD PRESSURE: 80 MMHG | HEART RATE: 114 BPM | TEMPERATURE: 98 F

## 2023-02-13 DIAGNOSIS — C50.811 MALIGNANT NEOPLASM OF OVERLAPPING SITES OF RIGHT BREAST IN FEMALE, ESTROGEN RECEPTOR POSITIVE: Primary | ICD-10-CM

## 2023-02-13 DIAGNOSIS — Z79.811 AROMATASE INHIBITOR USE: ICD-10-CM

## 2023-02-13 DIAGNOSIS — R63.0 POOR APPETITE: ICD-10-CM

## 2023-02-13 DIAGNOSIS — Z17.0 MALIGNANT NEOPLASM OF OVERLAPPING SITES OF RIGHT BREAST IN FEMALE, ESTROGEN RECEPTOR POSITIVE: Primary | ICD-10-CM

## 2023-02-13 PROCEDURE — 1160F PR REVIEW ALL MEDS BY PRESCRIBER/CLIN PHARMACIST DOCUMENTED: ICD-10-PCS | Mod: CPTII,S$GLB,, | Performed by: INTERNAL MEDICINE

## 2023-02-13 PROCEDURE — 3079F DIAST BP 80-89 MM HG: CPT | Mod: CPTII,S$GLB,, | Performed by: INTERNAL MEDICINE

## 2023-02-13 PROCEDURE — 3008F PR BODY MASS INDEX (BMI) DOCUMENTED: ICD-10-PCS | Mod: CPTII,S$GLB,, | Performed by: INTERNAL MEDICINE

## 2023-02-13 PROCEDURE — 3288F PR FALLS RISK ASSESSMENT DOCUMENTED: ICD-10-PCS | Mod: CPTII,S$GLB,, | Performed by: INTERNAL MEDICINE

## 2023-02-13 PROCEDURE — 1159F PR MEDICATION LIST DOCUMENTED IN MEDICAL RECORD: ICD-10-PCS | Mod: CPTII,S$GLB,, | Performed by: INTERNAL MEDICINE

## 2023-02-13 PROCEDURE — 1126F AMNT PAIN NOTED NONE PRSNT: CPT | Mod: CPTII,S$GLB,, | Performed by: INTERNAL MEDICINE

## 2023-02-13 PROCEDURE — 3079F PR MOST RECENT DIASTOLIC BLOOD PRESSURE 80-89 MM HG: ICD-10-PCS | Mod: CPTII,S$GLB,, | Performed by: INTERNAL MEDICINE

## 2023-02-13 PROCEDURE — 1101F PR PT FALLS ASSESS DOC 0-1 FALLS W/OUT INJ PAST YR: ICD-10-PCS | Mod: CPTII,S$GLB,, | Performed by: INTERNAL MEDICINE

## 2023-02-13 PROCEDURE — 99999 PR PBB SHADOW E&M-EST. PATIENT-LVL IV: CPT | Mod: PBBFAC,,, | Performed by: INTERNAL MEDICINE

## 2023-02-13 PROCEDURE — 3288F FALL RISK ASSESSMENT DOCD: CPT | Mod: CPTII,S$GLB,, | Performed by: INTERNAL MEDICINE

## 2023-02-13 PROCEDURE — 99214 OFFICE O/P EST MOD 30 MIN: CPT | Mod: S$GLB,,, | Performed by: INTERNAL MEDICINE

## 2023-02-13 PROCEDURE — 3074F PR MOST RECENT SYSTOLIC BLOOD PRESSURE < 130 MM HG: ICD-10-PCS | Mod: CPTII,S$GLB,, | Performed by: INTERNAL MEDICINE

## 2023-02-13 PROCEDURE — 3044F PR MOST RECENT HEMOGLOBIN A1C LEVEL <7.0%: ICD-10-PCS | Mod: CPTII,S$GLB,, | Performed by: INTERNAL MEDICINE

## 2023-02-13 PROCEDURE — 3044F HG A1C LEVEL LT 7.0%: CPT | Mod: CPTII,S$GLB,, | Performed by: INTERNAL MEDICINE

## 2023-02-13 PROCEDURE — 1160F RVW MEDS BY RX/DR IN RCRD: CPT | Mod: CPTII,S$GLB,, | Performed by: INTERNAL MEDICINE

## 2023-02-13 PROCEDURE — 1159F MED LIST DOCD IN RCRD: CPT | Mod: CPTII,S$GLB,, | Performed by: INTERNAL MEDICINE

## 2023-02-13 PROCEDURE — 1126F PR PAIN SEVERITY QUANTIFIED, NO PAIN PRESENT: ICD-10-PCS | Mod: CPTII,S$GLB,, | Performed by: INTERNAL MEDICINE

## 2023-02-13 PROCEDURE — 1101F PT FALLS ASSESS-DOCD LE1/YR: CPT | Mod: CPTII,S$GLB,, | Performed by: INTERNAL MEDICINE

## 2023-02-13 PROCEDURE — 3008F BODY MASS INDEX DOCD: CPT | Mod: CPTII,S$GLB,, | Performed by: INTERNAL MEDICINE

## 2023-02-13 PROCEDURE — 99999 PR PBB SHADOW E&M-EST. PATIENT-LVL IV: ICD-10-PCS | Mod: PBBFAC,,, | Performed by: INTERNAL MEDICINE

## 2023-02-13 PROCEDURE — 99214 PR OFFICE/OUTPT VISIT, EST, LEVL IV, 30-39 MIN: ICD-10-PCS | Mod: S$GLB,,, | Performed by: INTERNAL MEDICINE

## 2023-02-13 PROCEDURE — 3074F SYST BP LT 130 MM HG: CPT | Mod: CPTII,S$GLB,, | Performed by: INTERNAL MEDICINE

## 2023-02-13 NOTE — PROGRESS NOTES
Subjective:       Patient ID: Jaja Toledo is a 67 y.o. female.     Chief Complaint: follow up for breast cancer     Diagnosis:  Stage IA (mp T1cN0 (I+)M0) Right breast cancer, s/p right mastectomy on 6/4/19     Oncologic History:  1. Ms Toledo is a 64 yo postmenopausal woman who initially saw me on 7/22/19 for breast cancer. She was noted to have asymmetry in the right breast at the inner position on mammogram 3/18/19. Diagnosed mammogram on 4/11/19 showed a 11 mm high density, irregularly shaped mass with indistinct margins in the right breast at 3 oclock. US showed a 7 x 9 x 7 mm mass at 3 oclock. The right axilla was normal. Breast biopsy on 4/22/19 showed invasive ductal carcinoma, grade 3, with associated high grade DCIS. ER+ (>95%), KY+(>90%), HER2 equivocal, FISH negative. Ki-67 20%. She was seen by Dr Arteaga in May 2019. MRI breast on 5/16/19 showed a 13 mm x 10 mm x 10 mm mass at 3 oclock correlating with the prior finding. There is another 9 mm x 7 mm x 8 mm mass at 6 oclock in the right breast. Left breast is benign. She underwent biopsy of the second mass on 5/27/19, which showed a grade 2 invasive lobular carcinoma, ER positive 70%, KY negative, HER2 negative, Ki-67 15%. Patient underwent right mastectomy and right axillary lymph node biopsy on 6/4/19. Pathology showed two tumors: #1, one 1.3 cm grade 2 invasive ductal carcinoma with lobular features, #2, one 1.7 cm grade 2 invasive lobular carcinoma. DCIS present, grade 3, with necrosis. LCIS. Margins are negative. lymphovascular invasion was not identified. One sentinel lymph node removed, with isolated tumor cells. Extranodal extention not identified. mp T1c sn N0 (i+). Receptors: #1: ER positive 95%, KY positive 90%, HER2 FISH negative, Ki67 20%. #2, ER positive 70%, KY negative, HER2 negative, Ki-67 15%. She returns today to discuss further management. She currently feels well. She had some mild sweating on her forehead in her last 50s. She  had a hysterectomy more than 20 years ago. She had Confidex genetic test done which was negative. Oncotype DX of her 1.7 cm ILC showed an RS of 15, distant recurrence rate at 9 years is 4%, benefit from chemo is <1%. Oncotype DX of her 1.3 cm IDC is pending.   2. Oncotype DX of her 1.3 cm IDC showed RS of 24, distant recurrence rate at 9 years is 10%, benefit from chemotherapy <1%  3. Started letrozole in 2019. Bone density 19 and 2023 normal     Interval History:   Ms Toledo returns for follow up. She has been taking letrozole, vit D and calcium every day. Periactin helps with appetite. Megace worked better.       ECO     ROS:   A ten-point system review is obtained and negative except for what was stated in the Interval History.      Physical Examination:   Vital signs reviewed.   General: well hydrated, well developed, in no acute distress  HEENT: normocephalic, PERRLA, EOMI, anicteric sclerae, oropharynx clear  Neck: supple, no JVD, thyromegaly, cervical or supraclavicular lymphadenopathy  Lungs: clear breath sounds bilaterally, no wheezing, rales, or rhonchi  Heart: RRR, no M/R/G  Breast: s/p R mastectomy and reconstruction. No abnormal skin changes, masses or axillary LAD. L: no abnormal skin changes, masses, or axillary LAD.   Abdomen: soft, no tenderness, non-distended, no hepatosplenomegaly, mass, or hernia. BS present  Extremities: no clubbing, cyanosis, edema  Skin: no rash, ulcer, or open wounds  Neuro: alert and oriented x 4, no focal neuro deficit  Psych: pleasant and appropriate mood and affect      Objective:      Laboratory Data:  Labs reviewed. vit D level normal     Imaging Data:   L Mammogram 2023:  Impression:     *Normal bone mineral density     RECOMMENDATIONS:  *Daily calcium intake 1581-0105 mg, dietary sources preferred; Vitamin D 3111-7522 IU daily.  *Weight bearing exercise and fall precautions.  *No additional pharmacologic therapy recommended at this  time.  *There is no need to repeat BMD in the near future unless additional risk factors become apparent.     Assessment and Plan:      1. Malignant neoplasm of overlapping sites of right breast in female, estrogen receptor positive    2. Aromatase inhibitor use    3. Poor appetite      1-2  - Ms Tre is a 68 yo woman with Stage IA (mp T1cN0 (I+)M0) Right breast cancer s/p R mastectomy, SLNB and immediate reconstruction on 6/4/19. She had a 1.3 cm invasive ductal carcinoma and a 1.7 cm invasive lobular carcinoma. Both tumors ER positive, HER2 negative. She had isolated tumor cells in one sentinel lymph node, which is stage N0 (I+).  Oncotype DX score of the 1.7 cm ILC showed an RS of 15, distant recurrence rate at 9 years is 4%, benefit from chemo is <1%. Oncotype DX of her 1.3 cm IDC showed RS of 24, distant recurrence rate at 9 years is 10%, benefit from chemotherapy <1%  - Has been on letrozole since Aug 2019. Tolerating it well  - continue with letrozole for 5 years  - last mammogram in June 2022 normal. Next due in June 2023. Will schedule  - reviewed bone density result. Normal bone density. Next due in Jan 2025    3.  - asked patient not to take megace  - c/w periactin      Follow-up:      Return in 6 months  All her questions were answered in the office. Knows to call in the interval if any problems arise.    Route Chart for Scheduling    Med Onc Chart Routing      Follow up with physician 6 months. please mail patient appt letters. get mammogram in June 2023. see me in 6 months   Follow up with [facility]    Infusion scheduling note    Injection scheduling note    Labs    Imaging Mammogram   in June 2023   Pharmacy appointment    Other referrals

## 2023-02-20 ENCOUNTER — TELEPHONE (OUTPATIENT)
Dept: GASTROENTEROLOGY | Facility: CLINIC | Age: 68
End: 2023-02-20
Payer: COMMERCIAL

## 2023-03-23 ENCOUNTER — TELEPHONE (OUTPATIENT)
Dept: FAMILY MEDICINE | Facility: CLINIC | Age: 68
End: 2023-03-23
Payer: COMMERCIAL

## 2023-03-23 NOTE — TELEPHONE ENCOUNTER
Patient called to retrieve number to schedule colonoscopy number have been given to have patient schedule appointment .

## 2023-03-23 NOTE — TELEPHONE ENCOUNTER
----- Message from Kurtis Atkinson sent at 3/23/2023 11:50 AM CDT -----  Contact: Patient  Type:  Patient Call          Who Called: Patient         Does the patient know what this is regarding?: Requesting a call back and to have orders for a colonoscopy ; please advise           Would the patient rather a call back or a response via [facility]?call           Best Call Back Number:[phone]             Additional Information:

## 2023-03-28 ENCOUNTER — TELEPHONE (OUTPATIENT)
Dept: GASTROENTEROLOGY | Facility: CLINIC | Age: 68
End: 2023-03-28
Payer: COMMERCIAL

## 2023-03-28 NOTE — TELEPHONE ENCOUNTER
RE: GI Colonoscopy Referral Call     2nd attempt to call patient to scheduled colonoscopy procedure. Call outcome: Call went directly to voicemail. Mailbox was full and could not accept messages. No answer. Call time: 1 minutes

## 2023-04-20 ENCOUNTER — TELEPHONE (OUTPATIENT)
Dept: PLASTIC SURGERY | Facility: CLINIC | Age: 68
End: 2023-04-20
Payer: COMMERCIAL

## 2023-05-17 ENCOUNTER — OFFICE VISIT (OUTPATIENT)
Dept: PLASTIC SURGERY | Facility: CLINIC | Age: 68
End: 2023-05-17
Payer: COMMERCIAL

## 2023-05-17 VITALS — OXYGEN SATURATION: 94 % | HEART RATE: 125 BPM | SYSTOLIC BLOOD PRESSURE: 134 MMHG | DIASTOLIC BLOOD PRESSURE: 80 MMHG

## 2023-05-17 DIAGNOSIS — Z85.3 PERSONAL HISTORY OF BREAST CANCER: Primary | ICD-10-CM

## 2023-05-17 PROCEDURE — 3075F SYST BP GE 130 - 139MM HG: CPT | Mod: CPTII,S$GLB,, | Performed by: SURGERY

## 2023-05-17 PROCEDURE — 3044F HG A1C LEVEL LT 7.0%: CPT | Mod: CPTII,S$GLB,, | Performed by: SURGERY

## 2023-05-17 PROCEDURE — 1126F AMNT PAIN NOTED NONE PRSNT: CPT | Mod: CPTII,S$GLB,, | Performed by: SURGERY

## 2023-05-17 PROCEDURE — 1159F MED LIST DOCD IN RCRD: CPT | Mod: CPTII,S$GLB,, | Performed by: SURGERY

## 2023-05-17 PROCEDURE — 1126F PR PAIN SEVERITY QUANTIFIED, NO PAIN PRESENT: ICD-10-PCS | Mod: CPTII,S$GLB,, | Performed by: SURGERY

## 2023-05-17 PROCEDURE — 1159F PR MEDICATION LIST DOCUMENTED IN MEDICAL RECORD: ICD-10-PCS | Mod: CPTII,S$GLB,, | Performed by: SURGERY

## 2023-05-17 PROCEDURE — 1160F PR REVIEW ALL MEDS BY PRESCRIBER/CLIN PHARMACIST DOCUMENTED: ICD-10-PCS | Mod: CPTII,S$GLB,, | Performed by: SURGERY

## 2023-05-17 PROCEDURE — 99212 PR OFFICE/OUTPT VISIT, EST, LEVL II, 10-19 MIN: ICD-10-PCS | Mod: S$GLB,,, | Performed by: SURGERY

## 2023-05-17 PROCEDURE — 3075F PR MOST RECENT SYSTOLIC BLOOD PRESS GE 130-139MM HG: ICD-10-PCS | Mod: CPTII,S$GLB,, | Performed by: SURGERY

## 2023-05-17 PROCEDURE — 3079F DIAST BP 80-89 MM HG: CPT | Mod: CPTII,S$GLB,, | Performed by: SURGERY

## 2023-05-17 PROCEDURE — 99999 PR PBB SHADOW E&M-EST. PATIENT-LVL III: CPT | Mod: PBBFAC,,, | Performed by: SURGERY

## 2023-05-17 PROCEDURE — 99999 PR PBB SHADOW E&M-EST. PATIENT-LVL III: ICD-10-PCS | Mod: PBBFAC,,, | Performed by: SURGERY

## 2023-05-17 PROCEDURE — 1160F RVW MEDS BY RX/DR IN RCRD: CPT | Mod: CPTII,S$GLB,, | Performed by: SURGERY

## 2023-05-17 PROCEDURE — 3079F PR MOST RECENT DIASTOLIC BLOOD PRESSURE 80-89 MM HG: ICD-10-PCS | Mod: CPTII,S$GLB,, | Performed by: SURGERY

## 2023-05-17 PROCEDURE — 99212 OFFICE O/P EST SF 10 MIN: CPT | Mod: S$GLB,,, | Performed by: SURGERY

## 2023-05-17 PROCEDURE — 3044F PR MOST RECENT HEMOGLOBIN A1C LEVEL <7.0%: ICD-10-PCS | Mod: CPTII,S$GLB,, | Performed by: SURGERY

## 2023-05-22 NOTE — PROGRESS NOTES
Patient presents Plastic surgery Clinic after having a free flap to her foot many years ago as well as breast reconstruction.  Patient has a nice result.  She does need some fat grafting in the superior pole of the right breast.  She may also need a slightly bigger implant on that side.  I discussed the upcoming procedures with her.  I believe she would like to proceed.  We will go ahead and schedule this.

## 2023-05-25 ENCOUNTER — TELEPHONE (OUTPATIENT)
Dept: PLASTIC SURGERY | Facility: CLINIC | Age: 68
End: 2023-05-25
Payer: COMMERCIAL

## 2023-05-25 ENCOUNTER — TELEPHONE (OUTPATIENT)
Dept: FAMILY MEDICINE | Facility: CLINIC | Age: 68
End: 2023-05-25
Payer: COMMERCIAL

## 2023-05-25 DIAGNOSIS — Z85.3 PERSONAL HISTORY OF BREAST CANCER: Primary | ICD-10-CM

## 2023-05-25 NOTE — TELEPHONE ENCOUNTER
Contact with [facility] staff for review of procedure planned vs MD Notes - verified to book as stated on surgery sheet confirmed on 5/24/23.  Contact with patient RE: surgery booking.  Pt agrees with surgery date for 6/22/23. Contact with patient to confirm current email address.  Notification to pt of general surgery information packet to be emailed for patient review.  Pt voiced she understands all realms of surgery process and declines surgery packet to be sent.  Informed patient of PCP clearance needed for upcoming surgery. Notification to patient of post op follow up appointment has already been made.

## 2023-05-25 NOTE — TELEPHONE ENCOUNTER
----- Message from Santosh Pate sent at 5/25/2023 10:10 AM CDT -----  Regarding: Advice  Contact: [phone]  Patient is calling to get lab work ordered before surgery. Please contact pt

## 2023-06-12 ENCOUNTER — TELEPHONE (OUTPATIENT)
Dept: PLASTIC SURGERY | Facility: CLINIC | Age: 68
End: 2023-06-12
Payer: COMMERCIAL

## 2023-06-12 NOTE — TELEPHONE ENCOUNTER
Attempted to contact pt to discuss paperwork.  Pt was not available at the time of the call. Pts VM box is full and an message could not be left.

## 2023-06-14 ENCOUNTER — TELEPHONE (OUTPATIENT)
Dept: FAMILY MEDICINE | Facility: CLINIC | Age: 68
End: 2023-06-14
Payer: COMMERCIAL

## 2023-06-14 ENCOUNTER — TELEPHONE (OUTPATIENT)
Dept: PREADMISSION TESTING | Facility: HOSPITAL | Age: 68
End: 2023-06-14

## 2023-06-14 NOTE — ANESTHESIA PAT ROS NOTE
06/14/2023  Jaja Toledo is a 67 y.o., female.      Pre-op Assessment    I have reviewed the Patient Summary Reports.     I have reviewed the Nursing Notes. I have reviewed the NPO Status.   I have reviewed the Medications.     Review of Systems  Anesthesia Hx:  No problems with previous Anesthesia   History of prior surgery of interest to airway management or planning:  Previous anesthesia: General 6/6/2022 Breast reconstruction with general anesthesia.       Airway issues documented on chart review include GETA       Denies Personal Hx of Anesthesia complications.                    Social:  Non-Smoker, No Alcohol Use Quit smoking 1990      Hematology/Oncology:  Hematology Normal                       --  Cancer in past history:       Breast    right   surgery   Oncology Comments: 7/19/2019  Malignant neoplasm of overlapping sites of right breast in female, estrogen receptor positive    1996 History of cervical cancer     EENT/Dental:  EENT/Dental Normal           Cardiovascular:  Exercise tolerance: good   Denies Pacemaker.    Denies MI.        Denies Angina.     no hyperlipidemia     Functional Capacity 4 METS                         Pulmonary:     Denies Asthma.   Denies Shortness of breath.  Denies Recent URI.  Denies Sleep Apnea.                Hepatic/GI:     GERD, well controlled Denies Liver Disease.  Denies Hepatitis. Gastritis            Musculoskeletal:  Musculoskeletal Normal     Musculoskeletal General/Symptoms:     Back surgery?    2009 or 2010  Foot surgery  Soft tissue tumor, malignant   sarcoma left foot               OB/GYN/PEDS:  History of cervical cancer    1996  Total vaginal hysterectomy  1994  Tubal ligation           Neurological:  Denies TIA.  Denies CVA.    Denies Seizures.                                Endocrine:  Denies Diabetes. Denies Hypothyroidism.  Denies  Hyperthyroidism.         Dermatological:  Skin Normal     6/6/2022  Breast reconstruction (Right)   10/19/2020  Colonoscopy (N/A)   7/20/2020  Mastopexy (Left)   7/20/2020  Breast revision surgery (Right)   6/4/2019  Simple mastectomy (Right)   6/4/2019  [place] lymph node biopsy (Right)   6/4/2019  Axillary node dissection (Right)   6/4/2019  Mastopexy (Left)   6/4/2019  Insertion of breast tissue expander (Right)   6/4/2019  Insertion of breast implant (Right)   2019  Total reduction mammoplasty (Left)  11/29/2018  Abdominoplasty (N/A)   11/29/2018  Liposuction w/ fat injection (N/A)    Psych:  Psychiatric Normal                  Physical Exam  General: Well nourished, Cooperative, Alert and Oriented    Airway:  Mouth Opening: Normal  Tongue: Normal  Neck ROM: Normal ROM    Dental:  Partial Dentures, Caps / Implants, Intact  Upper partial plate      Anesthesia Assessment: Preoperative EQUATION    Planned Procedure: Procedure(s) (LRB):  RECONSTRUCTION, BREAST, WITH LATISSIMUS DORSI MYOCUTANEOUS FLAP (Right)  MASTOPEXY (Right)  TRANSFER, FAT TISSUE (Right)  Requested Anesthesia Type:General  Surgeon: Leonard Cordero MD  Service: Plastics  Known or anticipated Date of Surgery:6/22/2023    Surgeon notes: reviewed    Previous anesthesia records:GETA and No problems  6/6/2022 Breast reconstruction  Airway:  Mallampati: II   Mouth Opening: Normal  Neck ROM: Normal ROM    Placement Date: 06/06/22 Placement Time: 1137 , created via procedure documentation  Method of Intubation: Direct laryngoscopy Mask Ventilation: Easy - oral Intubated: Postinduction Blade: Lyle #2 Airway Device Size: 7.5 Placement Verified By: Capnometry Complicating Factors: None Findings Post-Intubation: Bilateral breath sounds;Atraumatic/Condition of teeth unchanged Secured at: Lips Complications: None     Last PCP note: 6-12 months ago , within [facility]   Subspecialty notes: Hematology/Oncology, Neurology, Plastics    Other important  co-morbidities: GERD and Anxiety, IFG (Impaired fasting glucose)       Tests already available:  No recent tests. Available tests,  3-6 months ago , within [facility] .  1/23/2023 A1c (5.8), TSH, Lipid Panel, CMP, CBC     Optimization:  Anesthesia Preop Clinic Assessment  Indicated.    Medical Opinion Indicated.           Plan:    Testing:  Hematology Profile   Pre-anesthesia  visit       Visit focus: concerns in complex and/or prolonged anesthesia     Consultation:Patient's PCP for a statement of optimization      Patient  has previously scheduled Medical Appointment: Not at this time prior to surgery    Navigation: Tests Scheduled.              Consults scheduled.             Results will be tracked by Preop Clinic.

## 2023-06-14 NOTE — TELEPHONE ENCOUNTER
The patient will need to be seen for clearance. I attempted to call her to schedule, but her voicemail is full and I was unable to leave a message.

## 2023-06-14 NOTE — TELEPHONE ENCOUNTER
----- Message from Denisa Rodriguez RN sent at 6/14/2023  1:19 PM CDT -----  Hello Provider,       Your patient indicated above requires Pre-Op Clearance/ Risk Stratification for a Breast Reconstructions with myocutaneous flap, mastopexy, fat tissue transfer with Dr. Cordero on 6/22/2023 (General anesthesia, 270 minutes).  Anesthesia review is in progress.    If a chart review is appropriate for clearance, please indicate in a note in the EMR.       If not, please advise timely, so that your clinic may schedule an appointment.  The following labs/tests have been ordered: Heme Prof    If further diagnostics are required please feel free to initiate.       Thank you,  Denisa Rodriguez RN  [facility], [facility]

## 2023-06-16 ENCOUNTER — TELEPHONE (OUTPATIENT)
Dept: PREADMISSION TESTING | Facility: HOSPITAL | Age: 68
End: 2023-06-16

## 2023-06-16 NOTE — DISCHARGE INSTRUCTIONS
Your surgery has been scheduled for:________6/22/2023__________________________________    You should report to:  ____[facility], located on the [place] side of the first floor of the           [facility] ([phone])  ___x_[facility], located on the [facility] side of the            Second floor of the [facility] ([phone])  ____3rd Floor [facility] located on the [facility] side of the [facility] [phone]  ____[facility]: located at [address] [facility] [place] [address]. [address].     Please Note   Tell your doctor if you take Aspirin, products containing Aspirin, herbal medications  or blood thinners, such as Coumadin, Ticlid, or Plavix.  (Consult your provider regarding holding or stopping before surgery).  Arrange for someone to drive you home following surgery.  You will not be allowed to leave the surgical facility alone or drive yourself home following sedation and anesthesia.    Before Surgery  Stop taking all herbal medications, vitamins, and supplements 7 days prior to surgery  No Motrin/Advil (Ibuprofen) 7 days before surgery  No Aleve (Naproxen) 7 days before surgery  Stop Taking Asprin, products containing Asprin __7___days before surgery  Stop taking blood thinners_______days before surgery  No Goody's/BC  Powder 7 days before surgery  Refrain from drinking alcoholic beverages for 24hours before and after surgery  Stop or limit smoking _____7____days before surgery  You may take Tylenol for pain    Night before Surgery  Do not eat or drink after midnight  Take a shower or bath (shower is recommended).  Bathe with Hibiclens soap or an antibacterial soap from the neck down.  If not supplied by your surgeon, hibiclens soap will need to be purchased over the counter in pharmacy.  Rinse soap off thoroughly.  Shampoo your hair with your regular shampoo    The Day of  Surgery  Take another bath or shower with hibiclens or any antibacterial soap, to reduce the chance of infection.  Take heart and blood pressure medications with a small sip of water, as advised by the perioperative team.  Do not take fluid pills  You may brush your teeth and rinse your mouth, but do not swall any additional water.   Do not apply perfumes, powder, body lotions or deodorant on the day of surgery.  Nail polish should be removed.  Do not wear makeup or moisturizer  Wear comfortable clothes, such as a button front shirt and loose fitting pants.  Leave all jewelry, including body piercings, and valuables at home.    Bring any devices you will neeed after surgery such as crutches or canes.  If you have sleep apnea, please bring your CPAP machine  In the event that your physical condition changes including the onset of a cold or respiratory illness, or if you have to delay or cancel your surgery, please notify your surgeon.       Anesthesia: General Anesthesia     You are watched continuously during your procedure by your anesthesia provider.     Youre due to have surgery. During surgery, youll be given medicine called anesthesia or anesthetic. This will keep you comfortable and pain-free. Your anesthesia provider will use general anesthesia.  What is general anesthesia?  General anesthesia puts you into a state like deep sleep. It goes into the bloodstream (IV anesthetics), into the lungs (gas anesthetics), or both. You feel nothing during the procedure. You will not remember it. During the procedure, the anesthesia provider monitors you continuously. He or she checks your heart rate and rhythm, blood pressure, breathing, and blood oxygen.  IV anesthetics. IV anesthetics are given through an IV line in your arm. Theyre often given first. This is so you are asleep before a gas anesthetic is started. Some kinds of IV anesthetics relieve pain. Others relax you. Your doctor will decide which kind is best  in your case.  Gas anesthetics. Gas anesthetics are breathed into the lungs. They are often used to keep you asleep. They can be given through a facemask or a tube placed in your larynx or trachea (breathing tube).  If you have a facemask, your anesthesia provider will most likely place it over your nose and mouth while youre still awake. Youll breathe oxygen through the mask as your IV anesthetic is started. Gas anesthetic may be added through the mask.  If you have a tube in the larynx or trachea, it will be inserted into your throat after youre asleep.  Anesthesia tools and medicines  You will likely have:  IV anesthetics. These are put into an IV line into your bloodstream.  Gas anesthetics. You breathe these anesthetics into your lungs, where they pass into your bloodstream.  Pulse oximeter. This is a small clip that is attached to the end of your finger. This measures your blood oxygen level.  Electrocardiography leads (electrodes). These are small sticky pads that are placed on your chest. They record your heart rate and rhythm.  Blood pressure cuff. This reads your blood pressure.  Risks and possible complications  General anesthesia has some risks. These include:  Breathing problems  Nausea and vomiting  Sore throat or hoarseness (usually temporary)  Allergic reaction to the anesthetic  Irregular heartbeat (rare)  Cardiac arrest (rare)   Anesthesia safety  Follow all instructions you are given for how long not to eat or drink before your procedure.  Be sure your doctor knows what medicines and drugs you take. This includes over-the-counter medicines, herbs, supplements, alcohol or other drugs. You will be asked when those were last taken.  Have an adult family member or friend drive you home after the procedure.  For the first 24 hours after your surgery:  Do not drive or use heavy equipment.  Do not make important decisions or sign legal documents. If important decisions or signing legal documents is  necessary during the first 24 hours after surgery, have a trusted family member or spouse act on your behalf.  Avoid alcohol.  Have a responsible adult stay with you. He or she can watch for problems and help keep you safe.  Date Last Reviewed: 12/1/2016 © 2000-2017 [employer]. [address] [place] [address], [place] [address]. All rights reserved. This information is not intended as a substitute for professional medical care. Always follow your healthcare professional's instructions.

## 2023-06-19 ENCOUNTER — HOSPITAL ENCOUNTER (OUTPATIENT)
Dept: PREADMISSION TESTING | Facility: HOSPITAL | Age: 68
Discharge: HOME | End: 2023-06-19
Attending: SURGERY
Payer: COMMERCIAL

## 2023-06-19 VITALS
TEMPERATURE: 98 F | WEIGHT: 172.13 LBS | DIASTOLIC BLOOD PRESSURE: 89 MMHG | HEIGHT: 65 IN | OXYGEN SATURATION: 98 % | SYSTOLIC BLOOD PRESSURE: 136 MMHG | HEART RATE: 118 BPM | BODY MASS INDEX: 28.68 KG/M2

## 2023-06-20 DIAGNOSIS — Z01.818 PREOP TESTING: Primary | ICD-10-CM

## 2023-06-21 ENCOUNTER — TELEPHONE (OUTPATIENT)
Dept: PLASTIC SURGERY | Facility: CLINIC | Age: 68
End: 2023-06-21
Payer: COMMERCIAL

## 2023-06-21 NOTE — TELEPHONE ENCOUNTER
Pt contact call, confirmed with patient surgery arrival time for  0530  am to [facility] .  Pt instructed to remain NPO after midnight except for anesthesia preop instructions given with meds, wash with dial soap the PM night before and AM of surgery, wear loose comfortable clothing and bring daily medications.  Pt states she has not received call from anesthesia preop, reviewed medications with patient has arrangements confirmed for ride home at discharge.  Call ended.

## 2023-06-22 ENCOUNTER — ANESTHESIA EVENT (OUTPATIENT)
Dept: SURGERY | Facility: HOSPITAL | Age: 68
End: 2023-06-22
Payer: COMMERCIAL

## 2023-06-22 ENCOUNTER — ANESTHESIA (OUTPATIENT)
Dept: SURGERY | Facility: HOSPITAL | Age: 68
End: 2023-06-22
Payer: COMMERCIAL

## 2023-06-22 ENCOUNTER — HOSPITAL ENCOUNTER (OUTPATIENT)
Facility: HOSPITAL | Age: 68
Discharge: HOME | End: 2023-06-22
Attending: SURGERY | Admitting: SURGERY
Payer: COMMERCIAL

## 2023-06-22 VITALS
RESPIRATION RATE: 18 BRPM | WEIGHT: 172.19 LBS | DIASTOLIC BLOOD PRESSURE: 91 MMHG | HEIGHT: 65 IN | HEART RATE: 96 BPM | TEMPERATURE: 98 F | SYSTOLIC BLOOD PRESSURE: 147 MMHG | OXYGEN SATURATION: 96 % | BODY MASS INDEX: 28.69 KG/M2

## 2023-06-22 DIAGNOSIS — Z01.818 PREOP TESTING: ICD-10-CM

## 2023-06-22 DIAGNOSIS — C50.811 MALIGNANT NEOPLASM OF OVERLAPPING SITES OF RIGHT BREAST IN FEMALE, ESTROGEN RECEPTOR POSITIVE: Primary | ICD-10-CM

## 2023-06-22 DIAGNOSIS — Z17.0 MALIGNANT NEOPLASM OF OVERLAPPING SITES OF RIGHT BREAST IN FEMALE, ESTROGEN RECEPTOR POSITIVE: Primary | ICD-10-CM

## 2023-06-22 DIAGNOSIS — Z01.818 PREOPERATIVE TESTING: ICD-10-CM

## 2023-06-22 LAB
ABO + RH BLD: NORMAL
BLD GP AB SCN CELLS X3 SERPL QL: NORMAL
SPECIMEN OUTDATE: NORMAL

## 2023-06-22 PROCEDURE — 71000015 HC POSTOP RECOV 1ST HR: Performed by: SURGERY

## 2023-06-22 PROCEDURE — D9220A PRA ANESTHESIA: ICD-10-PCS | Mod: ,,, | Performed by: STUDENT IN AN ORGANIZED HEALTH CARE EDUCATION/TRAINING PROGRAM

## 2023-06-22 PROCEDURE — 36415 COLL VENOUS BLD VENIPUNCTURE: CPT | Performed by: ANESTHESIOLOGY

## 2023-06-22 PROCEDURE — 88307 TISSUE EXAM BY PATHOLOGIST: CPT | Mod: 26,,, | Performed by: PATHOLOGY

## 2023-06-22 PROCEDURE — 88307 TISSUE EXAM BY PATHOLOGIST: CPT | Performed by: PATHOLOGY

## 2023-06-22 PROCEDURE — 37000009 HC ANESTHESIA EA ADD 15 MINS: Performed by: SURGERY

## 2023-06-22 PROCEDURE — 19380 REVJ RECONSTRUCTED BREAST: CPT | Mod: 50,,, | Performed by: SURGERY

## 2023-06-22 PROCEDURE — 88305 TISSUE EXAM BY PATHOLOGIST: CPT | Performed by: PATHOLOGY

## 2023-06-22 PROCEDURE — 37000008 HC ANESTHESIA 1ST 15 MINUTES: Performed by: SURGERY

## 2023-06-22 PROCEDURE — D9220A PRA ANESTHESIA: Mod: ,,, | Performed by: STUDENT IN AN ORGANIZED HEALTH CARE EDUCATION/TRAINING PROGRAM

## 2023-06-22 PROCEDURE — 19380 PR REVISE BREAST RECONSTRUCTION: ICD-10-PCS | Mod: 50,,, | Performed by: SURGERY

## 2023-06-22 PROCEDURE — 88307 PR  SURG PATH,LEVEL V: ICD-10-PCS | Mod: 26,,, | Performed by: PATHOLOGY

## 2023-06-22 PROCEDURE — 88305 TISSUE EXAM BY PATHOLOGIST: ICD-10-PCS | Mod: 26,,, | Performed by: PATHOLOGY

## 2023-06-22 PROCEDURE — 63600175 PHARM REV CODE 636 W HCPCS: Performed by: STUDENT IN AN ORGANIZED HEALTH CARE EDUCATION/TRAINING PROGRAM

## 2023-06-22 PROCEDURE — 71000045 HC DOSC ROUTINE RECOVERY EA ADD'L HR: Performed by: SURGERY

## 2023-06-22 PROCEDURE — 88305 TISSUE EXAM BY PATHOLOGIST: CPT | Mod: 26,,, | Performed by: PATHOLOGY

## 2023-06-22 PROCEDURE — 36000707: Performed by: SURGERY

## 2023-06-22 PROCEDURE — 71000044 HC DOSC ROUTINE RECOVERY FIRST HOUR: Performed by: SURGERY

## 2023-06-22 PROCEDURE — 86900 BLOOD TYPING SEROLOGIC ABO: CPT | Performed by: ANESTHESIOLOGY

## 2023-06-22 PROCEDURE — 25000003 PHARM REV CODE 250: Performed by: STUDENT IN AN ORGANIZED HEALTH CARE EDUCATION/TRAINING PROGRAM

## 2023-06-22 PROCEDURE — 25000003 PHARM REV CODE 250: Performed by: SURGERY

## 2023-06-22 PROCEDURE — 36000706: Performed by: SURGERY

## 2023-06-22 RX ORDER — ROCURONIUM BROMIDE 10 MG/ML
INJECTION, SOLUTION INTRAVENOUS
Status: DISCONTINUED | OUTPATIENT
Start: 2023-06-22 | End: 2023-06-22

## 2023-06-22 RX ORDER — DEXAMETHASONE SODIUM PHOSPHATE 4 MG/ML
INJECTION, SOLUTION INTRA-ARTICULAR; INTRALESIONAL; INTRAMUSCULAR; INTRAVENOUS; SOFT TISSUE
Status: DISCONTINUED | OUTPATIENT
Start: 2023-06-22 | End: 2023-06-22

## 2023-06-22 RX ORDER — TRIAMCINOLONE ACETONIDE 40 MG/ML
INJECTION, SUSPENSION INTRA-ARTICULAR; INTRAMUSCULAR
Status: DISCONTINUED | OUTPATIENT
Start: 2023-06-22 | End: 2023-06-22 | Stop reason: HOSPADM

## 2023-06-22 RX ORDER — MUPIROCIN 20 MG/G
OINTMENT TOPICAL
Status: DISCONTINUED | OUTPATIENT
Start: 2023-06-22 | End: 2023-06-22 | Stop reason: HOSPADM

## 2023-06-22 RX ORDER — FENTANYL CITRATE 50 UG/ML
25 INJECTION, SOLUTION INTRAMUSCULAR; INTRAVENOUS EVERY 5 MIN PRN
Status: DISCONTINUED | OUTPATIENT
Start: 2023-06-22 | End: 2023-06-22 | Stop reason: HOSPADM

## 2023-06-22 RX ORDER — PHENYLEPHRINE HCL IN 0.9% NACL 1 MG/10 ML
SYRINGE (ML) INTRAVENOUS
Status: DISCONTINUED | OUTPATIENT
Start: 2023-06-22 | End: 2023-06-22

## 2023-06-22 RX ORDER — SODIUM CHLORIDE 0.9 G/100ML
IRRIGANT IRRIGATION
Status: DISCONTINUED | OUTPATIENT
Start: 2023-06-22 | End: 2023-06-22 | Stop reason: HOSPADM

## 2023-06-22 RX ORDER — KETAMINE HCL IN 0.9 % NACL 50 MG/5 ML
SYRINGE (ML) INTRAVENOUS
Status: DISCONTINUED | OUTPATIENT
Start: 2023-06-22 | End: 2023-06-22

## 2023-06-22 RX ORDER — SODIUM CHLORIDE 0.9 % (FLUSH) 0.9 %
10 SYRINGE (ML) INJECTION
Status: DISCONTINUED | OUTPATIENT
Start: 2023-06-22 | End: 2023-06-22 | Stop reason: HOSPADM

## 2023-06-22 RX ORDER — LIDOCAINE HYDROCHLORIDE 20 MG/ML
INJECTION, SOLUTION EPIDURAL; INFILTRATION; INTRACAUDAL; PERINEURAL
Status: DISCONTINUED | OUTPATIENT
Start: 2023-06-22 | End: 2023-06-22

## 2023-06-22 RX ORDER — CEPHALEXIN 500 MG/1
500 CAPSULE ORAL EVERY 8 HOURS
Qty: 15 CAPSULE | Refills: 0 | Status: SHIPPED | OUTPATIENT
Start: 2023-06-22 | End: 2023-06-27

## 2023-06-22 RX ORDER — CEFAZOLIN SODIUM 1 G/3ML
INJECTION, POWDER, FOR SOLUTION INTRAMUSCULAR; INTRAVENOUS
Status: DISCONTINUED | OUTPATIENT
Start: 2023-06-22 | End: 2023-06-22

## 2023-06-22 RX ORDER — HALOPERIDOL 5 MG/ML
0.5 INJECTION INTRAMUSCULAR EVERY 10 MIN PRN
Status: DISCONTINUED | OUTPATIENT
Start: 2023-06-22 | End: 2023-06-22 | Stop reason: HOSPADM

## 2023-06-22 RX ORDER — HYDROCODONE BITARTRATE AND ACETAMINOPHEN 5; 325 MG/1; MG/1
1 TABLET ORAL EVERY 6 HOURS PRN
Qty: 28 TABLET | Refills: 0 | Status: SHIPPED | OUTPATIENT
Start: 2023-06-22 | End: 2023-06-29

## 2023-06-22 RX ORDER — LIDOCAINE HYDROCHLORIDE 10 MG/ML
1 INJECTION, SOLUTION EPIDURAL; INFILTRATION; INTRACAUDAL; PERINEURAL ONCE
Status: DISCONTINUED | OUTPATIENT
Start: 2023-06-22 | End: 2023-06-22 | Stop reason: HOSPADM

## 2023-06-22 RX ORDER — ONDANSETRON 2 MG/ML
INJECTION INTRAMUSCULAR; INTRAVENOUS
Status: DISCONTINUED | OUTPATIENT
Start: 2023-06-22 | End: 2023-06-22

## 2023-06-22 RX ORDER — MIDAZOLAM HYDROCHLORIDE 1 MG/ML
INJECTION, SOLUTION INTRAMUSCULAR; INTRAVENOUS
Status: DISCONTINUED | OUTPATIENT
Start: 2023-06-22 | End: 2023-06-22

## 2023-06-22 RX ORDER — INDOMETHACIN 25 MG/1
CAPSULE ORAL
Status: DISCONTINUED | OUTPATIENT
Start: 2023-06-22 | End: 2023-06-22 | Stop reason: HOSPADM

## 2023-06-22 RX ORDER — PROPOFOL 10 MG/ML
VIAL (ML) INTRAVENOUS
Status: DISCONTINUED | OUTPATIENT
Start: 2023-06-22 | End: 2023-06-22

## 2023-06-22 RX ORDER — FENTANYL CITRATE 50 UG/ML
INJECTION, SOLUTION INTRAMUSCULAR; INTRAVENOUS
Status: DISCONTINUED | OUTPATIENT
Start: 2023-06-22 | End: 2023-06-22

## 2023-06-22 RX ORDER — LIDOCAINE HYDROCHLORIDE 10 MG/ML
INJECTION, SOLUTION EPIDURAL; INFILTRATION; INTRACAUDAL; PERINEURAL
Status: DISCONTINUED | OUTPATIENT
Start: 2023-06-22 | End: 2023-06-22 | Stop reason: HOSPADM

## 2023-06-22 RX ORDER — HALOPERIDOL 5 MG/ML
INJECTION INTRAMUSCULAR
Status: DISCONTINUED | OUTPATIENT
Start: 2023-06-22 | End: 2023-06-22

## 2023-06-22 RX ORDER — EPINEPHRINE CONVENIENCE KIT 1 MG/ML(1)
KIT INTRAMUSCULAR; SUBCUTANEOUS
Status: DISCONTINUED | OUTPATIENT
Start: 2023-06-22 | End: 2023-06-22 | Stop reason: HOSPADM

## 2023-06-22 RX ADMIN — CEFAZOLIN 2 G: 330 INJECTION, POWDER, FOR SOLUTION INTRAMUSCULAR; INTRAVENOUS at 07:06

## 2023-06-22 RX ADMIN — Medication 100 MCG: at 07:06

## 2023-06-22 RX ADMIN — DEXAMETHASONE SODIUM PHOSPHATE 4 MG: 4 INJECTION INTRA-ARTICULAR; INTRALESIONAL; INTRAMUSCULAR; INTRAVENOUS; SOFT TISSUE at 07:06

## 2023-06-22 RX ADMIN — SODIUM CHLORIDE, SODIUM GLUCONATE, SODIUM ACETATE, POTASSIUM CHLORIDE, MAGNESIUM CHLORIDE, SODIUM PHOSPHATE, DIBASIC, AND POTASSIUM PHOSPHATE: .53; .5; .37; .037; .03; .012; .00082 INJECTION, SOLUTION INTRAVENOUS at 07:06

## 2023-06-22 RX ADMIN — ONDANSETRON 4 MG: 2 INJECTION INTRAMUSCULAR; INTRAVENOUS at 07:06

## 2023-06-22 RX ADMIN — LIDOCAINE HYDROCHLORIDE 100 MG: 20 INJECTION, SOLUTION EPIDURAL; INFILTRATION; INTRACAUDAL at 07:06

## 2023-06-22 RX ADMIN — Medication 50 MG: at 07:06

## 2023-06-22 RX ADMIN — ROCURONIUM BROMIDE 50 MG: 10 INJECTION, SOLUTION INTRAVENOUS at 07:06

## 2023-06-22 RX ADMIN — FENTANYL CITRATE 25 MCG: 50 INJECTION, SOLUTION INTRAMUSCULAR; INTRAVENOUS at 09:06

## 2023-06-22 RX ADMIN — PROPOFOL 150 MG: 10 INJECTION, EMULSION INTRAVENOUS at 07:06

## 2023-06-22 RX ADMIN — MIDAZOLAM HYDROCHLORIDE 2 MG: 2 INJECTION, SOLUTION INTRAMUSCULAR; INTRAVENOUS at 07:06

## 2023-06-22 RX ADMIN — SUGAMMADEX 200 MG: 100 INJECTION, SOLUTION INTRAVENOUS at 08:06

## 2023-06-22 RX ADMIN — Medication 200 MCG: at 08:06

## 2023-06-22 RX ADMIN — FENTANYL CITRATE 100 MCG: 50 INJECTION, SOLUTION INTRAMUSCULAR; INTRAVENOUS at 07:06

## 2023-06-22 RX ADMIN — HALOPERIDOL LACTATE 1 MG: 5 INJECTION, SOLUTION INTRAMUSCULAR at 07:06

## 2023-06-22 NOTE — OP NOTE
Date of surgery 06/23/2023   Preoperative diagnosis history of breast cancer  Postoperative diagnosis is the same  Procedure performed  1. Right crescent mastopexy  2. Right nipple repositioning  3. Excision soft tissue right lateral breast measuring approximately 8 cm by 4 cm with layered closure final incision length 8 cm  5. Excision soft tissue left inframammary fold with advancement flap closure advancement flap measures proximally 10 cm x 2.5 cm   Surgeon Consuelo  Anesthesia general  Complications none   Blood loss minimal  Drains none    Patient was evaluated the preoperative holding area in the standing position markings for the procedure were performed.  Patient was taken to the operative room placed in supine position after adequate general anesthesia was prepped and draped in a normal sterile fashion.  On the right side a large wedge of soft tissue was excised where the latissimus dorsi flap had been passed.  This consisted of skin and subcutaneous tissue.  This was then closed in layers using 3-0 Monocryl followed by running 4-0 Monocryl subcuticular suture.  Next, crescent mastopexy was then performed elevating the nipple over 2 cm this was then closed using interrupted 4-0 Monocryl followed by running 4-0 Monocryl subcuticular suture.  The nipple was facing downward on the right side this wedge of tissue was taken out the base and the nipple was then elevated and closed using 5 0 nylon sutures.    On the left side.  The entire inframammary fold was reexcised in order to bring the nipple into position.  This was a proximally 10 cm x 2-1/2 cm.  An undermining then proceeded.  The advancement flap was then brought inferiorly and closed using 2-0 Vicryl 3-0 Monocryl and a running 4-0 Monocryl subcuticular suture.  There were no complications with this procedure and dictation

## 2023-06-22 NOTE — PLAN OF CARE
Chart reviewed. Preop nursing care completed per orders. Safe surgery checklist complete. Family at bedside and to take belongings. Waiting for full H&P, surgical consent, anesthesia consent, blood consent, and site juanita prior to surgery. Call bell within reach. Instructed pt to call for assistance.

## 2023-06-22 NOTE — ANESTHESIA PROCEDURE NOTES
Intubation    Date/Time: 6/22/2023 7:40 AM  Performed by: Lucius Borges MD  Authorized by: Lucius Borges MD     Intubation:     Induction:  Intravenous    Intubated:  Postinduction    Mask Ventilation:  Easy mask    Attempts:  1    Attempted By:  Staff anesthesiologist    Method of Intubation:  Video laryngoscopy    Blade:  Arriaga 3    Laryngeal View Grade: Grade I - full view of cords      Difficult Airway Encountered?: No      Complications:  None    Airway Device:  Oral endotracheal tube    Airway Device Size:  7.5    Style/Cuff Inflation:  Cuffed    Inflation Amount (mL):  5    Tube secured:  21    Secured at:  The teeth    Placement Verified By:  Capnometry    Complicating Factors:  None    Findings Post-Intubation:  BS equal bilateral

## 2023-06-22 NOTE — ANESTHESIA PREPROCEDURE EVALUATION
06/22/2023  Jaja Toledo is a 67 y.o., female.  Pre-operative evaluation for Procedure(s) (LRB):  MASTOPEXY (Right)  TRANSFER, FAT TISSUE (Right)    Jaja Toledo is a 67 y.o. female     Patient Active Problem List   Diagnosis    Chronic low back pain    GERD (gastroesophageal reflux disease)    IFG (impaired fasting glucose)    Localized adiposity of abdomen    Malignant neoplasm of overlapping sites of right breast in female, estrogen receptor positive       Review of patient's allergies indicates:   Allergen Reactions    No known drug allergies        No current facility-administered medications on file prior to encounter.     Current Outpatient Medications on File Prior to Encounter   Medication Sig Dispense Refill    acetaminophen (TYLENOL) 500 MG tablet Take 2 tablets (1,000 mg total) by mouth every 8 (eight) hours as needed for Pain. 20 tablet 0    docusate sodium (COLACE) 100 MG capsule Take 1 capsule (100 mg total) by mouth 2 (two) times daily. 60 capsule 0    ibuprofen (ADVIL,MOTRIN) 600 MG tablet Take 1 tablet (600 mg total) by mouth every 6 (six) hours as needed for Pain. 20 tablet 0    bacitracin 500 unit/gram ointment Apply topically 3 (three) times daily. 28 g 0    calcium carbonate (CALCIUM 500 ORAL) Take by mouth once daily.       cyproheptadine (PERIACTIN) 4 mg tablet TAKE 1 TABLET BY MOUTH THREE TIMES DAILY AS NEEDED FOR DECREASED APETITE 90 tablet 2    letrozole (FEMARA) 2.5 mg Tab TAKE 1 TABLET(2.5 MG) BY MOUTH EVERY DAY 90 tablet 3    mirtazapine (REMERON) 7.5 MG Tab Take 1 tablet (7.5 mg total) by mouth every evening. 30 tablet 11    multivitamin capsule Take 1 capsule by mouth once daily.      [DISCONTINUED] famotidine (PEPCID) 20 MG tablet Take 1 tablet (20 mg total) by mouth 2 (two) times daily. 60 tablet 0       Past Surgical History:   Procedure Laterality Date     ABDOMINOPLASTY N/A 11/29/2018    Procedure: EXTENDED ABDOMINOPLASTY;  Surgeon: Mio Arriaga MD;  Location: [facility] OR;  Service: Plastics;  Laterality: N/A;    AXILLARY NODE DISSECTION Right 6/4/2019    Procedure: LYMPHADENECTOMY, AXILLARY RIGHT;  Surgeon: Sabine Arteaga MD;  Location: [facility] OR 2ND FLR;  Service: General;  Laterality: Right;    BACK SURGERY      BREAST CYST EXCISION Left 1984    BREAST RECONSTRUCTION Right 6/6/2022    Procedure: RECONSTRUCTION, BREAST;  Surgeon: Leonard Cordero MD;  Location: [facility] OR 2ND FLR;  Service: Plastics;  Laterality: Right;  T-DAP    BREAST REVISION SURGERY Right 7/20/2020    Procedure: BREAST REVISION SURGERY RIGHT;  Surgeon: Leonard Cordero MD;  Location: [facility] OR 2ND FLR;  Service: Plastics;  Laterality: Right;    COLONOSCOPY N/A 10/19/2020    Procedure: COLONOSCOPY;  Surgeon: Chanel Saenz MD;  Location: [facility] ENDO (4TH FLR);  Service: Endoscopy;  Laterality: N/A;  covid test 10/16-[facility] urgent care    FOOT SURGERY  2009 or 2010    INSERTION OF BREAST IMPLANT Right 6/4/2019    Procedure: INSERTION, BREAST IMPLANT RIGHT;  Surgeon: Leonard Cordero MD;  Location: [facility] OR 2ND FLR;  Service: Plastics;  Laterality: Right;    INSERTION OF BREAST TISSUE EXPANDER Right 6/4/2019    Procedure: INSERTION, TISSUE EXPANDER, BREAST RIGHT;  Surgeon: Leonard Cordero MD;  Location: [facility] OR 2ND FLR;  Service: Plastics;  Laterality: Right;    LIPOSUCTION W/ FAT INJECTION N/A 11/29/2018    Procedure: LIPOSUCTION, WITH FAT TRANSFER TO BUTTOCKS;  Surgeon: Mio Arriaga MD;  Location: [facility] OR;  Service: Plastics;  Laterality: N/A;    MASTOPEXY Left 6/4/2019    Procedure: MASTOPEXY LEFT;  Surgeon: Leonard Cordero MD;  Location: [facility] OR 2ND FLR;  Service: Plastics;  Laterality: Left;    MASTOPEXY Left 7/20/2020    Procedure: MASTOPEXY LEFT;  Surgeon: Leonard Cordero MD;  Location: [facility] OR 2ND FLR;  Service: Plastics;   Laterality: Left;    SENTINEL LYMPH NODE BIOPSY Right 2019    Procedure: BIOPSY, LYMPH NODE, SENTINEL RIGHT;  Surgeon: Sabine Arteaga MD;  Location: [facility] OR [address] [place] [address];  Service: General;  Laterality: Right;    SIMPLE MASTECTOMY Right 2019    Procedure: MASTECTOMY, SIMPLE RIGHT (CONSENT AM OF) 4.0 hr case;  Surgeon: Sabine Arteaga MD;  Location: [facility] OR [address] [place] [address];  Service: General;  Laterality: Right;    TOTAL REDUCTION MAMMOPLASTY Left     TOTAL VAGINAL HYSTERECTOMY      TUBAL LIGATION         Social History     Socioeconomic History    Marital status:    Tobacco Use    Smoking status: Former     Packs/day: 0.25     Years: 15.00     Pack years: 3.75     Types: Cigarettes     Quit date: 8/15/1993     Years since quittin.8    Smokeless tobacco: Never   Substance and Sexual Activity    Alcohol use: Yes     Alcohol/week: 1.0 standard drink     Types: 1 Glasses of wine per week     Comment: seldom    Drug use: No    Sexual activity: Yes     Partners: Male         CBC:   Recent Labs     23  1057   WBC 5.04   RBC 4.57   HGB 12.6   HCT 40.4      MCV 88   MCH 27.6   MCHC 31.2*       CMP: No results for input(s): NA, K, CL, CO2, BUN, CREATININE, GLU, MG, PHOS, CALCIUM, ALBUMIN, PROT, ALKPHOS, ALT, AST, BILITOT in the last 72 hours.    INR  No results for input(s): PT, INR, PROTIME, APTT in the last 72 hours.        Diagnostic Studies:      EKD Echo:  No results found for this or any previous visit.        Pre-op Assessment    I have reviewed the Patient Summary Reports.     I have reviewed the Nursing Notes. I have reviewed the NPO Status.   I have reviewed the Medications.     Review of Systems      Physical Exam  General: Well nourished and Alert    Airway:  Mallampati: III           Anesthesia Plan  Type of Anesthesia, risks & benefits discussed:    Anesthesia Type: Gen ETT  Intra-op Monitoring Plan: Standard ASA Monitors  Post Op Pain Control Plan: multimodal  analgesia  Induction:  IV  Airway Plan: Direct, Post-Induction  Informed Consent: Informed consent signed with the Patient and all parties understand the risks and agree with anesthesia plan.  All questions answered.   ASA Score: 2  Day of Surgery Review of History & Physical: H&P Update referred to the surgeon/provider.    Ready For Surgery From Anesthesia Perspective.     .

## 2023-06-22 NOTE — BRIEF OP NOTE
[facility] - Surgery ([facility])  Brief Operative Note    Surgery Date: 6/22/2023     Surgeon(s) and Role:     * Leonard Cordero MD - Primary     * Betito Garrett MD - Resident - Assisting        Pre-op Diagnosis:  Personal history of breast cancer [Z85.3]    Post-op Diagnosis:  Post-Op Diagnosis Codes:     * Personal history of breast cancer [Z85.3]    Procedure(s) (LRB):  MASTOPEXY (Bilateral)    Anesthesia: General    Operative Findings: revision    Estimated Blood Loss: * No values recorded between 6/22/2023  8:01 AM and 6/22/2023  8:55 AM *         Specimens:   Specimen (24h ago, onward)       Start     Ordered    06/22/23 0822  Specimen to Pathology, Surgery Other (Plastic Surgery)  Once        Comments: Pre-op Diagnosis: Personal history of breast cancer [Z85.3]Procedure(s):MASTOPEXYTRANSFER, FAT TISSUE Number of specimens: 2Name of specimens: 1. Right axillary tissue (permanent)2. Left breast tissue (permanent)     References:    Click here for ordering Quick Tip   Question Answer Comment   Procedure Type: Other Plastic Surgery   Specimen Class: Routine/Screening    Which provider would you like to cc? LEONARD CORDERO    Release to patient Immediate        06/22/23 0822                      Discharge Note    OUTCOME: Patient tolerated treatment/procedure well without complication and is now ready for discharge.    DISPOSITION: Home or Self Care    FINAL DIAGNOSIS:  <principal problem not specified>    FOLLOWUP: In clinic    DISCHARGE INSTRUCTIONS:    Discharge Procedure Orders   CBC Without Differential   Standing Status: Future Number of Occurrences: 1 Standing Exp. Date: 08/12/24

## 2023-06-22 NOTE — H&P
PLASTIC SURGERY H&P    HPI: Patient presents Plastic surgery Clinic with hx of breast cancer s/p breast reconstruction.  Patient has a nice result however she has some decreased fullness on the right. She does need some fat grafting in the superior pole of the right breast.  She may also need a slightly bigger implant on that side.      ROS: negative except HPI    Physical: right breast s/p mastectomy and reconstruction, implant in place, decreased fullness on right lateral breast and superior pole. Left breast normal exam    Plan:  OR for right breast revision, possible implant exchange, possible fat grafting, possible mastopexy    Discussed with patient and/or family the risks and benefits of surgical intervention.  Conservative measures have been exhausted and patient would like to proceed with surgery.      We have discussed risks, which include but are not limited to blood clots in the legs that can travel to the lungs (pulmonary embolism). Pulmonary embolism can cause shortness of breath, chest pain, and even shock. Other risks include urinary tract infection, nausea and vomiting (usually related to pain medication), chronic pain, bleeding, nerve damage, blood vessel injury, scarring and infection, which can require re-operation. Furthermore, the risks of anesthesia include potential heart, lung, kidney, and liver damage.  Informed consent was obtained.  The patient understands and would like to proceed with surgery in the near future.

## 2023-06-23 NOTE — ANESTHESIA POSTPROCEDURE EVALUATION
Anesthesia Post Evaluation    Patient: Jaja Toledo    Procedure(s) Performed: Procedure(s) (LRB):  MASTOPEXY (Bilateral)    Final Anesthesia Type: general      Patient location during evaluation: PACU  Patient participation: Yes- Able to Participate  Level of consciousness: awake and alert, awake and oriented  Post-procedure vital signs: reviewed and stable  Pain management: adequate  Airway patency: patent    PONV status at discharge: No PONV  Anesthetic complications: no      Cardiovascular status: blood pressure returned to baseline, hemodynamically stable and stable  Respiratory status: unassisted, spontaneous ventilation and room air  Hydration status: euvolemic  Follow-up not needed.          Vitals Value Taken Time   /91 06/22/23 1045   Temp 36.7 °C (98.1 °F) 06/22/23 1045   Pulse 96 06/22/23 1045   Resp 18 06/22/23 1045   SpO2 96 % 06/22/23 1045         No case tracking events are documented in the log.      Pain/Cathy Score: Pain Rating Prior to Med Admin: 7 (6/22/2023  9:53 AM)  Cathy Score: 10 (6/22/2023 10:45 AM)

## 2023-06-27 LAB
FINAL PATHOLOGIC DIAGNOSIS: NORMAL
GROSS: NORMAL
Lab: NORMAL

## 2023-06-28 ENCOUNTER — OFFICE VISIT (OUTPATIENT)
Dept: PLASTIC SURGERY | Facility: CLINIC | Age: 68
End: 2023-06-28
Payer: COMMERCIAL

## 2023-06-28 VITALS — SYSTOLIC BLOOD PRESSURE: 115 MMHG | DIASTOLIC BLOOD PRESSURE: 82 MMHG | HEART RATE: 113 BPM

## 2023-06-28 DIAGNOSIS — Z09 SURGERY FOLLOW-UP EXAMINATION: Primary | ICD-10-CM

## 2023-06-28 PROCEDURE — 99024 PR POST-OP FOLLOW-UP VISIT: ICD-10-PCS | Mod: S$GLB,,, | Performed by: SURGERY

## 2023-06-28 PROCEDURE — 1126F AMNT PAIN NOTED NONE PRSNT: CPT | Mod: CPTII,S$GLB,, | Performed by: SURGERY

## 2023-06-28 PROCEDURE — 99999 PR PBB SHADOW E&M-EST. PATIENT-LVL II: ICD-10-PCS | Mod: PBBFAC,,, | Performed by: SURGERY

## 2023-06-28 PROCEDURE — 3074F SYST BP LT 130 MM HG: CPT | Mod: CPTII,S$GLB,, | Performed by: SURGERY

## 2023-06-28 PROCEDURE — 99024 POSTOP FOLLOW-UP VISIT: CPT | Mod: S$GLB,,, | Performed by: SURGERY

## 2023-06-28 PROCEDURE — 1126F PR PAIN SEVERITY QUANTIFIED, NO PAIN PRESENT: ICD-10-PCS | Mod: CPTII,S$GLB,, | Performed by: SURGERY

## 2023-06-28 PROCEDURE — 3074F PR MOST RECENT SYSTOLIC BLOOD PRESSURE < 130 MM HG: ICD-10-PCS | Mod: CPTII,S$GLB,, | Performed by: SURGERY

## 2023-06-28 PROCEDURE — 3079F DIAST BP 80-89 MM HG: CPT | Mod: CPTII,S$GLB,, | Performed by: SURGERY

## 2023-06-28 PROCEDURE — 3079F PR MOST RECENT DIASTOLIC BLOOD PRESSURE 80-89 MM HG: ICD-10-PCS | Mod: CPTII,S$GLB,, | Performed by: SURGERY

## 2023-06-28 PROCEDURE — 3044F HG A1C LEVEL LT 7.0%: CPT | Mod: CPTII,S$GLB,, | Performed by: SURGERY

## 2023-06-28 PROCEDURE — 99999 PR PBB SHADOW E&M-EST. PATIENT-LVL II: CPT | Mod: PBBFAC,,, | Performed by: SURGERY

## 2023-06-28 PROCEDURE — 3044F PR MOST RECENT HEMOGLOBIN A1C LEVEL <7.0%: ICD-10-PCS | Mod: CPTII,S$GLB,, | Performed by: SURGERY

## 2023-06-28 NOTE — PROGRESS NOTES
General Surgery Clinic  Post Operative Follow Up    Subjective:     Jaja Toledo is a 67 y.o. female who presents to clinic for follow up s/p right mastopexy and nipple repositioning and excision of left inframammary fold.  Overall, she is doing well. She does have some itching of the incisions. She denies fever or chills. She has been placing bacitracin ointment on the incisions.         Medications:    Current Outpatient Medications on File Prior to Visit   Medication Sig Dispense Refill    acetaminophen (TYLENOL) 500 MG tablet Take 2 tablets (1,000 mg total) by mouth every 8 (eight) hours as needed for Pain. 20 tablet 0    bacitracin 500 unit/gram ointment Apply topically 3 (three) times daily. 28 g 0    calcium carbonate (CALCIUM 500 ORAL) Take by mouth once daily.       [] cephALEXin (KEFLEX) 500 MG capsule Take 1 capsule (500 mg total) by mouth every 8 (eight) hours. for 5 days 15 capsule 0    cyproheptadine (PERIACTIN) 4 mg tablet TAKE 1 TABLET BY MOUTH THREE TIMES DAILY AS NEEDED FOR DECREASED APETITE 90 tablet 2    docusate sodium (COLACE) 100 MG capsule Take 1 capsule (100 mg total) by mouth 2 (two) times daily. 60 capsule 0    HYDROcodone-acetaminophen (NORCO) 5-325 mg per tablet Take 1 tablet by mouth every 6 (six) hours as needed for Pain. 28 tablet 0    ibuprofen (ADVIL,MOTRIN) 600 MG tablet Take 1 tablet (600 mg total) by mouth every 6 (six) hours as needed for Pain. 20 tablet 0    letrozole (FEMARA) 2.5 mg Tab TAKE 1 TABLET(2.5 MG) BY MOUTH EVERY DAY 90 tablet 3    mirtazapine (REMERON) 7.5 MG Tab Take 1 tablet (7.5 mg total) by mouth every evening. 30 tablet 11    multivitamin capsule Take 1 capsule by mouth once daily.      [DISCONTINUED] famotidine (PEPCID) 20 MG tablet Take 1 tablet (20 mg total) by mouth 2 (two) times daily. 60 tablet 0     No current facility-administered medications on file prior to visit.         Objective:     PHYSICAL EXAM:  Vital Signs (Most Recent)  Pulse: (!)  113 (06/28/23 1136)  BP: 115/82 (06/28/23 1136)    Physical Exam:  Physical Exam  Constitutional:       General: She is not in acute distress.  HENT:      Head: Normocephalic and atraumatic.   Cardiovascular:      Rate and Rhythm: Normal rate.   Pulmonary:      Effort: Pulmonary effort is normal. No respiratory distress.   Chest:      Comments: Right breast: inframammary incision and nipple incision c/d/I  Left breast: inframammary incision c/d/I   Slight erythema around incisions. No induration or fluctuance.    Skin:     General: Skin is warm and dry.   Neurological:      General: No focal deficit present.      Mental Status: She is alert and oriented to person, place, and time.     Pathology:  Specimen (730h ago, onward)      None              Assessment:     67 y.o. female s/p right mastopexy/nipple repositioning and left intermammary excision.     Plan:     - Follow up in 6 weeks      Damaris Duarte MD   [facility]

## 2023-06-29 DIAGNOSIS — R63.0 POOR APPETITE: ICD-10-CM

## 2023-06-29 RX ORDER — CYPROHEPTADINE HYDROCHLORIDE 4 MG/1
TABLET ORAL
Qty: 90 TABLET | Refills: 2 | Status: SHIPPED | OUTPATIENT
Start: 2023-06-29 | End: 2023-11-27

## 2023-07-17 ENCOUNTER — OFFICE VISIT (OUTPATIENT)
Dept: URGENT CARE | Facility: CLINIC | Age: 68
End: 2023-07-17
Payer: COMMERCIAL

## 2023-07-17 VITALS
DIASTOLIC BLOOD PRESSURE: 83 MMHG | HEIGHT: 65 IN | BODY MASS INDEX: 28.66 KG/M2 | TEMPERATURE: 98 F | OXYGEN SATURATION: 100 % | SYSTOLIC BLOOD PRESSURE: 127 MMHG | RESPIRATION RATE: 19 BRPM | HEART RATE: 110 BPM | WEIGHT: 172 LBS

## 2023-07-17 DIAGNOSIS — N39.0 URINARY TRACT INFECTION WITH HEMATURIA, SITE UNSPECIFIED: ICD-10-CM

## 2023-07-17 DIAGNOSIS — R31.9 URINARY TRACT INFECTION WITH HEMATURIA, SITE UNSPECIFIED: ICD-10-CM

## 2023-07-17 DIAGNOSIS — R00.0 TACHYCARDIA: ICD-10-CM

## 2023-07-17 DIAGNOSIS — N10 ACUTE PYELONEPHRITIS: Primary | ICD-10-CM

## 2023-07-17 LAB
BILIRUB UR QL STRIP: NEGATIVE
GLUCOSE UR QL STRIP: NEGATIVE
KETONES UR QL STRIP: NEGATIVE
LEUKOCYTE ESTERASE UR QL STRIP: POSITIVE
PH, POC UA: 6.5 (ref 5–8)
POC BLOOD, URINE: POSITIVE
POC NITRATES, URINE: POSITIVE
PROT UR QL STRIP: POSITIVE
SP GR UR STRIP: 1.02 (ref 1–1.03)
UROBILINOGEN UR STRIP-ACNC: NORMAL (ref 0.1–1.1)

## 2023-07-17 PROCEDURE — 87186 SC STD MICRODIL/AGAR DIL: CPT | Performed by: PHYSICIAN ASSISTANT

## 2023-07-17 PROCEDURE — 99214 PR OFFICE/OUTPT VISIT, EST, LEVL IV, 30-39 MIN: ICD-10-PCS | Mod: 25,S$GLB,, | Performed by: PHYSICIAN ASSISTANT

## 2023-07-17 PROCEDURE — 96372 PR INJECTION,THERAP/PROPH/DIAG2ST, IM OR SUBCUT: ICD-10-PCS | Mod: S$GLB,,, | Performed by: PHYSICIAN ASSISTANT

## 2023-07-17 PROCEDURE — 87086 URINE CULTURE/COLONY COUNT: CPT | Performed by: PHYSICIAN ASSISTANT

## 2023-07-17 PROCEDURE — 81003 URINALYSIS AUTO W/O SCOPE: CPT | Mod: QW,S$GLB,, | Performed by: PHYSICIAN ASSISTANT

## 2023-07-17 PROCEDURE — 87077 CULTURE AEROBIC IDENTIFY: CPT | Performed by: PHYSICIAN ASSISTANT

## 2023-07-17 PROCEDURE — 96372 THER/PROPH/DIAG INJ SC/IM: CPT | Mod: S$GLB,,, | Performed by: PHYSICIAN ASSISTANT

## 2023-07-17 PROCEDURE — 81003 POCT URINALYSIS, DIPSTICK, AUTOMATED, W/O SCOPE: ICD-10-PCS | Mod: QW,S$GLB,, | Performed by: PHYSICIAN ASSISTANT

## 2023-07-17 PROCEDURE — 87088 URINE BACTERIA CULTURE: CPT | Performed by: PHYSICIAN ASSISTANT

## 2023-07-17 PROCEDURE — 99214 OFFICE O/P EST MOD 30 MIN: CPT | Mod: 25,S$GLB,, | Performed by: PHYSICIAN ASSISTANT

## 2023-07-17 RX ORDER — LIDOCAINE HYDROCHLORIDE 10 MG/ML
2.1 INJECTION INFILTRATION; PERINEURAL
Status: COMPLETED | OUTPATIENT
Start: 2023-07-17 | End: 2023-07-17

## 2023-07-17 RX ORDER — IBUPROFEN 800 MG/1
800 TABLET ORAL EVERY 8 HOURS PRN
Qty: 15 TABLET | Refills: 0 | Status: SHIPPED | OUTPATIENT
Start: 2023-07-17 | End: 2023-07-22

## 2023-07-17 RX ORDER — CIPROFLOXACIN 500 MG/1
500 TABLET ORAL 2 TIMES DAILY
Qty: 14 TABLET | Refills: 0 | Status: SHIPPED | OUTPATIENT
Start: 2023-07-17 | End: 2023-07-24

## 2023-07-17 RX ORDER — CEFTRIAXONE 1 G/1
1 INJECTION, POWDER, FOR SOLUTION INTRAMUSCULAR; INTRAVENOUS
Status: COMPLETED | OUTPATIENT
Start: 2023-07-17 | End: 2023-07-17

## 2023-07-17 RX ADMIN — LIDOCAINE HYDROCHLORIDE 2.1 ML: 10 INJECTION INFILTRATION; PERINEURAL at 11:07

## 2023-07-17 RX ADMIN — CEFTRIAXONE 1 G: 1 INJECTION, POWDER, FOR SOLUTION INTRAMUSCULAR; INTRAVENOUS at 11:07

## 2023-07-17 NOTE — PROGRESS NOTES
"Subjective:      Patient ID: Jaja Toledo is a 67 y.o. female.    Vitals:  height is 5' 5" (1.651 m) and weight is 78 kg (172 lb). Her oral temperature is 98 °F (36.7 °C). Her blood pressure is 127/83 and her pulse is 110. Her respiration is 19 and oxygen saturation is 100%.     Chief Complaint: Dysuria    Patient is 67-year-old female who presents today with chief complaint of dysuria.  Patient states that this began last week, initially as a tingling sensation when she urinates.  Patient states that this has persisted, but also with bladder discomfort/pressure and with left flank pain.  Admits to nausea but no vomiting.  No fever.  Urgency and frequency present. Pt tried increased water in take ; no relief.  Has history of recurrent UTIs in the past, feels like prior UTI.  No CP/dyspnea.    Dysuria   This is a new problem. The current episode started 1 to 4 weeks ago. The problem occurs every urination. The problem has been unchanged. The quality of the pain is described as aching. The pain is moderate. There has been no fever. She is Sexually active. There is No history of pyelonephritis. Associated symptoms include flank pain, frequency, nausea and urgency. Pertinent negatives include no behavior changes, chills, discharge, hematuria, hesitancy, possible pregnancy, sweats, vomiting, weight loss, bubble bath use, constipation, rash or withholding. She has tried increased fluids for the symptoms. The treatment provided no relief. There is no history of catheterization, diabetes insipidus, diabetes mellitus, genitourinary reflux, hypertension, kidney stones, recurrent UTIs, a single kidney, STD, urinary stasis or a urological procedure.     Patient Active Problem List   Diagnosis    Chronic low back pain    GERD (gastroesophageal reflux disease)    IFG (impaired fasting glucose)    Localized adiposity of abdomen    Malignant neoplasm of overlapping sites of right breast in female, estrogen receptor positive "     Past Medical History:   Diagnosis Date    Anxiety     Chronic low back pain     Gastritis     GERD (gastroesophageal reflux disease)     History of cervical cancer     History of recurrent urinary tract infection     IFG (impaired fasting glucose)     Malignant neoplasm of overlapping sites of right breast in female, estrogen receptor positive 7/19/2019    Soft tissue tumor, malignant     sarcoma left foot       Constitution: Negative for chills, sweating, fatigue and fever.   HENT:  Negative for ear pain, congestion and sore throat.    Neck: Negative for neck pain and neck stiffness.   Cardiovascular:  Negative for chest pain, leg swelling and palpitations.   Eyes:  Negative for eye itching, eye pain and eye redness.   Respiratory:  Negative for cough, sputum production and shortness of breath.    Gastrointestinal:  Positive for nausea. Negative for abdominal pain, abdominal bloating, vomiting, constipation, diarrhea, bright red blood in stool, dark colored stools, rectal bleeding, rectal pain and hemorrhoids.   Genitourinary:  Positive for dysuria, frequency, urgency and flank pain. Negative for urine decreased, hematuria, vaginal pain, vaginal discharge, vaginal bleeding, vaginal odor, genital sore and pelvic pain.   Musculoskeletal:  Negative for pain and joint swelling.   Skin:  Negative for color change and rash.   Neurological:  Negative for dizziness, light-headedness, facial drooping, speech difficulty, coordination disturbances, headaches, disorientation, altered mental status, numbness and tingling.   Psychiatric/Behavioral:  Negative for altered mental status and disorientation.     Objective:     Physical Exam   Constitutional: She is oriented to person, place, and time. She appears well-developed.  Non-toxic appearance. She does not appear ill. No distress.      Comments:Very pleasant sitting comfortably in no acute distress.     HENT:   Head: Normocephalic and atraumatic.   Ears:   Right Ear:  External ear normal.   Left Ear: External ear normal.   Nose: Nose normal.   Mouth/Throat: Mucous membranes are normal.   Eyes: Conjunctivae and lids are normal.   Neck: Trachea normal. Neck supple.   Cardiovascular: Regular rhythm and normal heart sounds. Tachycardia present.      Comments: Tachycardia, regular rhythm.   Pulmonary/Chest: Effort normal and breath sounds normal. No accessory muscle usage or stridor. No tachypnea and no bradypnea. No respiratory distress. She has no decreased breath sounds. She has no wheezes. She has no rhonchi. She has no rales.   Abdominal: Normal appearance and bowel sounds are normal. She exhibits no distension and no mass. Soft. There is abdominal tenderness in the suprapubic area and left lower quadrant. There is left CVA tenderness. There is no rebound, no guarding, no tenderness at McBurney's point, negative Sanon's sign and no right CVA tenderness.      Comments: Abdomen soft without rigidity or guarding.  There is mild TTP of left side of abdomen/LLQ.  There is also mild left CVA tenderness. Mild ttp over bladder.   No peritoneal signs.  Rest of abdomen nontender.   Musculoskeletal: Normal range of motion.         General: Normal range of motion.      Right lower leg: No edema.      Left lower leg: No edema.   Lymphadenopathy:     She has no cervical adenopathy.   Neurological: She is alert and oriented to person, place, and time. She has normal strength.   Skin: Skin is warm, dry, intact, not diaphoretic and not pale.   Psychiatric: Her speech is normal and behavior is normal. Judgment and thought content normal.   Nursing note and vitals reviewed.    Assessment:     1. Acute pyelonephritis    2. Urinary tract infection with hematuria, site unspecified    3. Tachycardia      Results for orders placed or performed in visit on 07/17/23   POCT Urinalysis, Dipstick, Automated, W/O Scope   Result Value Ref Range    POC Blood, Urine Positive (A) Negative    POC Bilirubin, Urine  Negative Negative    POC Urobilinogen, Urine Normal 0.1 - 1.1    POC Ketones, Urine Negative Negative    POC Protein, Urine Positive (A) Negative    POC Nitrates, Urine Positive (A) Negative    POC Glucose, Urine Negative Negative    pH, UA 6.5 5 - 8    POC Specific Gravity, Urine 1.020 1.003 - 1.029    POC Leukocytes, Urine Positive (A) Negative       Plan:     - Discussed ddx, home care, tx options, and given follow up precautions.  I have reviewed the patient's chart to view previous visits, labs, and imaging to assess PMH and look for any trends or previous treatments.    Patient is a 67-year-old female who presents today for complicated UTI.  Complicated due to patient having left flank pain and tachycardia, suspicious for pyelonephritis.  Will treat as pyelonephritis with Rocephin injection, urine culture sent, and Cipro p.o..  Instructed to monitor closely, seek medical attention immediately for new or worsening symptoms, or follow-up if symptoms persist or recur.  Patient expresses understanding, agrees with plan.  Patient requesting 800 ibuprofen for p.r.n. pain relief.  Acute pyelonephritis  -     POCT Urinalysis, Dipstick, Automated, W/O Scope  -     Urine culture    Urinary tract infection with hematuria, site unspecified  -     LIDOcaine HCL 10 mg/ml (1%) injection 2.1 mL  -     cefTRIAXone injection 1 g  -     ciprofloxacin HCl (CIPRO) 500 MG tablet; Take 1 tablet (500 mg total) by mouth 2 (two) times daily. for 7 days  Dispense: 14 tablet; Refill: 0  -     ibuprofen (ADVIL,MOTRIN) 800 MG tablet; Take 1 tablet (800 mg total) by mouth every 8 (eight) hours as needed for Pain.  Dispense: 15 tablet; Refill: 0  -     Urine culture    Tachycardia      Patient Instructions   - Rest.    - Drink plenty of fluids.    - Acetaminophen (tylenol) or Ibuprofen (advil,motrin) as directed as needed for fever/pain. Avoid tylenol if you have a history of liver disease. Do not take ibuprofen if you have a history of GI  bleeding, kidney disease, or if you take blood thinners.     - You have been given an antibiotic to treat your condition today.    - Please complete the antibiotic as directed on the bottle.   - If you are female and on oral birth control pills, use additional methods to prevent pregnancy while on antibiotics and for one cycle after.   - you can take otc probiotic to limit upset stomach    -your urinalysis showed protein in urine today which is abnormal. Recommended to follow up with pcp for repeat urinalysis during evaluation to ensure it resolves or for further evaluation    - Follow up with your PCP or specialty clinic as directed in the next 1-2 weeks if not improved or as needed.  You can call [phone] to schedule an appointment with the appropriate provider.    - Go to the ER or seek medical attention immediately if you develop new or worsening symptoms.     - You must understand that you have received an Urgent Care treatment only and that you may be released before all of your medical problems are known or treated.   - You, the patient, will arrange for follow up care as instructed.   - If your condition worsens or fails to improve we recommend that you receive another evaluation at the ER immediately or contact your PCP to discuss your concerns or return here.

## 2023-07-17 NOTE — PATIENT INSTRUCTIONS
- Rest.    - Drink plenty of fluids.    - Acetaminophen (tylenol) or Ibuprofen (advil,motrin) as directed as needed for fever/pain. Avoid tylenol if you have a history of liver disease. Do not take ibuprofen if you have a history of GI bleeding, kidney disease, or if you take blood thinners.     - You have been given an antibiotic to treat your condition today.    - Please complete the antibiotic as directed on the bottle.   - If you are female and on oral birth control pills, use additional methods to prevent pregnancy while on antibiotics and for one cycle after.   - you can take otc probiotic to limit upset stomach    -your urinalysis showed protein in urine today which is abnormal. Recommended to follow up with pcp for repeat urinalysis during evaluation to ensure it resolves or for further evaluation    - Follow up with your PCP or specialty clinic as directed in the next 1-2 weeks if not improved or as needed.  You can call [phone] to schedule an appointment with the appropriate provider.    - Go to the ER or seek medical attention immediately if you develop new or worsening symptoms.     - You must understand that you have received an Urgent Care treatment only and that you may be released before all of your medical problems are known or treated.   - You, the patient, will arrange for follow up care as instructed.   - If your condition worsens or fails to improve we recommend that you receive another evaluation at the ER immediately or contact your PCP to discuss your concerns or return here.

## 2023-07-19 ENCOUNTER — TELEPHONE (OUTPATIENT)
Dept: URGENT CARE | Facility: CLINIC | Age: 68
End: 2023-07-19
Payer: COMMERCIAL

## 2023-07-19 LAB — BACTERIA UR CULT: ABNORMAL

## 2023-07-19 NOTE — TELEPHONE ENCOUNTER
Calling to give patient positive urine culture results. She was treated appropriately with cipro. Unable to leave voicemail due to mailbox being full.

## 2023-07-20 ENCOUNTER — TELEPHONE (OUTPATIENT)
Dept: URGENT CARE | Facility: CLINIC | Age: 68
End: 2023-07-20
Payer: COMMERCIAL

## 2023-07-21 ENCOUNTER — TELEPHONE (OUTPATIENT)
Dept: URGENT CARE | Facility: CLINIC | Age: 68
End: 2023-07-21
Payer: COMMERCIAL

## 2023-07-21 NOTE — TELEPHONE ENCOUNTER
Second attempt to contact patient regarding lab results from previous visit.  Seen by PCP Aelx Henley on 07/17/2023.  Urine culture positive for Pseudomonas and treated appropriately with Cipro.  She did not answer the phone.  Unable to leave voicemail since mailbox is full.  Does not have my chart.  Emergency contact on file daughter Katie called.  Left voicemail for patient to call our clinic.    Results for orders placed or performed in visit on 07/17/23   Urine culture    Specimen: Urine, Clean Catch   Result Value Ref Range    Urine Culture, Routine PSEUDOMONAS AERUGINOSA  >100,000 cfu/ml   (A)        Susceptibility    Pseudomonas aeruginosa - CULTURE, URINE     Amikacin <=16 Sensitive mcg/mL     Ciprofloxacin <=1 Sensitive mcg/mL     Cefepime 8 Sensitive mcg/mL     Gentamicin <=4 Sensitive mcg/mL     Levofloxacin <=2 Sensitive mcg/mL     Meropenem <=1 Sensitive mcg/mL     Piperacillin/Tazo <=16 Sensitive mcg/mL     Tobramycin <=4 Sensitive mcg/mL   POCT Urinalysis, Dipstick, Automated, W/O Scope   Result Value Ref Range    POC Blood, Urine Positive (A) Negative    POC Bilirubin, Urine Negative Negative    POC Urobilinogen, Urine Normal 0.1 - 1.1    POC Ketones, Urine Negative Negative    POC Protein, Urine Positive (A) Negative    POC Nitrates, Urine Positive (A) Negative    POC Glucose, Urine Negative Negative    pH, UA 6.5 5 - 8    POC Specific Gravity, Urine 1.020 1.003 - 1.029    POC Leukocytes, Urine Positive (A) Negative

## 2023-07-21 NOTE — TELEPHONE ENCOUNTER
Returned patient's phone call.  She reports complete resolution of previous symptoms since starting antibiotic prescribed by Felton NOLEN 07/17/2023 with Cipro.  Recommend her to complete antibiotics.  Discussed ER versus clinic precautions.  Answered all questions.  Patient verbalized understanding and agree plan of care.    Results for orders placed or performed in visit on 07/17/23   Urine culture    Specimen: Urine, Clean Catch   Result Value Ref Range    Urine Culture, Routine PSEUDOMONAS AERUGINOSA  >100,000 cfu/ml   (A)        Susceptibility    Pseudomonas aeruginosa - CULTURE, URINE     Amikacin <=16 Sensitive mcg/mL     Ciprofloxacin <=1 Sensitive mcg/mL     Cefepime 8 Sensitive mcg/mL     Gentamicin <=4 Sensitive mcg/mL     Levofloxacin <=2 Sensitive mcg/mL     Meropenem <=1 Sensitive mcg/mL     Piperacillin/Tazo <=16 Sensitive mcg/mL     Tobramycin <=4 Sensitive mcg/mL   POCT Urinalysis, Dipstick, Automated, W/O Scope   Result Value Ref Range    POC Blood, Urine Positive (A) Negative    POC Bilirubin, Urine Negative Negative    POC Urobilinogen, Urine Normal 0.1 - 1.1    POC Ketones, Urine Negative Negative    POC Protein, Urine Positive (A) Negative    POC Nitrates, Urine Positive (A) Negative    POC Glucose, Urine Negative Negative    pH, UA 6.5 5 - 8    POC Specific Gravity, Urine 1.020 1.003 - 1.029    POC Leukocytes, Urine Positive (A) Negative

## 2023-07-25 ENCOUNTER — TELEPHONE (OUTPATIENT)
Dept: FAMILY MEDICINE | Facility: CLINIC | Age: 68
End: 2023-07-25
Payer: COMMERCIAL

## 2023-07-25 NOTE — TELEPHONE ENCOUNTER
----- Message from Lise Wick sent at 7/25/2023 12:19 PM CDT -----  Type:  Colonoscopy    Who Called: pt  Would the patient rather a call back or a response via [facility]? call  Best Call Back Number: [phone]  Additional Information: Jaja Toledo calling regarding Appointment Access (message) for calling to schedule colonoscopy

## 2023-07-31 ENCOUNTER — HOSPITAL ENCOUNTER (EMERGENCY)
Facility: HOSPITAL | Age: 68
Discharge: HOME | End: 2023-07-31
Attending: EMERGENCY MEDICINE
Payer: COMMERCIAL

## 2023-07-31 ENCOUNTER — TELEPHONE (OUTPATIENT)
Dept: FAMILY MEDICINE | Facility: CLINIC | Age: 68
End: 2023-07-31
Payer: COMMERCIAL

## 2023-07-31 VITALS
TEMPERATURE: 98 F | BODY MASS INDEX: 28.79 KG/M2 | SYSTOLIC BLOOD PRESSURE: 140 MMHG | OXYGEN SATURATION: 99 % | WEIGHT: 173 LBS | RESPIRATION RATE: 19 BRPM | DIASTOLIC BLOOD PRESSURE: 90 MMHG | HEART RATE: 85 BPM

## 2023-07-31 DIAGNOSIS — K63.5 POLYP OF COLON, UNSPECIFIED PART OF COLON, UNSPECIFIED TYPE: ICD-10-CM

## 2023-07-31 DIAGNOSIS — N76.0 ACUTE VAGINITIS: Primary | ICD-10-CM

## 2023-07-31 DIAGNOSIS — Z12.11 COLON CANCER SCREENING: Primary | ICD-10-CM

## 2023-07-31 LAB
BACTERIA #/AREA URNS HPF: NORMAL /HPF
BACTERIA GENITAL QL WET PREP: ABNORMAL
BILIRUB UR QL STRIP: NEGATIVE
CLARITY UR: CLEAR
CLUE CELLS VAG QL WET PREP: ABNORMAL
COLOR UR: YELLOW
FILAMENT FUNGI VAG WET PREP-#/AREA: ABNORMAL
GLUCOSE UR QL STRIP: NEGATIVE
HGB UR QL STRIP: NEGATIVE
KETONES UR QL STRIP: NEGATIVE
LEUKOCYTE ESTERASE UR QL STRIP: ABNORMAL
MICROSCOPIC COMMENT: NORMAL
NITRITE UR QL STRIP: NEGATIVE
PH UR STRIP: 6 [PH] (ref 5–8)
PROT UR QL STRIP: NEGATIVE
RBC #/AREA URNS HPF: 2 /HPF (ref 0–4)
SP GR UR STRIP: 1.02 (ref 1–1.03)
SPECIMEN SOURCE: ABNORMAL
SQUAMOUS #/AREA URNS HPF: 2 /HPF
T VAGINALIS GENITAL QL WET PREP: ABNORMAL
URN SPEC COLLECT METH UR: ABNORMAL
UROBILINOGEN UR STRIP-ACNC: NEGATIVE EU/DL
WBC #/AREA URNS HPF: 5 /HPF (ref 0–5)
WBC #/AREA VAG WET PREP: ABNORMAL
YEAST GENITAL QL WET PREP: ABNORMAL

## 2023-07-31 PROCEDURE — 81000 URINALYSIS NONAUTO W/SCOPE: CPT | Performed by: NURSE PRACTITIONER

## 2023-07-31 PROCEDURE — 87591 N.GONORRHOEAE DNA AMP PROB: CPT | Performed by: EMERGENCY MEDICINE

## 2023-07-31 PROCEDURE — 99284 EMERGENCY DEPT VISIT MOD MDM: CPT

## 2023-07-31 PROCEDURE — 87210 SMEAR WET MOUNT SALINE/INK: CPT | Performed by: EMERGENCY MEDICINE

## 2023-07-31 RX ORDER — LACTULOSE 10 G/15ML
20 SOLUTION ORAL
Qty: 200 ML | Refills: 0 | Status: SHIPPED | OUTPATIENT
Start: 2023-07-31

## 2023-07-31 RX ORDER — TRIAMCINOLONE ACETONIDE 1 MG/G
CREAM TOPICAL 2 TIMES DAILY
Qty: 15 G | Refills: 0 | Status: SHIPPED | OUTPATIENT
Start: 2023-07-31

## 2023-07-31 RX ORDER — METRONIDAZOLE 500 MG/1
500 TABLET ORAL EVERY 12 HOURS
Qty: 14 TABLET | Refills: 0 | Status: SHIPPED | OUTPATIENT
Start: 2023-07-31 | End: 2023-08-07

## 2023-07-31 NOTE — ED PROVIDER NOTES
Chief Complaint: vaginal itching     History of Present Illness:    Jaja Toledo 67 y.o. with a  has a past medical history of Anxiety, Chronic low back pain, Gastritis, GERD (gastroesophageal reflux disease), History of cervical cancer, History of recurrent urinary tract infection, IFG (impaired fasting glucose), Malignant neoplasm of overlapping sites of right breast in female, estrogen receptor positive (7/19/2019), and Soft tissue tumor, malignant. who presents to the emergency department today with a complaint of vaginal itching.  No dysuria, frequency, urgency, hematuria.  No vaginal discharge, no vaginal bleeding.  Just vaginal itching.  It improved with petroleum jelly.         ROS    Constitutional: No fever, no chills.  ENT: No nasal drainage. No ear ache. No sore throat.  Cardiovascular: No chest pain, no palpitations.  Respiratory: No cough, no shortness of breath.  Gastrointestinal: No abdominal pain, no vomiting. No diarrhea.  Musculoskeletal: No back pain.   Neurological: No headache, no focal weakness.    Otherwise remaining ROS negative     The history is provided by the patient      Reviewed and verified by myself:   PMH/PSH/SOC/FH REVIEWED :    Past Medical History:   Diagnosis Date    Anxiety     Chronic low back pain     Gastritis     GERD (gastroesophageal reflux disease)     History of cervical cancer     History of recurrent urinary tract infection     IFG (impaired fasting glucose)     Malignant neoplasm of overlapping sites of right breast in female, estrogen receptor positive 7/19/2019    Soft tissue tumor, malignant     sarcoma left foot       Past Surgical History:   Procedure Laterality Date    ABDOMINOPLASTY N/A 11/29/2018    Procedure: EXTENDED ABDOMINOPLASTY;  Surgeon: Mio Arriaga MD;  Location: [facility] OR;  Service: Plastics;  Laterality: N/A;    AXILLARY NODE DISSECTION Right 6/4/2019    Procedure: LYMPHADENECTOMY, AXILLARY RIGHT;  Surgeon: Sabine Arteaga MD;  Location: [facility]  OR 2ND FLR;  Service: General;  Laterality: Right;    BACK SURGERY      BREAST CYST EXCISION Left 1984    BREAST RECONSTRUCTION Right 6/6/2022    Procedure: RECONSTRUCTION, BREAST;  Surgeon: Leonard Cordero MD;  Location: [facility] OR 2ND FLR;  Service: Plastics;  Laterality: Right;  T-DAP    BREAST REVISION SURGERY Right 7/20/2020    Procedure: BREAST REVISION SURGERY RIGHT;  Surgeon: Leonard Cordero MD;  Location: [facility] OR 2ND FLR;  Service: Plastics;  Laterality: Right;    COLONOSCOPY N/A 10/19/2020    Procedure: COLONOSCOPY;  Surgeon: Chanel Saenz MD;  Location: [facility] ENDO (4TH FLR);  Service: Endoscopy;  Laterality: N/A;  covid test 10/16-[facility] urgent care    FOOT SURGERY  2009 or 2010    INSERTION OF BREAST IMPLANT Right 6/4/2019    Procedure: INSERTION, BREAST IMPLANT RIGHT;  Surgeon: Leonard Cordero MD;  Location: [facility] OR 2ND FLR;  Service: Plastics;  Laterality: Right;    INSERTION OF BREAST TISSUE EXPANDER Right 6/4/2019    Procedure: INSERTION, TISSUE EXPANDER, BREAST RIGHT;  Surgeon: Leonard Cordero MD;  Location: [facility] OR 2ND FLR;  Service: Plastics;  Laterality: Right;    LIPOSUCTION W/ FAT INJECTION N/A 11/29/2018    Procedure: LIPOSUCTION, WITH FAT TRANSFER TO BUTTOCKS;  Surgeon: Mio Arriaga MD;  Location: [facility] OR;  Service: Plastics;  Laterality: N/A;    MASTOPEXY Left 6/4/2019    Procedure: MASTOPEXY LEFT;  Surgeon: Leonard Cordero MD;  Location: [facility] OR 2ND FLR;  Service: Plastics;  Laterality: Left;    MASTOPEXY Left 7/20/2020    Procedure: MASTOPEXY LEFT;  Surgeon: Leonard Cordero MD;  Location: [facility] OR 2ND FLR;  Service: Plastics;  Laterality: Left;    MASTOPEXY Bilateral 6/22/2023    Procedure: MASTOPEXY;  Surgeon: Leonard Cordero MD;  Location: [facility] OR 2ND FLR;  Service: Plastics;  Laterality: Bilateral;  cresent mastopexy    SENTINEL LYMPH NODE BIOPSY Right 6/4/2019    Procedure: BIOPSY, LYMPH NODE, SENTINEL RIGHT;  Surgeon: Sabine  MD Chandler;  Location: [facility] OR [address] [place] [address];  Service: General;  Laterality: Right;    SIMPLE MASTECTOMY Right 2019    Procedure: MASTECTOMY, SIMPLE RIGHT (CONSENT AM OF) 4.0 hr case;  Surgeon: Sabine Arteaga MD;  Location: [facility] OR [address] [place] [address];  Service: General;  Laterality: Right;    TOTAL REDUCTION MAMMOPLASTY Left     TOTAL VAGINAL HYSTERECTOMY      TUBAL LIGATION         Social History     Socioeconomic History    Marital status:    Tobacco Use    Smoking status: Former     Current packs/day: 0.00     Average packs/day: 0.3 packs/day for 15.0 years (3.8 ttl pk-yrs)     Types: Cigarettes     Start date: 8/15/1978     Quit date: 8/15/1993     Years since quittin.9    Smokeless tobacco: Never   Substance and Sexual Activity    Alcohol use: Yes     Alcohol/week: 1.0 standard drink of alcohol     Types: 1 Glasses of wine per week     Comment: seldom    Drug use: No    Sexual activity: Yes     Partners: Male       Family History   Problem Relation Age of Onset    Breast cancer Sister         dx in     No Known Problems Mother     Throat cancer Father     Colon cancer Neg Hx     Diabetes Neg Hx     Hypertension Neg Hx     Ovarian cancer Neg Hx     Stroke Neg Hx     Anesthesia problems Neg Hx                ALLERGIES REVIEWED  Review of patient's allergies indicates:   Allergen Reactions    No known drug allergies        MEDICATIONS REVIEWED  Medication List with Changes/Refills   New Medications    LACTULOSE (CHRONULAC) 20 GRAM/30 ML SOLN    Take 30 mLs (20 g total) by mouth after meals as needed (constipation).    METRONIDAZOLE (FLAGYL) 500 MG TABLET    Take 1 tablet (500 mg total) by mouth every 12 (twelve) hours. for 7 days    TRIAMCINOLONE ACETONIDE 0.1% (KENALOG) 0.1 % CREAM    Apply topically 2 (two) times daily.   Current Medications    ACETAMINOPHEN (TYLENOL) 500 MG TABLET    Take 2 tablets (1,000 mg total) by mouth every 8 (eight) hours as needed for Pain.    BACITRACIN 500 UNIT/GRAM  OINTMENT    Apply topically 3 (three) times daily.    CALCIUM CARBONATE (CALCIUM 500 ORAL)    Take by mouth once daily.     CYPROHEPTADINE (PERIACTIN) 4 MG TABLET    TAKE 1 TABLET BY MOUTH THREE TIMES DAILY AS NEEDED FOR DECREASED APPETITE    DOCUSATE SODIUM (COLACE) 100 MG CAPSULE    Take 1 capsule (100 mg total) by mouth 2 (two) times daily.    IBUPROFEN (ADVIL,MOTRIN) 600 MG TABLET    Take 1 tablet (600 mg total) by mouth every 6 (six) hours as needed for Pain.    LETROZOLE (FEMARA) 2.5 MG TAB    TAKE 1 TABLET(2.5 MG) BY MOUTH EVERY DAY    MIRTAZAPINE (REMERON) 7.5 MG TAB    Take 1 tablet (7.5 mg total) by mouth every evening.    MULTIVITAMIN CAPSULE    Take 1 capsule by mouth once daily.           VS reviewed    Nursing/Ancillary staff note reviewed.       Physical Exam     ED Triage Vitals [07/31/23 1139]   BP (!) 143/93   Pulse (!) 122   Resp 18   Temp 97.8 °F (36.6 °C)   SpO2 97 %       Physical Exam  Vitals and nursing note reviewed.   Constitutional:       General: She is not in acute distress.     Appearance: Normal appearance. She is well-developed.   HENT:      Head: Normocephalic and atraumatic.      Right Ear: External ear normal.      Left Ear: External ear normal.      Nose: Nose normal.      Mouth/Throat:      Mouth: Mucous membranes are moist.   Eyes:      General: No scleral icterus.        Right eye: No discharge.         Left eye: No discharge.      Conjunctiva/sclera: Conjunctivae normal.   Cardiovascular:      Rate and Rhythm: Normal rate.   Pulmonary:      Effort: Pulmonary effort is normal. No respiratory distress.   Abdominal:      General: There is no distension.   Musculoskeletal:         General: Normal range of motion.      Cervical back: Normal range of motion and neck supple.      Comments: Gait normal.    Skin:     General: Skin is warm and dry.   Neurological:      Mental Status: She is alert and oriented to person, place, and time.      Cranial Nerves: No cranial nerve deficit.       Motor: No abnormal muscle tone.                 ED Course         ED Course as of 07/31/23 1422   Mon Jul 31, 2023   1416 UA with LE but also squamous epithelial - not convincing for UTI.    No clue cells, no ueast.  Does have some bacteria and WBC so given her symptoms will treat for vaginitis.  [JA]      ED Course User Index  [JA] Chris Toledo MD            ED Management:      Medical Decision Making    Differential diagnosis included but not limited to :  UTI, vaginitis, STI    Initial: This is a 67 y.o. female  with  has a past medical history of Anxiety, Chronic low back pain, Gastritis, GERD (gastroesophageal reflux disease), History of cervical cancer, History of recurrent urinary tract infection, IFG (impaired fasting glucose), Malignant neoplasm of overlapping sites of right breast in female, estrogen receptor positive (7/19/2019), and Soft tissue tumor, malignant. who comes in for emergent evaluation of a new, acute, complicated and undiagnosed problem of vaginal itching.      Orders I ordered to further evaluate included:  UA, wet prep, GC chlamydia  Former    Social determinants of health taken into consideration during development of our treatment plan include   Former Smoking/tobacco use -  Smoking was the leading social determinant of health affecting mortality and life expectancy, although income was another strong SDOH predictor, according to researchers from the [facility] at [facility] and the Department of Sociology at [facility]. JOSE Netw Open. 2022;5(4):f943773. doi:10.1001/jamanetworkopen.2022.6547      Problems Addressed:  Acute vaginitis: complicated acute illness or injury with systemic symptoms    Amount and/or Complexity of Data Reviewed  External Data Reviewed: notes.     Details: Seen 07/17/2023 and diagnosed with acute pyelonephritis and placed on Cipro.  her urinalysis was positive for leukocyte esterase and nitrates along with hematuria  Labs:  ordered. Decision-making details documented in ED Course.     Details: Urinalysis unremarkable, wet prep with signs of bacteria and white blood cells but no Trichomonas, yeast or BV however will treat her    Risk  OTC drugs.  Prescription drug management.        After consideration of the pts current home medications, the decision is made to maintain the current medication regimen.        Will start :  New Prescriptions    LACTULOSE (CHRONULAC) 20 GRAM/30 ML SOLN    Take 30 mLs (20 g total) by mouth after meals as needed (constipation).    METRONIDAZOLE (FLAGYL) 500 MG TABLET    Take 1 tablet (500 mg total) by mouth every 12 (twelve) hours. for 7 days    TRIAMCINOLONE ACETONIDE 0.1% (KENALOG) 0.1 % CREAM    Apply topically 2 (two) times daily.       MDM continued:     Jaja Toledo  presents to the emergency Department today with  concerns for urinary tract infection novel the patient only has vaginal irritation and itching.  Her urinalysis is unremarkable cover okay signs of infection on her urinalysis today.  Likely her Cipro clear that infection up.  Her vaginal irritation could be secondary to her antibiotic use however she has no sign of yeast fact sheet vaginosis Trichomonas on her wet prep.  She does have white blood cells and bacteria so will treat her for vaginitis.  I will discharge home with medications and follow up with her OBGYN.  Additionally the patient at discharge complaint of needing something for constipation.  I will write her for lactulose.  Patient will follow up with her PCP.      The pt is comfortable with this plan and comfortable going home at this time. After taking into careful account the historical factors and physical exam findings of the patient's presentation today, in conjunction with the empirical and objective data obtained on ED workup, no acute emergent medical condition requiring admission has been identified. The patient appears to be low risk for an emergent medical  condition and I feel it is safe and appropriate at this time for the patient to be discharged to follow-up as detailed in their discharge instructions for reevaluation and possible continued outpatient workup and management. Regardless, an unremarkable evaluation in the ED does not preclude the development or presence of a serious or life threatening condition. As such, patient was instructed to return immediately for any worsening or change in current symptoms. Precautions for return discussed at length.  Discharge and follow-up instructions discussed with the patient who expressed understanding and willingness to comply with my recommendations.    Voice recognition software utilized in this note.      Procedures          Impression      The encounter diagnosis was Acute vaginitis.                New Prescriptions    LACTULOSE (CHRONULAC) 20 GRAM/30 ML SOLN    Take 30 mLs (20 g total) by mouth after meals as needed (constipation).    METRONIDAZOLE (FLAGYL) 500 MG TABLET    Take 1 tablet (500 mg total) by mouth every 12 (twelve) hours. for 7 days    TRIAMCINOLONE ACETONIDE 0.1% (KENALOG) 0.1 % CREAM    Apply topically 2 (two) times daily.        Follow-up Information       Schedule an appointment as soon as possible for a visit  with Andres Hurtado MD.    Specialty: Family Medicine  Contact information:  [address]  [address]  [place] [id number] [phone]               Schedule an appointment as soon as possible for a visit  with Your OB/GYN.                                    Chris Toledo MD  08/02/23 5815

## 2023-07-31 NOTE — TELEPHONE ENCOUNTER
Mailbox is full and unable to leave a message to inform pt that Dr. Hurtado put in a colonoscopy order

## 2023-07-31 NOTE — FIRST PROVIDER EVALUATION
Medical screening examination initiated.  I have conducted a focused provider triage encounter, findings are as follows:    Brief history of present illness:  68 yo presents today with complaint of vaginal itching x 1 week; denies dysuria, vaginal discharge    Vitals:    07/31/23 1139   BP: (!) 143/93   Pulse: (!) 122   Resp: 18   Temp: 97.8 °F (36.6 °C)   SpO2: 97%   Weight: 78.5 kg (173 lb)       Pertinent physical exam:  Defer to room    Brief workup plan:  UA, GC/CZ, vag exam    Preliminary workup initiated; this workup will be continued and followed by the physician or advanced practice provider that is assigned to the patient when roomed.

## 2023-07-31 NOTE — DISCHARGE INSTRUCTIONS
Additional instructions  Followup with your primary care physician in 2-3 days if you are not improving. Take all your medications as prescribed. Return to the emergency department if you have increasing pain, chest pain, difficulty breathing,  nonstop vomiting, or any other concerns. Be sure to drink plenty of fluids to stay hydrated. Get plenty of rest. Please refer to additional educational material for further instructions.    If your blood pressure was over > 120/80 without history of hypertension you are advised to follow up with your PCP.  While elevated Blood pressures can be caused by many things including just coming to the emergency department, you will need to follow up with your primary care physician for further evaluation ideally in 2-3 days.

## 2023-08-01 LAB
C TRACH DNA SPEC QL NAA+PROBE: NOT DETECTED
N GONORRHOEA DNA SPEC QL NAA+PROBE: NOT DETECTED

## 2023-08-03 ENCOUNTER — TELEPHONE (OUTPATIENT)
Dept: GASTROENTEROLOGY | Facility: CLINIC | Age: 68
End: 2023-08-03
Payer: COMMERCIAL

## 2023-08-03 NOTE — TELEPHONE ENCOUNTER
----- Message from Abby Knight sent at 8/3/2023  2:58 PM CDT -----  Type:  Patient Returning Call    Who Called:Pt   Who Left Message for Patient:Brigid   Does the patient know what this is regarding?:yes   Would the patient rather a call back or a response via [facility]? Call back   Best Call Back Number:[phone]  Additional Information:

## 2023-08-09 ENCOUNTER — TELEPHONE (OUTPATIENT)
Dept: HEMATOLOGY/ONCOLOGY | Facility: CLINIC | Age: 68
End: 2023-08-09
Payer: COMMERCIAL

## 2023-08-09 NOTE — TELEPHONE ENCOUNTER
----- Message from Kiah Christianson sent at 8/9/2023  1:42 PM CDT -----  Regarding: Mammogram order needed  Contact: [phone]  Hi, pt called to request a new order for the Mammogram. Pt missed the appt in June. Pls call pt to confirm the order has been placed at [phone]

## 2023-08-10 ENCOUNTER — TELEPHONE (OUTPATIENT)
Dept: ENDOSCOPY | Facility: HOSPITAL | Age: 68
End: 2023-08-10
Payer: COMMERCIAL

## 2023-08-10 NOTE — TELEPHONE ENCOUNTER
----- Message from Court Higuera sent at 8/10/2023  8:36 AM CDT -----  Regarding: FW: Miky requested  Contact: [phone]    ----- Message -----  From: Kiah Christianson  Sent: 8/9/2023   1:45 PM CDT  To: [facility] Endo Schedulers  Subject: Appt requested                                   Hi, pt called to request a call back to get scheduled for a colonoscopy. Pls call the pt at [phone] to help pt get scheduled.

## 2023-08-10 NOTE — TELEPHONE ENCOUNTER
Ma called pt to schedule procedure  No answer unable to lvm , pt mail box full and shes not on the portal

## 2023-08-11 ENCOUNTER — HOSPITAL ENCOUNTER (OUTPATIENT)
Dept: RADIOLOGY | Facility: HOSPITAL | Age: 68
Discharge: HOME | End: 2023-08-11
Attending: INTERNAL MEDICINE
Payer: COMMERCIAL

## 2023-08-11 ENCOUNTER — TELEPHONE (OUTPATIENT)
Dept: ENDOSCOPY | Facility: HOSPITAL | Age: 68
End: 2023-08-11
Payer: COMMERCIAL

## 2023-08-11 VITALS — HEIGHT: 66 IN | BODY MASS INDEX: 28.12 KG/M2 | WEIGHT: 175 LBS

## 2023-08-11 DIAGNOSIS — Z12.11 SCREEN FOR COLON CANCER: Primary | ICD-10-CM

## 2023-08-11 DIAGNOSIS — Z17.0 MALIGNANT NEOPLASM OF OVERLAPPING SITES OF RIGHT BREAST IN FEMALE, ESTROGEN RECEPTOR POSITIVE: ICD-10-CM

## 2023-08-11 DIAGNOSIS — C50.811 MALIGNANT NEOPLASM OF OVERLAPPING SITES OF RIGHT BREAST IN FEMALE, ESTROGEN RECEPTOR POSITIVE: ICD-10-CM

## 2023-08-11 PROCEDURE — 77067 SCR MAMMO BI INCL CAD: CPT | Mod: 26,52,, | Performed by: RADIOLOGY

## 2023-08-11 PROCEDURE — 77067 MAMMO DIGITAL SCREENING LEFT WITH TOMO: ICD-10-PCS | Mod: 26,52,, | Performed by: RADIOLOGY

## 2023-08-11 PROCEDURE — 77063 BREAST TOMOSYNTHESIS BI: CPT | Mod: 26,52,, | Performed by: RADIOLOGY

## 2023-08-11 PROCEDURE — 77063 MAMMO DIGITAL SCREENING LEFT WITH TOMO: ICD-10-PCS | Mod: 26,52,, | Performed by: RADIOLOGY

## 2023-08-11 PROCEDURE — 77067 SCR MAMMO BI INCL CAD: CPT | Mod: TC,52

## 2023-08-11 NOTE — TELEPHONE ENCOUNTER
Are you ready for your Colonoscopy?      __ If you take blood thinners, have you stopped taking them according to your doctor's instructions before your procedures?  __ Have you stopped eating solid foods and followed a clear liquid diet a full day before your procedure or followed the diet       guidelines indicated in your instructions? REMINDER: NO BROTH AFTER MIDNIGHT THE DAY BEFORE PROCEDURE.   __ Have you completed all your prep solution? PLEASE DO NOT FOLLOW the insert/or box instructions from pharmacy)  __ Have you taken your blood pressure, heart, seizure, or other essential medications the morning of your procedure?  __ Have you planned for a ride to and from procedure?  (Medical Transportation, Uber, Lyft, Taxi, etc. may ONLY be used if a responsible adult is present to accompany you home). The responsible adult CANNOT be the  of the service.       Questions or Concerns?  Please call us!  [phone] (M-F) [phone] (Nights and weekends)    Listed below are some helpful tips:    Please bring protective cases for eyewear and hearing aids-wear comfortable clothing/shoes.  Follow prep instructions closely so you don't have to do it twice.  Bowel prep is prescription used to clean out the colon before a colonoscopy. The prep increases movement of your colon by causing you to have diarrhea (loose stools). Cleaning out stool from the colon helps your doctor to see in your colon clearly during this procedure.   It is important to stay hydrated before, during and after bowel prep to prevent loss of fluid (dehydration). You can have water and your choice of clear liquids. Reminder: these liquids you will drink in addition to the bowel prep.     Preparing the mixture:    First, mix the prep with water.  Make the taste better by adding a sugar free drink mix (Crystal Light) can improve the taste of your prep.   Use a large bore (opening) straw. Place towards the back of mouth (throat) as  tolerated.  Prepare a prep mixture that is lightly chilled, but not ice-cold. Drinking a large amount of ice-cold liquid can make you feel very ill.  Avoid drinking any RED beverages or eating popsicles with this color for the 24 hours leading up to your procedure. This color can look like blood in the colon.    Consuming the prep:    Take your time. If you feel ill, take a 15-minute break from drinking the prep mixture  Combat hunger and dehydration with clear liquids. Options like JELL-O, popsicles, or chicken broth will help.  Settle in with good reading material. The goal is to clean out 6 feet of colon, so you can plan on spending a good deal of time in the bathroom. Have some good reading material on-hand or an iPad ready to keep yourself entertained!  Keep yourself comfortable. We recommend applying personal hygiene wipes, Tucks pads and a soothing ointment, like A&D ointment, Desitin, or Vaseline to your bottom before starting and as needed to protect your skin.           IMPORTANT INFORMATION TO KNOW BEFORE YOUR PROCEDURE    [facility] [place] 4th Floor    If your procedure requires the administration of anesthesia, it is necessary for a responsible adult to drive you home. (Medical Transportation, Uber, Lyft, Taxi, etc. may ONLY be used if a responsible adult is present to accompany you home.  The responsible adult CAN'T be the  of the service).      person must be available to return to pick you up within 30 minutes of being notified of discharge.     Due to the limited socially distant seating in our waiting room, please limit your guest (1) who accompany you for this procedure. If someone accompanies you for this procedure into the facility:    Consider having them proceed to an area that is socially distant other than the lobby until a member of the medical team contacts them to provide an update after the procedure.     Also, please consider being dropped off and picked up  from the facility.      Please bring a picture ID, insurance card, & copayment    Take Medications as directed below:      1) Stop taking   (prior to the procedure) on:       2) Stop taking   (prior to the procedure) on:     If you begin taking any blood thinning medications, please contact the  listed below as soon as possible.    If you are diabetic see the attached instruction sheet regarding your medication.     If you take HEART, BLOOD PRESSURE, SEIZURE, PAIN, LUNG (including inhalers/nebulizers), ANTI-REJECTION (transplant patients), or PSYCHIATRIC medications, please take at your regular times with a sip of water or as directed by the scheduling nurse.     Important contact information:    Endoscopy [phone] Hours of operation Monday-Friday 8:00-4:30pm.    Questions about insurance or financial obligations call [phone] or [phone].    If you have questions regarding the prep or need to reschedule, please call [phone]. After hours questions requiring immediate assistance, contact [facility] nurse line at [phone] or [phone].   NOTE:     On occasion, unforeseen circumstances may cause a delay in your procedure start time. We respect your time and appreciate your patience during these circumstances.      Comments:         Colonoscopy Procedure Prep Instructions    Date of procedure: 8/15/23Arrive at: 2:45 pm    Location of Department:   [facility] [address] [place] [address]  Take the [facility] Elevators to 4th Floor Endoscopy Lab    As soon as possible:   your prep from pharmacy and over the counter DULCOLAX LAXATIVE TABLETS            On the day before your procedure   What You CAN do:   You may have clear liquids ONLY-see below for list.     Liquids That Are OK to Drink:   Water  Sports drinks (Gatorade, Power-Aid)  Coffee or tea (no cream or nondairy creamer)  Clear juices without pulp (apple, white grape)  Gelatin  desserts (no fruit or toppings)  Clear soda (sprite, coke, ginger ale)  Chicken broth (until 12 midnight the night before procedure)    What You CANNOT do:   Do not EAT solid food, drink milk or anything   colored red.  Do not drink alcohol.  Do not take oral medications within 1 hour of starting   each dose of prep.  No gum chewing or candy morning of procedure                       Note:   (Please disregard the insert instructions from pharmacy).  PEG Bowel Prep is indicated for cleansing of the colon as a preparation for colonoscopy in adults.   Be sure to tell your doctor about all the medicines you take, including prescription and non-prescription medicines, vitamins, and herbal supplements. PEG Bowel Prep may affect how other medicines work.  Medication taken by mouth may not be absorbed properly when taken within 1 hour before the start of each dose of PEG Bowel Prep.    It is not uncommon to experience some abdominal cramping, nausea and/or vomiting when taking the prep. If you have nausea and/or vomiting while taking the prep, stop drinking for 20 to 30 minutes then continue.      How to take prep:    PEG Bowel Prep is a (2-day) prep.    One (1) bottle of prep are required for a complete preparation for colonoscopy. Dilute the solution concentrate as directed prior to use. You must drink water with each dose of prep, and additional water after each dose.    DOSE 1--Day Before Colonoscopy 8/14/23     Drink at least 6 to 8 glasses of clear liquids from time you wake up until you begin your prep and then continue until bedtime to avoid dehydration.     12:00 pm (NOON) Mix your entire container of prep with lukewarm water and refrigerate. Take four (4) Dulcolax (Bisacodyl) tablets with at least 8 ounces or more of clear liquids.       6:00 pm:    You must complete Steps 1 and 2 below before going to bed:    Step 1-Drink half the liquid in the container within one (1) hour.   Step 2-Refrigerate the remaining  half of the liquid for dose 2. See below when to begin this step.                       IMPORTANT: If you experience preparation-related symptoms (for example, nausea, bloating, or cramping), stop, or slow the rate of drinking the additional water until your symptoms decrease.    DOSE 2--Day of the Colonoscopy 8/15/23 at 7-8 AM.    For this dose, repeat Step 1 shown above using the remaining half of the liquid prep.   You may continue drinking water/clear liquids until   4 hours before your colonoscopy or as directed by the scheduling nurse  11:45 AM.    For more information about your procedure, please watch this informational video. It is important to watch this animated consent video prior to your arrival.   If you haven't watched the video prior to arriving, you are required to watch it during admission which can cause delays.     Options for viewing:  Using a keyboard:  press and hold the control tab (Ctrl) and left mouse click to follow link          Colonoscopy Instructional Video                                                        OR    Type link address into your web browser's address bar:  [email or web address]    Using a mobile phone: tap on web address/link.     Comments:

## 2023-08-11 NOTE — TELEPHONE ENCOUNTER
----- Message from Court Higuera sent at 8/10/2023  8:36 AM CDT -----  Regarding: FW: Miky requested  Contact: [phone]    ----- Message -----  From: Kiah Christianson  Sent: 8/9/2023   1:45 PM CDT  To: [facility] Endo Schedulers  Subject: Appt requested                                   Hi, pt called to request a call back to get scheduled for a colonoscopy. Pls call the pt at [phone] to help pt get scheduled.

## 2023-08-11 NOTE — TELEPHONE ENCOUNTER
Dear Ms. Toledo:    We are writing to you because we have made multiple attempts to reach you by phone and have been unsuccessful.      Please contact the office at your earliest convenience at Dept: [phone]    Sincerely,       [facility] department

## 2023-08-11 NOTE — TELEPHONE ENCOUNTER
----- Message from Court Higuera sent at 8/10/2023  8:36 AM CDT -----  Regarding: FW: Miky requested  Contact: [phone]    ----- Message -----  From: Kiah Christianson  Sent: 8/9/2023   1:45 PM CDT  To: [facility] Endo Schedulers  Subject: Appt requested                                   Hi, pt called to request a call back to get scheduled for a colonoscopy. Pls call the pt at [phone] to help pt get scheduled.

## 2023-08-11 NOTE — TELEPHONE ENCOUNTER
Spoke to Jaja to schedule procedure(s) Colonoscopy       Physician to perform procedure(s) Dr. GAMAL Avery  Date of Procedure (s) 8/15/23  Arrival Time 2:45 PM  Time of Procedure(s) 3:45 PM   Location of Procedure(s) [facility] 4th Floor  Type of Rx Prep sent to patient: PEG  Instructions provided to patient via Mail and Email    Patient was informed on the following information and verbalized understanding. Screening questionnaire reviewed with patient and complete. If procedure requires anesthesia, a responsible adult needs to be present to accompany the patient home, patient cannot drive after receiving anesthesia. Appointment details are tentative, especially check-in time. Patient will receive a prep-op call 4 days prior to confirm check-in time for procedure. If applicable the patient should contact their pharmacy to verify Rx for procedure prep is ready for pick-up. Patient was advised to call the scheduling department at [phone] if pharmacy states no Rx is available. Patient was advised to call the endoscopy scheduling department if any questions or concerns arise.      [facility] Endoscopy Scheduling Department

## 2023-08-14 ENCOUNTER — TELEPHONE (OUTPATIENT)
Dept: ENDOSCOPY | Facility: HOSPITAL | Age: 68
End: 2023-08-14
Payer: COMMERCIAL

## 2023-08-14 NOTE — TELEPHONE ENCOUNTER
Patient called in. Revised time and prep instructions for colonoscopy. Patient verbalized understanding.

## 2023-08-15 ENCOUNTER — ANESTHESIA (OUTPATIENT)
Dept: ENDOSCOPY | Facility: HOSPITAL | Age: 68
End: 2023-08-15
Payer: COMMERCIAL

## 2023-08-15 ENCOUNTER — ANESTHESIA EVENT (OUTPATIENT)
Dept: ENDOSCOPY | Facility: HOSPITAL | Age: 68
End: 2023-08-15
Payer: COMMERCIAL

## 2023-08-15 ENCOUNTER — HOSPITAL ENCOUNTER (OUTPATIENT)
Facility: HOSPITAL | Age: 68
Discharge: HOME | End: 2023-08-15
Attending: INTERNAL MEDICINE | Admitting: INTERNAL MEDICINE
Payer: COMMERCIAL

## 2023-08-15 VITALS
HEIGHT: 66 IN | HEART RATE: 87 BPM | WEIGHT: 174 LBS | BODY MASS INDEX: 27.97 KG/M2 | OXYGEN SATURATION: 98 % | TEMPERATURE: 97 F | RESPIRATION RATE: 16 BRPM | SYSTOLIC BLOOD PRESSURE: 137 MMHG | DIASTOLIC BLOOD PRESSURE: 96 MMHG

## 2023-08-15 DIAGNOSIS — Z86.010 HISTORY OF COLON POLYPS: Primary | ICD-10-CM

## 2023-08-15 PROCEDURE — G0105 COLORECTAL SCRN; HI RISK IND: HCPCS | Mod: ,,, | Performed by: INTERNAL MEDICINE

## 2023-08-15 PROCEDURE — G0105 COLORECTAL SCRN; HI RISK IND: ICD-10-PCS | Mod: ,,, | Performed by: INTERNAL MEDICINE

## 2023-08-15 PROCEDURE — E9220 PRA ENDO ANESTHESIA: HCPCS | Mod: ,,, | Performed by: NURSE ANESTHETIST, CERTIFIED REGISTERED

## 2023-08-15 PROCEDURE — 63600175 PHARM REV CODE 636 W HCPCS: Performed by: NURSE ANESTHETIST, CERTIFIED REGISTERED

## 2023-08-15 PROCEDURE — 37000009 HC ANESTHESIA EA ADD 15 MINS: Performed by: INTERNAL MEDICINE

## 2023-08-15 PROCEDURE — E9220 PRA ENDO ANESTHESIA: ICD-10-PCS | Mod: ,,, | Performed by: NURSE ANESTHETIST, CERTIFIED REGISTERED

## 2023-08-15 PROCEDURE — 25000003 PHARM REV CODE 250: Performed by: NURSE ANESTHETIST, CERTIFIED REGISTERED

## 2023-08-15 PROCEDURE — 37000008 HC ANESTHESIA 1ST 15 MINUTES: Performed by: INTERNAL MEDICINE

## 2023-08-15 PROCEDURE — G0105 COLORECTAL SCRN; HI RISK IND: HCPCS | Mod: PT | Performed by: INTERNAL MEDICINE

## 2023-08-15 RX ORDER — PROPOFOL 10 MG/ML
VIAL (ML) INTRAVENOUS CONTINUOUS PRN
Status: DISCONTINUED | OUTPATIENT
Start: 2023-08-15 | End: 2023-08-15

## 2023-08-15 RX ORDER — LIDOCAINE HYDROCHLORIDE 20 MG/ML
INJECTION INTRAVENOUS
Status: DISCONTINUED | OUTPATIENT
Start: 2023-08-15 | End: 2023-08-15

## 2023-08-15 RX ORDER — SODIUM CHLORIDE 9 MG/ML
INJECTION, SOLUTION INTRAVENOUS CONTINUOUS
Status: DISCONTINUED | OUTPATIENT
Start: 2023-08-15 | End: 2023-08-15 | Stop reason: HOSPADM

## 2023-08-15 RX ADMIN — SODIUM CHLORIDE: 0.9 INJECTION, SOLUTION INTRAVENOUS at 04:08

## 2023-08-15 RX ADMIN — LIDOCAINE HYDROCHLORIDE 50 MG: 20 INJECTION INTRAVENOUS at 04:08

## 2023-08-15 RX ADMIN — PROPOFOL 150 MCG/KG/MIN: 10 INJECTION, EMULSION INTRAVENOUS at 04:08

## 2023-08-15 NOTE — ANESTHESIA PREPROCEDURE EVALUATION
08/15/2023  Jaja Toledo is a 67 y.o., female.  Pre-operative evaluation for COLONOSCOPY (N/A)    Chief Complaint:    PMH:  Right breast CA s/p right mastectomy 2019  Cervical CA 1996?  Sarcoma left foot 2009  Past Surgical History:   Procedure Laterality Date    ABDOMINOPLASTY N/A 11/29/2018    Procedure: EXTENDED ABDOMINOPLASTY;  Surgeon: Mio Arriaga MD;  Location: [facility] OR;  Service: Plastics;  Laterality: N/A;    AXILLARY NODE DISSECTION Right 6/4/2019    Procedure: LYMPHADENECTOMY, AXILLARY RIGHT;  Surgeon: Sabine Arteaga MD;  Location: [facility] OR 2ND FLR;  Service: General;  Laterality: Right;    BACK SURGERY      BREAST CYST EXCISION Left 1984    BREAST RECONSTRUCTION Right 6/6/2022    Procedure: RECONSTRUCTION, BREAST;  Surgeon: Leonard Cordero MD;  Location: [facility] OR 2ND FLR;  Service: Plastics;  Laterality: Right;  T-DAP    BREAST REVISION SURGERY Right 7/20/2020    Procedure: BREAST REVISION SURGERY RIGHT;  Surgeon: Leonard Cordero MD;  Location: [facility] OR 2ND FLR;  Service: Plastics;  Laterality: Right;    COLONOSCOPY N/A 10/19/2020    Procedure: COLONOSCOPY;  Surgeon: Chanel Saenz MD;  Location: [facility] ENDO (4TH FLR);  Service: Endoscopy;  Laterality: N/A;  covid test 10/[facility] urgent care    FOOT SURGERY  2009 or 2010    INSERTION OF BREAST IMPLANT Right 6/4/2019    Procedure: INSERTION, BREAST IMPLANT RIGHT;  Surgeon: Leonard Cordero MD;  Location: [facility] OR 2ND FLR;  Service: Plastics;  Laterality: Right;    INSERTION OF BREAST TISSUE EXPANDER Right 6/4/2019    Procedure: INSERTION, TISSUE EXPANDER, BREAST RIGHT;  Surgeon: Leonard Cordero MD;  Location: [facility] OR 2ND FLR;  Service: Plastics;  Laterality: Right;    LIPOSUCTION W/ FAT INJECTION N/A 11/29/2018    Procedure: LIPOSUCTION, WITH FAT TRANSFER TO BUTTOCKS;  Surgeon: Mio Arriaga MD;   "Location: [facility] OR;  Service: Plastics;  Laterality: N/A;    MASTOPEXY Left 2019    Procedure: MASTOPEXY LEFT;  Surgeon: Leonard Cordero MD;  [facility];  Service: Plastics;  Laterality: Left;    MASTOPEXY Left 2020    Procedure: MASTOPEXY LEFT;  Surgeon: Leonard Cordero MD;  [facility];  Service: Plastics;  Laterality: Left;    MASTOPEXY Bilateral 2023    Procedure: MASTOPEXY;  Surgeon: Leonard Cordero MD;  [facility];  Service: Plastics;  Laterality: Bilateral;  cresent mastopexy    SENTINEL LYMPH NODE BIOPSY Right 2019    Procedure: BIOPSY, LYMPH NODE, SENTINEL RIGHT;  Surgeon: Sabine Arteaga MD;  [facility];  Service: General;  Laterality: Right;    SIMPLE MASTECTOMY Right 2019    Procedure: MASTECTOMY, SIMPLE RIGHT (CONSENT AM OF) 4.0 hr case;  Surgeon: Sabine Arteaga MD;  Location: [facility] OR [address] [place] [address];  Service: General;  Laterality: Right;    TOTAL REDUCTION MAMMOPLASTY Left     TOTAL VAGINAL HYSTERECTOMY  1996    TUBAL LIGATION           Vital Signs Range (Last 24H):         CBC:   No results for input(s): "WBC", "RBC", "HGB", "HCT", "PLT", "MCV", "MCH", "MCHC" in the last 720 hours.    CMP: No results for input(s): "NA", "K", "CL", "CO2", "BUN", "CREATININE", "GLU", "MG", "PHOS", "CALCIUM", "ALBUMIN", "PROT", "ALKPHOS", "ALT", "AST", "BILITOT" in the last 720 hours.    INR:  No results for input(s): "PT", "INR", "PROTIME", "APTT" in the last 720 hours.      Diagnostic Studies:      EK- sinus tachycardia    2D Echo:    Pre-op Assessment    I have reviewed the Patient Summary Reports.     I have reviewed the Nursing Notes. I have reviewed the NPO Status.   I have reviewed the Medications.     Review of Systems  Anesthesia Hx:  No problems with previous Anesthesia    Social:  Non-Smoker occasional wine   Hematology/Oncology:         -- Cancer in past history: Breast and Other (see Oncology " comments)   Cardiovascular:  Cardiovascular Normal     Pulmonary:  Pulmonary Normal    Neurological:  Neurology Normal        Physical Exam  General: Well nourished, Cooperative, Alert and Oriented    Airway:  Mallampati: III / III  Mouth Opening: Normal  TM Distance: Normal  Tongue: Normal  Neck ROM: Extension Decreased    Dental:  Caps / Implants    Chest/Lungs:  Normal Respiratory Rate        Anesthesia Plan  Type of Anesthesia, risks & benefits discussed:    Anesthesia Type: Gen Natural Airway  Intra-op Monitoring Plan: Standard ASA Monitors  Post Op Pain Control Plan: multimodal analgesia  Induction:  IV  Informed Consent: Informed consent signed with the Patient and all parties understand the risks and agree with anesthesia plan.  All questions answered.   ASA Score: 3  Day of Surgery Review of History & Physical: H&P Update referred to the surgeon/provider.    Ready For Surgery From Anesthesia Perspective.     .

## 2023-08-15 NOTE — ANESTHESIA POSTPROCEDURE EVALUATION
Anesthesia Post Evaluation    Patient: Jaja Toledo    Procedure(s) Performed: Procedure(s) (LRB):  COLONOSCOPY (N/A)    Final Anesthesia Type: general      Patient location during evaluation: PACU  Patient participation: Yes- Able to Participate  Level of consciousness: awake and alert and oriented  Post-procedure vital signs: reviewed and stable  Pain management: adequate  Airway patency: patent    PONV status at discharge: No PONV  Anesthetic complications: no      Cardiovascular status: hemodynamically stable  Respiratory status: unassisted  Hydration status: euvolemic  Follow-up not needed.          Vitals Value Taken Time   /96 08/15/23 1731   Pulse 87 08/15/23 1731   Resp 16 08/15/23 1731   SpO2 98 % 08/15/23 1731         Event Time   Out of Recovery 17:32:55         Pain/Cathy Score: Cathy Score: 10 (8/15/2023  5:31 PM)

## 2023-08-15 NOTE — PROVATION PATIENT INSTRUCTIONS
Discharge Summary/Instructions after an Endoscopic Procedure  Patient Name: Jaja Toledo  Patient MRN: [id number]  Patient YOB: 1955  Tuesday, August 15, 2023  Ranjan Avery MD  Dear patient,  As a result of recent federal legislation (The Federal Cures Act), you may   receive lab or pathology results from your procedure in your [facility]   account before your physician is able to contact you. Your physician or   their representative will relay the results to you with their   recommendations at their soonest availability.  Thank you,  RESTRICTIONS:  During your procedure today, you received medications for sedation.  These   medications may affect your judgment, balance and coordination.  Therefore,   for 24 hours, you have the following restrictions:   - DO NOT drive a car, operate machinery, make legal/financial decisions,   sign important papers or drink alcohol.    ACTIVITY:  Today: no heavy lifting, straining or running due to procedural   sedation/anesthesia.  The following day: return to full activity including work.  DIET:  Eat and drink normally unless instructed otherwise.     TREATMENT FOR COMMON SIDE EFFECTS:  - Mild abdominal pain, nausea, belching, bloating or excessive gas:  rest,   eat lightly and use a heating pad.  - Sore Throat: treat with throat lozenges and/or gargle with warm salt   water.  - Because air was used during the procedure, expelling large amounts of air   from your rectum or belching is normal.  - If a bowel prep was taken, you may not have a bowel movement for 1-3 days.    This is normal.  SYMPTOMS TO WATCH FOR AND REPORT TO YOUR PHYSICIAN:  1. Abdominal pain or bloating, other than gas cramps.  2. Chest pain.  3. Back pain.  4. Signs of infection such as: chills or fever occurring within 24 hours   after the procedure.  5. Rectal bleeding, which would show as bright red, maroon, or black stools.   (A tablespoon of blood from the rectum is not serious, especially if    hemorrhoids are present.)  6. Vomiting.  7. Weakness or dizziness.  GO DIRECTLY TO THE NEAREST EMERGENCY ROOM IF YOU HAVE ANY OF THE FOLLOWING:      Difficulty breathing              Chills and/or fever over 101 F   Persistent vomiting and/or vomiting blood   Severe abdominal pain   Severe chest pain   Black, tarry stools   Bleeding- more than one tablespoon   Any other symptom or condition that you feel may need urgent attention  Your doctor recommends these additional instructions:  If any biopsies were taken, your doctors clinic will contact you in 1 to 2   weeks with any results.  - Discharge patient to home.   - Patient has a contact number available for emergencies.  The signs and   symptoms of potential delayed complications were discussed with the   patient.  Return to normal activities tomorrow.  Written discharge   instructions were provided to the patient.   - Resume previous diet.   - Continue present medications.   - Repeat colonoscopy in 5 years for surveillance.  For questions, problems or results please call your physician - Ranjan Avery MD at Work:  [phone].  [facility] [place], EMERGENCY ROOM PHONE NUMBER: [phone]  IF A COMPLICATION OR EMERGENCY SITUATION ARISES AND YOU ARE UNABLE TO REACH   YOUR PHYSICIAN - GO DIRECTLY TO THE EMERGENCY ROOM.  Ranjan Avery MD  8/15/2023 4:56:23 PM  This report has been verified and signed electronically.  Dear patient,  As a result of recent federal legislation (The Federal Cures Act), you may   receive lab or pathology results from your procedure in your [facility]   account before your physician is able to contact you. Your physician or   their representative will relay the results to you with their   recommendations at their soonest availability.  Thank you,  PROVATION

## 2023-08-15 NOTE — TRANSFER OF CARE
"Anesthesia Transfer of Care Note    Patient: Jaja Toledo    Procedure(s) Performed: Procedure(s) (LRB):  COLONOSCOPY (N/A)    Patient location: PACU    Anesthesia Type: general    Transport from OR: Transported from OR on 2-3 L/min O2 by NC with adequate spontaneous ventilation    Post pain: adequate analgesia    Post assessment: no apparent anesthetic complications    Post vital signs: stable    Level of consciousness: awake    Nausea/Vomiting: no nausea/vomiting    Complications: none    Transfer of care protocol was followed      Last vitals:   Visit Vitals  /76   Pulse 95   Temp 36.1 °C (97 °F) (Temporal)   Resp 16   Ht 5' 6" (1.676 m)   Wt 78.9 kg (174 lb)   SpO2 100%   BMI 28.08 kg/m²     "

## 2023-08-25 ENCOUNTER — TELEPHONE (OUTPATIENT)
Dept: FAMILY MEDICINE | Facility: CLINIC | Age: 68
End: 2023-08-25
Payer: COMMERCIAL

## 2023-08-25 DIAGNOSIS — R79.89 ABNORMAL TSH: ICD-10-CM

## 2023-08-25 DIAGNOSIS — Z00.00 ROUTINE GENERAL MEDICAL EXAMINATION AT A HEALTH CARE FACILITY: Primary | ICD-10-CM

## 2023-08-25 DIAGNOSIS — R73.01 IFG (IMPAIRED FASTING GLUCOSE): ICD-10-CM

## 2023-08-25 NOTE — TELEPHONE ENCOUNTER
Can get labs 1 wk prior to visit.    Orders Placed This Encounter   Procedures    CBC Auto Differential    Comprehensive Metabolic Panel    Lipid Panel    TSH    Hemoglobin A1C

## 2023-08-25 NOTE — ADDENDUM NOTE
Addendum  created 08/25/23 1130 by Dixie Ford CRNA    Attestation recorded in Intraprocedure, Intraprocedure Attestations filed, Intraprocedure Event deleted, Intraprocedure Event edited

## 2023-08-25 NOTE — TELEPHONE ENCOUNTER
----- Message from Ismael Barreto MA sent at 8/25/2023  3:52 PM CDT -----  Regarding: FW: labs  Contact: [phone]  Pt needs lab orders   ----- Message -----  From: Lisette Perez  Sent: 8/25/2023   1:43 PM CDT  To: [facility] Staff  Subject: las                                              Caller is requesting to schedule their Lab appointment prior to annual appointment.  Order is not listed in EPIC.  Please enter order and contact patient to schedule.    Name of Caller: pt     Preferred : Annual labs     Date of EPP Appointment: 01/22    Where would they like the lab performed? [facility]     Would the patient rather a call back or a response via [facility]? Call       Best Call Back Number: [phone]    Additional Information:

## 2023-09-12 ENCOUNTER — OFFICE VISIT (OUTPATIENT)
Dept: HEMATOLOGY/ONCOLOGY | Facility: CLINIC | Age: 68
End: 2023-09-12
Payer: COMMERCIAL

## 2023-09-12 VITALS
BODY MASS INDEX: 27.76 KG/M2 | WEIGHT: 172.75 LBS | SYSTOLIC BLOOD PRESSURE: 129 MMHG | RESPIRATION RATE: 18 BRPM | HEART RATE: 103 BPM | OXYGEN SATURATION: 100 % | TEMPERATURE: 98 F | DIASTOLIC BLOOD PRESSURE: 81 MMHG | HEIGHT: 66 IN

## 2023-09-12 DIAGNOSIS — E55.9 VITAMIN D DEFICIENCY: ICD-10-CM

## 2023-09-12 DIAGNOSIS — Z17.0 MALIGNANT NEOPLASM OF OVERLAPPING SITES OF RIGHT BREAST IN FEMALE, ESTROGEN RECEPTOR POSITIVE: Primary | ICD-10-CM

## 2023-09-12 DIAGNOSIS — Z79.811 AROMATASE INHIBITOR USE: ICD-10-CM

## 2023-09-12 DIAGNOSIS — R63.0 POOR APPETITE: ICD-10-CM

## 2023-09-12 DIAGNOSIS — C50.811 MALIGNANT NEOPLASM OF OVERLAPPING SITES OF RIGHT BREAST IN FEMALE, ESTROGEN RECEPTOR POSITIVE: Primary | ICD-10-CM

## 2023-09-12 PROCEDURE — 1159F MED LIST DOCD IN RCRD: CPT | Mod: CPTII,S$GLB,, | Performed by: INTERNAL MEDICINE

## 2023-09-12 PROCEDURE — 1159F PR MEDICATION LIST DOCUMENTED IN MEDICAL RECORD: ICD-10-PCS | Mod: CPTII,S$GLB,, | Performed by: INTERNAL MEDICINE

## 2023-09-12 PROCEDURE — 3044F PR MOST RECENT HEMOGLOBIN A1C LEVEL <7.0%: ICD-10-PCS | Mod: CPTII,S$GLB,, | Performed by: INTERNAL MEDICINE

## 2023-09-12 PROCEDURE — 99214 PR OFFICE/OUTPT VISIT, EST, LEVL IV, 30-39 MIN: ICD-10-PCS | Mod: S$GLB,,, | Performed by: INTERNAL MEDICINE

## 2023-09-12 PROCEDURE — 99214 OFFICE O/P EST MOD 30 MIN: CPT | Mod: S$GLB,,, | Performed by: INTERNAL MEDICINE

## 2023-09-12 PROCEDURE — 3008F BODY MASS INDEX DOCD: CPT | Mod: CPTII,S$GLB,, | Performed by: INTERNAL MEDICINE

## 2023-09-12 PROCEDURE — 3008F PR BODY MASS INDEX (BMI) DOCUMENTED: ICD-10-PCS | Mod: CPTII,S$GLB,, | Performed by: INTERNAL MEDICINE

## 2023-09-12 PROCEDURE — 1160F PR REVIEW ALL MEDS BY PRESCRIBER/CLIN PHARMACIST DOCUMENTED: ICD-10-PCS | Mod: CPTII,S$GLB,, | Performed by: INTERNAL MEDICINE

## 2023-09-12 PROCEDURE — 3288F FALL RISK ASSESSMENT DOCD: CPT | Mod: CPTII,S$GLB,, | Performed by: INTERNAL MEDICINE

## 2023-09-12 PROCEDURE — 1101F PR PT FALLS ASSESS DOC 0-1 FALLS W/OUT INJ PAST YR: ICD-10-PCS | Mod: CPTII,S$GLB,, | Performed by: INTERNAL MEDICINE

## 2023-09-12 PROCEDURE — 1101F PT FALLS ASSESS-DOCD LE1/YR: CPT | Mod: CPTII,S$GLB,, | Performed by: INTERNAL MEDICINE

## 2023-09-12 PROCEDURE — 3074F SYST BP LT 130 MM HG: CPT | Mod: CPTII,S$GLB,, | Performed by: INTERNAL MEDICINE

## 2023-09-12 PROCEDURE — 3074F PR MOST RECENT SYSTOLIC BLOOD PRESSURE < 130 MM HG: ICD-10-PCS | Mod: CPTII,S$GLB,, | Performed by: INTERNAL MEDICINE

## 2023-09-12 PROCEDURE — 3288F PR FALLS RISK ASSESSMENT DOCUMENTED: ICD-10-PCS | Mod: CPTII,S$GLB,, | Performed by: INTERNAL MEDICINE

## 2023-09-12 PROCEDURE — 1126F AMNT PAIN NOTED NONE PRSNT: CPT | Mod: CPTII,S$GLB,, | Performed by: INTERNAL MEDICINE

## 2023-09-12 PROCEDURE — 99999 PR PBB SHADOW E&M-EST. PATIENT-LVL IV: ICD-10-PCS | Mod: PBBFAC,,, | Performed by: INTERNAL MEDICINE

## 2023-09-12 PROCEDURE — 3044F HG A1C LEVEL LT 7.0%: CPT | Mod: CPTII,S$GLB,, | Performed by: INTERNAL MEDICINE

## 2023-09-12 PROCEDURE — 3079F PR MOST RECENT DIASTOLIC BLOOD PRESSURE 80-89 MM HG: ICD-10-PCS | Mod: CPTII,S$GLB,, | Performed by: INTERNAL MEDICINE

## 2023-09-12 PROCEDURE — 99999 PR PBB SHADOW E&M-EST. PATIENT-LVL IV: CPT | Mod: PBBFAC,,, | Performed by: INTERNAL MEDICINE

## 2023-09-12 PROCEDURE — 3079F DIAST BP 80-89 MM HG: CPT | Mod: CPTII,S$GLB,, | Performed by: INTERNAL MEDICINE

## 2023-09-12 PROCEDURE — 1160F RVW MEDS BY RX/DR IN RCRD: CPT | Mod: CPTII,S$GLB,, | Performed by: INTERNAL MEDICINE

## 2023-09-12 PROCEDURE — 1126F PR PAIN SEVERITY QUANTIFIED, NO PAIN PRESENT: ICD-10-PCS | Mod: CPTII,S$GLB,, | Performed by: INTERNAL MEDICINE

## 2023-09-12 NOTE — PROGRESS NOTES
Subjective:       Patient ID: Jaja Toledo is a 67 y.o. female.     Chief Complaint: follow up for breast cancer     Diagnosis:  Stage IA (mp T1cN0 (I+)M0) Right breast cancer, s/p right mastectomy on 6/4/19     Oncologic History:  1. Ms Toledo is a 64 yo postmenopausal woman who initially saw me on 7/22/19 for breast cancer. She was noted to have asymmetry in the right breast at the inner position on mammogram 3/18/19. Diagnosed mammogram on 4/11/19 showed a 11 mm high density, irregularly shaped mass with indistinct margins in the right breast at 3 oclock. US showed a 7 x 9 x 7 mm mass at 3 oclock. The right axilla was normal. Breast biopsy on 4/22/19 showed invasive ductal carcinoma, grade 3, with associated high grade DCIS. ER+ (>95%), NY+(>90%), HER2 equivocal, FISH negative. Ki-67 20%. She was seen by Dr Arteaga in May 2019. MRI breast on 5/16/19 showed a 13 mm x 10 mm x 10 mm mass at 3 oclock correlating with the prior finding. There is another 9 mm x 7 mm x 8 mm mass at 6 oclock in the right breast. Left breast is benign. She underwent biopsy of the second mass on 5/27/19, which showed a grade 2 invasive lobular carcinoma, ER positive 70%, NY negative, HER2 negative, Ki-67 15%. Patient underwent right mastectomy and right axillary lymph node biopsy on 6/4/19. Pathology showed two tumors: #1, one 1.3 cm grade 2 invasive ductal carcinoma with lobular features, #2, one 1.7 cm grade 2 invasive lobular carcinoma. DCIS present, grade 3, with necrosis. LCIS. Margins are negative. lymphovascular invasion was not identified. One sentinel lymph node removed, with isolated tumor cells. Extranodal extention not identified. mp T1c sn N0 (i+). Receptors: #1: ER positive 95%, NY positive 90%, HER2 FISH negative, Ki67 20%. #2, ER positive 70%, NY negative, HER2 negative, Ki-67 15%. She returns today to discuss further management. She currently feels well. She had some mild sweating on her forehead in her last 50s. She  had a hysterectomy more than 20 years ago. She had [employer] genetic test done which was negative. Oncotype DX of her 1.7 cm ILC showed an RS of 15, distant recurrence rate at 9 years is 4%, benefit from chemo is <1%. Oncotype DX of her 1.3 cm IDC is pending.   2. Oncotype DX of her 1.3 cm IDC showed RS of 24, distant recurrence rate at 9 years is 10%, benefit from chemotherapy <1%  3. Started letrozole in 2019. Bone density 19 and 2023 normal  4. Colonoscopy 8/15/2023 was unremarkable     Interval History:   Ms Toledo returns for follow up. She has been taking letrozole, vit D and calcium every day. Periactin helps with appetite.      ECO     ROS:   A ten-point system review is obtained and negative except for what was stated in the Interval History.      Physical Examination:   Vital signs reviewed.   General: well hydrated, well developed, in no acute distress  HEENT: normocephalic, PERRLA, EOMI, anicteric sclerae, oropharynx clear  Neck: supple, no JVD, thyromegaly, cervical or supraclavicular lymphadenopathy  Lungs: clear breath sounds bilaterally, no wheezing, rales, or rhonchi  Heart: RRR, no M/R/G  Breast: s/p R mastectomy and reconstruction. No abnormal skin changes, masses or axillary LAD. L: no abnormal skin changes, masses, or axillary LAD.   Abdomen: soft, no tenderness, non-distended, no hepatosplenomegaly, mass, or hernia. BS present  Extremities: no clubbing, cyanosis, edema  Skin: no rash, ulcer, or open wounds  Neuro: alert and oriented x 4, no focal neuro deficit  Psych: pleasant and appropriate mood and affect      Objective:      Laboratory Data:  Labs reviewed.      Imaging Data:   L mammogram 23:  Impression:   No mammographic evidence of malignancy.     BI-RADS Category 1: Negative     Recommendation:  Routine screening mammogram in 1 year is recommended.       Bone density 2023:  Impression:     *Normal bone mineral density     RECOMMENDATIONS:  *Daily calcium  intake 0789-5645 mg, dietary sources preferred; Vitamin D 6251-6977 IU daily.  *Weight bearing exercise and fall precautions.  *No additional pharmacologic therapy recommended at this time.  *There is no need to repeat BMD in the near future unless additional risk factors become apparent.     Assessment and Plan:      1. Malignant neoplasm of overlapping sites of right breast in female, estrogen receptor positive    2. Aromatase inhibitor use    3. Vitamin D deficiency    4. Poor appetite      1-2  - Ms Toledo is a 66 yo woman with Stage IA (mp T1cN0 (I+)M0) Right breast cancer s/p R mastectomy, SLNB and immediate reconstruction on 6/4/19. She had a 1.3 cm invasive ductal carcinoma and a 1.7 cm invasive lobular carcinoma. Both tumors ER positive, HER2 negative. She had isolated tumor cells in one sentinel lymph node, which is stage N0 (I+).  Oncotype DX score of the 1.7 cm ILC showed an RS of 15, distant recurrence rate at 9 years is 4%, benefit from chemo is <1%. Oncotype DX of her 1.3 cm IDC showed RS of 24, distant recurrence rate at 9 years is 10%, benefit from chemotherapy <1%  - Has been on letrozole since Aug 2019. Tolerating it well  - continue with letrozole for 5 years  - reviewed mammogram results. Normal. Next due for L mammogram in Aug 2023  - reviewed bone density result. Normal bone density. Next due in Jan 2025    3.  - c/w OTC vit D  - check vit D on return    4.  - c/w periactin prn      Follow-up:      Return in 6 months    Route Chart for Scheduling    Med Onc Chart Routing      Follow up with physician 6 months. see me in 6 months with vit D level checked 1 day prior   Follow up with [facility]    Infusion scheduling note    Injection scheduling note    Labs Vitamin D   Scheduling:  Preferred lab:  Lab interval:     Imaging    Pharmacy appointment    Other referrals

## 2023-10-02 ENCOUNTER — OFFICE VISIT (OUTPATIENT)
Dept: URGENT CARE | Facility: CLINIC | Age: 68
End: 2023-10-02
Payer: COMMERCIAL

## 2023-10-02 VITALS
RESPIRATION RATE: 20 BRPM | TEMPERATURE: 98 F | HEIGHT: 66 IN | SYSTOLIC BLOOD PRESSURE: 116 MMHG | DIASTOLIC BLOOD PRESSURE: 73 MMHG | WEIGHT: 171.94 LBS | BODY MASS INDEX: 27.63 KG/M2 | OXYGEN SATURATION: 98 % | HEART RATE: 125 BPM

## 2023-10-02 DIAGNOSIS — B37.31 VAGINAL CANDIDIASIS: Primary | ICD-10-CM

## 2023-10-02 DIAGNOSIS — R30.0 DYSURIA: ICD-10-CM

## 2023-10-02 LAB
BILIRUB UR QL STRIP: NEGATIVE
GLUCOSE UR QL STRIP: NEGATIVE
KETONES UR QL STRIP: NEGATIVE
LEUKOCYTE ESTERASE UR QL STRIP: NEGATIVE
PH, POC UA: 5.5 (ref 5–8)
POC BLOOD, URINE: NEGATIVE
POC NITRATES, URINE: NEGATIVE
PROT UR QL STRIP: POSITIVE
SP GR UR STRIP: 1.02 (ref 1–1.03)
UROBILINOGEN UR STRIP-ACNC: ABNORMAL (ref 0.1–1.1)

## 2023-10-02 PROCEDURE — 81003 POCT URINALYSIS, DIPSTICK, AUTOMATED, W/O SCOPE: ICD-10-PCS | Mod: QW,S$GLB,, | Performed by: NURSE PRACTITIONER

## 2023-10-02 PROCEDURE — 99213 OFFICE O/P EST LOW 20 MIN: CPT | Mod: S$GLB,,, | Performed by: NURSE PRACTITIONER

## 2023-10-02 PROCEDURE — 87086 URINE CULTURE/COLONY COUNT: CPT | Performed by: NURSE PRACTITIONER

## 2023-10-02 PROCEDURE — 87088 URINE BACTERIA CULTURE: CPT | Performed by: NURSE PRACTITIONER

## 2023-10-02 PROCEDURE — 81003 URINALYSIS AUTO W/O SCOPE: CPT | Mod: QW,S$GLB,, | Performed by: NURSE PRACTITIONER

## 2023-10-02 PROCEDURE — 99213 PR OFFICE/OUTPT VISIT, EST, LEVL III, 20-29 MIN: ICD-10-PCS | Mod: S$GLB,,, | Performed by: NURSE PRACTITIONER

## 2023-10-02 RX ORDER — FLUCONAZOLE 150 MG/1
150 TABLET ORAL WEEKLY
Qty: 2 TABLET | Refills: 0 | Status: SHIPPED | OUTPATIENT
Start: 2023-10-02 | End: 2023-10-10

## 2023-10-02 NOTE — PATIENT INSTRUCTIONS
Dysuria  -     POCT Urinalysis, Dipstick, Automated, W/O Scope  -     CULTURE, URINE    Vaginal candidiasis    -     fluconazole (DIFLUCAN) 150 MG Tab; Take 1 tablet (150 mg total) by mouth once a week. for 2 doses  Dispense: 2 tablet; Refill: 0      Try taking an over the counter probiotic or eating yogurt.      You can use over the counter clotrimazole (lotrimin) cream to the outer vagina for irritation.        If you had cultures done it will take 3-5 days to result. We will call you with the result.      If your condition worsens or fails to improve we recommend that you receive another evaluation at the ER immediately or contact your PCP to discuss your concerns or return here.

## 2023-10-02 NOTE — PROGRESS NOTES
"Subjective:      Patient ID: Jaja Toledo is a 68 y.o. female.    Vitals:  height is 5' 6" (1.676 m) and weight is 78 kg (171 lb 15.3 oz). Her oral temperature is 98 °F (36.7 °C). Her blood pressure is 116/73 and her pulse is 125 (abnormal). Her respiration is 20 and oxygen saturation is 98%.     Chief Complaint: Dysuria      Pt is a 69 yo female presenting with vaginal burning and tenderness.  Onset of symptoms was 1 week ago. Treatment include azo with no relief. Pain level 08/10.  Pt states it feels different than a UTI.     Dysuria   This is a recurrent problem. The current episode started 1 to 4 weeks ago. The problem occurs every urination. The problem has been gradually worsening. The quality of the pain is described as aching. The pain is at a severity of 8/10. There has been no fever. She is Not sexually active. There is A history of pyelonephritis. Pertinent negatives include no chills, discharge, flank pain, frequency, hematuria, nausea, urgency or vomiting. She has tried NSAIDs for the symptoms. The treatment provided no relief.       Constitution: Negative for chills, fatigue and fever.   Cardiovascular:  Negative for chest pain.   Respiratory:  Negative for shortness of breath.    Gastrointestinal:  Negative for nausea, vomiting and diarrhea.   Genitourinary:  Positive for dysuria and vaginal pain (irritation). Negative for frequency, urgency, flank pain, hematuria, vaginal discharge, vaginal odor and genital sore.   Neurological:  Negative for headaches.      Objective:     Physical Exam   Constitutional: She is oriented to person, place, and time.   Cardiovascular: Normal rate and regular rhythm.   Pulmonary/Chest: Effort normal and breath sounds normal.   Abdominal: Bowel sounds are normal. Soft. flat abdomen There is no abdominal tenderness. There is no left CVA tenderness and no right CVA tenderness.   Neurological: She is alert and oriented to person, place, and time.   Skin: Skin is warm and " dry.   Vitals reviewed.      Assessment:     1. Vaginal candidiasis    2. Dysuria      Reviewed multiple past visits, urinalysis and urine cx to determine recent treatments and C&S results.    Plan:       Vaginal candidiasis  -     fluconazole (DIFLUCAN) 150 MG Tab; Take 1 tablet (150 mg total) by mouth once a week. for 2 doses  Dispense: 2 tablet; Refill: 0    Dysuria  -     POCT Urinalysis, Dipstick, Automated, W/O Scope  -     CULTURE, URINE      Patient Instructions   Dysuria  -     POCT Urinalysis, Dipstick, Automated, W/O Scope  -     CULTURE, URINE    Vaginal candidiasis    -     fluconazole (DIFLUCAN) 150 MG Tab; Take 1 tablet (150 mg total) by mouth once a week. for 2 doses  Dispense: 2 tablet; Refill: 0      Try taking an over the counter probiotic or eating yogurt.      You can use over the counter clotrimazole (lotrimin) cream to the outer vagina for irritation.        If you had cultures done it will take 3-5 days to result. We will call you with the result.      If your condition worsens or fails to improve we recommend that you receive another evaluation at the ER immediately or contact your PCP to discuss your concerns or return here.

## 2023-10-04 LAB — BACTERIA UR CULT: ABNORMAL

## 2023-10-06 ENCOUNTER — TELEPHONE (OUTPATIENT)
Dept: URGENT CARE | Facility: CLINIC | Age: 68
End: 2023-10-06
Payer: COMMERCIAL

## 2023-10-06 DIAGNOSIS — N30.00 ACUTE CYSTITIS WITHOUT HEMATURIA: Primary | ICD-10-CM

## 2023-10-06 RX ORDER — CEPHALEXIN 500 MG/1
500 CAPSULE ORAL EVERY 12 HOURS
Qty: 14 CAPSULE | Refills: 0 | Status: SHIPPED | OUTPATIENT
Start: 2023-10-06 | End: 2023-10-13

## 2023-10-06 NOTE — TELEPHONE ENCOUNTER
Urine cx shows streptococcus viridians.  Pt needs cephalosporin.  NP called pt - no answer, unable to leave VM  NP sent Keflex BID x 7 days to pharmacy on file ([facility] [id number]  @ [place])

## 2023-10-07 NOTE — TELEPHONE ENCOUNTER
Urine cx shows streptococcus viridians.  NP called pt (call #2)- no answer, mailbox full  NP already sent Keflex BID x 7 days to pharmacy on file ([email or web address] [id number]  @ [place])

## 2023-11-26 DIAGNOSIS — R63.0 POOR APPETITE: ICD-10-CM

## 2023-11-27 RX ORDER — CYPROHEPTADINE HYDROCHLORIDE 4 MG/1
TABLET ORAL
Qty: 90 TABLET | Refills: 2 | Status: SHIPPED | OUTPATIENT
Start: 2023-11-27

## 2024-03-11 ENCOUNTER — LAB VISIT (OUTPATIENT)
Dept: LAB | Facility: HOSPITAL | Age: 69
End: 2024-03-11
Attending: INTERNAL MEDICINE
Payer: COMMERCIAL

## 2024-03-11 ENCOUNTER — OFFICE VISIT (OUTPATIENT)
Dept: HEMATOLOGY/ONCOLOGY | Facility: CLINIC | Age: 69
End: 2024-03-11
Payer: COMMERCIAL

## 2024-03-11 VITALS
DIASTOLIC BLOOD PRESSURE: 89 MMHG | BODY MASS INDEX: 28.11 KG/M2 | OXYGEN SATURATION: 98 % | WEIGHT: 174.94 LBS | RESPIRATION RATE: 18 BRPM | SYSTOLIC BLOOD PRESSURE: 161 MMHG | HEART RATE: 120 BPM | HEIGHT: 66 IN

## 2024-03-11 DIAGNOSIS — E55.9 VITAMIN D DEFICIENCY: ICD-10-CM

## 2024-03-11 DIAGNOSIS — C50.811 MALIGNANT NEOPLASM OF OVERLAPPING SITES OF RIGHT BREAST IN FEMALE, ESTROGEN RECEPTOR POSITIVE: Primary | ICD-10-CM

## 2024-03-11 DIAGNOSIS — Z79.811 AROMATASE INHIBITOR USE: ICD-10-CM

## 2024-03-11 DIAGNOSIS — I10 ESSENTIAL HYPERTENSION: ICD-10-CM

## 2024-03-11 DIAGNOSIS — F32.A DEPRESSION, UNSPECIFIED DEPRESSION TYPE: ICD-10-CM

## 2024-03-11 DIAGNOSIS — Z17.0 MALIGNANT NEOPLASM OF OVERLAPPING SITES OF RIGHT BREAST IN FEMALE, ESTROGEN RECEPTOR POSITIVE: Primary | ICD-10-CM

## 2024-03-11 LAB — 25(OH)D3+25(OH)D2 SERPL-MCNC: 35 NG/ML (ref 30–96)

## 2024-03-11 PROCEDURE — 3079F DIAST BP 80-89 MM HG: CPT | Mod: CPTII,S$GLB,, | Performed by: INTERNAL MEDICINE

## 2024-03-11 PROCEDURE — 3288F FALL RISK ASSESSMENT DOCD: CPT | Mod: CPTII,S$GLB,, | Performed by: INTERNAL MEDICINE

## 2024-03-11 PROCEDURE — 3077F SYST BP >= 140 MM HG: CPT | Mod: CPTII,S$GLB,, | Performed by: INTERNAL MEDICINE

## 2024-03-11 PROCEDURE — 1126F AMNT PAIN NOTED NONE PRSNT: CPT | Mod: CPTII,S$GLB,, | Performed by: INTERNAL MEDICINE

## 2024-03-11 PROCEDURE — 99999 PR PBB SHADOW E&M-EST. PATIENT-LVL V: CPT | Mod: PBBFAC,,, | Performed by: INTERNAL MEDICINE

## 2024-03-11 PROCEDURE — G2211 COMPLEX E/M VISIT ADD ON: HCPCS | Mod: S$GLB,,, | Performed by: INTERNAL MEDICINE

## 2024-03-11 PROCEDURE — 1101F PT FALLS ASSESS-DOCD LE1/YR: CPT | Mod: CPTII,S$GLB,, | Performed by: INTERNAL MEDICINE

## 2024-03-11 PROCEDURE — 1160F RVW MEDS BY RX/DR IN RCRD: CPT | Mod: CPTII,S$GLB,, | Performed by: INTERNAL MEDICINE

## 2024-03-11 PROCEDURE — 3008F BODY MASS INDEX DOCD: CPT | Mod: CPTII,S$GLB,, | Performed by: INTERNAL MEDICINE

## 2024-03-11 PROCEDURE — 82306 VITAMIN D 25 HYDROXY: CPT | Performed by: INTERNAL MEDICINE

## 2024-03-11 PROCEDURE — 1159F MED LIST DOCD IN RCRD: CPT | Mod: CPTII,S$GLB,, | Performed by: INTERNAL MEDICINE

## 2024-03-11 PROCEDURE — 99214 OFFICE O/P EST MOD 30 MIN: CPT | Mod: S$GLB,,, | Performed by: INTERNAL MEDICINE

## 2024-03-11 PROCEDURE — 36415 COLL VENOUS BLD VENIPUNCTURE: CPT | Performed by: INTERNAL MEDICINE

## 2024-03-11 RX ORDER — LETROZOLE 2.5 MG/1
2.5 TABLET, FILM COATED ORAL DAILY
Qty: 90 TABLET | Refills: 3 | Status: SHIPPED | OUTPATIENT
Start: 2024-03-11

## 2024-03-11 NOTE — PROGRESS NOTES
Subjective:       Patient ID: Jaja Toledo is a 67 y.o. female.     Chief Complaint: follow up for breast cancer     Diagnosis:  Stage IA (mp T1cN0 (I+)M0) Right breast cancer, s/p right mastectomy on 6/4/19     Oncologic History:  1. Ms Toledo is a 62 yo postmenopausal woman who initially saw me on 7/22/19 for breast cancer. She was noted to have asymmetry in the right breast at the inner position on mammogram 3/18/19. Diagnosed mammogram on 4/11/19 showed a 11 mm high density, irregularly shaped mass with indistinct margins in the right breast at 3 oclock. US showed a 7 x 9 x 7 mm mass at 3 oclock. The right axilla was normal. Breast biopsy on 4/22/19 showed invasive ductal carcinoma, grade 3, with associated high grade DCIS. ER+ (>95%), MT+(>90%), HER2 equivocal, FISH negative. Ki-67 20%. She was seen by Dr Arteaga in May 2019. MRI breast on 5/16/19 showed a 13 mm x 10 mm x 10 mm mass at 3 oclock correlating with the prior finding. There is another 9 mm x 7 mm x 8 mm mass at 6 oclock in the right breast. Left breast is benign. She underwent biopsy of the second mass on 5/27/19, which showed a grade 2 invasive lobular carcinoma, ER positive 70%, MT negative, HER2 negative, Ki-67 15%. Patient underwent right mastectomy and right axillary lymph node biopsy on 6/4/19. Pathology showed two tumors: #1, one 1.3 cm grade 2 invasive ductal carcinoma with lobular features, #2, one 1.7 cm grade 2 invasive lobular carcinoma. DCIS present, grade 3, with necrosis. LCIS. Margins are negative. lymphovascular invasion was not identified. One sentinel lymph node removed, with isolated tumor cells. Extranodal extention not identified. mp T1c sn N0 (i+). Receptors: #1: ER positive 95%, MT positive 90%, HER2 FISH negative, Ki67 20%. #2, ER positive 70%, MT negative, HER2 negative, Ki-67 15%. She returns today to discuss further management. She currently feels well. She had some mild sweating on her forehead in her last 50s. She  had a hysterectomy more than 20 years ago. She had [employer] genetic test done which was negative. Oncotype DX of her 1.7 cm ILC showed an RS of 15, distant recurrence rate at 9 years is 4%, benefit from chemo is <1%. Oncotype DX of her 1.3 cm IDC is pending.   2. Oncotype DX of her 1.3 cm IDC showed RS of 24, distant recurrence rate at 9 years is 10%, benefit from chemotherapy <1%  3. Started letrozole in 2019. Bone density 19 and 2023 normal  4. Colonoscopy 8/15/2023 was unremarkable     Interval History:   Ms Toledo returns for follow up. She has been taking letrozole. Periactin helps with appetite. She does have a lot of stress and depression from work. Denies SI/HI.      ECO     ROS:   A ten-point system review is obtained and negative except for what was stated in the Interval History.      Physical Examination:   Vital signs reviewed.   General: well hydrated, well developed, in no acute distress  HEENT: normocephalic, PERRLA, EOMI, anicteric sclerae  Neck: supple, no JVD, thyromegaly, cervical or supraclavicular lymphadenopathy  Lungs: clear breath sounds bilaterally, no wheezing, rales, or rhonchi  Heart: RRR, no M/R/G  Breast: s/p R mastectomy and reconstruction. No abnormal skin changes, masses or axillary LAD. L: no abnormal skin changes, masses, or axillary LAD.   Abdomen: soft, no tenderness, non-distended, no hepatosplenomegaly, mass, or hernia. BS present  Extremities: no clubbing, cyanosis, edema  Skin: no rash, ulcer, or open wounds  Neuro: alert and oriented x 4, no focal neuro deficit  Psych: tearful     Objective:      Laboratory Data:  Labs reviewed.      Imaging Data:   L mammogram 23:  Impression:   No mammographic evidence of malignancy.     BI-RADS Category 1: Negative     Recommendation:  Routine screening mammogram in 1 year is recommended.       Bone density 2023:  Impression:     *Normal bone mineral density     RECOMMENDATIONS:  *Daily calcium intake  3381-6582 mg, dietary sources preferred; Vitamin D 5718-6932 IU daily.  *Weight bearing exercise and fall precautions.  *No additional pharmacologic therapy recommended at this time.  *There is no need to repeat BMD in the near future unless additional risk factors become apparent.     Assessment and Plan:      1. Malignant neoplasm of overlapping sites of right breast in female, estrogen receptor positive    2. Aromatase inhibitor use    3. Depression, unspecified depression type    4. Essential hypertension        1-2  - Ms Tre is a 67 yo woman with Stage IA (mp T1cN0 (I+)M0) Right breast cancer s/p R mastectomy, SLNB and immediate reconstruction on 6/4/19. She had a 1.3 cm invasive ductal carcinoma and a 1.7 cm invasive lobular carcinoma. Both tumors ER positive, HER2 negative. She had isolated tumor cells in one sentinel lymph node, which is stage N0 (I+).  Oncotype DX score of the 1.7 cm ILC showed an RS of 15, distant recurrence rate at 9 years is 4%, benefit from chemo is <1%. Oncotype DX of her 1.3 cm IDC showed RS of 24, distant recurrence rate at 9 years is 10%, benefit from chemotherapy <1%  - Has been on letrozole since Aug 2019. Tolerating it well  - continue with letrozole for 5 years  - Next due for L mammogram in Aug 2024. Will schedule  - Normal bone density on the last bone density. Next due in Jan 2025  - c/w OTC vit D    3.  - denies SI/HI  - refer to hem/onc/psych    4.  - BP elevated  - asked patient to f/u with PCP      Follow-up:      Return in 6 months    Route Chart for Scheduling    Med Onc Chart Routing  Urgent    Follow up with physician . Please schedule patient to see hem/onc/psych. schedule mammogram in August 2024. see me in 6 months   Follow up with [facility]    Infusion scheduling note    Injection scheduling note    Labs    Imaging    Pharmacy appointment    Other referrals

## 2024-04-01 ENCOUNTER — OFFICE VISIT (OUTPATIENT)
Dept: FAMILY MEDICINE | Facility: CLINIC | Age: 69
End: 2024-04-01
Attending: FAMILY MEDICINE
Payer: COMMERCIAL

## 2024-04-01 VITALS
SYSTOLIC BLOOD PRESSURE: 128 MMHG | TEMPERATURE: 98 F | DIASTOLIC BLOOD PRESSURE: 84 MMHG | OXYGEN SATURATION: 98 % | WEIGHT: 175.94 LBS | HEART RATE: 116 BPM | BODY MASS INDEX: 28.27 KG/M2 | HEIGHT: 66 IN

## 2024-04-01 DIAGNOSIS — K21.9 GASTROESOPHAGEAL REFLUX DISEASE, UNSPECIFIED WHETHER ESOPHAGITIS PRESENT: ICD-10-CM

## 2024-04-01 DIAGNOSIS — E78.2 MIXED HYPERLIPIDEMIA: ICD-10-CM

## 2024-04-01 DIAGNOSIS — Z00.00 ROUTINE GENERAL MEDICAL EXAMINATION AT A HEALTH CARE FACILITY: Primary | ICD-10-CM

## 2024-04-01 DIAGNOSIS — R63.0 LOSS OF APPETITE: ICD-10-CM

## 2024-04-01 DIAGNOSIS — Z17.0 MALIGNANT NEOPLASM OF RIGHT BREAST IN FEMALE, ESTROGEN RECEPTOR POSITIVE, UNSPECIFIED SITE OF BREAST: ICD-10-CM

## 2024-04-01 DIAGNOSIS — R79.89 ABNORMAL TSH: ICD-10-CM

## 2024-04-01 DIAGNOSIS — R73.01 IFG (IMPAIRED FASTING GLUCOSE): ICD-10-CM

## 2024-04-01 DIAGNOSIS — Z17.0 MALIGNANT NEOPLASM OF OVERLAPPING SITES OF RIGHT BREAST IN FEMALE, ESTROGEN RECEPTOR POSITIVE: ICD-10-CM

## 2024-04-01 DIAGNOSIS — C50.911 MALIGNANT NEOPLASM OF RIGHT BREAST IN FEMALE, ESTROGEN RECEPTOR POSITIVE, UNSPECIFIED SITE OF BREAST: ICD-10-CM

## 2024-04-01 DIAGNOSIS — C50.811 MALIGNANT NEOPLASM OF OVERLAPPING SITES OF RIGHT BREAST IN FEMALE, ESTROGEN RECEPTOR POSITIVE: ICD-10-CM

## 2024-04-01 PROCEDURE — 99397 PER PM REEVAL EST PAT 65+ YR: CPT | Mod: S$GLB,,, | Performed by: FAMILY MEDICINE

## 2024-04-01 PROCEDURE — 3008F BODY MASS INDEX DOCD: CPT | Mod: CPTII,S$GLB,, | Performed by: FAMILY MEDICINE

## 2024-04-01 PROCEDURE — 3079F DIAST BP 80-89 MM HG: CPT | Mod: CPTII,S$GLB,, | Performed by: FAMILY MEDICINE

## 2024-04-01 PROCEDURE — 1160F RVW MEDS BY RX/DR IN RCRD: CPT | Mod: CPTII,S$GLB,, | Performed by: FAMILY MEDICINE

## 2024-04-01 PROCEDURE — 99999 PR PBB SHADOW E&M-EST. PATIENT-LVL IV: CPT | Mod: PBBFAC,,, | Performed by: FAMILY MEDICINE

## 2024-04-01 PROCEDURE — 3074F SYST BP LT 130 MM HG: CPT | Mod: CPTII,S$GLB,, | Performed by: FAMILY MEDICINE

## 2024-04-01 PROCEDURE — 3044F HG A1C LEVEL LT 7.0%: CPT | Mod: CPTII,S$GLB,, | Performed by: FAMILY MEDICINE

## 2024-04-01 PROCEDURE — 1159F MED LIST DOCD IN RCRD: CPT | Mod: CPTII,S$GLB,, | Performed by: FAMILY MEDICINE

## 2024-04-01 PROCEDURE — 1126F AMNT PAIN NOTED NONE PRSNT: CPT | Mod: CPTII,S$GLB,, | Performed by: FAMILY MEDICINE

## 2024-04-01 RX ORDER — MIRTAZAPINE 15 MG/1
15 TABLET, FILM COATED ORAL NIGHTLY
Qty: 30 TABLET | Refills: 11 | Status: SHIPPED | OUTPATIENT
Start: 2024-04-01 | End: 2025-04-01

## 2024-04-02 ENCOUNTER — LAB VISIT (OUTPATIENT)
Dept: LAB | Facility: HOSPITAL | Age: 69
End: 2024-04-02
Attending: FAMILY MEDICINE
Payer: COMMERCIAL

## 2024-04-02 DIAGNOSIS — R73.01 IFG (IMPAIRED FASTING GLUCOSE): ICD-10-CM

## 2024-04-02 DIAGNOSIS — Z00.00 ROUTINE GENERAL MEDICAL EXAMINATION AT A HEALTH CARE FACILITY: ICD-10-CM

## 2024-04-02 DIAGNOSIS — R79.89 ABNORMAL TSH: ICD-10-CM

## 2024-04-02 LAB
ALBUMIN SERPL BCP-MCNC: 3.9 G/DL (ref 3.5–5.2)
ALP SERPL-CCNC: 84 U/L (ref 55–135)
ALT SERPL W/O P-5'-P-CCNC: 29 U/L (ref 10–44)
ANION GAP SERPL CALC-SCNC: 13 MMOL/L (ref 8–16)
AST SERPL-CCNC: 33 U/L (ref 10–40)
BASOPHILS # BLD AUTO: 0.04 K/UL (ref 0–0.2)
BASOPHILS NFR BLD: 0.7 % (ref 0–1.9)
BILIRUB SERPL-MCNC: 0.4 MG/DL (ref 0.1–1)
BUN SERPL-MCNC: 12 MG/DL (ref 8–23)
CALCIUM SERPL-MCNC: 9.4 MG/DL (ref 8.7–10.5)
CHLORIDE SERPL-SCNC: 108 MMOL/L (ref 95–110)
CHOLEST SERPL-MCNC: 247 MG/DL (ref 120–199)
CHOLEST/HDLC SERPL: 7.1 {RATIO} (ref 2–5)
CO2 SERPL-SCNC: 22 MMOL/L (ref 23–29)
CREAT SERPL-MCNC: 0.9 MG/DL (ref 0.5–1.4)
DIFFERENTIAL METHOD BLD: ABNORMAL
EOSINOPHIL # BLD AUTO: 0.2 K/UL (ref 0–0.5)
EOSINOPHIL NFR BLD: 2.8 % (ref 0–8)
ERYTHROCYTE [DISTWIDTH] IN BLOOD BY AUTOMATED COUNT: 13.8 % (ref 11.5–14.5)
EST. GFR  (NO RACE VARIABLE): >60 ML/MIN/1.73 M^2
ESTIMATED AVG GLUCOSE: 117 MG/DL (ref 68–131)
GLUCOSE SERPL-MCNC: 93 MG/DL (ref 70–110)
HBA1C MFR BLD: 5.7 % (ref 4–5.6)
HCT VFR BLD AUTO: 40.2 % (ref 37–48.5)
HDLC SERPL-MCNC: 35 MG/DL (ref 40–75)
HDLC SERPL: 14.2 % (ref 20–50)
HGB BLD-MCNC: 12.4 G/DL (ref 12–16)
IMM GRANULOCYTES # BLD AUTO: 0.02 K/UL (ref 0–0.04)
IMM GRANULOCYTES NFR BLD AUTO: 0.4 % (ref 0–0.5)
LDLC SERPL CALC-MCNC: 164 MG/DL (ref 63–159)
LYMPHOCYTES # BLD AUTO: 1.9 K/UL (ref 1–4.8)
LYMPHOCYTES NFR BLD: 35 % (ref 18–48)
MCH RBC QN AUTO: 27.3 PG (ref 27–31)
MCHC RBC AUTO-ENTMCNC: 30.8 G/DL (ref 32–36)
MCV RBC AUTO: 89 FL (ref 82–98)
MONOCYTES # BLD AUTO: 0.5 K/UL (ref 0.3–1)
MONOCYTES NFR BLD: 8.6 % (ref 4–15)
NEUTROPHILS # BLD AUTO: 2.8 K/UL (ref 1.8–7.7)
NEUTROPHILS NFR BLD: 52.5 % (ref 38–73)
NONHDLC SERPL-MCNC: 212 MG/DL
NRBC BLD-RTO: 0 /100 WBC
PLATELET # BLD AUTO: 336 K/UL (ref 150–450)
PMV BLD AUTO: 9.2 FL (ref 9.2–12.9)
POTASSIUM SERPL-SCNC: 4.1 MMOL/L (ref 3.5–5.1)
PROT SERPL-MCNC: 7.9 G/DL (ref 6–8.4)
RBC # BLD AUTO: 4.54 M/UL (ref 4–5.4)
SODIUM SERPL-SCNC: 143 MMOL/L (ref 136–145)
TRIGL SERPL-MCNC: 240 MG/DL (ref 30–150)
TSH SERPL DL<=0.005 MIU/L-ACNC: 0.52 UIU/ML (ref 0.4–4)
WBC # BLD AUTO: 5.35 K/UL (ref 3.9–12.7)

## 2024-04-02 PROCEDURE — 36415 COLL VENOUS BLD VENIPUNCTURE: CPT | Performed by: FAMILY MEDICINE

## 2024-04-02 PROCEDURE — 80053 COMPREHEN METABOLIC PANEL: CPT | Performed by: FAMILY MEDICINE

## 2024-04-02 PROCEDURE — 85025 COMPLETE CBC W/AUTO DIFF WBC: CPT | Performed by: FAMILY MEDICINE

## 2024-04-02 PROCEDURE — 80061 LIPID PANEL: CPT | Performed by: FAMILY MEDICINE

## 2024-04-02 PROCEDURE — 84443 ASSAY THYROID STIM HORMONE: CPT | Performed by: FAMILY MEDICINE

## 2024-04-02 PROCEDURE — 83036 HEMOGLOBIN GLYCOSYLATED A1C: CPT | Performed by: FAMILY MEDICINE

## 2024-04-03 NOTE — PROGRESS NOTES
Subjective     Patient ID: Jaja Toledo is a 68 y.o. female.    Chief Complaint: Annual Exam    68 yr old pleasant female with HLD, IFG, GERD, presents today for her annual and lab work and evaluation of HLD, and IFG. No complaints today.    HLD - controlled -   LDLCALC                  164.0 (H)           04/02/2024      - on diet     Breast CA - on letrozole daily     Loss of appetite - on Remeron - need refill    History as below - reviewed     Hyperlipidemia  This is a chronic problem. The current episode started more than 1 year ago. The problem is controlled. Recent lipid tests were reviewed and are normal. She has no history of chronic renal disease, hypothyroidism or nephrotic syndrome. There are no known factors aggravating her hyperlipidemia. Pertinent negatives include no chest pain, myalgias or shortness of breath. Current antihyperlipidemic treatment includes statins. There are no compliance problems.  Risk factors for coronary artery disease include dyslipidemia and post-menopausal.     Review of Systems   Constitutional: Negative.  Negative for activity change, diaphoresis and unexpected weight change.   HENT: Negative.  Negative for nasal congestion, ear discharge, hearing loss, rhinorrhea, sore throat and voice change.    Eyes: Negative.  Negative for pain, discharge and visual disturbance.   Respiratory: Negative.  Negative for chest tightness, shortness of breath and wheezing.    Cardiovascular: Negative.  Negative for chest pain.   Gastrointestinal: Negative.  Negative for abdominal distention, anal bleeding, constipation and nausea.   Endocrine: Negative.  Negative for cold intolerance, polydipsia and polyuria.   Genitourinary: Negative.  Negative for decreased urine volume, difficulty urinating, dysuria, frequency, menstrual problem and vaginal pain.   Musculoskeletal: Negative.  Negative for arthralgias, gait problem and myalgias.   Integumentary:  Negative for color change, pallor and  wound. Negative.   Allergic/Immunologic: Negative.  Negative for environmental allergies and immunocompromised state.   Neurological: Negative.  Negative for dizziness, tremors, seizures, speech difficulty and headaches.   Hematological: Negative.  Negative for adenopathy. Does not bruise/bleed easily.   Psychiatric/Behavioral: Negative.  Negative for agitation, confusion, decreased concentration, hallucinations, self-injury and suicidal ideas. The patient is not nervous/anxious.      Past Medical History:   Diagnosis Date    Anxiety     Chronic low back pain     Gastritis     GERD (gastroesophageal reflux disease)     History of cervical cancer     History of recurrent urinary tract infection     IFG (impaired fasting glucose)     Malignant neoplasm of overlapping sites of right breast in female, estrogen receptor positive 7/19/2019    Soft tissue tumor, malignant     sarcoma left foot       Past Surgical History:   Procedure Laterality Date    ABDOMINOPLASTY N/A 11/29/2018    Procedure: EXTENDED ABDOMINOPLASTY;  Surgeon: Mio Arriaga MD;  Location: [facility] OR;  Service: Plastics;  Laterality: N/A;    AXILLARY NODE DISSECTION Right 6/4/2019    Procedure: LYMPHADENECTOMY, AXILLARY RIGHT;  Surgeon: Sabine Arteaga MD;  Location: [facility] OR [address] [place] [address];  Service: General;  Laterality: Right;    BACK SURGERY      BREAST CYST EXCISION Left 1984    BREAST RECONSTRUCTION Right 6/6/2022    Procedure: RECONSTRUCTION, BREAST;  Surgeon: Leonard Cordero MD;  [facility];  Service: Plastics;  Laterality: Right;  T-DAP    BREAST REVISION SURGERY Right 7/20/2020    Procedure: BREAST REVISION SURGERY RIGHT;  Surgeon: Leonard Cordero MD;  Location: [facility] OR 2ND FLR;  Service: Plastics;  Laterality: Right;    COLONOSCOPY N/A 10/19/2020    Procedure: COLONOSCOPY;  Surgeon: Chanel Saenz MD;  Location: [facility] ENDO (4TH FLR);  Service: Endoscopy;  Laterality: N/A;  covid test 10/16-[facility] urgent [facility]     COLONOSCOPY N/A 8/15/2023    Procedure: COLONOSCOPY;  Surgeon: Ranjan Avery MD;  Location: [facility] ENDO (4TH FLR);  Service: Endoscopy;  Laterality: N/A;  peg prep  prep instructions placed at    prep instructions sent to pt email [email or web address]  8/14 Revised time and prep instructions - PC    FOOT SURGERY  2009 or 2010    INSERTION OF BREAST IMPLANT Right 6/4/2019    Procedure: INSERTION, BREAST IMPLANT RIGHT;  Surgeon: Leonard Cordero MD;  Location: [facility] OR 2ND FLR;  Service: Plastics;  Laterality: Right;    INSERTION OF BREAST TISSUE EXPANDER Right 6/4/2019    Procedure: INSERTION, TISSUE EXPANDER, BREAST RIGHT;  Surgeon: Leonard Cordero MD;  Location: [facility] OR 2ND FLR;  Service: Plastics;  Laterality: Right;    LIPOSUCTION W/ FAT INJECTION N/A 11/29/2018    Procedure: LIPOSUCTION, WITH FAT TRANSFER TO BUTTOCKS;  Surgeon: Mio Arriaga MD;  Location: [facility] OR;  Service: Plastics;  Laterality: N/A;    MASTOPEXY Left 6/4/2019    Procedure: MASTOPEXY LEFT;  Surgeon: Leonard Cordero MD;  [facility];  Service: Plastics;  Laterality: Left;    MASTOPEXY Left 7/20/2020    Procedure: MASTOPEXY LEFT;  Surgeon: Leonard Cordero MD;  [facility];  Service: Plastics;  Laterality: Left;    MASTOPEXY Bilateral 6/22/2023    Procedure: MASTOPEXY;  Surgeon: Leonard Cordero MD;  [facility];  Service: Plastics;  Laterality: Bilateral;  cresent mastopexy    SENTINEL LYMPH NODE BIOPSY Right 6/4/2019    Procedure: BIOPSY, LYMPH NODE, SENTINEL RIGHT;  Surgeon: Sabine Arteaga MD;  [facility];  Service: General;  Laterality: Right;    SIMPLE MASTECTOMY Right 6/4/2019    Procedure: MASTECTOMY, SIMPLE RIGHT (CONSENT AM OF) 4.0 hr case;  Surgeon: Sabine Arteaga MD;  [facility];  Service: General;  Laterality: Right;    TOTAL REDUCTION MAMMOPLASTY Left 2019    TOTAL VAGINAL HYSTERECTOMY  1996    TUBAL  LIGATION         Family History   Problem Relation Age of Onset    Breast cancer Sister         dx in 2006    No Known Problems Mother     Throat cancer Father     Colon cancer Neg Hx     Diabetes Neg Hx     Hypertension Neg Hx     Ovarian cancer Neg Hx     Stroke Neg Hx     Anesthesia problems Neg Hx        Social History     Socioeconomic History    Marital status:    Tobacco Use    Smoking status: Former     Current packs/day: 0.00     Average packs/day: 0.3 packs/day for 15.0 years (3.8 ttl pk-yrs)     Types: Cigarettes     Start date: 8/15/1978     Quit date: 8/15/1993     Years since quittin.6    Smokeless tobacco: Never   Substance and Sexual Activity    Alcohol use: Yes     Alcohol/week: 1.0 standard drink of alcohol     Types: 1 Glasses of wine per week     Comment: seldom    Drug use: No    Sexual activity: Yes     Partners: Male       Current Outpatient Medications   Medication Sig Dispense Refill    acetaminophen (TYLENOL) 500 MG tablet Take 2 tablets (1,000 mg total) by mouth every 8 (eight) hours as needed for Pain. 20 tablet 0    bacitracin 500 unit/gram ointment Apply topically 3 (three) times daily. 28 g 0    calcium carbonate (CALCIUM 500 ORAL) Take by mouth once daily.       cyproheptadine (PERIACTIN) 4 mg tablet TAKE 1 TABLET BY MOUTH THREE TIMES DAILY AS NEEDED FOR DECREASED APPETITE 90 tablet 2    docusate sodium (COLACE) 100 MG capsule Take 1 capsule (100 mg total) by mouth 2 (two) times daily. 60 capsule 0    ibuprofen (ADVIL,MOTRIN) 600 MG tablet Take 1 tablet (600 mg total) by mouth every 6 (six) hours as needed for Pain. 20 tablet 0    lactulose (CHRONULAC) 20 gram/30 mL Soln Take 30 mLs (20 g total) by mouth after meals as needed (constipation). 200 mL 0    letrozole (FEMARA) 2.5 mg Tab Take 1 tablet (2.5 mg total) by mouth once daily. 90 tablet 3    multivitamin capsule Take 1 capsule by mouth once daily.      triamcinolone acetonide 0.1% (KENALOG) 0.1 % cream Apply  topically 2 (two) times daily. 15 g 0    mirtazapine (REMERON) 15 MG tablet Take 1 tablet (15 mg total) by mouth every evening. 30 tablet 11     No current facility-administered medications for this visit.       Review of patient's allergies indicates:  No Known Allergies       Objective   Vitals:    04/01/24 1425   BP: 128/84   Pulse: (!) 116   Temp: 98.2 °F (36.8 °C)       Physical Exam  Constitutional:       General: She is not in acute distress.     Appearance: She is well-developed. She is not diaphoretic.   HENT:      Head: Normocephalic and atraumatic.      Right Ear: External ear normal.      Left Ear: External ear normal.      Nose: Nose normal.      Mouth/Throat:      Pharynx: No oropharyngeal exudate.   Eyes:      General: No scleral icterus.        Right eye: No discharge.         Left eye: No discharge.      Conjunctiva/sclera: Conjunctivae normal.      Pupils: Pupils are equal, round, and reactive to light.   Neck:      Thyroid: No thyromegaly.      Vascular: No JVD.      Trachea: No tracheal deviation.   Cardiovascular:      Rate and Rhythm: Normal rate and regular rhythm.      Heart sounds: Normal heart sounds. No murmur heard.     No friction rub. No gallop.   Pulmonary:      Effort: Pulmonary effort is normal.      Breath sounds: Normal breath sounds. No stridor. No wheezing or rales.   Chest:      Chest wall: No tenderness.   Abdominal:      General: Bowel sounds are normal. There is no distension.      Palpations: Abdomen is soft. There is no mass.      Tenderness: There is no abdominal tenderness. There is no guarding or rebound.      Hernia: No hernia is present.   Musculoskeletal:         General: No tenderness. Normal range of motion.      Cervical back: Normal range of motion and neck supple.   Lymphadenopathy:      Cervical: No cervical adenopathy.   Skin:     General: Skin is warm and dry.      Coloration: Skin is not pale.      Findings: No erythema or rash.   Neurological:      Mental  Status: She is alert and oriented to person, place, and time.      Cranial Nerves: No cranial nerve deficit.      Motor: No abnormal muscle tone.      Coordination: Coordination normal.      Deep Tendon Reflexes: Reflexes are normal and symmetric. Reflexes normal.   Psychiatric:         Behavior: Behavior normal.         Thought Content: Thought content normal.         Judgment: Judgment normal.            Assessment and Plan     1. Routine general medical examination at a health care facility    2. IFG (impaired fasting glucose)    3. Gastroesophageal reflux disease, unspecified whether esophagitis present    4. Malignant neoplasm of overlapping sites of right breast in female, estrogen receptor positive    5. Malignant neoplasm of right breast in female, estrogen receptor positive, unspecified site of breast    6. Abnormal TSH    7. Mixed hyperlipidemia    8. Loss of appetite  -     mirtazapine (REMERON) 15 MG tablet; Take 1 tablet (15 mg total) by mouth every evening.  Dispense: 30 tablet; Refill: 11        Wellness check  -normal exam  -labs    HLD  -diet control    Breast CA  -in remission    Loss of appetite  -remeron rx      Spent adequate time in obtaining history and explaining differentials    Follow up in about 1 year (around 4/1/2025), or if symptoms worsen or fail to improve.           Follow up in about 1 year (around 4/1/2025), or if symptoms worsen or fail to improve.

## 2024-04-24 DIAGNOSIS — R63.0 POOR APPETITE: ICD-10-CM

## 2024-04-24 RX ORDER — CYPROHEPTADINE HYDROCHLORIDE 4 MG/1
TABLET ORAL
Qty: 90 TABLET | Refills: 2 | Status: SHIPPED | OUTPATIENT
Start: 2024-04-24

## 2024-05-15 ENCOUNTER — TELEPHONE (OUTPATIENT)
Dept: HEMATOLOGY/ONCOLOGY | Facility: CLINIC | Age: 69
End: 2024-05-15
Payer: COMMERCIAL

## 2024-05-16 ENCOUNTER — TELEPHONE (OUTPATIENT)
Dept: HEMATOLOGY/ONCOLOGY | Facility: CLINIC | Age: 69
End: 2024-05-16
Payer: COMMERCIAL

## 2024-05-17 ENCOUNTER — TELEPHONE (OUTPATIENT)
Dept: PSYCHIATRY | Facility: CLINIC | Age: 69
End: 2024-05-17
Payer: COMMERCIAL

## 2024-05-20 ENCOUNTER — TELEPHONE (OUTPATIENT)
Dept: HEMATOLOGY/ONCOLOGY | Facility: CLINIC | Age: 69
End: 2024-05-20
Payer: COMMERCIAL

## 2024-05-27 ENCOUNTER — OFFICE VISIT (OUTPATIENT)
Dept: FAMILY MEDICINE | Facility: CLINIC | Age: 69
End: 2024-05-27
Attending: FAMILY MEDICINE
Payer: COMMERCIAL

## 2024-05-27 VITALS
SYSTOLIC BLOOD PRESSURE: 129 MMHG | BODY MASS INDEX: 27.99 KG/M2 | DIASTOLIC BLOOD PRESSURE: 79 MMHG | OXYGEN SATURATION: 98 % | WEIGHT: 174.19 LBS | HEIGHT: 66 IN | HEART RATE: 117 BPM

## 2024-05-27 DIAGNOSIS — R51.9 ACUTE INTRACTABLE HEADACHE, UNSPECIFIED HEADACHE TYPE: ICD-10-CM

## 2024-05-27 DIAGNOSIS — R51.9 HEADACHE, CHRONIC DAILY: Primary | ICD-10-CM

## 2024-05-27 DIAGNOSIS — R20.0 NUMBNESS OF FINGERS OF BOTH HANDS: ICD-10-CM

## 2024-05-27 PROCEDURE — 99214 OFFICE O/P EST MOD 30 MIN: CPT | Mod: S$GLB,,, | Performed by: FAMILY MEDICINE

## 2024-05-27 PROCEDURE — 1160F RVW MEDS BY RX/DR IN RCRD: CPT | Mod: CPTII,S$GLB,, | Performed by: FAMILY MEDICINE

## 2024-05-27 PROCEDURE — 3044F HG A1C LEVEL LT 7.0%: CPT | Mod: CPTII,S$GLB,, | Performed by: FAMILY MEDICINE

## 2024-05-27 PROCEDURE — 1159F MED LIST DOCD IN RCRD: CPT | Mod: CPTII,S$GLB,, | Performed by: FAMILY MEDICINE

## 2024-05-27 PROCEDURE — 3008F BODY MASS INDEX DOCD: CPT | Mod: CPTII,S$GLB,, | Performed by: FAMILY MEDICINE

## 2024-05-27 PROCEDURE — 99999 PR PBB SHADOW E&M-EST. PATIENT-LVL IV: CPT | Mod: PBBFAC,,, | Performed by: FAMILY MEDICINE

## 2024-05-27 PROCEDURE — 3078F DIAST BP <80 MM HG: CPT | Mod: CPTII,S$GLB,, | Performed by: FAMILY MEDICINE

## 2024-05-27 PROCEDURE — 3074F SYST BP LT 130 MM HG: CPT | Mod: CPTII,S$GLB,, | Performed by: FAMILY MEDICINE

## 2024-05-28 NOTE — PROGRESS NOTES
Subjective     Patient ID: Jaja Toledo is a 68 y.o. female.    Chief Complaint: Follow-up    68 yr old pleasant female with HLD, IFG, GERD, presents today for her follow up and c/o headache, numbness in fingers and leave.    Headache - chronic - top of head - 7-9/10 and aching type. No relieving factors. BP stable. No vision changes. 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   Hyperlipidemia  This is a chronic problem. The current episode started more than 1 year ago. The problem is controlled. Recent lipid tests were reviewed and are normal. She has no history of chronic renal disease, hypothyroidism or nephrotic syndrome. There are no known factors aggravating her hyperlipidemia. Pertinent negatives include no chest pain, myalgias or shortness of breath. Current antihyperlipidemic treatment includes statins. There are no compliance problems.  Risk factors for coronary artery disease include dyslipidemia and post-menopausal.     Review of Systems   Constitutional: Negative.  Negative for activity change, diaphoresis and unexpected weight change.   HENT: Negative.  Negative for nasal congestion, ear discharge, hearing loss, rhinorrhea, sore throat and voice change.    Eyes: Negative.  Negative for pain, discharge and visual disturbance.   Respiratory: Negative.  Negative for chest tightness, shortness of breath and wheezing.    Cardiovascular: Negative.  Negative for chest pain.   Gastrointestinal: Negative.  Negative for abdominal  distention, anal bleeding, constipation and nausea.   Endocrine: Negative.  Negative for cold intolerance, polydipsia and polyuria.   Genitourinary: Negative.  Negative for decreased urine volume, difficulty urinating, dysuria, frequency, menstrual problem and vaginal pain.   Musculoskeletal: Negative.  Negative for arthralgias, gait problem and myalgias.   Integumentary:  Negative for color change, pallor and wound. Negative.   Allergic/Immunologic: Negative.  Negative for environmental allergies and immunocompromised state.   Neurological: Negative.  Negative for dizziness, tremors, seizures, speech difficulty and headaches.   Hematological: Negative.  Negative for adenopathy. Does not bruise/bleed easily.   Psychiatric/Behavioral: Negative.  Negative for agitation, confusion, decreased concentration, hallucinations, self-injury and suicidal ideas. The patient is not nervous/anxious.      Past Medical History:   Diagnosis Date    Anxiety     Chronic low back pain     Gastritis     GERD (gastroesophageal reflux disease)     History of cervical cancer     History of recurrent urinary tract infection     IFG (impaired fasting glucose)     Malignant neoplasm of overlapping sites of right breast in female, estrogen receptor positive 7/19/2019    Soft tissue tumor, malignant     sarcoma left foot       Past Surgical History:   Procedure Laterality Date    ABDOMINOPLASTY N/A 11/29/2018    Procedure: EXTENDED ABDOMINOPLASTY;  Surgeon: Mio Arriaga MD;  Location: [facility] OR;  Service: Plastics;  Laterality: N/A;    AXILLARY NODE DISSECTION Right 6/4/2019    Procedure: LYMPHADENECTOMY, AXILLARY RIGHT;  Surgeon: Sabine Arteaga MD;  Location: [facility] OR [address] [place] [address];  Service: General;  Laterality: Right;    BACK SURGERY      BREAST CYST EXCISION Left 1984    BREAST RECONSTRUCTION Right 6/6/2022    Procedure: RECONSTRUCTION, BREAST;  Surgeon: Leonard Cordero MD;  Location: [facility] OR [address] [place] [address];  Service: Plastics;  Laterality:  Right;  T-DAP    BREAST REVISION SURGERY Right 7/20/2020    Procedure: BREAST REVISION SURGERY RIGHT;  Surgeon: Leonard Cordero MD;  [facility];  Service: Plastics;  Laterality: Right;    COLONOSCOPY N/A 10/19/2020    Procedure: COLONOSCOPY;  Surgeon: Chanel Saenz MD;  Location: [facility] ENDO (4TH FLR);  Service: Endoscopy;  Laterality: N/A;  covid test 10/16[facility] urgent [facility]    COLONOSCOPY N/A 8/15/2023    Procedure: COLONOSCOPY;  Surgeon: Ranjan Avery MD;  Location: [facility] ENDO (4TH FLR);  Service: Endoscopy;  Laterality: N/A;  peg prep  prep instructions placed at    prep instructions sent to pt email [email or web address]  8/14 Revised time and prep instructions - PC    FOOT SURGERY  2009 or 2010    INSERTION OF BREAST IMPLANT Right 6/4/2019    Procedure: INSERTION, BREAST IMPLANT RIGHT;  Surgeon: Leonard Cordero MD;  [facility];  Service: Plastics;  Laterality: Right;    INSERTION OF BREAST TISSUE EXPANDER Right 6/4/2019    Procedure: INSERTION, TISSUE EXPANDER, BREAST RIGHT;  Surgeon: Leonard Cordero MD;  Location: [facility] OR [address] [place] [address];  Service: Plastics;  Laterality: Right;    LIPOSUCTION W/ FAT INJECTION N/A 11/29/2018    Procedure: LIPOSUCTION, WITH FAT TRANSFER TO BUTTOCKS;  Surgeon: Mio Arriaga MD;  Location: [facility];  Service: Plastics;  Laterality: N/A;    MASTOPEXY Left 6/4/2019    Procedure: MASTOPEXY LEFT;  Surgeon: Leonard Cordero MD;  Location: [facility] OR [address] [place] [address];  Service: Plastics;  Laterality: Left;    MASTOPEXY Left 7/20/2020    Procedure: MASTOPEXY LEFT;  Surgeon: Leonard Cordero MD;  Location: [facility] OR [address] [place] [address];  Service: Plastics;  Laterality: Left;    MASTOPEXY Bilateral 6/22/2023    Procedure: MASTOPEXY;  Surgeon: Leonard Cordero MD;  Location: [facility] OR [address] [place] [address];  Service: Plastics;  Laterality: Bilateral;  cresent mastopexy    SENTINEL LYMPH NODE BIOPSY Right 6/4/2019    Procedure: BIOPSY,  LYMPH NODE, SENTINEL RIGHT;  Surgeon: Sabine Arteaga MD;  [facility];  Service: General;  Laterality: Right;    SIMPLE MASTECTOMY Right 2019    Procedure: MASTECTOMY, SIMPLE RIGHT (CONSENT AM OF) 4.0 hr case;  Surgeon: Sabine Arteaga MD;  Location: [facility] OR 2ND FLR;  Service: General;  Laterality: Right;    TOTAL REDUCTION MAMMOPLASTY Left     TOTAL VAGINAL HYSTERECTOMY      TUBAL LIGATION         Family History   Problem Relation Name Age of Onset    Breast cancer Sister Mary Ellen         dx in     No Known Problems Mother      Throat cancer Father      Colon cancer Neg Hx      Diabetes Neg Hx      Hypertension Neg Hx      Ovarian cancer Neg Hx      Stroke Neg Hx      Anesthesia problems Neg Hx         Social History     Socioeconomic History    Marital status:    Tobacco Use    Smoking status: Former     Current packs/day: 0.00     Average packs/day: 0.3 packs/day for 15.0 years (3.8 ttl pk-yrs)     Types: Cigarettes     Start date: 8/15/1978     Quit date: 8/15/1993     Years since quittin.8    Smokeless tobacco: Never   Substance and Sexual Activity    Alcohol use: Yes     Alcohol/week: 1.0 standard drink of alcohol     Types: 1 Glasses of wine per week     Comment: seldom    Drug use: No    Sexual activity: Yes     Partners: Male       Current Outpatient Medications   Medication Sig Dispense Refill    acetaminophen (TYLENOL) 500 MG tablet Take 2 tablets (1,000 mg total) by mouth every 8 (eight) hours as needed for Pain. 20 tablet 0    bacitracin 500 unit/gram ointment Apply topically 3 (three) times daily. 28 g 0    calcium carbonate (CALCIUM 500 ORAL) Take by mouth once daily.       cyproheptadine (PERIACTIN) 4 mg tablet TAKE 1 TABLET BY MOUTH THREE TIMES DAILY AS NEEDED FOR DECREASED APPETITE 90 tablet 2    docusate sodium (COLACE) 100 MG capsule Take 1 capsule (100 mg total) by mouth 2 (two) times daily. 60 capsule 0    ibuprofen (ADVIL,MOTRIN) 600 MG tablet Take 1  tablet (600 mg total) by mouth every 6 (six) hours as needed for Pain. 20 tablet 0    lactulose (CHRONULAC) 20 gram/30 mL Soln Take 30 mLs (20 g total) by mouth after meals as needed (constipation). 200 mL 0    letrozole (FEMARA) 2.5 mg Tab Take 1 tablet (2.5 mg total) by mouth once daily. 90 tablet 3    mirtazapine (REMERON) 15 MG tablet Take 1 tablet (15 mg total) by mouth every evening. 30 tablet 11    multivitamin capsule Take 1 capsule by mouth once daily.      triamcinolone acetonide 0.1% (KENALOG) 0.1 % cream Apply topically 2 (two) times daily. 15 g 0     No current facility-administered medications for this visit.       Review of patient's allergies indicates:  No Known Allergies       Objective   Vitals:    05/27/24 1607   BP: 129/79   Pulse: (!) 117       Physical Exam  Constitutional:       General: She is not in acute distress.     Appearance: She is well-developed. She is not diaphoretic.   HENT:      Head: Normocephalic and atraumatic.      Right Ear: External ear normal.      Left Ear: External ear normal.      Nose: Nose normal.      Mouth/Throat:      Pharynx: No oropharyngeal exudate.   Eyes:      General: No scleral icterus.        Right eye: No discharge.         Left eye: No discharge.      Conjunctiva/sclera: Conjunctivae normal.      Pupils: Pupils are equal, round, and reactive to light.   Neck:      Thyroid: No thyromegaly.      Vascular: No JVD.      Trachea: No tracheal deviation.   Cardiovascular:      Rate and Rhythm: Normal rate and regular rhythm.      Heart sounds: Normal heart sounds. No murmur heard.     No friction rub. No gallop.   Pulmonary:      Effort: Pulmonary effort is normal.      Breath sounds: Normal breath sounds. No stridor. No wheezing or rales.   Chest:      Chest wall: No tenderness.   Abdominal:      General: Bowel sounds are normal. There is no distension.      Palpations: Abdomen is soft. There is no mass.      Tenderness: There is no abdominal tenderness. There  is no guarding or rebound.      Hernia: No hernia is present.   Musculoskeletal:         General: Tenderness (ttp b/l wrista nd tinel and phalen +) present. Normal range of motion.      Cervical back: Normal range of motion and neck supple.   Lymphadenopathy:      Cervical: No cervical adenopathy.   Skin:     General: Skin is warm and dry.      Coloration: Skin is not pale.      Findings: No erythema or rash.   Neurological:      Mental Status: She is alert and oriented to person, place, and time.      Cranial Nerves: No cranial nerve deficit.      Motor: No abnormal muscle tone.      Coordination: Coordination normal.      Deep Tendon Reflexes: Reflexes are normal and symmetric. Reflexes normal.   Psychiatric:         Behavior: Behavior normal.         Thought Content: Thought content normal.         Judgment: Judgment normal.            Assessment and Plan     1. Headache, chronic daily  -     Ambulatory referral/consult to Neurology; Future; Expected date: 06/03/2024  -     CT Head Without Contrast; Future; Expected date: 05/27/2024    2. Numbness of fingers of both hands  -     EMG W/ ULTRASOUND AND NERVE CONDUCTION TEST 2 Extremities; Future  -     Ambulatory referral/consult to Neurology; Future; Expected date: 06/03/2024    3. Acute intractable headache, unspecified headache type  -     CT Head Without Contrast; Future; Expected date: 05/27/2024        Headache  -tension vs mograine  -CT to r/o ICA    Numbness fingers  -CTS  -EMG  -neurology referral    HLD  -diet control    Breast CA  -in remission    Loss of appetite  -remeron rx      Spent adequate time in obtaining history and explaining differentials      30 minutes spent during this visit of which greater than 50% devoted to face-face counseling and coordination of care regarding diagnosis and management plan             No follow-ups on file.

## 2024-05-31 ENCOUNTER — TELEPHONE (OUTPATIENT)
Dept: PSYCHIATRY | Facility: CLINIC | Age: 69
End: 2024-05-31
Payer: COMMERCIAL

## 2024-06-25 ENCOUNTER — OFFICE VISIT (OUTPATIENT)
Dept: PSYCHIATRY | Facility: CLINIC | Age: 69
End: 2024-06-25
Payer: COMMERCIAL

## 2024-06-25 DIAGNOSIS — Z56.6 WORK STRESS: ICD-10-CM

## 2024-06-25 DIAGNOSIS — C50.811 MALIGNANT NEOPLASM OF OVERLAPPING SITES OF RIGHT BREAST IN FEMALE, ESTROGEN RECEPTOR POSITIVE: ICD-10-CM

## 2024-06-25 DIAGNOSIS — Z17.0 MALIGNANT NEOPLASM OF OVERLAPPING SITES OF RIGHT BREAST IN FEMALE, ESTROGEN RECEPTOR POSITIVE: ICD-10-CM

## 2024-06-25 DIAGNOSIS — F41.9 ANXIETY: Primary | ICD-10-CM

## 2024-06-25 PROCEDURE — 99999 PR PBB SHADOW E&M-EST. PATIENT-LVL II: CPT | Mod: PBBFAC,,, | Performed by: PSYCHOLOGIST

## 2024-06-25 SDOH — SOCIAL DETERMINANTS OF HEALTH (SDOH): OTHER PHYSICAL AND MENTAL STRAIN RELATED TO WORK: Z56.6

## 2024-06-25 NOTE — PROGRESS NOTES
INFORMED CONSENT/ LIMITS of CONFIDENTIALITY: Prior to beginning the interview, the patient's identification was confirmed using two identifiers. Jaja Toledo  was informed of the possible risks and benefits of psychological interventions (e.g., counseling, psychotherapy, testing) and provided information regarding the handling of protected health records and   the limits of confidentiality, including the importance of reporting any suicidal or homicidal ideation to ensure safety of all parties. This provider explained the purpose of today's appointment and the patient was provided with time to ask questions regarding this information.  Acceptance and understanding of these conditions was expressed, and Jaja Toledo freely consented to this evaluation.     PSYCHO-ONCOLOGY INTAKE    Diagnostic Interview - CPT 51945    Date: 6/25/2024  Site: [facility]     Evaluation Length (direct face-to-face time):  45 minutes     Referral Source: Jake Petersen MD   Oncologist:   PCP: Andres Hurtado MD    Clinical status of patient: Outpatient    Jaja Toledo, a 68 y.o. female, seen for initial evaluation visit.  Met with patient.    Chief complaint/reason for encounter: adjustment to illness, Psychological Evaluation and treatment recommendations    Medical/Surgical History:    Patient Active Problem List   Diagnosis    Chronic low back pain    GERD (gastroesophageal reflux disease)    IFG (impaired fasting glucose)    Localized adiposity of abdomen    Malignant neoplasm of overlapping sites of right breast in female, estrogen receptor positive    Mixed hyperlipidemia       Health Behaviors:       ETOH Use: No (rare)       Tobacco Use: No   Illicit Drug Use:  No     Prescription Misuse:No   Caffeine: minimal   Exercise:The patient engages in environmental activity only.   Firearms:  No   Advanced directives:No         Past Psychiatric History:   Inpatient treatment: No     Outpatient treatment: No     Prior  substance abuse treatment: No     Suicide Attempts: No     Psychotropic Medications:  Current: Remeron and Valium       Past: none    Current medications as per below, allergies reviewed in chart.    Current Outpatient Medications   Medication    acetaminophen (TYLENOL) 500 MG tablet    bacitracin 500 unit/gram ointment    calcium carbonate (CALCIUM 500 ORAL)    cyproheptadine (PERIACTIN) 4 mg tablet    diazePAM (VALIUM) 10 MG Tab    docusate sodium (COLACE) 100 MG capsule    ibuprofen (ADVIL,MOTRIN) 600 MG tablet    lactulose (CHRONULAC) 20 gram/30 mL Soln    letrozole (FEMARA) 2.5 mg Tab    mirtazapine (REMERON) 15 MG tablet    multivitamin capsule    triamcinolone acetonide 0.1% (KENALOG) 0.1 % cream     No current facility-administered medications for this visit.          Social situation/Stressors: Jaja Toledo lives with partner and son in [facility].  She is a full-time  at [employer].  She has been in her job for 30 years.    Jaja Toledo has been   and has 3 adult children children.  Her partner is supportive      Additional stressors:  Work Stress      Current Evaluation:     Mental Status Exam: Jaja Toledo arrived late for the assessment session.The patient was fully cooperative throughout the interview and was an adequate historian   Appearance: age appropriate, appropriately  dressed, adequately  groomed  Behavior/Cooperation: friendly and cooperative  Speech: normal in rate, volume, and tone and appropriate quality, quantity and organization of sentences  Mood: anxious  Affect: mood congruent  Thought Process: goal-directed, logical  Thought Content: normal, no suicidality, no homicidality, delusions, or paranoia;did not appear to be responding to internal stimuli during the interview.   Orientation: grossly intact  Memory: Grossly intact  Attention Span/Concentration: Attends to interview without distraction; reports no difficulty  Fund of Knowledge:  average  Estimate of Intelligence: average from verbal skills and history  Cognition: grossly intact  Insight: patient has awareness of illness; good insight into own behavior and behavior of others  Judgment: the patient's behavior is adequate to circumstances      6/28/2024     4:32 PM 3/11/2024     2:41 PM 9/12/2023     2:02 PM 2/13/2023     1:18 PM 7/22/2022     9:27 AM 2/14/2022     2:59 PM 5/9/2019    10:35 AM   DISTRESS SCREENING   Distress Score 4 10 - Extreme Distress 0 - No Distress 0 - No Distress 1 0 6   Practical Concerns Work         Social Concerns None of these         Emotional Concerns Worry or anxiety         Retire Spiritual or Episcopalian Concerns       No   Spiritual or Episcopalian Concerns None of these         Physical Concerns None of these                 6/28/2024     4:32 PM   GAD7   1. Feeling nervous, anxious, or on edge? 0   2. Not being able to stop or control worrying? 0   3. Worrying too much about different things? 0   4. Trouble relaxing? 0   5. Being so restless that it is hard to sit still? 0   6. Becoming easily annoyed or irritable? 0   7. Feeling afraid as if something awful might happen? 0   8. If you checked off any problems, how difficult have these problems made it for you to do your work, take care of things at home, or get along with other people? 0   KEVIN-7 Score 0          6/28/2024     4:33 PM   PHQ-9 Depression Patient Health Questionnaire   Little interest or pleasure in doing things 0   Feeling down, depressed, or hopeless 0   Trouble falling or staying asleep, or sleeping too much 0   Feeling tired or having little energy 0   Poor appetite or overeating 0   Feeling bad about yourself - or that you are a failure or have let yourself or your family down 0   Trouble concentrating on things, such as reading the newspaper or watching television 0   Moving or speaking so slowly that other people could have noticed. 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 Patient with stress at work due to demands, phone calls, and talking with clients. She is currently on FMLA. He has been in the ED today with mother- for Has. She is trying to retire in Dec 2024.No other complaint-when she is not at work she declines mood symptoms.      Behavioral Health Symptoms:   Mood: Depression: anhedonia, insomnia, fatigue, feelings of worthlessness/guilt, difficulty concentrating, and hopelessness no prior; no SI/HI  Amy: Denies  Psychosis: Denies   Anxiety: Feeling nervous, anxious, or on edge, Uncontrollable worry (about work), Excessive worry (interfering with functioning), Difficulty relaxing, Restlessness, Irritability, and Fear of unknown;  prior anxiety:episodic  Generalized anxiety: Denies    Panic Disorder: Denies  Social/specific phobia: Denies   OCD: Denies  Trauma: Denies  Sexual Dysfunction:  Denies  Substance abuse: denied  Cognitive functioning: denied  Health behaviors: noncontributory  Sleep: Takes Remeron with  benefit  CAM Therapies: None         Assessment - Diagnosis - Goals:       ICD-10-CM ICD-9-CM   1. Anxiety  F41.9 300.00   2. Work stress  Z56.6 V62.1   3. Malignant neoplasm of overlapping sites of right breast in female, estrogen receptor positive  C50.811 174.8    Z17.0 V86.0        Plan:individual  psychotherapy and medication management by physician    Summary and Recommendations  Jaja Toledo is a 68 y.o. female referred by Jake Petersen MD for psychological evaluation and treatment.  Ms. Toledo appears to be coping well with her diagnosis and proposed treatment course.  Patient has good support system.  She is not currently interested in regular CBT or supportive therapy, but is aware of available resources to address future needs..       Syl Layne, PhD  Clinical Psychologist  [place] License #[id number]  [place] License #[id number]

## 2024-06-28 ENCOUNTER — LAB VISIT (OUTPATIENT)
Dept: LAB | Facility: HOSPITAL | Age: 69
End: 2024-06-28
Attending: FAMILY MEDICINE
Payer: COMMERCIAL

## 2024-06-28 ENCOUNTER — OFFICE VISIT (OUTPATIENT)
Dept: FAMILY MEDICINE | Facility: CLINIC | Age: 69
End: 2024-06-28
Attending: FAMILY MEDICINE
Payer: COMMERCIAL

## 2024-06-28 VITALS
DIASTOLIC BLOOD PRESSURE: 74 MMHG | WEIGHT: 170.88 LBS | SYSTOLIC BLOOD PRESSURE: 114 MMHG | BODY MASS INDEX: 27.46 KG/M2 | HEIGHT: 66 IN | HEART RATE: 107 BPM | OXYGEN SATURATION: 99 %

## 2024-06-28 DIAGNOSIS — N39.0 UTI (URINARY TRACT INFECTION), BACTERIAL: ICD-10-CM

## 2024-06-28 DIAGNOSIS — A49.9 UTI (URINARY TRACT INFECTION), BACTERIAL: Primary | ICD-10-CM

## 2024-06-28 DIAGNOSIS — A49.9 UTI (URINARY TRACT INFECTION), BACTERIAL: ICD-10-CM

## 2024-06-28 DIAGNOSIS — N39.0 UTI (URINARY TRACT INFECTION), BACTERIAL: Primary | ICD-10-CM

## 2024-06-28 DIAGNOSIS — E78.2 MIXED HYPERLIPIDEMIA: ICD-10-CM

## 2024-06-28 DIAGNOSIS — R51.9 HEADACHE, CHRONIC DAILY: ICD-10-CM

## 2024-06-28 LAB
BACTERIA #/AREA URNS HPF: ABNORMAL /HPF
BILIRUB UR QL STRIP: ABNORMAL
CLARITY UR: ABNORMAL
COLOR UR: ABNORMAL
GLUCOSE UR QL STRIP: NEGATIVE
HGB UR QL STRIP: ABNORMAL
HYALINE CASTS #/AREA URNS LPF: 0 /LPF
KETONES UR QL STRIP: NEGATIVE
LEUKOCYTE ESTERASE UR QL STRIP: ABNORMAL
MICROSCOPIC COMMENT: ABNORMAL
NITRITE UR QL STRIP: POSITIVE
PH UR STRIP: 6 [PH] (ref 5–8)
PROT UR QL STRIP: ABNORMAL
RBC #/AREA URNS HPF: 20 /HPF (ref 0–4)
SP GR UR STRIP: 1.02 (ref 1–1.03)
URN SPEC COLLECT METH UR: ABNORMAL
UROBILINOGEN UR STRIP-ACNC: ABNORMAL EU/DL
WBC #/AREA URNS HPF: >100 /HPF (ref 0–5)

## 2024-06-28 PROCEDURE — 87086 URINE CULTURE/COLONY COUNT: CPT | Performed by: FAMILY MEDICINE

## 2024-06-28 PROCEDURE — 99999 PR PBB SHADOW E&M-EST. PATIENT-LVL IV: CPT | Mod: PBBFAC,,, | Performed by: FAMILY MEDICINE

## 2024-06-28 PROCEDURE — 87186 SC STD MICRODIL/AGAR DIL: CPT | Performed by: FAMILY MEDICINE

## 2024-06-28 PROCEDURE — 87088 URINE BACTERIA CULTURE: CPT | Performed by: FAMILY MEDICINE

## 2024-06-28 PROCEDURE — 87077 CULTURE AEROBIC IDENTIFY: CPT | Performed by: FAMILY MEDICINE

## 2024-06-28 PROCEDURE — 81000 URINALYSIS NONAUTO W/SCOPE: CPT | Performed by: FAMILY MEDICINE

## 2024-06-28 RX ORDER — ROSUVASTATIN CALCIUM 5 MG/1
5 TABLET, COATED ORAL DAILY
Qty: 90 TABLET | Refills: 3 | Status: SHIPPED | OUTPATIENT
Start: 2024-06-28 | End: 2025-06-28

## 2024-06-28 RX ORDER — IBUPROFEN 800 MG/1
800 TABLET ORAL EVERY 6 HOURS PRN
Qty: 30 TABLET | Refills: 2 | Status: SHIPPED | OUTPATIENT
Start: 2024-06-28

## 2024-06-28 NOTE — LETTER
June 28, 2024    Jaja Toledo  [address] [place] [address]             [facility]  [address]  [address]  [place] [address]  Phone: [phone]  Fax: [phone]   June 28, 2024     Patient: Jaja Toledo   YOB: 1955   Date of Visit: 6/28/2024       To Whom it May Concern:    Jaja Toledo was seen in my clinic on 6/28/2024. She is excused for work from 06/28/2024 to 08/06/2024. She may return to work on 08/07/2024 .    Please excuse her from any classes or work missed.    If you have any questions or concerns, please don't hesitate to call.    Sincerely,         Brian Vu MD

## 2024-06-28 NOTE — PROGRESS NOTES
Subjective     Patient ID: Jaja Toledo is a 68 y.o. female.    Chief Complaint: Urinary Tract Infection    68 yr old pleasant female with HLD, IFG, GERD, presents today for her follow up evaluation of HLD, and IFG. C/outi    Need excuse to work    HLD - controlled -   LDLCALC                  164.0 (H)           04/02/2024      - on diet     Breast CA - on letrozole daily     Loss of appetite - on Remeron - need refill    History as below - reviewed     Urinary Tract Infection   This is a recurrent problem. The current episode started in the past 7 days. The problem occurs every urination. The quality of the pain is described as burning. The pain is at a severity of 5/10. The pain is moderate. There has been no fever. Pertinent negatives include no frequency, nausea or constipation. The treatment provided no relief.   Hyperlipidemia  This is a chronic problem. The current episode started more than 1 year ago. The problem is controlled. Recent lipid tests were reviewed and are normal. She has no history of chronic renal disease, hypothyroidism or nephrotic syndrome. There are no known factors aggravating her hyperlipidemia. Pertinent negatives include no chest pain, myalgias or shortness of breath. Current antihyperlipidemic treatment includes statins. There are no compliance problems.  Risk factors for coronary artery disease include dyslipidemia and post-menopausal.     Review of Systems   Constitutional: Negative.  Negative for activity change, diaphoresis and unexpected weight change.   HENT: Negative.  Negative for nasal congestion, ear discharge, hearing loss, rhinorrhea, sore throat and voice change.    Eyes: Negative.  Negative for pain, discharge and visual disturbance.   Respiratory: Negative.  Negative for chest tightness, shortness of breath and wheezing.    Cardiovascular: Negative.  Negative for chest pain.   Gastrointestinal: Negative.  Negative for abdominal distention, anal bleeding, constipation  and nausea.   Endocrine: Negative.  Negative for cold intolerance, polydipsia and polyuria.   Genitourinary: Negative.  Negative for decreased urine volume, difficulty urinating, dysuria, frequency, menstrual problem and vaginal pain.   Musculoskeletal: Negative.  Negative for arthralgias, gait problem and myalgias.   Integumentary:  Negative for color change, pallor and wound. Negative.   Allergic/Immunologic: Negative.  Negative for environmental allergies and immunocompromised state.   Neurological: Negative.  Negative for dizziness, tremors, seizures, speech difficulty and headaches.   Hematological: Negative.  Negative for adenopathy. Does not bruise/bleed easily.   Psychiatric/Behavioral: Negative.  Negative for agitation, confusion, decreased concentration, hallucinations, self-injury and suicidal ideas. The patient is not nervous/anxious.      Past Medical History:   Diagnosis Date    Anxiety     Chronic low back pain     Gastritis     GERD (gastroesophageal reflux disease)     History of cervical cancer     History of recurrent urinary tract infection     IFG (impaired fasting glucose)     Malignant neoplasm of overlapping sites of right breast in female, estrogen receptor positive 7/19/2019    Soft tissue tumor, malignant     sarcoma left foot       Past Surgical History:   Procedure Laterality Date    ABDOMINOPLASTY N/A 11/29/2018    Procedure: EXTENDED ABDOMINOPLASTY;  Surgeon: Mio Arriaga MD;  Location: [facility] OR;  Service: Plastics;  Laterality: N/A;    AXILLARY NODE DISSECTION Right 6/4/2019    Procedure: LYMPHADENECTOMY, AXILLARY RIGHT;  Surgeon: Sabine Arteaga MD;  Location: [facility] OR [address] [place] [address];  Service: General;  Laterality: Right;    BACK SURGERY      BREAST CYST EXCISION Left 1984    BREAST RECONSTRUCTION Right 6/6/2022    Procedure: RECONSTRUCTION, BREAST;  Surgeon: Leonard Cordero MD;  Location: [facility] OR [address] [place] [address];  Service: Plastics;  Laterality: Right;  T-DAP    BREAST REVISION SURGERY  Right 7/20/2020    Procedure: BREAST REVISION SURGERY RIGHT;  Surgeon: Leonard Cordero MD;  Location: [facility] OR 2ND FLR;  Service: Plastics;  Laterality: Right;    COLONOSCOPY N/A 10/19/2020    Procedure: COLONOSCOPY;  Surgeon: Chanel Saenz MD;  Location: [facility] ENDO (4TH FLR);  Service: Endoscopy;  Laterality: N/A;  covid test 10/16[facility] urgent care    COLONOSCOPY N/A 8/15/2023    Procedure: COLONOSCOPY;  Surgeon: Ranjan Avery MD;  Location: [facility] ENDO (4TH FLR);  Service: Endoscopy;  Laterality: N/A;  peg prep  prep instructions placed at    prep instructions sent to pt email [email or web address]  8/14 Revised time and prep instructions - PC    FOOT SURGERY  2009 or 2010    INSERTION OF BREAST IMPLANT Right 6/4/2019    Procedure: INSERTION, BREAST IMPLANT RIGHT;  Surgeon: Leonard Cordero MD;  [facility];  Service: Plastics;  Laterality: Right;    INSERTION OF BREAST TISSUE EXPANDER Right 6/4/2019    Procedure: INSERTION, TISSUE EXPANDER, BREAST RIGHT;  Surgeon: Leonard Cordero MD;  [facility];  Service: Plastics;  Laterality: Right;    LIPOSUCTION W/ FAT INJECTION N/A 11/29/2018    Procedure: LIPOSUCTION, WITH FAT TRANSFER TO BUTTOCKS;  Surgeon: Mio Arriaga MD;  Location: [facility] OR;  Service: Plastics;  Laterality: N/A;    MASTOPEXY Left 6/4/2019    Procedure: MASTOPEXY LEFT;  Surgeon: Leonard Cordero MD;  [facility];  Service: Plastics;  Laterality: Left;    MASTOPEXY Left 7/20/2020    Procedure: MASTOPEXY LEFT;  Surgeon: Leonard Cordero MD;  [facility];  Service: Plastics;  Laterality: Left;    MASTOPEXY Bilateral 6/22/2023    Procedure: MASTOPEXY;  Surgeon: Leonard Cordero MD;  [facility];  Service: Plastics;  Laterality: Bilateral;  cresent mastopexy    SENTINEL LYMPH NODE BIOPSY Right 6/4/2019    Procedure: BIOPSY, LYMPH NODE, SENTINEL RIGHT;  Surgeon: Sabine  MD Chandler;  Location: [facility] OR [address] [place] [address];  Service: General;  Laterality: Right;    SIMPLE MASTECTOMY Right 2019    Procedure: MASTECTOMY, SIMPLE RIGHT (CONSENT AM OF) 4.0 hr case;  Surgeon: Sbaine Arteaga MD;  Location: [facility] OR [address] [place] [address];  Service: General;  Laterality: Right;    TOTAL REDUCTION MAMMOPLASTY Left     TOTAL VAGINAL HYSTERECTOMY      TUBAL LIGATION         Family History   Problem Relation Name Age of Onset    Breast cancer Sister Mary Ellen         dx in     No Known Problems Mother      Throat cancer Father      Colon cancer Neg Hx      Diabetes Neg Hx      Hypertension Neg Hx      Ovarian cancer Neg Hx      Stroke Neg Hx      Anesthesia problems Neg Hx         Social History     Socioeconomic History    Marital status:    Tobacco Use    Smoking status: Former     Current packs/day: 0.00     Average packs/day: 0.3 packs/day for 15.0 years (3.8 ttl pk-yrs)     Types: Cigarettes     Start date: 8/15/1978     Quit date: 8/15/1993     Years since quittin.8    Smokeless tobacco: Never   Substance and Sexual Activity    Alcohol use: Yes     Alcohol/week: 1.0 standard drink of alcohol     Types: 1 Glasses of wine per week     Comment: seldom    Drug use: No    Sexual activity: Yes     Partners: Male       Current Outpatient Medications   Medication Sig Dispense Refill    acetaminophen (TYLENOL) 500 MG tablet Take 2 tablets (1,000 mg total) by mouth every 8 (eight) hours as needed for Pain. 20 tablet 0    bacitracin 500 unit/gram ointment Apply topically 3 (three) times daily. 28 g 0    calcium carbonate (CALCIUM 500 ORAL) Take by mouth once daily.       cyproheptadine (PERIACTIN) 4 mg tablet TAKE 1 TABLET BY MOUTH THREE TIMES DAILY AS NEEDED FOR DECREASED APPETITE 90 tablet 2    diazePAM (VALIUM) 10 MG Tab Take 10 mg by mouth.      docusate sodium (COLACE) 100 MG capsule Take 1 capsule (100 mg total) by mouth 2 (two) times daily. 60 capsule 0    lactulose (CHRONULAC) 20 gram/30 mL  Soln Take 30 mLs (20 g total) by mouth after meals as needed (constipation). 200 mL 0    letrozole (FEMARA) 2.5 mg Tab Take 1 tablet (2.5 mg total) by mouth once daily. 90 tablet 3    mirtazapine (REMERON) 15 MG tablet Take 1 tablet (15 mg total) by mouth every evening. 30 tablet 11    multivitamin capsule Take 1 capsule by mouth once daily.      triamcinolone acetonide 0.1% (KENALOG) 0.1 % cream Apply topically 2 (two) times daily. 15 g 0    ibuprofen (ADVIL,MOTRIN) 800 MG tablet Take 1 tablet (800 mg total) by mouth every 6 (six) hours as needed for Pain. 30 tablet 2    rosuvastatin (CRESTOR) 5 MG tablet Take 1 tablet (5 mg total) by mouth once daily. 90 tablet 3     No current facility-administered medications for this visit.       Review of patient's allergies indicates:  No Known Allergies       Objective   Vitals:    06/28/24 0943   BP: 114/74   Pulse: 107       Physical Exam  Constitutional:       General: She is not in acute distress.     Appearance: She is well-developed. She is not diaphoretic.   HENT:      Head: Normocephalic and atraumatic.      Right Ear: External ear normal.      Left Ear: External ear normal.      Nose: Nose normal.      Mouth/Throat:      Pharynx: No oropharyngeal exudate.   Eyes:      General: No scleral icterus.        Right eye: No discharge.         Left eye: No discharge.      Conjunctiva/sclera: Conjunctivae normal.      Pupils: Pupils are equal, round, and reactive to light.   Neck:      Thyroid: No thyromegaly.      Vascular: No JVD.      Trachea: No tracheal deviation.   Cardiovascular:      Rate and Rhythm: Normal rate and regular rhythm.      Heart sounds: Normal heart sounds. No murmur heard.     No friction rub. No gallop.   Pulmonary:      Effort: Pulmonary effort is normal.      Breath sounds: Normal breath sounds. No stridor. No wheezing or rales.   Chest:      Chest wall: No tenderness.   Abdominal:      General: Bowel sounds are normal. There is no distension.       Palpations: Abdomen is soft. There is no mass.      Tenderness: There is no abdominal tenderness. There is no guarding or rebound.      Hernia: No hernia is present.   Musculoskeletal:         General: No tenderness. Normal range of motion.      Cervical back: Normal range of motion and neck supple.   Lymphadenopathy:      Cervical: No cervical adenopathy.   Skin:     General: Skin is warm and dry.      Coloration: Skin is not pale.      Findings: No erythema or rash.   Neurological:      Mental Status: She is alert and oriented to person, place, and time.      Cranial Nerves: No cranial nerve deficit.      Motor: No abnormal muscle tone.      Coordination: Coordination normal.      Deep Tendon Reflexes: Reflexes are normal and symmetric. Reflexes normal.   Psychiatric:         Behavior: Behavior normal.         Thought Content: Thought content normal.         Judgment: Judgment normal.            Assessment and Plan     1. UTI (urinary tract infection), bacterial  -     Urinalysis; Future; Expected date: 06/28/2024  -     Urine culture; Future; Expected date: 06/28/2024    2. Mixed hyperlipidemia  -     rosuvastatin (CRESTOR) 5 MG tablet; Take 1 tablet (5 mg total) by mouth once daily.  Dispense: 90 tablet; Refill: 3    3. Headache, chronic daily  -     ibuprofen (ADVIL,MOTRIN) 800 MG tablet; Take 1 tablet (800 mg total) by mouth every 6 (six) hours as needed for Pain.  Dispense: 30 tablet; Refill: 2        Uti  -ua n cx    HLD  -diet control  -start statin    Breast CA  -in remission    Loss of appetite  -remeron rx      Spent adequate time in obtaining history and explaining differentials    30 minutes spent during this visit of which greater than 50% devoted to face-face counseling and coordination of care regarding diagnosis and management plan      Follow up if symptoms worsen or fail to improve.           Follow up if symptoms worsen or fail to improve.

## 2024-07-01 ENCOUNTER — HOSPITAL ENCOUNTER (EMERGENCY)
Facility: HOSPITAL | Age: 69
Discharge: HOME | End: 2024-07-01
Attending: EMERGENCY MEDICINE
Payer: COMMERCIAL

## 2024-07-01 VITALS
SYSTOLIC BLOOD PRESSURE: 108 MMHG | BODY MASS INDEX: 28.61 KG/M2 | HEIGHT: 66 IN | OXYGEN SATURATION: 100 % | TEMPERATURE: 99 F | HEART RATE: 112 BPM | RESPIRATION RATE: 16 BRPM | WEIGHT: 178 LBS | DIASTOLIC BLOOD PRESSURE: 85 MMHG

## 2024-07-01 DIAGNOSIS — N39.0 URINARY TRACT INFECTION WITHOUT HEMATURIA, SITE UNSPECIFIED: Primary | ICD-10-CM

## 2024-07-01 LAB
ALBUMIN SERPL BCP-MCNC: 3.8 G/DL (ref 3.5–5.2)
ALP SERPL-CCNC: 80 U/L (ref 55–135)
ALT SERPL W/O P-5'-P-CCNC: 21 U/L (ref 10–44)
ANION GAP SERPL CALC-SCNC: 11 MMOL/L (ref 8–16)
AST SERPL-CCNC: 39 U/L (ref 10–40)
BACTERIA #/AREA URNS AUTO: ABNORMAL /HPF
BACTERIA UR CULT: ABNORMAL
BASOPHILS # BLD AUTO: 0.05 K/UL (ref 0–0.2)
BASOPHILS NFR BLD: 0.7 % (ref 0–1.9)
BILIRUB SERPL-MCNC: 0.3 MG/DL (ref 0.1–1)
BILIRUB UR QL STRIP: NEGATIVE
BUN SERPL-MCNC: 15 MG/DL (ref 8–23)
BUN SERPL-MCNC: 17 MG/DL (ref 6–30)
CALCIUM SERPL-MCNC: 9.7 MG/DL (ref 8.7–10.5)
CHLORIDE SERPL-SCNC: 109 MMOL/L (ref 95–110)
CHLORIDE SERPL-SCNC: 112 MMOL/L (ref 95–110)
CLARITY UR REFRACT.AUTO: ABNORMAL
CO2 SERPL-SCNC: 20 MMOL/L (ref 23–29)
COLOR UR AUTO: YELLOW
CREAT SERPL-MCNC: 1 MG/DL (ref 0.5–1.4)
CREAT SERPL-MCNC: 1 MG/DL (ref 0.5–1.4)
DIFFERENTIAL METHOD BLD: ABNORMAL
EOSINOPHIL # BLD AUTO: 0.3 K/UL (ref 0–0.5)
EOSINOPHIL NFR BLD: 3.7 % (ref 0–8)
ERYTHROCYTE [DISTWIDTH] IN BLOOD BY AUTOMATED COUNT: 13.9 % (ref 11.5–14.5)
EST. GFR  (NO RACE VARIABLE): >60 ML/MIN/1.73 M^2
GLUCOSE SERPL-MCNC: 123 MG/DL (ref 70–110)
GLUCOSE SERPL-MCNC: 132 MG/DL (ref 70–110)
GLUCOSE UR QL STRIP: NEGATIVE
HCT VFR BLD AUTO: 40.9 % (ref 37–48.5)
HCT VFR BLD CALC: 40 %PCV (ref 36–54)
HCV AB SERPL QL IA: NORMAL
HGB BLD-MCNC: 12.3 G/DL (ref 12–16)
HGB UR QL STRIP: ABNORMAL
HIV 1+2 AB+HIV1 P24 AG SERPL QL IA: NORMAL
HYALINE CASTS UR QL AUTO: 0 /LPF
IMM GRANULOCYTES # BLD AUTO: 0.02 K/UL (ref 0–0.04)
IMM GRANULOCYTES NFR BLD AUTO: 0.3 % (ref 0–0.5)
KETONES UR QL STRIP: NEGATIVE
LEUKOCYTE ESTERASE UR QL STRIP: ABNORMAL
LYMPHOCYTES # BLD AUTO: 2 K/UL (ref 1–4.8)
LYMPHOCYTES NFR BLD: 28.5 % (ref 18–48)
MCH RBC QN AUTO: 27.6 PG (ref 27–31)
MCHC RBC AUTO-ENTMCNC: 30.1 G/DL (ref 32–36)
MCV RBC AUTO: 92 FL (ref 82–98)
MICROSCOPIC COMMENT: ABNORMAL
MONOCYTES # BLD AUTO: 0.4 K/UL (ref 0.3–1)
MONOCYTES NFR BLD: 5.6 % (ref 4–15)
NEUTROPHILS # BLD AUTO: 4.3 K/UL (ref 1.8–7.7)
NEUTROPHILS NFR BLD: 61.2 % (ref 38–73)
NITRITE UR QL STRIP: POSITIVE
NRBC BLD-RTO: 0 /100 WBC
PH UR STRIP: 6 [PH] (ref 5–8)
PLATELET # BLD AUTO: 347 K/UL (ref 150–450)
PMV BLD AUTO: 9 FL (ref 9.2–12.9)
POC IONIZED CALCIUM: 1.04 MMOL/L (ref 1.06–1.42)
POC TCO2 (MEASURED): 23 MMOL/L (ref 23–29)
POTASSIUM BLD-SCNC: 4.1 MMOL/L (ref 3.5–5.1)
POTASSIUM SERPL-SCNC: 4.4 MMOL/L (ref 3.5–5.1)
PROT SERPL-MCNC: 8.4 G/DL (ref 6–8.4)
PROT UR QL STRIP: ABNORMAL
RBC # BLD AUTO: 4.45 M/UL (ref 4–5.4)
RBC #/AREA URNS AUTO: 86 /HPF (ref 0–4)
SAMPLE: ABNORMAL
SODIUM BLD-SCNC: 141 MMOL/L (ref 136–145)
SODIUM SERPL-SCNC: 140 MMOL/L (ref 136–145)
SP GR UR STRIP: 1.02 (ref 1–1.03)
SQUAMOUS #/AREA URNS AUTO: 3 /HPF
URN SPEC COLLECT METH UR: ABNORMAL
WBC # BLD AUTO: 7.02 K/UL (ref 3.9–12.7)
WBC #/AREA URNS AUTO: >100 /HPF (ref 0–5)
WBC CLUMPS UR QL AUTO: ABNORMAL

## 2024-07-01 PROCEDURE — 82330 ASSAY OF CALCIUM: CPT

## 2024-07-01 PROCEDURE — 81001 URINALYSIS AUTO W/SCOPE: CPT | Performed by: EMERGENCY MEDICINE

## 2024-07-01 PROCEDURE — 85025 COMPLETE CBC W/AUTO DIFF WBC: CPT | Performed by: EMERGENCY MEDICINE

## 2024-07-01 PROCEDURE — 87088 URINE BACTERIA CULTURE: CPT | Performed by: EMERGENCY MEDICINE

## 2024-07-01 PROCEDURE — 87389 HIV-1 AG W/HIV-1&-2 AB AG IA: CPT | Performed by: PHYSICIAN ASSISTANT

## 2024-07-01 PROCEDURE — 80053 COMPREHEN METABOLIC PANEL: CPT | Performed by: EMERGENCY MEDICINE

## 2024-07-01 PROCEDURE — 87186 SC STD MICRODIL/AGAR DIL: CPT | Performed by: EMERGENCY MEDICINE

## 2024-07-01 PROCEDURE — 99283 EMERGENCY DEPT VISIT LOW MDM: CPT

## 2024-07-01 PROCEDURE — 80047 BASIC METABLC PNL IONIZED CA: CPT

## 2024-07-01 PROCEDURE — 87086 URINE CULTURE/COLONY COUNT: CPT | Performed by: EMERGENCY MEDICINE

## 2024-07-01 PROCEDURE — 86803 HEPATITIS C AB TEST: CPT | Performed by: PHYSICIAN ASSISTANT

## 2024-07-01 PROCEDURE — 87077 CULTURE AEROBIC IDENTIFY: CPT | Performed by: EMERGENCY MEDICINE

## 2024-07-01 RX ORDER — CEPHALEXIN 500 MG/1
500 CAPSULE ORAL EVERY 8 HOURS
Qty: 15 CAPSULE | Refills: 0 | Status: SHIPPED | OUTPATIENT
Start: 2024-07-01 | End: 2024-07-06

## 2024-07-01 NOTE — ED TRIAGE NOTES
Reports possible UTI. Denies hematuria, painful urination. But has a tingling sensation when she urinates. A sample was given a few days ago at PCP but no follow up was had.    LOC: The patient is awake, alert and aware of environment with an appropriate affect, the patient is oriented x 3 and speaking appropriately.  APPEARANCE: Patient resting comfortably and in no acute distress, patient is clean and well groomed, patient's clothing is properly fastened.  SKIN: The skin is warm and dry, color consistent with ethnicity, patient has normal skin turgor and moist mucus membranes, skin intact, no breakdown or bruising noted.  MUSCULOSKELETAL: Patient moving all extremities spontaneously, no obvious swelling or deformities noted.  RESPIRATORY: Airway is open and patent, respirations are spontaneous, patient has a normal effort and rate, no accessory muscle use noted,   CARDIAC: Patient has a normal rate and regular rhythm, no periphreal edema noted, capillary refill < 3 seconds.  ABDOMEN: Soft and non tender to palpation, no distention noted, normoactive bowel sounds present in all four quadrants.  NEUROLOGIC:  facial expression is symmetrical, patient moving all extremities spontaneously, normal sensation in all extremities when touched with a finger.  Follows all commands appropriately.

## 2024-07-01 NOTE — ED PROVIDER NOTES
Encounter Date: 7/1/2024       History     Chief Complaint   Patient presents with    Female  Problem     Started week ago     68-year-old female presents with 1 week of dysuria.  She states her urine has been cloudy.  Denies fever chills or flank pain.  Occasionally gets back pain.  She gave a urine sample to her primary care doctor 3 days ago but had not gotten results.  She returns here.  She was seen in triage and was noted to be tachycardic and a CBC i-STAT urinalysis were ordered.        Review of patient's allergies indicates:  No Known Allergies  Past Medical History:   Diagnosis Date    Anxiety     Chronic low back pain     Gastritis     GERD (gastroesophageal reflux disease)     History of cervical cancer     History of recurrent urinary tract infection     IFG (impaired fasting glucose)     Malignant neoplasm of overlapping sites of right breast in female, estrogen receptor positive 7/19/2019    Soft tissue tumor, malignant     sarcoma left foot     Past Surgical History:   Procedure Laterality Date    ABDOMINOPLASTY N/A 11/29/2018    Procedure: EXTENDED ABDOMINOPLASTY;  Surgeon: Mio Arriaga MD;  Location: [facility] OR;  Service: Plastics;  Laterality: N/A;    AXILLARY NODE DISSECTION Right 6/4/2019    Procedure: LYMPHADENECTOMY, AXILLARY RIGHT;  Surgeon: Sabine Arteaga MD;  Location: [facility] OR 2ND FLR;  Service: General;  Laterality: Right;    BACK SURGERY      BREAST CYST EXCISION Left 1984    BREAST RECONSTRUCTION Right 6/6/2022    Procedure: RECONSTRUCTION, BREAST;  Surgeon: Leonard Cordero MD;  Location: [facility] OR 2ND FLR;  Service: Plastics;  Laterality: Right;  T-DAP    BREAST REVISION SURGERY Right 7/20/2020    Procedure: BREAST REVISION SURGERY RIGHT;  Surgeon: Leonard Cordero MD;  Location: [facility] OR 2ND FLR;  Service: Plastics;  Laterality: Right;    COLONOSCOPY N/A 10/19/2020    Procedure: COLONOSCOPY;  Surgeon: Chanel Saenz MD;  Location: [facility] ENDO (4TH FLR);  Service:  Endoscopy;  Laterality: N/A;  covid test 10/16-[facility] urgent care    COLONOSCOPY N/A 8/15/2023    Procedure: COLONOSCOPY;  Surgeon: Ranjan Avery MD;  Location: [facility] ENDO (4TH FLR);  Service: Endoscopy;  Laterality: N/A;  peg prep  prep instructions placed at    prep instructions sent to pt email [email or web address]  8/14 Revised time and prep instructions - PC    FOOT SURGERY  2009 or 2010    INSERTION OF BREAST IMPLANT Right 6/4/2019    Procedure: INSERTION, BREAST IMPLANT RIGHT;  Surgeon: Leonard Cordero MD;  Location: [facility] OR 2ND FLR;  Service: Plastics;  Laterality: Right;    INSERTION OF BREAST TISSUE EXPANDER Right 6/4/2019    Procedure: INSERTION, TISSUE EXPANDER, BREAST RIGHT;  Surgeon: Leonard Cordero MD;  Location: [facility] OR 2ND FLR;  Service: Plastics;  Laterality: Right;    LIPOSUCTION W/ FAT INJECTION N/A 11/29/2018    Procedure: LIPOSUCTION, WITH FAT TRANSFER TO BUTTOCKS;  Surgeon: Mio Arriaga MD;  Location: [facility] OR;  Service: Plastics;  Laterality: N/A;    MASTOPEXY Left 6/4/2019    Procedure: MASTOPEXY LEFT;  Surgeon: Leonard Cordero MD;  Location: [facility] OR [address] [place] [address];  Service: Plastics;  Laterality: Left;    MASTOPEXY Left 7/20/2020    Procedure: MASTOPEXY LEFT;  Surgeon: Leonard Cordero MD;  [facility];  Service: Plastics;  Laterality: Left;    MASTOPEXY Bilateral 6/22/2023    Procedure: MASTOPEXY;  Surgeon: Leonard Cordero MD;  [facility];  Service: Plastics;  Laterality: Bilateral;  cresent mastopexy    SENTINEL LYMPH NODE BIOPSY Right 6/4/2019    Procedure: BIOPSY, LYMPH NODE, SENTINEL RIGHT;  Surgeon: Sabine Arteaga MD;  Location: [facility] OR [address] [place] [address];  Service: General;  Laterality: Right;    SIMPLE MASTECTOMY Right 6/4/2019    Procedure: MASTECTOMY, SIMPLE RIGHT (CONSENT AM OF) 4.0 hr case;  Surgeon: Sabine Arteaga MD;  Location: [facility] OR 2ND FLR;  Service: General;  Laterality: Right;    TOTAL REDUCTION  MAMMOPLASTY Left 2019    TOTAL VAGINAL HYSTERECTOMY  1996    TUBAL LIGATION  1994     Family History   Problem Relation Name Age of Onset    Breast cancer Sister Mary Ellen         dx in     No Known Problems Mother      Throat cancer Father      Colon cancer Neg Hx      Diabetes Neg Hx      Hypertension Neg Hx      Ovarian cancer Neg Hx      Stroke Neg Hx      Anesthesia problems Neg Hx       Social History     Tobacco Use    Smoking status: Former     Current packs/day: 0.00     Average packs/day: 0.3 packs/day for 15.0 years (3.8 ttl pk-yrs)     Types: Cigarettes     Start date: 8/15/1978     Quit date: 8/15/1993     Years since quittin.8    Smokeless tobacco: Never   Substance Use Topics    Alcohol use: Yes     Alcohol/week: 1.0 standard drink of alcohol     Types: 1 Glasses of wine per week     Comment: seldom    Drug use: No     Review of Systems    Physical Exam     Initial Vitals [24 1143]   BP Pulse Resp Temp SpO2   108/85 (!) 112 16 99 °F (37.2 °C) 100 %      MAP       --         Physical Exam    Constitutional: She appears well-developed and well-nourished. No distress.   HENT:   Head: Normocephalic.   Neck: Neck supple.   Normal range of motion.  Cardiovascular:  Normal rate and regular rhythm.           Heart rate 92 on my exam   Pulmonary/Chest: Breath sounds normal. No respiratory distress. She has no wheezes. She has no rales.   Abdominal: Abdomen is soft. Bowel sounds are normal. She exhibits no distension. There is no abdominal tenderness.   Musculoskeletal:         General: Normal range of motion.      Cervical back: Normal range of motion and neck supple.      Comments: No CVA tenderness     Psychiatric: She has a normal mood and affect.         ED Course   Procedures  Labs Reviewed   CBC W/ AUTO DIFFERENTIAL - Abnormal; Notable for the following components:       Result Value    MCHC 30.1 (*)     MPV 9.0 (*)     All other components within normal limits    Narrative:     Release to  patient->Immediate   URINALYSIS, REFLEX TO URINE CULTURE - Abnormal; Notable for the following components:    Appearance, UA Cloudy (*)     Protein, UA 2+ (*)     Occult Blood UA 2+ (*)     Nitrite, UA Positive (*)     Leukocytes, UA 3+ (*)     All other components within normal limits    Narrative:     Specimen Source->Urine   COMPREHENSIVE METABOLIC PANEL - Abnormal; Notable for the following components:    CO2 20 (*)     Glucose 123 (*)     All other components within normal limits    Narrative:     Release to patient->Immediate   URINALYSIS MICROSCOPIC - Abnormal; Notable for the following components:    RBC, UA 86 (*)     WBC, UA >100 (*)     WBC Clumps, UA Many (*)     Bacteria Few (*)     All other components within normal limits    Narrative:     Specimen Source->Urine   ISTAT PROCEDURE - Abnormal; Notable for the following components:    POC Glucose 132 (*)     POC Chloride 112 (*)     POC Ionized Calcium 1.04 (*)     All other components within normal limits   CULTURE, URINE   HIV 1 / 2 ANTIBODY    Narrative:     Release to patient->Immediate   HEPATITIS C ANTIBODY    Narrative:     Release to patient->Immediate   ISTAT CHEM8          Imaging Results    None          Medications - No data to display  Medical Decision Making  Patient with UTI.  I reviewed record and prior testing.  She had urine with 100 white cells in her urine and culture of E coli that was sensitive to all except for gentamicin.  I doubt she has sepsis who will follow up her CBC.  Her tachycardia has improved.  Will follow her creatinine.  I doubt this is pyelonephritis or infected kidney stone.  Likely UTI.  Called in Keflex to her pharmacy    Risk  Prescription drug management.                                      Clinical Impression:  Final diagnoses:  [N39.0] Urinary tract infection without hematuria, site unspecified (Primary)          ED Disposition Condition    Discharge Stable          ED Prescriptions       Medication Sig  Dispense Start Date End Date Auth. Provider    cephALEXin (KEFLEX) 500 MG capsule Take 1 capsule (500 mg total) by mouth every 8 (eight) hours. for 5 days 15 capsule 7/1/2024 7/6/2024 Shaq Humphrey MD          Follow-up Information       Follow up With Specialties Details Why Contact Info    Brian Vu MD Internal Medicine Schedule an appointment as soon as possible for a visit   [address]  [address]  [facility] [address]  [phone]      [facility] - Emergency Dept Emergency Medicine  If symptoms worsen [address] [facility] [address] [phone]             Shaq Humphrey MD  07/01/24 1705

## 2024-07-01 NOTE — FIRST PROVIDER EVALUATION
"Medical screening examination initiated.  I have conducted a focused provider triage encounter, findings are as follows:    Brief history of present illness:  68 yr old pleasant female with HLD, IFG, GERD, presents today for urinary symptoms ongoing for 1 week. No fevers or chills, occasional back pain.     Vitals:    07/01/24 1143   BP: 108/85   Pulse: (!) 112   Resp: 16   Temp: 99 °F (37.2 °C)   TempSrc: Oral   SpO2: 100%   Weight: 80.7 kg (178 lb)   Height: 5' 6" (1.676 m)       Pertinent physical exam:  non-toxic, tachycardic heart rate    Brief workup plan:  UA, CBC, Istat    Preliminary workup initiated; this workup will be continued and followed by the physician or advanced practice provider that is assigned to the patient when roomed.    Osiel George DO, FAAEM  Emergency Staff Physician   Dept of Emergency Medicine   [facility]  Spectralink: [id number]        Disclaimer: This note has been generated using voice-recognition software. There may be typographical errors that have been missed during proof-reading.    "

## 2024-07-02 ENCOUNTER — TELEPHONE (OUTPATIENT)
Dept: FAMILY MEDICINE | Facility: CLINIC | Age: 69
End: 2024-07-02
Payer: COMMERCIAL

## 2024-07-02 NOTE — TELEPHONE ENCOUNTER
Plz call n inform    Urine confirmed infection - wanted to send antibiotics but she is seen in ER and I agree with antibiotic sent. Let me know if still have symptoms after completing abx.

## 2024-07-03 LAB — BACTERIA UR CULT: ABNORMAL

## 2024-07-03 NOTE — TELEPHONE ENCOUNTER
Left msg for CB to inform pt that her Urine confirmed infection - wanted to send antibiotics but she is seen in ER and I agree with antibiotic sent. Let me know if still have symptoms after completing abx.

## 2024-07-09 ENCOUNTER — TELEPHONE (OUTPATIENT)
Dept: FAMILY MEDICINE | Facility: CLINIC | Age: 69
End: 2024-07-09
Payer: COMMERCIAL

## 2024-07-09 DIAGNOSIS — B96.20 E. COLI UTI: Primary | ICD-10-CM

## 2024-07-09 DIAGNOSIS — N39.0 E. COLI UTI: Primary | ICD-10-CM

## 2024-07-09 RX ORDER — CIPROFLOXACIN 500 MG/1
500 TABLET ORAL 2 TIMES DAILY
Qty: 20 TABLET | Refills: 0 | Status: SHIPPED | OUTPATIENT
Start: 2024-07-09

## 2024-07-09 NOTE — TELEPHONE ENCOUNTER
Spoke to pt and stated she was seen for a UTI and prescribed antibiotics, but the antibiotics that she was prescribed is not helping. Pt would like for Dr. Vu to review her results from 7/1. Pls advise.

## 2024-07-09 NOTE — TELEPHONE ENCOUNTER
----- Message from Danita Guardado sent at 7/9/2024  2:08 PM CDT -----  Needs advice from nurse:      Who Called:pt  Regarding:returning a call to Marcie re: UTI  Would the patient rather a call back or VIA [facility]?  Best Call Back number: [phone]  Additional Info:

## 2024-07-09 NOTE — TELEPHONE ENCOUNTER
----- Message from Dixie Browning sent at 7/9/2024 11:15 AM CDT -----  Regarding: Questions and concerns  Contact: [phone]  Type:  Needs Medical Advice    Who Called: Jaja  Symptoms (please be specific): UTI   How long has patient had these symptoms:  1-2 weeks ago  Pharmacy name and phone #:  [facility] [phone]  Would the patient rather a call back or a response via [facility]? Call  Best Call Back Number:  [phone]  Additional Information: Patient went to the ER and was given an antibiotic but it isnt working. Would like to have another med called into the pharm. Please call

## 2024-07-09 NOTE — TELEPHONE ENCOUNTER
----- Message from Amelia Remy sent at 7/9/2024  2:50 PM CDT -----  Type:  Patient Returning Call    Who Called: Pt   Who Left Message for Patient: andrews   Does the patient know what this is regarding?: returning missed call   Would the patient rather a call back or a response via [facility]? Call   Best Call Back Number:[phone]   Additional Information:

## 2024-08-01 ENCOUNTER — TELEPHONE (OUTPATIENT)
Dept: FAMILY MEDICINE | Facility: CLINIC | Age: 69
End: 2024-08-01
Payer: COMMERCIAL

## 2024-08-01 NOTE — TELEPHONE ENCOUNTER
----- Message from Madai Toledo sent at 8/1/2024  1:23 PM CDT -----  Type:  Needs Medical Advice/new script    Who Called: pt    Pharmacy name and phone #:  [id number] - [place] - [address]  AT [facility] [address]  [address] [place] [address]  Phone: [phone] Fax: [phone]  Hours: Open 24 hours      Would the patient rather a call back or a response via [facility]? Call   Best Call Back Number: [phone]  Additional Information: Megestrol new prescription request contact pt for further information

## 2024-08-05 ENCOUNTER — PROCEDURE VISIT (OUTPATIENT)
Dept: NEUROLOGY | Facility: CLINIC | Age: 69
End: 2024-08-05
Payer: COMMERCIAL

## 2024-08-05 DIAGNOSIS — R20.0 NUMBNESS OF FINGERS OF BOTH HANDS: ICD-10-CM

## 2024-08-05 PROCEDURE — 95911 NRV CNDJ TEST 9-10 STUDIES: CPT | Mod: S$GLB,,, | Performed by: PHYSICAL MEDICINE & REHABILITATION

## 2024-08-05 PROCEDURE — 95886 MUSC TEST DONE W/N TEST COMP: CPT | Mod: S$GLB,,, | Performed by: PHYSICAL MEDICINE & REHABILITATION

## 2024-08-11 ENCOUNTER — HOSPITAL ENCOUNTER (EMERGENCY)
Facility: HOSPITAL | Age: 69
Discharge: HOME | End: 2024-08-11
Attending: EMERGENCY MEDICINE
Payer: COMMERCIAL

## 2024-08-11 VITALS
RESPIRATION RATE: 16 BRPM | HEIGHT: 66 IN | OXYGEN SATURATION: 97 % | TEMPERATURE: 99 F | WEIGHT: 173 LBS | DIASTOLIC BLOOD PRESSURE: 78 MMHG | SYSTOLIC BLOOD PRESSURE: 122 MMHG | BODY MASS INDEX: 27.8 KG/M2 | HEART RATE: 99 BPM

## 2024-08-11 DIAGNOSIS — N39.0 URINARY TRACT INFECTION WITHOUT HEMATURIA, SITE UNSPECIFIED: Primary | ICD-10-CM

## 2024-08-11 LAB
ALBUMIN SERPL BCP-MCNC: 4.1 G/DL (ref 3.5–5.2)
ALP SERPL-CCNC: 90 U/L (ref 55–135)
ALT SERPL W/O P-5'-P-CCNC: 20 U/L (ref 10–44)
ANION GAP SERPL CALC-SCNC: 10 MMOL/L (ref 8–16)
AST SERPL-CCNC: 34 U/L (ref 10–40)
BACTERIA #/AREA URNS AUTO: ABNORMAL /HPF
BASOPHILS # BLD AUTO: 0.04 K/UL (ref 0–0.2)
BASOPHILS NFR BLD: 0.9 % (ref 0–1.9)
BILIRUB SERPL-MCNC: 0.4 MG/DL (ref 0.1–1)
BILIRUB UR QL STRIP: NEGATIVE
BUN SERPL-MCNC: 20 MG/DL (ref 8–23)
CALCIUM SERPL-MCNC: 9.7 MG/DL (ref 8.7–10.5)
CHLORIDE SERPL-SCNC: 109 MMOL/L (ref 95–110)
CLARITY UR REFRACT.AUTO: ABNORMAL
CO2 SERPL-SCNC: 22 MMOL/L (ref 23–29)
COLOR UR AUTO: YELLOW
CREAT SERPL-MCNC: 1 MG/DL (ref 0.5–1.4)
DIFFERENTIAL METHOD BLD: ABNORMAL
EOSINOPHIL # BLD AUTO: 0.1 K/UL (ref 0–0.5)
EOSINOPHIL NFR BLD: 2.2 % (ref 0–8)
ERYTHROCYTE [DISTWIDTH] IN BLOOD BY AUTOMATED COUNT: 14 % (ref 11.5–14.5)
EST. GFR  (NO RACE VARIABLE): >60 ML/MIN/1.73 M^2
GLUCOSE SERPL-MCNC: 115 MG/DL (ref 70–110)
GLUCOSE UR QL STRIP: NEGATIVE
HCT VFR BLD AUTO: 41.3 % (ref 37–48.5)
HGB BLD-MCNC: 12.2 G/DL (ref 12–16)
HGB UR QL STRIP: NEGATIVE
IMM GRANULOCYTES # BLD AUTO: 0.01 K/UL (ref 0–0.04)
IMM GRANULOCYTES NFR BLD AUTO: 0.2 % (ref 0–0.5)
KETONES UR QL STRIP: NEGATIVE
LEUKOCYTE ESTERASE UR QL STRIP: ABNORMAL
LYMPHOCYTES # BLD AUTO: 2 K/UL (ref 1–4.8)
LYMPHOCYTES NFR BLD: 43.9 % (ref 18–48)
MCH RBC QN AUTO: 26.9 PG (ref 27–31)
MCHC RBC AUTO-ENTMCNC: 29.5 G/DL (ref 32–36)
MCV RBC AUTO: 91 FL (ref 82–98)
MICROSCOPIC COMMENT: ABNORMAL
MONOCYTES # BLD AUTO: 0.3 K/UL (ref 0.3–1)
MONOCYTES NFR BLD: 7.2 % (ref 4–15)
NEUTROPHILS # BLD AUTO: 2 K/UL (ref 1.8–7.7)
NEUTROPHILS NFR BLD: 45.6 % (ref 38–73)
NITRITE UR QL STRIP: POSITIVE
NRBC BLD-RTO: 0 /100 WBC
PH UR STRIP: 6 [PH] (ref 5–8)
PLATELET # BLD AUTO: 319 K/UL (ref 150–450)
PMV BLD AUTO: 9.4 FL (ref 9.2–12.9)
POTASSIUM SERPL-SCNC: 3.8 MMOL/L (ref 3.5–5.1)
PROT SERPL-MCNC: 8.6 G/DL (ref 6–8.4)
PROT UR QL STRIP: ABNORMAL
RBC # BLD AUTO: 4.53 M/UL (ref 4–5.4)
SODIUM SERPL-SCNC: 141 MMOL/L (ref 136–145)
SP GR UR STRIP: 1.02 (ref 1–1.03)
SQUAMOUS #/AREA URNS AUTO: 6 /HPF
URN SPEC COLLECT METH UR: ABNORMAL
WBC # BLD AUTO: 4.46 K/UL (ref 3.9–12.7)
WBC #/AREA URNS AUTO: >100 /HPF (ref 0–5)

## 2024-08-11 PROCEDURE — 63600175 PHARM REV CODE 636 W HCPCS: Performed by: PHYSICIAN ASSISTANT

## 2024-08-11 PROCEDURE — 25000003 PHARM REV CODE 250: Performed by: PHYSICIAN ASSISTANT

## 2024-08-11 PROCEDURE — 87088 URINE BACTERIA CULTURE: CPT | Performed by: PHYSICIAN ASSISTANT

## 2024-08-11 PROCEDURE — 96365 THER/PROPH/DIAG IV INF INIT: CPT

## 2024-08-11 PROCEDURE — 87086 URINE CULTURE/COLONY COUNT: CPT | Performed by: PHYSICIAN ASSISTANT

## 2024-08-11 PROCEDURE — 99284 EMERGENCY DEPT VISIT MOD MDM: CPT | Mod: 25

## 2024-08-11 PROCEDURE — 87186 SC STD MICRODIL/AGAR DIL: CPT | Performed by: PHYSICIAN ASSISTANT

## 2024-08-11 PROCEDURE — 80053 COMPREHEN METABOLIC PANEL: CPT | Performed by: PHYSICIAN ASSISTANT

## 2024-08-11 PROCEDURE — 81001 URINALYSIS AUTO W/SCOPE: CPT | Performed by: PHYSICIAN ASSISTANT

## 2024-08-11 PROCEDURE — 85025 COMPLETE CBC W/AUTO DIFF WBC: CPT | Performed by: PHYSICIAN ASSISTANT

## 2024-08-11 PROCEDURE — 96361 HYDRATE IV INFUSION ADD-ON: CPT

## 2024-08-11 RX ORDER — CEFDINIR 300 MG/1
300 CAPSULE ORAL 2 TIMES DAILY
Qty: 14 CAPSULE | Refills: 0 | Status: SHIPPED | OUTPATIENT
Start: 2024-08-11 | End: 2024-08-18

## 2024-08-11 RX ADMIN — CEFTRIAXONE 1 G: 1 INJECTION, POWDER, FOR SOLUTION INTRAMUSCULAR; INTRAVENOUS at 04:08

## 2024-08-11 RX ADMIN — SODIUM CHLORIDE, POTASSIUM CHLORIDE, SODIUM LACTATE AND CALCIUM CHLORIDE 1000 ML: 600; 310; 30; 20 INJECTION, SOLUTION INTRAVENOUS at 02:08

## 2024-08-11 NOTE — ED TRIAGE NOTES
"Patient presents to the ED today with reports of R flank pain and "tingling" "pressure" when urinating onset 1 week.     Patient identifiers verified and correct for Jaja Toledo  LOC: The patient is awake, alert and aware of environment with an appropriate affect, the patient is oriented x 3 and speaking appropriately.   APPEARANCE: Patient appears comfortable and in no acute distress, patient is clean and well groomed.  SKIN: The skin is warm and dry, color consistent with ethnicity, patient has normal skin turgor and moist mucus membranes, skin intact, no breakdown or bruising noted.   MUSCULOSKELETAL: Patient moving all extremities spontaneously, no swelling noted.  RESPIRATORY: Airway is open and patent, respirations are spontaneous, patient has a normal effort and rate, no accessory muscle use noted, O2 sats noted at 98% on room air.  CARDIAC: Denies chest pain   GASTRO: Denies abdominal pain nausea or emesis  :R flank pain tingling and pressure with urination.   NEURO: AAOX4 Speech clear. Denies numbness or tingling to extremities       "

## 2024-08-11 NOTE — ED PROVIDER NOTES
Encounter Date: 8/11/2024       History     Chief Complaint   Patient presents with    Back Pain     Lower back pain, thinks uti     The history is provided by the patient and medical records. No  was used.     Jaja Toledo is a 68 y.o. female with medical history of HLD, GERD, Hx of breast cancer presenting to the ED with the chief complaint of back pain.     Reports 1 week of dysuria and tingling sensation when she urinates. +suprapubic pressure. No abdominal pain. She occasionally has R lower back pain that is familiar to her. No fever. She is concerned she has a UTI. She was recently treated for a UTI last month. She was given RX for Keflex in the ED. She continued to have symptoms and f/u with her PCP who prescribed her 10 days of Cipro which relieved her symptoms. She reports taking laxatives to have BMs and reports she is wiping correctly and denies hygiene concerns. No vaginal discharge or STD concerns.    Review of patient's allergies indicates:  No Known Allergies  Past Medical History:   Diagnosis Date    Anxiety     Chronic low back pain     Gastritis     GERD (gastroesophageal reflux disease)     History of cervical cancer     History of recurrent urinary tract infection     IFG (impaired fasting glucose)     Malignant neoplasm of overlapping sites of right breast in female, estrogen receptor positive 7/19/2019    Soft tissue tumor, malignant     sarcoma left foot     Past Surgical History:   Procedure Laterality Date    ABDOMINOPLASTY N/A 11/29/2018    Procedure: EXTENDED ABDOMINOPLASTY;  Surgeon: Mio Arriaga MD;  Location: [facility] OR;  Service: Plastics;  Laterality: N/A;    AXILLARY NODE DISSECTION Right 6/4/2019    Procedure: LYMPHADENECTOMY, AXILLARY RIGHT;  Surgeon: Sabine Arteaga MD;  Location: [address];  Service: General;  Laterality: Right;    BACK SURGERY      BREAST CYST EXCISION Left 1984    BREAST RECONSTRUCTION Right 6/6/2022    Procedure: RECONSTRUCTION,  BREAST;  Surgeon: Leonard Cordero MD;  Location: [facility] OR [address] [place] [address];  Service: Plastics;  Laterality: Right;  T-DAP    BREAST REVISION SURGERY Right 7/20/2020    Procedure: BREAST REVISION SURGERY RIGHT;  Surgeon: Leonard Cordero MD;  Location: [facility] OR [address] [place] [address];  Service: Plastics;  Laterality: Right;    COLONOSCOPY N/A 10/19/2020    Procedure: COLONOSCOPY;  Surgeon: Chanel Saenz MD;  Location: [facility] ENDO (4TH FLR);  Service: Endoscopy;  Laterality: N/A;  covid test 10/16[facility] urgent care    COLONOSCOPY N/A 8/15/2023    Procedure: COLONOSCOPY;  Surgeon: Ranjan Avery MD;  Location: [facility] ENDO (4TH FLR);  Service: Endoscopy;  Laterality: N/A;  peg prep  prep instructions placed at    prep instructions sent to pt email [email or web address]  8/14 Revised time and prep instructions -     FOOT SURGERY  2009 or 2010    INSERTION OF BREAST IMPLANT Right 6/4/2019    Procedure: INSERTION, BREAST IMPLANT RIGHT;  Surgeon: Leonard Cordero MD;  Location: [address];  Service: Plastics;  Laterality: Right;    INSERTION OF BREAST TISSUE EXPANDER Right 6/4/2019    Procedure: INSERTION, TISSUE EXPANDER, BREAST RIGHT;  Surgeon: Leonard Cordero MD;  Location: [address];  Service: Plastics;  Laterality: Right;    LIPOSUCTION W/ FAT INJECTION N/A 11/29/2018    Procedure: LIPOSUCTION, WITH FAT TRANSFER TO BUTTOCKS;  Surgeon: Mio Arriaga MD;  Location: [facility];  Service: Plastics;  Laterality: N/A;    MASTOPEXY Left 6/4/2019    Procedure: MASTOPEXY LEFT;  Surgeon: Leonard Cordero MD;  Location: [address];  Service: Plastics;  Laterality: Left;    MASTOPEXY Left 7/20/2020    Procedure: MASTOPEXY LEFT;  Surgeon: Leonard Cordero MD;  Location: [address];  Service: Plastics;  Laterality: Left;    MASTOPEXY Bilateral 6/22/2023    Procedure: MASTOPEXY;  Surgeon: Leonard Cordero MD;  Location: [facility] OR [address] [place] [address];  Service: Plastics;   Laterality: Bilateral;  cresent mastopexy    SENTINEL LYMPH NODE BIOPSY Right 2019    Procedure: BIOPSY, LYMPH NODE, SENTINEL RIGHT;  Surgeon: Sabine Arteaga MD;  Location: [facility] OR [address] [place] [address];  Service: General;  Laterality: Right;    SIMPLE MASTECTOMY Right 2019    Procedure: MASTECTOMY, SIMPLE RIGHT (CONSENT AM OF) 4.0 hr case;  Surgeon: Sabine Arteaga MD;  Location: [facility] OR [address] [place] [address];  Service: General;  Laterality: Right;    TOTAL REDUCTION MAMMOPLASTY Left     TOTAL VAGINAL HYSTERECTOMY      TUBAL LIGATION       Family History   Problem Relation Name Age of Onset    Breast cancer Sister Mary Ellen         dx in     No Known Problems Mother      Throat cancer Father      Colon cancer Neg Hx      Diabetes Neg Hx      Hypertension Neg Hx      Ovarian cancer Neg Hx      Stroke Neg Hx      Anesthesia problems Neg Hx       Social History     Tobacco Use    Smoking status: Former     Current packs/day: 0.00     Average packs/day: 0.3 packs/day for 15.0 years (3.8 ttl pk-yrs)     Types: Cigarettes     Start date: 8/15/1978     Quit date: 8/15/1993     Years since quittin.0    Smokeless tobacco: Never   Substance Use Topics    Alcohol use: Yes     Alcohol/week: 1.0 standard drink of alcohol     Types: 1 Glasses of wine per week     Comment: seldom    Drug use: No     Review of Systems   Genitourinary:  Positive for dysuria.       Physical Exam     Initial Vitals [24 1338]   BP Pulse Resp Temp SpO2   132/80 100 16 98 °F (36.7 °C) 98 %      MAP       --         Physical Exam    Constitutional: She appears well-developed and well-nourished. She is not diaphoretic. No distress.   HENT:   Head: Normocephalic and atraumatic.   Mouth/Throat: Oropharynx is clear and moist. No oropharyngeal exudate.   Eyes: Conjunctivae and EOM are normal. Pupils are equal, round, and reactive to light. No scleral icterus.   Neck: Neck supple.   Normal range of motion.  Cardiovascular:  Regular rhythm.   Tachycardia  present.         Pulmonary/Chest: Breath sounds normal. No respiratory distress. She has no wheezes.   Abdominal: Abdomen is soft. She exhibits no distension. There is no abdominal tenderness.   No CVA tenderness There is no rebound.   Musculoskeletal:         General: No tenderness or edema. Normal range of motion.      Cervical back: Normal range of motion and neck supple.     Neurological: She is alert and oriented to person, place, and time. She has normal strength. No sensory deficit.   Skin: Skin is warm and dry. No rash noted. No erythema.   Psychiatric: She has a normal mood and affect.         ED Course   Procedures  Labs Reviewed   CBC W/ AUTO DIFFERENTIAL - Abnormal       Result Value    WBC 4.46      RBC 4.53      Hemoglobin 12.2      Hematocrit 41.3      MCV 91      MCH 26.9 (*)     MCHC 29.5 (*)     RDW 14.0      Platelets 319      MPV 9.4      Immature Granulocytes 0.2      Gran # (ANC) 2.0      Immature Grans (Abs) 0.01      Lymph # 2.0      Mono # 0.3      Eos # 0.1      Baso # 0.04      nRBC 0      Gran % 45.6      Lymph % 43.9      Mono % 7.2      Eosinophil % 2.2      Basophil % 0.9      Differential Method Automated     COMPREHENSIVE METABOLIC PANEL - Abnormal    Sodium 141      Potassium 3.8      Chloride 109      CO2 22 (*)     Glucose 115 (*)     BUN 20      Creatinine 1.0      Calcium 9.7      Total Protein 8.6 (*)     Albumin 4.1      Total Bilirubin 0.4      Alkaline Phosphatase 90      AST 34      ALT 20      eGFR >60.0      Anion Gap 10     URINALYSIS, REFLEX TO URINE CULTURE - Abnormal    Specimen UA Urine, Clean Catch      Color, UA Yellow      Appearance, UA Hazy (*)     pH, UA 6.0      Specific Gravity, UA 1.025      Protein, UA Trace (*)     Glucose, UA Negative      Ketones, UA Negative      Bilirubin (UA) Negative      Occult Blood UA Negative      Nitrite, UA Positive (*)     Leukocytes, UA 3+ (*)     Narrative:     Specimen Source->Urine   URINALYSIS MICROSCOPIC - Abnormal     WBC, UA >100 (*)     Bacteria Many (*)     Squam Epithel, UA 6      Microscopic Comment SEE COMMENT      Narrative:     Specimen Source->Urine   CULTURE, URINE          Imaging Results    None          Medications   lactated ringers bolus 1,000 mL (0 mLs Intravenous Stopped 8/11/24 1546)   cefTRIAXone (Rocephin) 1 g in D5W 100 mL IVPB (MB+) (0 g Intravenous Stopped 8/11/24 1640)     Medical Decision Making  68 y.o. female with medical history of HLD, GERD, Hx of breast cancer presenting to the ED c/o 1 week of dysuria. Recently treated for UTI last month with Keflex f/b Cipro.    DDx includes but not limited to UTI, interstitial cystitis, vaginitis, candidiasis, TAURUS, malignancy. No CVA tenderness, fever, N/V and lower suspicion for pyelonephritis or obstructive nephrolithiasis.     Amount and/or Complexity of Data Reviewed  External Data Reviewed: labs and notes.  Labs: ordered. Decision-making details documented in ED Course.    Risk  Prescription drug management.               ED Course as of 08/11/24 1801   Sun Aug 11, 2024   1548 WBC: 4.46 [BA]   1548 Creatinine: 1.0 [BA]   1548 WBC, UA(!): >100 [BA]   1549 NITRITE UA(!): Positive [BA]   1549 Leukocyte Esterase, UA(!): 3+  UA consistent with UTI [BA]   1549 Rocephin IV given in the ED. RX for Omnicef provided for home management. Prior cultures reviewed. She has been treated with Keflex and Cipro within the last month. Advised PCP follow-up within the next week. Patient expresses understanding and agreeable to the plan. Return to ED precautions given for new, worsening, or concerning symptoms. [BA]      ED Course User Index  [BA] Daniel Lynch, CALLUM                           Clinical Impression:  Final diagnoses:  [N39.0] Urinary tract infection without hematuria, site unspecified (Primary)          ED Disposition Condition    Discharge Stable          ED Prescriptions       Medication Sig Dispense Start Date End Date Auth. Provider    cefdinir (OMNICEF) 300  MG capsule Take 1 capsule (300 mg total) by mouth 2 (two) times daily. for 7 days 14 capsule 8/11/2024 8/18/2024 Daniel Lynch PA-C          Follow-up Information       Follow up With Specialties Details Why Contact Info    Brian Vu MD Internal Medicine   [address] [facility]  [address]  [facility] [address]  [phone]               Daniel Lynch PA-C  08/11/24 6428

## 2024-08-11 NOTE — DISCHARGE INSTRUCTIONS
Take the prescribed Cefdinir as directed for your urinary tract infection  Follow-up with your primary care provider for further evaluation.  Return to the emergency room for new, worsening, or concerning symptoms.     Future Appointments   Date Time Provider Department Center   8/19/2024 10:15 AM [facility]   9/11/2024 10:40 AM Jake Petersen MD [facility] HEMONC[facility]   9/27/2024 11:20 AM Brian Vu MD [facility]

## 2024-08-13 LAB — BACTERIA UR CULT: ABNORMAL

## 2024-08-19 ENCOUNTER — HOSPITAL ENCOUNTER (OUTPATIENT)
Dept: RADIOLOGY | Facility: HOSPITAL | Age: 69
Discharge: HOME | End: 2024-08-19
Attending: INTERNAL MEDICINE
Payer: COMMERCIAL

## 2024-08-19 VITALS — WEIGHT: 173 LBS | BODY MASS INDEX: 27.92 KG/M2

## 2024-08-19 DIAGNOSIS — Z17.0 MALIGNANT NEOPLASM OF OVERLAPPING SITES OF RIGHT BREAST IN FEMALE, ESTROGEN RECEPTOR POSITIVE: ICD-10-CM

## 2024-08-19 DIAGNOSIS — C50.811 MALIGNANT NEOPLASM OF OVERLAPPING SITES OF RIGHT BREAST IN FEMALE, ESTROGEN RECEPTOR POSITIVE: ICD-10-CM

## 2024-08-19 PROCEDURE — 77067 SCR MAMMO BI INCL CAD: CPT | Mod: 26,52,, | Performed by: RADIOLOGY

## 2024-08-19 PROCEDURE — 77067 SCR MAMMO BI INCL CAD: CPT | Mod: TC,52

## 2024-08-19 PROCEDURE — 77063 BREAST TOMOSYNTHESIS BI: CPT | Mod: 26,52,, | Performed by: RADIOLOGY

## 2024-08-19 PROCEDURE — 77063 BREAST TOMOSYNTHESIS BI: CPT | Mod: TC,52

## 2024-08-27 ENCOUNTER — TELEPHONE (OUTPATIENT)
Dept: HEMATOLOGY/ONCOLOGY | Facility: CLINIC | Age: 69
End: 2024-08-27
Payer: COMMERCIAL

## 2024-09-09 ENCOUNTER — TELEPHONE (OUTPATIENT)
Dept: HEMATOLOGY/ONCOLOGY | Facility: CLINIC | Age: 69
End: 2024-09-09
Payer: COMMERCIAL

## 2024-09-09 NOTE — TELEPHONE ENCOUNTER
I attempt to reach pt  to find out if she is able to make apt on 9/11, because of weather conditions,but unfortunately I was  unable to reach patient. She has no voice mail set up to update pt.

## 2024-09-10 ENCOUNTER — TELEPHONE (OUTPATIENT)
Dept: HEMATOLOGY/ONCOLOGY | Facility: CLINIC | Age: 69
End: 2024-09-10
Payer: COMMERCIAL

## 2024-09-10 NOTE — TELEPHONE ENCOUNTER
Spoke to pt to see if she would like to keep her apt on tomorrow,because of weather conditions. She explain she will keep her apt at this time,but she agree to call  and update me if she until able to make it.

## 2024-09-19 DIAGNOSIS — L70.9 ACNE, UNSPECIFIED ACNE TYPE: Primary | ICD-10-CM

## 2024-09-19 RX ORDER — CLINDAMYCIN PHOSPHATE 10 MG/G
GEL TOPICAL 2 TIMES DAILY
Qty: 60 G | Refills: 2 | Status: SHIPPED | OUTPATIENT
Start: 2024-09-19

## 2024-09-19 RX ORDER — CLINDAMYCIN AND BENZOYL PEROXIDE 10; 50 MG/G; MG/G
GEL TOPICAL 2 TIMES DAILY
Qty: 50 G | Refills: 3 | Status: SHIPPED | OUTPATIENT
Start: 2024-09-19 | End: 2025-09-19

## 2024-09-23 ENCOUNTER — TELEPHONE (OUTPATIENT)
Dept: HEMATOLOGY/ONCOLOGY | Facility: CLINIC | Age: 69
End: 2024-09-23
Payer: COMMERCIAL

## 2024-09-23 NOTE — TELEPHONE ENCOUNTER
I attempt to reach pt to remind her of upcoming apt ,but unfortunately I was  unable to reach patient   so i left both a voice mail and call back number.

## 2024-10-14 ENCOUNTER — OFFICE VISIT (OUTPATIENT)
Dept: HEMATOLOGY/ONCOLOGY | Facility: CLINIC | Age: 69
End: 2024-10-14
Payer: COMMERCIAL

## 2024-10-14 VITALS
HEIGHT: 66 IN | HEART RATE: 106 BPM | TEMPERATURE: 98 F | BODY MASS INDEX: 27.58 KG/M2 | SYSTOLIC BLOOD PRESSURE: 126 MMHG | DIASTOLIC BLOOD PRESSURE: 73 MMHG | RESPIRATION RATE: 17 BRPM | OXYGEN SATURATION: 98 % | WEIGHT: 171.63 LBS

## 2024-10-14 DIAGNOSIS — C50.811 MALIGNANT NEOPLASM OF OVERLAPPING SITES OF RIGHT BREAST IN FEMALE, ESTROGEN RECEPTOR POSITIVE: Primary | ICD-10-CM

## 2024-10-14 DIAGNOSIS — Z79.811 AROMATASE INHIBITOR USE: ICD-10-CM

## 2024-10-14 DIAGNOSIS — R63.0 POOR APPETITE: ICD-10-CM

## 2024-10-14 DIAGNOSIS — I10 ESSENTIAL HYPERTENSION: ICD-10-CM

## 2024-10-14 DIAGNOSIS — Z17.0 MALIGNANT NEOPLASM OF OVERLAPPING SITES OF RIGHT BREAST IN FEMALE, ESTROGEN RECEPTOR POSITIVE: Primary | ICD-10-CM

## 2024-10-14 DIAGNOSIS — Z78.0 MENOPAUSE: ICD-10-CM

## 2024-10-14 PROCEDURE — 99999 PR PBB SHADOW E&M-EST. PATIENT-LVL V: CPT | Mod: PBBFAC,,, | Performed by: INTERNAL MEDICINE

## 2024-10-14 PROCEDURE — 1101F PT FALLS ASSESS-DOCD LE1/YR: CPT | Mod: CPTII,S$GLB,, | Performed by: INTERNAL MEDICINE

## 2024-10-14 PROCEDURE — 1160F RVW MEDS BY RX/DR IN RCRD: CPT | Mod: CPTII,S$GLB,, | Performed by: INTERNAL MEDICINE

## 2024-10-14 PROCEDURE — 3078F DIAST BP <80 MM HG: CPT | Mod: CPTII,S$GLB,, | Performed by: INTERNAL MEDICINE

## 2024-10-14 PROCEDURE — 1159F MED LIST DOCD IN RCRD: CPT | Mod: CPTII,S$GLB,, | Performed by: INTERNAL MEDICINE

## 2024-10-14 PROCEDURE — 3044F HG A1C LEVEL LT 7.0%: CPT | Mod: CPTII,S$GLB,, | Performed by: INTERNAL MEDICINE

## 2024-10-14 PROCEDURE — 1126F AMNT PAIN NOTED NONE PRSNT: CPT | Mod: CPTII,S$GLB,, | Performed by: INTERNAL MEDICINE

## 2024-10-14 PROCEDURE — 3074F SYST BP LT 130 MM HG: CPT | Mod: CPTII,S$GLB,, | Performed by: INTERNAL MEDICINE

## 2024-10-14 PROCEDURE — 3288F FALL RISK ASSESSMENT DOCD: CPT | Mod: CPTII,S$GLB,, | Performed by: INTERNAL MEDICINE

## 2024-10-14 PROCEDURE — 3008F BODY MASS INDEX DOCD: CPT | Mod: CPTII,S$GLB,, | Performed by: INTERNAL MEDICINE

## 2024-10-14 PROCEDURE — G2211 COMPLEX E/M VISIT ADD ON: HCPCS | Mod: S$GLB,,, | Performed by: INTERNAL MEDICINE

## 2024-10-14 PROCEDURE — 99214 OFFICE O/P EST MOD 30 MIN: CPT | Mod: S$GLB,,, | Performed by: INTERNAL MEDICINE

## 2024-10-14 NOTE — PROGRESS NOTES
Subjective:       Patient ID: Jaja Toledo is a 69 y.o. female.     Chief Complaint: follow up for breast cancer     Diagnosis:  Stage IA (mp T1cN0 (I+)M0) Right breast cancer, s/p right mastectomy on 6/4/19     Oncologic History:  1. Ms Toledo is a 62 yo postmenopausal woman who initially saw me on 7/22/19 for breast cancer. She was noted to have asymmetry in the right breast at the inner position on mammogram 3/18/19. Diagnosed mammogram on 4/11/19 showed a 11 mm high density, irregularly shaped mass with indistinct margins in the right breast at 3 oclock. US showed a 7 x 9 x 7 mm mass at 3 oclock. The right axilla was normal. Breast biopsy on 4/22/19 showed invasive ductal carcinoma, grade 3, with associated high grade DCIS. ER+ (>95%), WA+(>90%), HER2 equivocal, FISH negative. Ki-67 20%. She was seen by Dr Arteaga in May 2019. MRI breast on 5/16/19 showed a 13 mm x 10 mm x 10 mm mass at 3 oclock correlating with the prior finding. There is another 9 mm x 7 mm x 8 mm mass at 6 oclock in the right breast. Left breast is benign. She underwent biopsy of the second mass on 5/27/19, which showed a grade 2 invasive lobular carcinoma, ER positive 70%, WA negative, HER2 negative, Ki-67 15%. Patient underwent right mastectomy and right axillary lymph node biopsy on 6/4/19. Pathology showed two tumors: #1, one 1.3 cm grade 2 invasive ductal carcinoma with lobular features, #2, one 1.7 cm grade 2 invasive lobular carcinoma. DCIS present, grade 3, with necrosis. LCIS. Margins are negative. lymphovascular invasion was not identified. One sentinel lymph node removed, with isolated tumor cells. Extranodal extention not identified. mp T1c sn N0 (i+). Receptors: #1: ER positive 95%, WA positive 90%, HER2 FISH negative, Ki67 20%. #2, ER positive 70%, WA negative, HER2 negative, Ki-67 15%. She returns today to discuss further management. She currently feels well. She had some mild sweating on her forehead in her last 50s. She  had a hysterectomy more than 20 years ago. She had [employer] genetic test done which was negative. Oncotype DX of her 1.7 cm ILC showed an RS of 15, distant recurrence rate at 9 years is 4%, benefit from chemo is <1%. Oncotype DX of her 1.3 cm IDC is pending.   2. Oncotype DX of her 1.3 cm IDC showed RS of 24, distant recurrence rate at 9 years is 10%, benefit from chemotherapy <1%  3. Started letrozole in 2019. Bone density 19 and 2023 normal  4. Colonoscopy 8/15/2023 was unremarkable     Interval History:   Ms Toledo returns for follow up. She has been taking letrozole. 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      ECO     ROS:   A ten-point system review is obtained and negative except for what was stated in the Interval History.      Physical Examination:   Vital signs reviewed.   General: well hydrated, well developed, in no acute distress  HEENT: normocephalic, PERRLA, EOMI, anicteric sclerae  Neck: supple, no JVD, thyromegaly, cervical or supraclavicular lymphadenopathy  Lungs: clear breath sounds bilaterally, no wheezing, rales, or rhonchi  Heart: RRR, no M/R/G  Breast: s/p R mastectomy and reconstruction. No abnormal skin changes, masses or axillary LAD. L: no abnormal skin changes, masses, or axillary LAD.   Abdomen: soft, no tenderness, non-distended, no hepatosplenomegaly, mass, or hernia. BS present  Extremities: no clubbing, cyanosis, edema  Skin: no rash, ulcer, or open wounds  Neuro: alert and oriented x 4  Psych: normal      Objective:      Laboratory Data:  Labs reviewed.      Imaging Data:   L mammogram 24:  Left  The left breast is heterogeneously dense, which may obscure small masses. There is no evidence of suspicious masses, calcifications, or other abnormal findings in the left breast.     Impression:  There is no mammographic evidence of malignancy in the left breast.     BI-RADS Category:   Overall: 1 - Negative         Recommendation:  Routine screening mammogram in 1 year is recommended.     Assessment and Plan:      1. Malignant neoplasm of overlapping sites of right breast in female, estrogen receptor positive    2. Aromatase inhibitor use    3. Poor appetite    4. Essential hypertension    5. Menopause      1-2  - Ms Toledo is a 67 yo woman with Stage IA (mp T1cN0 (I+)M0) Right breast cancer s/p R mastectomy, SLNB and immediate reconstruction on 6/4/19. She had a 1.3 cm invasive ductal carcinoma and a 1.7 cm invasive lobular carcinoma. Both tumors ER positive, HER2 negative. She had isolated tumor cells in one sentinel lymph node, which is stage N0 (I+).  Oncotype DX score of the 1.7 cm ILC showed an RS of 15, distant recurrence rate at 9 years is 4%, benefit from chemo is <1%. Oncotype DX of her 1.3 cm IDC showed RS of 24, distant recurrence rate at 9 years is 10%, benefit from chemotherapy <1%  - Has been on letrozole since Aug 2019. Tolerating it well  - She has been on letrozole for 5 years. Discussed the survival data with 7 vs 10 years of adjuvant AI. Discussed breast cancer index. Will send [facility] to see if she needs an additional 5 years of letrozole. Meanwhile c/w letrozole. Patient understands and agrees with the plan.   - reviewed mammogram results. Normal. Next due in Aug 2025  - due for bone density in Jan 2025  - see me after bone density  - c/w OTC vit D    3.  - chronic problem unrelated to cancer or medication. She told me she took periactin in her 20s. She does not have active cancer, is not cachectic, does not have mechanical pathology making her not able to eat. Her weight has been stable. Discussed with patient I do no recommend taking long term medication for appetite. Asked patient to f/u with PCP    4.  - BP controlled  - c/w current medication    5.  - get bone density    Follow-up:      Return after Jan 2025    Route Chart for Scheduling    Med Onc Chart Routing      Follow up with physician . Please  schedule patient for bone density and vitamin D in Jan 2025. see me one month after   Follow up with [facility]    Infusion scheduling note    Injection scheduling note    Labs Vitamin D   Scheduling:  Preferred lab:  Lab interval:     Imaging DXA scan      Pharmacy appointment    Other referrals

## 2024-10-17 ENCOUNTER — OFFICE VISIT (OUTPATIENT)
Dept: FAMILY MEDICINE | Facility: CLINIC | Age: 69
End: 2024-10-17
Attending: FAMILY MEDICINE
Payer: COMMERCIAL

## 2024-10-17 VITALS
BODY MASS INDEX: 28.91 KG/M2 | HEIGHT: 65 IN | OXYGEN SATURATION: 98 % | WEIGHT: 173.5 LBS | HEART RATE: 82 BPM | SYSTOLIC BLOOD PRESSURE: 136 MMHG | DIASTOLIC BLOOD PRESSURE: 80 MMHG

## 2024-10-17 DIAGNOSIS — R63.0 POOR APPETITE: ICD-10-CM

## 2024-10-17 DIAGNOSIS — C50.811 MALIGNANT NEOPLASM OF OVERLAPPING SITES OF RIGHT BREAST IN FEMALE, ESTROGEN RECEPTOR POSITIVE: ICD-10-CM

## 2024-10-17 DIAGNOSIS — E78.2 MIXED HYPERLIPIDEMIA: Primary | ICD-10-CM

## 2024-10-17 DIAGNOSIS — Z17.0 MALIGNANT NEOPLASM OF OVERLAPPING SITES OF RIGHT BREAST IN FEMALE, ESTROGEN RECEPTOR POSITIVE: ICD-10-CM

## 2024-10-17 DIAGNOSIS — R73.01 IFG (IMPAIRED FASTING GLUCOSE): ICD-10-CM

## 2024-10-17 DIAGNOSIS — R51.9 HEADACHE, CHRONIC DAILY: ICD-10-CM

## 2024-10-17 DIAGNOSIS — K21.9 GASTROESOPHAGEAL REFLUX DISEASE, UNSPECIFIED WHETHER ESOPHAGITIS PRESENT: ICD-10-CM

## 2024-10-17 DIAGNOSIS — L70.9 ACNE, UNSPECIFIED ACNE TYPE: ICD-10-CM

## 2024-10-17 PROCEDURE — 3008F BODY MASS INDEX DOCD: CPT | Mod: CPTII,S$GLB,, | Performed by: FAMILY MEDICINE

## 2024-10-17 PROCEDURE — 3075F SYST BP GE 130 - 139MM HG: CPT | Mod: CPTII,S$GLB,, | Performed by: FAMILY MEDICINE

## 2024-10-17 PROCEDURE — 1159F MED LIST DOCD IN RCRD: CPT | Mod: CPTII,S$GLB,, | Performed by: FAMILY MEDICINE

## 2024-10-17 PROCEDURE — 3288F FALL RISK ASSESSMENT DOCD: CPT | Mod: CPTII,S$GLB,, | Performed by: FAMILY MEDICINE

## 2024-10-17 PROCEDURE — 3079F DIAST BP 80-89 MM HG: CPT | Mod: CPTII,S$GLB,, | Performed by: FAMILY MEDICINE

## 2024-10-17 PROCEDURE — 99999 PR PBB SHADOW E&M-EST. PATIENT-LVL IV: CPT | Mod: PBBFAC,,, | Performed by: FAMILY MEDICINE

## 2024-10-17 PROCEDURE — 1160F RVW MEDS BY RX/DR IN RCRD: CPT | Mod: CPTII,S$GLB,, | Performed by: FAMILY MEDICINE

## 2024-10-17 PROCEDURE — 3044F HG A1C LEVEL LT 7.0%: CPT | Mod: CPTII,S$GLB,, | Performed by: FAMILY MEDICINE

## 2024-10-17 PROCEDURE — 1101F PT FALLS ASSESS-DOCD LE1/YR: CPT | Mod: CPTII,S$GLB,, | Performed by: FAMILY MEDICINE

## 2024-10-17 PROCEDURE — 99214 OFFICE O/P EST MOD 30 MIN: CPT | Mod: S$GLB,,, | Performed by: FAMILY MEDICINE

## 2024-10-17 RX ORDER — CYPROHEPTADINE HYDROCHLORIDE 4 MG/1
4 TABLET ORAL 3 TIMES DAILY PRN
Qty: 90 TABLET | Refills: 2 | Status: SHIPPED | OUTPATIENT
Start: 2024-10-17

## 2024-10-17 RX ORDER — CLINDAMYCIN PHOSPHATE 10 MG/G
GEL TOPICAL 2 TIMES DAILY
Qty: 60 G | Refills: 2 | Status: SHIPPED | OUTPATIENT
Start: 2024-10-17

## 2024-10-17 RX ORDER — CLINDAMYCIN AND BENZOYL PEROXIDE 10; 50 MG/G; MG/G
GEL TOPICAL 2 TIMES DAILY
Qty: 50 G | Refills: 3 | Status: SHIPPED | OUTPATIENT
Start: 2024-10-17 | End: 2025-10-17

## 2024-10-28 NOTE — PROGRESS NOTES
Subjective:       Patient ID: Jaja Toledo is a 66 y.o. female.     Chief Complaint: follow up for breast cancer     Diagnosis:  Stage IA (mp T1cN0 (I+)M0) Right breast cancer, s/p right mastectomy on 6/4/19     Oncologic History:  1. Ms Toledo is a 64 yo postmenopausal woman who initially saw me on 7/22/19 for breast cancer. She was noted to have asymmetry in the right breast at the inner position on mammogram 3/18/19. Diagnosed mammogram on 4/11/19 showed a 11 mm high density, irregularly shaped mass with indistinct margins in the right breast at 3 oclock. US showed a 7 x 9 x 7 mm mass at 3 oclock. The right axilla was normal. Breast biopsy on 4/22/19 showed invasive ductal carcinoma, grade 3, with associated high grade DCIS. ER+ (>95%), NE+(>90%), HER2 equivocal, FISH negative. Ki-67 20%. She was seen by Dr Arteaga in May 2019. MRI breast on 5/16/19 showed a 13 mm x 10 mm x 10 mm mass at 3 oclock correlating with the prior finding. There is another 9 mm x 7 mm x 8 mm mass at 6 oclock in the right breast. Left breast is benign. She underwent biopsy of the second mass on 5/27/19, which showed a grade 2 invasive lobular carcinoma, ER positive 70%, NE negative, HER2 negative, Ki-67 15%. Patient underwent right mastectomy and right axillary lymph node biopsy on 6/4/19. Pathology showed two tumors: #1, one 1.3 cm grade 2 invasive ductal carcinoma with lobular features, #2, one 1.7 cm grade 2 invasive lobular carcinoma. DCIS present, grade 3, with necrosis. LCIS. Margins are negative. lymphovascular invasion was not identified. One sentinel lymph node removed, with isolated tumor cells. Extranodal extention not identified. mp T1c sn N0 (i+). Receptors: #1: ER positive 95%, NE positive 90%, HER2 FISH negative, Ki67 20%. #2, ER positive 70%, NE negative, HER2 negative, Ki-67 15%. She returns today to discuss further management. She currently feels well. She had some mild sweating on her forehead in her last 50s. She  had a hysterectomy more than 20 years ago. She had [employer] genetic test done which was negative. Oncotype DX of her 1.7 cm ILC showed an RS of 15, distant recurrence rate at 9 years is 4%, benefit from chemo is <1%. Oncotype DX of her 1.3 cm IDC is pending.   2. Oncotype DX of her 1.3 cm IDC showed RS of 24, distant recurrence rate at 9 years is 10%, benefit from chemotherapy <1%  3. Started letrozole in 2019. Bone density 19 normal.      Interval History:   Ms Toledo returns for follow up. She has been taking letrozole and two vit D a day. Low appetite. Lost 12 lbs and wants to gain that back. Noted tingling/numbness in fingers for the past 6 months.      ECO     ROS:   A ten-point system review is obtained and negative except for what was stated in the Interval History.      Physical Examination:   Vital signs reviewed.   General: well hydrated, well developed, in no acute distress  HEENT: normocephalic, PERRLA, EOMI, anicteric sclerae, oropharynx clear  Neck: supple, no JVD, thyromegaly, cervical or supraclavicular lymphadenopathy  Lungs: clear breath sounds bilaterally, no wheezing, rales, or rhonchi  Heart: RRR, no M/R/G  Breast: s/p R mastectomy and reconstruction. No abnormal skin changes, masses or axillary LAD. L: no abnormal skin changes, masses, or axillary LAD.   Abdomen: soft, no tenderness, non-distended, no hepatosplenomegaly, mass, or hernia. BS present  Extremities: no clubbing, cyanosis, edema  Skin: no rash, ulcer, or open wounds  Neuro: alert and oriented x 4, no focal neuro deficit  Psych: pleasant and appropriate mood and affect      Objective:      Laboratory Data:  Labs reviewed. vit D level normal     Imaging Data:   Mammogram 6/15/2022:  Impression:  There is no mammographic evidence of malignancy in the left breast.     BI-RADS Category:   Overall: 1 - Negative     Recommendation:  Routine screening mammogram in 1 year is recommended.        Assessment and Plan:      1.  Malignant neoplasm of overlapping sites of right breast in female, estrogen receptor positive    2. Aromatase inhibitor use    3. Poor appetite    4. Numbness    5. Menopause      1-2  - Ms Toledo is a 67 yo woman with Stage IA (mp T1cN0 (I+)M0) Right breast cancer s/p R mastectomy, SLNB and immediate reconstruction on 6/4/19. She had a 1.3 cm invasive ductal carcinoma and a 1.7 cm invasive lobular carcinoma. Both tumors ER positive, HER2 negative. She had isolated tumor cells in one sentinel lymph node, which is stage N0 (I+).  Oncotype DX score of the 1.7 cm ILC showed an RS of 15, distant recurrence rate at 9 years is 4%, benefit from chemo is <1%. Oncotype DX of her 1.3 cm IDC showed RS of 24, distant recurrence rate at 9 years is 10%, benefit from chemotherapy <1%  - Has been on letrozole since Aug 2019. Tolerating it well  - continue with letrozole for 5 years  - last mammogram in June 2022 normal. Next due in June 2023    3.  - try periactin    4.  - never got chemo  - refer to neurlogy    5.  - Bone density 11/22/19 normal.  - schedule bone density on return    Follow-up:      Return in 6 months  All her questions were answered in the office. Knows to call in the interval if any problems arise.    Route Chart for Scheduling    Med Onc Chart Routing  Urgent    Follow up with physician 6 months. Please refer to neurology. get bone density in 5 months. see me in 6 months   Follow up with [facility]    Infusion scheduling note    Injection scheduling note    Labs    Imaging Other   Bone density in 5 months   Pharmacy appointment    Other referrals Additional referrals needed                 no

## 2024-11-01 ENCOUNTER — TELEPHONE (OUTPATIENT)
Dept: HEMATOLOGY/ONCOLOGY | Facility: CLINIC | Age: 69
End: 2024-11-01
Payer: COMMERCIAL

## 2024-11-06 ENCOUNTER — TELEPHONE (OUTPATIENT)
Dept: HEMATOLOGY/ONCOLOGY | Facility: CLINIC | Age: 69
End: 2024-11-06
Payer: COMMERCIAL

## 2024-11-06 NOTE — TELEPHONE ENCOUNTER
"----- Message from SIENA Hamlin sent at 11/5/2024  4:20 PM CST -----  I returned the completed fax today.  Will follow up tomorrow  ----- Message -----  From: Jorge Up  Sent: 11/5/2024   1:33 PM CST  To: Nicola Joseph Staff    Consult/Advisory     Name Of Caller: [place] [Client  - [facility]]     Contact Preference?: [phone]   Fax [phone]     Provider Name: Nicola     What is the nature of the call?: Following up nhung Hamlin about recent request - order form for BCI test to be signed and dated     Additional Notes: "Thank you for all that you do for our patients"  "

## 2024-12-04 ENCOUNTER — TELEPHONE (OUTPATIENT)
Dept: FAMILY MEDICINE | Facility: CLINIC | Age: 69
End: 2024-12-04
Payer: COMMERCIAL

## 2024-12-04 NOTE — TELEPHONE ENCOUNTER
----- Message from Olivia sent at 12/2/2024 12:59 PM CST -----  Type:  Needs Medical Advice    Who Called: Patient  Best Call Back Number: [phone]  Additional Information: Patient is requesting a return to work note for January 2,2025

## 2024-12-06 ENCOUNTER — TELEPHONE (OUTPATIENT)
Dept: FAMILY MEDICINE | Facility: CLINIC | Age: 69
End: 2024-12-06
Payer: COMMERCIAL

## 2024-12-06 NOTE — LETTER
December 6, 2024    Jaja Toledo  [address] [place] [address]             [facility]  [address]  [address]  [place] [address]  Phone: [phone]  Fax: [phone]   December 6, 2024     Patient: Jaja Toledo   YOB: 1955   Date of Visit: 12/6/2024       To Whom it May Concern:    Jaja Toledo was seen in my clinic on 12/6/2024. She may return to work on 01/02/2025 .    Please excuse her from any classes or work missed.    If you have any questions or concerns, please don't hesitate to call.    Sincerely,         Brian Vu MD
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
## 2024-12-11 ENCOUNTER — TELEPHONE (OUTPATIENT)
Dept: FAMILY MEDICINE | Facility: CLINIC | Age: 69
End: 2024-12-11
Payer: COMMERCIAL

## 2024-12-11 NOTE — TELEPHONE ENCOUNTER
----- Message from Abby sent at 12/11/2024  9:03 AM CST -----  Type:  Patient FMLA Call    Who Called:Pt   Would the patient rather a call back or a response via [facility]? Call back  Best Call Back Number:[phone]  Additional Information: need letter for FMLA reworded

## 2024-12-11 NOTE — TELEPHONE ENCOUNTER
----- Message from Evie sent at 12/11/2024 12:24 PM CST -----  Regarding: Call back  Contact: [phone]  Type:  Patient Returning Call    Who Called: PT   Who Left Message for Patient: Nurse   Does the patient know what this is regarding?: Yes   Would the patient rather a call back or a response via [employer]? Call back   Best Call Back Number: [phone]  Additional Information:

## 2024-12-11 NOTE — LETTER
December 11, 2024      [facility]  [address]  [address]  [place] [address]  Phone: [phone]  Fax: [phone]       December 6, 2024     Patient: Jaja Toledo   YOB: 1955   Date of Visit: 12/6/2024       To Whom it May Concern:    Jaja Toledo was seen in my clinic on 12/6/2024. 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    Please excuse her from any classes or work missed.    If you have any questions or concerns, please don't hesitate to call.    Sincerely,     Celsa Gustafson CMA on behalf of   Brian Vu MD          Sincerely,    Celsa Gustafson CMA

## 2024-12-11 NOTE — LETTER
December 6, 2024     Patient: Jaja Toledo   YOB: 1955   Date of Visit: 12/6/2024       To Whom it May Concern:    Jaja Toledo was seen in my clinic on 12/6/2024. 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    Please excuse her from any classes or work missed.    If you have any questions or concerns, please don't hesitate to call.    Sincerely,     Celsa Gustafson CMA on behalf of   Brian Vu MD

## 2024-12-11 NOTE — TELEPHONE ENCOUNTER
The letter needs to state  Due to her mother's health iswue and chemo treatment   Pt may return to work on 1/2/2025    May I change this on her letter

## 2025-01-12 ENCOUNTER — HOSPITAL ENCOUNTER (EMERGENCY)
Facility: HOSPITAL | Age: 70
Discharge: HOME | End: 2025-01-12
Attending: EMERGENCY MEDICINE
Payer: COMMERCIAL

## 2025-01-12 VITALS
RESPIRATION RATE: 18 BRPM | HEIGHT: 65 IN | OXYGEN SATURATION: 96 % | SYSTOLIC BLOOD PRESSURE: 135 MMHG | WEIGHT: 173.5 LBS | BODY MASS INDEX: 28.91 KG/M2 | DIASTOLIC BLOOD PRESSURE: 81 MMHG | HEART RATE: 109 BPM | TEMPERATURE: 97 F

## 2025-01-12 DIAGNOSIS — M54.32 SCIATICA OF LEFT SIDE: Primary | ICD-10-CM

## 2025-01-12 LAB
ALBUMIN SERPL BCP-MCNC: 3.7 G/DL (ref 3.5–5.2)
ALP SERPL-CCNC: 82 U/L (ref 40–150)
ALT SERPL W/O P-5'-P-CCNC: 28 U/L (ref 10–44)
ANION GAP SERPL CALC-SCNC: 10 MMOL/L (ref 8–16)
AST SERPL-CCNC: 33 U/L (ref 10–40)
BASOPHILS # BLD AUTO: 0.07 K/UL (ref 0–0.2)
BASOPHILS NFR BLD: 0.9 % (ref 0–1.9)
BILIRUB SERPL-MCNC: 0.3 MG/DL (ref 0.1–1)
BILIRUB UR QL STRIP: NEGATIVE
BUN SERPL-MCNC: 16 MG/DL (ref 8–23)
CALCIUM SERPL-MCNC: 9.5 MG/DL (ref 8.7–10.5)
CHLORIDE SERPL-SCNC: 108 MMOL/L (ref 95–110)
CLARITY UR REFRACT.AUTO: CLEAR
CO2 SERPL-SCNC: 21 MMOL/L (ref 23–29)
COLOR UR AUTO: YELLOW
CREAT SERPL-MCNC: 0.7 MG/DL (ref 0.5–1.4)
DIFFERENTIAL METHOD BLD: ABNORMAL
EOSINOPHIL # BLD AUTO: 0.3 K/UL (ref 0–0.5)
EOSINOPHIL NFR BLD: 3.5 % (ref 0–8)
ERYTHROCYTE [DISTWIDTH] IN BLOOD BY AUTOMATED COUNT: 15.1 % (ref 11.5–14.5)
EST. GFR  (NO RACE VARIABLE): >60 ML/MIN/1.73 M^2
GLUCOSE SERPL-MCNC: 91 MG/DL (ref 70–110)
GLUCOSE UR QL STRIP: NEGATIVE
HCT VFR BLD AUTO: 38.6 % (ref 37–48.5)
HGB BLD-MCNC: 11.6 G/DL (ref 12–16)
HGB UR QL STRIP: NEGATIVE
IMM GRANULOCYTES # BLD AUTO: 0.02 K/UL (ref 0–0.04)
IMM GRANULOCYTES NFR BLD AUTO: 0.2 % (ref 0–0.5)
KETONES UR QL STRIP: NEGATIVE
LEUKOCYTE ESTERASE UR QL STRIP: ABNORMAL
LYMPHOCYTES # BLD AUTO: 2.4 K/UL (ref 1–4.8)
LYMPHOCYTES NFR BLD: 29.8 % (ref 18–48)
MCH RBC QN AUTO: 26.5 PG (ref 27–31)
MCHC RBC AUTO-ENTMCNC: 30.1 G/DL (ref 32–36)
MCV RBC AUTO: 88 FL (ref 82–98)
MICROSCOPIC COMMENT: NORMAL
MONOCYTES # BLD AUTO: 0.7 K/UL (ref 0.3–1)
MONOCYTES NFR BLD: 8.3 % (ref 4–15)
NEUTROPHILS # BLD AUTO: 4.7 K/UL (ref 1.8–7.7)
NEUTROPHILS NFR BLD: 57.3 % (ref 38–73)
NITRITE UR QL STRIP: NEGATIVE
NRBC BLD-RTO: 0 /100 WBC
PH UR STRIP: 6 [PH] (ref 5–8)
PLATELET # BLD AUTO: 334 K/UL (ref 150–450)
PMV BLD AUTO: 9.2 FL (ref 9.2–12.9)
POTASSIUM SERPL-SCNC: 4 MMOL/L (ref 3.5–5.1)
PROT SERPL-MCNC: 7.5 G/DL (ref 6–8.4)
PROT UR QL STRIP: NEGATIVE
RBC # BLD AUTO: 4.37 M/UL (ref 4–5.4)
RBC #/AREA URNS AUTO: 3 /HPF (ref 0–4)
SODIUM SERPL-SCNC: 139 MMOL/L (ref 136–145)
SP GR UR STRIP: 1.03 (ref 1–1.03)
SQUAMOUS #/AREA URNS AUTO: 1 /HPF
URN SPEC COLLECT METH UR: ABNORMAL
WBC # BLD AUTO: 8.19 K/UL (ref 3.9–12.7)
WBC #/AREA URNS AUTO: 5 /HPF (ref 0–5)

## 2025-01-12 PROCEDURE — 85025 COMPLETE CBC W/AUTO DIFF WBC: CPT | Performed by: PHYSICIAN ASSISTANT

## 2025-01-12 PROCEDURE — 80053 COMPREHEN METABOLIC PANEL: CPT | Performed by: EMERGENCY MEDICINE

## 2025-01-12 PROCEDURE — 99284 EMERGENCY DEPT VISIT MOD MDM: CPT

## 2025-01-12 PROCEDURE — 81001 URINALYSIS AUTO W/SCOPE: CPT | Performed by: PHYSICIAN ASSISTANT

## 2025-01-12 RX ORDER — METHOCARBAMOL 500 MG/1
500 TABLET, FILM COATED ORAL 3 TIMES DAILY
Qty: 15 TABLET | Refills: 0 | Status: SHIPPED | OUTPATIENT
Start: 2025-01-12 | End: 2025-01-17

## 2025-01-12 RX ORDER — NAPROXEN 500 MG/1
500 TABLET ORAL 2 TIMES DAILY WITH MEALS
Qty: 20 TABLET | Refills: 0 | Status: SHIPPED | OUTPATIENT
Start: 2025-01-12 | End: 2025-01-22

## 2025-01-13 NOTE — ED PROVIDER NOTES
Encounter Date: 1/12/2025       History     Chief Complaint   Patient presents with    Back Pain     Left calf, posterior leg, buttocks and back pain for over a week. Not relieved by 800mg of ibuprofen.      69-year-old female with past medical history of breast CA, recurrent UTI, GERD, chronic low back pain presents ED with low back pain.  One week of if low back pain radiating down her left leg.  Denies any recent trauma and no history of IVDU, saddle anesthesia loss of bowel or bladder.  States she took 800 mg ibuprofen in her pain is now resolved.  She also notes a funny sensation after urinating in his concerned about UTI.  Denies any fevers/chills, nausea vomiting, dysuria, vaginal discharge.        Review of patient's allergies indicates:  No Known Allergies  Past Medical History:   Diagnosis Date    Anxiety     Chronic low back pain     Gastritis     GERD (gastroesophageal reflux disease)     History of cervical cancer     History of recurrent urinary tract infection     IFG (impaired fasting glucose)     Malignant neoplasm of overlapping sites of right breast in female, estrogen receptor positive 7/19/2019    Soft tissue tumor, malignant     sarcoma left foot     Past Surgical History:   Procedure Laterality Date    ABDOMINOPLASTY N/A 11/29/2018    Procedure: EXTENDED ABDOMINOPLASTY;  Surgeon: Mio Arriaga MD;  Location: [facility] OR;  Service: Plastics;  Laterality: N/A;    AXILLARY NODE DISSECTION Right 6/4/2019    Procedure: LYMPHADENECTOMY, AXILLARY RIGHT;  Surgeon: Sabine Arteaga MD;  Location: [facility] OR [address] [place] [address];  Service: General;  Laterality: Right;    BACK SURGERY      BREAST CYST EXCISION Left 1984    BREAST RECONSTRUCTION Right 6/6/2022    Procedure: RECONSTRUCTION, BREAST;  Surgeon: Leonard Cordero MD;  [facility];  Service: Plastics;  Laterality: Right;  T-DAP    BREAST REVISION SURGERY Right 7/20/2020    Procedure: BREAST REVISION SURGERY RIGHT;  Surgeon: Leonard PURCELL  MD Consuelo;  [address];  Service: Plastics;  Laterality: Right;    COLONOSCOPY N/A 10/19/2020    Procedure: COLONOSCOPY;  Surgeon: Chanel Saenz MD;  Location: [facility] ENDO (4TH FLR);  Service: Endoscopy;  Laterality: N/A;  covid test 10/16[facility] urgent care    COLONOSCOPY N/A 8/15/2023    Procedure: COLONOSCOPY;  Surgeon: Ranjan Avery MD;  Location: [facility] ENDO (4TH FLR);  Service: Endoscopy;  Laterality: N/A;  peg prep  prep instructions placed at    prep instructions sent to pt email [email or web address]  8/14 Revised time and prep instructions - PC    FOOT SURGERY  2009 or 2010    INSERTION OF BREAST IMPLANT Right 6/4/2019    Procedure: INSERTION, BREAST IMPLANT RIGHT;  Surgeon: Leonard Cordero MD;  Location: [facility] OR [address] [place] [address];  Service: Plastics;  Laterality: Right;    INSERTION OF BREAST TISSUE EXPANDER Right 6/4/2019    Procedure: INSERTION, TISSUE EXPANDER, BREAST RIGHT;  Surgeon: Leonard Cordero MD;  [address];  Service: Plastics;  Laterality: Right;    LIPOSUCTION W/ FAT INJECTION N/A 11/29/2018    Procedure: LIPOSUCTION, WITH FAT TRANSFER TO BUTTOCKS;  Surgeon: Mio Arriaga MD;  Location: [facility];  Service: Plastics;  Laterality: N/A;    MASTOPEXY Left 6/4/2019    Procedure: MASTOPEXY LEFT;  Surgeon: Leonard Cordero MD;  [address];  Service: Plastics;  Laterality: Left;    MASTOPEXY Left 7/20/2020    Procedure: MASTOPEXY LEFT;  Surgeon: Leonard Cordero MD;  Location: [address];  Service: Plastics;  Laterality: Left;    MASTOPEXY Bilateral 6/22/2023    Procedure: MASTOPEXY;  Surgeon: Leonard Cordero MD;  Location: [address];  Service: Plastics;  Laterality: Bilateral;  cresent mastopexy    SENTINEL LYMPH NODE BIOPSY Right 6/4/2019    Procedure: BIOPSY, LYMPH NODE, SENTINEL RIGHT;  Surgeon: Sabine Arteaga MD;  Location: [address] FLR;  Service: General;  Laterality: Right;     SIMPLE MASTECTOMY Right 2019    Procedure: MASTECTOMY, SIMPLE RIGHT (CONSENT AM OF) 4.0 hr case;  Surgeon: Sabine Arteaga MD;  Location: [facility] OR [address] [place] [address];  Service: General;  Laterality: Right;    TOTAL REDUCTION MAMMOPLASTY Left     TOTAL VAGINAL HYSTERECTOMY  1996    TUBAL LIGATION       Family History   Problem Relation Name Age of Onset    Breast cancer Sister Mary Ellen         dx in     No Known Problems Mother      Throat cancer Father      Colon cancer Neg Hx      Diabetes Neg Hx      Hypertension Neg Hx      Ovarian cancer Neg Hx      Stroke Neg Hx      Anesthesia problems Neg Hx       Social History     Tobacco Use    Smoking status: Former     Current packs/day: 0.00     Average packs/day: 0.3 packs/day for 15.0 years (3.8 ttl pk-yrs)     Types: Cigarettes     Start date: 8/15/1978     Quit date: 8/15/1993     Years since quittin.4    Smokeless tobacco: Never   Substance Use Topics    Alcohol use: Yes     Alcohol/week: 1.0 standard drink of alcohol     Types: 1 Glasses of wine per week     Comment: seldom    Drug use: No     Review of Systems    Physical Exam     Initial Vitals [25 1708]   BP Pulse Resp Temp SpO2   135/81 109 18 97.4 °F (36.3 °C) 96 %      MAP       --         Physical Exam    Nursing note and vitals reviewed.  Constitutional: She appears well-developed and well-nourished.   HENT:   Head: Normocephalic and atraumatic.   Eyes: Conjunctivae are normal. Pupils are equal, round, and reactive to light.   Neck: Neck supple.   Normal range of motion.  Cardiovascular:  Normal rate.           Pulmonary/Chest: Breath sounds normal.   Abdominal: Abdomen is soft. She exhibits no distension. There is no abdominal tenderness.   Musculoskeletal:         General: Normal range of motion.      Cervical back: Normal range of motion and neck supple.      Comments: No midline C, T or L-spine tenderness.  Minimally tender over the left upper outer gluteus.  No weakness in the lower  extremity.  Lower extremities neurovascularly intact.     Neurological: She is alert and oriented to person, place, and time. GCS score is 15. GCS eye subscore is 4. GCS verbal subscore is 5. GCS motor subscore is 6.   Skin: Skin is warm and dry.         ED Course   Procedures  Labs Reviewed   CBC W/ AUTO DIFFERENTIAL - Abnormal       Result Value    WBC 8.19      RBC 4.37      Hemoglobin 11.6 (*)     Hematocrit 38.6      MCV 88      MCH 26.5 (*)     MCHC 30.1 (*)     RDW 15.1 (*)     Platelets 334      MPV 9.2      Immature Granulocytes 0.2      Gran # (ANC) 4.7      Immature Grans (Abs) 0.02      Lymph # 2.4      Mono # 0.7      Eos # 0.3      Baso # 0.07      nRBC 0      Gran % 57.3      Lymph % 29.8      Mono % 8.3      Eosinophil % 3.5      Basophil % 0.9      Differential Method Automated     URINALYSIS, REFLEX TO URINE CULTURE - Abnormal    Specimen UA Urine, Clean Catch      Color, UA Yellow      Appearance, UA Clear      pH, UA 6.0      Specific Gravity, UA 1.030      Protein, UA Negative      Glucose, UA Negative      Ketones, UA Negative      Bilirubin (UA) Negative      Occult Blood UA Negative      Nitrite, UA Negative      Leukocytes, UA 1+ (*)     Narrative:     Specimen Source->Urine   COMPREHENSIVE METABOLIC PANEL - Abnormal    Sodium 139      Potassium 4.0      Chloride 108      CO2 21 (*)     Glucose 91      BUN 16      Creatinine 0.7      Calcium 9.5      Total Protein 7.5      Albumin 3.7      Total Bilirubin 0.3      Alkaline Phosphatase 82      AST 33      ALT 28      eGFR >60.0      Anion Gap 10     URINALYSIS MICROSCOPIC    RBC, UA 3      WBC, UA 5      Squam Epithel, UA 1      Microscopic Comment SEE COMMENT      Narrative:     Specimen Source->Urine          Imaging Results    None          Medications - No data to display  Medical Decision Making  69-year-old female with lower back pain radiating down her left leg.      Differential includes but not limited to cauda equina, lumbar strain,  "sciatica, UTI, pyelonephritis    Patient also endorsed a "funny feeling after she urinates" she was concerned by UTI.  UA negative for UTI.  Lab work reassuring.  No midline C, T or L-spine tenderness.  Exam not consistent with cauda equina.  Currently not endorsing any pain and she has no weakness in her lower extremities or signs of compressive myelopathy.  Lower extremities are neurovascularly intact.  Suspect sciatica will discharge with NSAIDs and Robaxin.  Recommend PCP or back and spine clinic follow-up.  Return ED precautions given.    Amount and/or Complexity of Data Reviewed  Labs: ordered. Decision-making details documented in ED Course.               ED Course as of 01/12/25 2152   Sun Jan 12, 2025 2109 WBC: 8.19  No leukocytosis [HJ]      ED Course User Index  [HJ] Rhonda Arreola PA-C                           Clinical Impression:  Final diagnoses:  [M54.32] Sciatica of left side (Primary)          ED Disposition Condition    Discharge Stable          ED Prescriptions       Medication Sig Dispense Start Date End Date Auth. Provider    naproxen (NAPROSYN) 500 MG tablet Take 1 tablet (500 mg total) by mouth 2 (two) times daily with meals. for 10 days 20 tablet 1/12/2025 1/22/2025 Rhonda Arreola PA-C    methocarbamoL (ROBAXIN) 500 MG Tab Take 1 tablet (500 mg total) by mouth 3 (three) times daily. for 5 days 15 tablet 1/12/2025 1/17/2025 Rhonda Arreola PA-C          Follow-up Information       Follow up With Specialties Details Why Contact Info Additional Information    Brian Vu MD Internal Medicine Schedule an appointment as soon as possible for a visit   [address]  [address]  [facility] [address] [phone]       [facility]ine [facility] Schedule an appointment as soon as possible for a visit   [address] [facility] [address] [phone] 5th Floor [address]             Rhonda Arreola PA-C  01/12/25 2152    "

## 2025-01-13 NOTE — DISCHARGE INSTRUCTIONS
Blood work and urine did not show any signs of UTI.  Your exam is consistent with a pinched nerve in your lower back.  I have prescribed you anti-inflammatories as well as a muscle relaxer for your symptoms.  You may also use over-the-counter Tylenol for additional pain relief.  If you continue to have pain with a 2 weeks I recommend follow-up with your primary care doctor or the back and spine clinic.  I have placed a outpatient referral.  You may return to ED sooner if you develop any new or worsening symptoms.

## 2025-01-13 NOTE — ED TRIAGE NOTES
Pt c/o pain to her L leg beginning at her buttocks and radiating down to her ankle for the past 2 weeks. Pt also also c/o discomfort to her vaginal area along with odorous discharge for the past two weeks. Pt denies painful urination, bloody in her urine, CP, SOB, any and all pain, recent illness, or recent trauma.

## 2025-01-29 DIAGNOSIS — M51.369 DEGENERATION OF INTERVERTEBRAL DISC OF LUMBAR REGION, UNSPECIFIED WHETHER PAIN PRESENT: Primary | ICD-10-CM

## 2025-03-17 ENCOUNTER — OFFICE VISIT (OUTPATIENT)
Dept: FAMILY MEDICINE | Facility: CLINIC | Age: 70
End: 2025-03-17
Attending: FAMILY MEDICINE
Payer: COMMERCIAL

## 2025-03-17 VITALS
SYSTOLIC BLOOD PRESSURE: 110 MMHG | BODY MASS INDEX: 28.65 KG/M2 | TEMPERATURE: 98 F | HEART RATE: 67 BPM | WEIGHT: 171.94 LBS | HEIGHT: 65 IN | OXYGEN SATURATION: 98 % | DIASTOLIC BLOOD PRESSURE: 70 MMHG

## 2025-03-17 DIAGNOSIS — R91.1 LUNG NODULE: ICD-10-CM

## 2025-03-17 DIAGNOSIS — E78.2 MIXED HYPERLIPIDEMIA: ICD-10-CM

## 2025-03-17 DIAGNOSIS — R91.1 SOLITARY PULMONARY NODULE: Primary | ICD-10-CM

## 2025-03-17 DIAGNOSIS — C50.811 MALIGNANT NEOPLASM OF OVERLAPPING SITES OF RIGHT BREAST IN FEMALE, ESTROGEN RECEPTOR POSITIVE: ICD-10-CM

## 2025-03-17 DIAGNOSIS — M79.2 NEUROPATHIC PAIN: ICD-10-CM

## 2025-03-17 DIAGNOSIS — Z17.0 MALIGNANT NEOPLASM OF OVERLAPPING SITES OF RIGHT BREAST IN FEMALE, ESTROGEN RECEPTOR POSITIVE: ICD-10-CM

## 2025-03-17 DIAGNOSIS — R51.9 HEADACHE, CHRONIC DAILY: ICD-10-CM

## 2025-03-17 DIAGNOSIS — K21.9 GASTROESOPHAGEAL REFLUX DISEASE, UNSPECIFIED WHETHER ESOPHAGITIS PRESENT: ICD-10-CM

## 2025-03-17 DIAGNOSIS — R73.01 IFG (IMPAIRED FASTING GLUCOSE): ICD-10-CM

## 2025-03-17 PROCEDURE — 1159F MED LIST DOCD IN RCRD: CPT | Mod: CPTII,S$GLB,, | Performed by: FAMILY MEDICINE

## 2025-03-17 PROCEDURE — 99999 PR PBB SHADOW E&M-EST. PATIENT-LVL III: CPT | Mod: PBBFAC,,, | Performed by: FAMILY MEDICINE

## 2025-03-17 PROCEDURE — 99214 OFFICE O/P EST MOD 30 MIN: CPT | Mod: S$GLB,,, | Performed by: FAMILY MEDICINE

## 2025-03-17 PROCEDURE — 3074F SYST BP LT 130 MM HG: CPT | Mod: CPTII,S$GLB,, | Performed by: FAMILY MEDICINE

## 2025-03-17 PROCEDURE — 1160F RVW MEDS BY RX/DR IN RCRD: CPT | Mod: CPTII,S$GLB,, | Performed by: FAMILY MEDICINE

## 2025-03-17 PROCEDURE — 3008F BODY MASS INDEX DOCD: CPT | Mod: CPTII,S$GLB,, | Performed by: FAMILY MEDICINE

## 2025-03-17 PROCEDURE — 3078F DIAST BP <80 MM HG: CPT | Mod: CPTII,S$GLB,, | Performed by: FAMILY MEDICINE

## 2025-03-17 RX ORDER — GABAPENTIN 100 MG/1
100 CAPSULE ORAL NIGHTLY
Qty: 30 CAPSULE | Refills: 2 | Status: SHIPPED | OUTPATIENT
Start: 2025-03-17 | End: 2026-03-17

## 2025-03-17 RX ORDER — IBUPROFEN 800 MG/1
800 TABLET ORAL EVERY 6 HOURS PRN
Qty: 30 TABLET | Refills: 2 | Status: SHIPPED | OUTPATIENT
Start: 2025-03-17

## 2025-03-17 NOTE — TELEPHONE ENCOUNTER
Care Due:                  Date            Visit Type   Department     Provider  --------------------------------------------------------------------------------                                EP -                              [facility]    Brian Reyesjoshua  Last Visit: 03-      CARE ([facility])   MEDICINE       Mirella                               -                              [facility]  Next Visit: 06-      CARE ([facility])   MEDICINE       Mirella                                                            Last  Test          Frequency    Reason                     Performed    Due Date  --------------------------------------------------------------------------------    Lipid Panel.  12 months..  rosuvastatin.............  04- 03-    [facility] Embedded Care Due Messages. Reference number: [id number].   3/17/2025 2:39:51 PM CDT

## 2025-03-17 NOTE — PROGRESS NOTES
Subjective     Patient ID: Jaja Toledo is a 69 y.o. female.    Chief Complaint: Follow-up    69 yr old pleasant female with HLD, IFG, GERD, presents today for her and evaluation of HLD, and IFG. C/o lefty leg tingling and had history of surgery. On crestor for lipids.    HLD - controlled -   LDLCALC                  164.0 (H)           04/02/2024      - on diet     Lung nodule - had CT many years ago and she had breast cancer - want to get for surveillance    Breast CA - on letrozole daily     Loss of appetite - on Remeron - need refill    History as below - reviewed     Follow-up  Pertinent negatives include no arthralgias, chest pain, congestion, diaphoresis, headaches, myalgias, nausea or sore throat.   Hyperlipidemia  This is a chronic problem. The current episode started more than 1 year ago. The problem is controlled. Recent lipid tests were reviewed and are normal. She has no history of chronic renal disease, hypothyroidism or nephrotic syndrome. There are no known factors aggravating her hyperlipidemia. Pertinent negatives include no chest pain, myalgias or shortness of breath. Current antihyperlipidemic treatment includes statins. There are no compliance problems.  Risk factors for coronary artery disease include dyslipidemia and post-menopausal.     Review of Systems   Constitutional: Negative.  Negative for activity change, diaphoresis and unexpected weight change.   HENT: Negative.  Negative for nasal congestion, ear discharge, hearing loss, rhinorrhea, sore throat and voice change.    Eyes: Negative.  Negative for pain, discharge and visual disturbance.   Respiratory: Negative.  Negative for chest tightness, shortness of breath and wheezing.    Cardiovascular: Negative.  Negative for chest pain.   Gastrointestinal: Negative.  Negative for abdominal distention, anal bleeding, constipation and nausea.   Endocrine: Negative.  Negative for cold intolerance, polydipsia and polyuria.   Genitourinary:  Negative.  Negative for decreased urine volume, difficulty urinating, dysuria, frequency, menstrual problem and vaginal pain.   Musculoskeletal: Negative.  Negative for arthralgias, gait problem and myalgias.   Integumentary:  Negative for color change, pallor and wound. Negative.   Allergic/Immunologic: Negative.  Negative for environmental allergies and immunocompromised state.   Neurological: Negative.  Negative for dizziness, tremors, seizures, speech difficulty and headaches.   Hematological: Negative.  Negative for adenopathy. Does not bruise/bleed easily.   Psychiatric/Behavioral: Negative.  Negative for agitation, confusion, decreased concentration, hallucinations, self-injury and suicidal ideas. The patient is not nervous/anxious.      Past Medical History:   Diagnosis Date    Anxiety     Chronic low back pain     Gastritis     GERD (gastroesophageal reflux disease)     History of cervical cancer     History of recurrent urinary tract infection     IFG (impaired fasting glucose)     Malignant neoplasm of overlapping sites of right breast in female, estrogen receptor positive 7/19/2019    Soft tissue tumor, malignant     sarcoma left foot       Past Surgical History:   Procedure Laterality Date    ABDOMINOPLASTY N/A 11/29/2018    Procedure: EXTENDED ABDOMINOPLASTY;  Surgeon: Mio Arriaga MD;  Location: [facility] OR;  Service: Plastics;  Laterality: N/A;    AXILLARY NODE DISSECTION Right 6/4/2019    Procedure: LYMPHADENECTOMY, AXILLARY RIGHT;  Surgeon: Sabine Arteaga MD;  Location: [facility] OR [address] [place] [address];  Service: General;  Laterality: Right;    BACK SURGERY      BREAST CYST EXCISION Left 1984    BREAST RECONSTRUCTION Right 6/6/2022    Procedure: RECONSTRUCTION, BREAST;  Surgeon: Leonard Cordero MD;  Location: [address];  Service: Plastics;  Laterality: Right;  T-DAP    BREAST REVISION SURGERY Right 7/20/2020    Procedure: BREAST REVISION SURGERY RIGHT;  Surgeon: Leonard Cordero MD;  Location:  [facility] OR 2ND FLR;  Service: Plastics;  Laterality: Right;    COLONOSCOPY N/A 10/19/2020    Procedure: COLONOSCOPY;  Surgeon: Chanel Saenz MD;  Location: [facility] ENDO (4TH FLR);  Service: Endoscopy;  Laterality: N/A;  covid test 10/16-[facility] urgent care    COLONOSCOPY N/A 8/15/2023    Procedure: COLONOSCOPY;  Surgeon: Ranjan Avery MD;  Location: [facility] ENDO (4TH FLR);  Service: Endoscopy;  Laterality: N/A;  peg prep  prep instructions placed at    prep instructions sent to pt email [email or web address]  8/14 Revised time and prep instructions - PC    FOOT SURGERY  2009 or 2010    INSERTION OF BREAST IMPLANT Right 6/4/2019    Procedure: INSERTION, BREAST IMPLANT RIGHT;  Surgeon: Leonard Cordero MD;  Location: [address];  Service: Plastics;  Laterality: Right;    INSERTION OF BREAST TISSUE EXPANDER Right 6/4/2019    Procedure: INSERTION, TISSUE EXPANDER, BREAST RIGHT;  Surgeon: Leonard Cordero MD;  Location: [address];  Service: Plastics;  Laterality: Right;    LIPOSUCTION W/ FAT INJECTION N/A 11/29/2018    Procedure: LIPOSUCTION, WITH FAT TRANSFER TO BUTTOCKS;  Surgeon: Mio Arriaga MD;  Location: [facility];  Service: Plastics;  Laterality: N/A;    MASTOPEXY Left 6/4/2019    Procedure: MASTOPEXY LEFT;  Surgeon: Leonard Cordero MD;  Location: [address];  Service: Plastics;  Laterality: Left;    MASTOPEXY Left 7/20/2020    Procedure: MASTOPEXY LEFT;  Surgeon: Leonard Cordero MD;  Location: [address];  Service: Plastics;  Laterality: Left;    MASTOPEXY Bilateral 6/22/2023    Procedure: MASTOPEXY;  Surgeon: Leonard Cordero MD;  Location: [address];  Service: Plastics;  Laterality: Bilateral;  cresent mastopexy    SENTINEL LYMPH NODE BIOPSY Right 6/4/2019    Procedure: BIOPSY, LYMPH NODE, SENTINEL RIGHT;  Surgeon: Sabine Arteaga MD;  Location: [facility] OR 2ND FLR;  Service: General;  Laterality: Right;    SIMPLE MASTECTOMY Right  2019    Procedure: MASTECTOMY, SIMPLE RIGHT (CONSENT AM OF) 4.0 hr case;  Surgeon: Sabine Arteaga MD;  Location: [facility] OR [address] [place] [address];  Service: General;  Laterality: Right;    TOTAL REDUCTION MAMMOPLASTY Left 2019    TOTAL VAGINAL HYSTERECTOMY      TUBAL LIGATION         Family History   Problem Relation Name Age of Onset    Breast cancer Sister Mary Ellen         dx in     No Known Problems Mother      Throat cancer Father      Colon cancer Neg Hx      Diabetes Neg Hx      Hypertension Neg Hx      Ovarian cancer Neg Hx      Stroke Neg Hx      Anesthesia problems Neg Hx         Social History     Socioeconomic History    Marital status:    Tobacco Use    Smoking status: Former     Current packs/day: 0.00     Average packs/day: 0.3 packs/day for 15.0 years (3.8 ttl pk-yrs)     Types: Cigarettes     Start date: 8/15/1978     Quit date: 8/15/1993     Years since quittin.6    Smokeless tobacco: Never   Substance and Sexual Activity    Alcohol use: Yes     Alcohol/week: 1.0 standard drink of alcohol     Types: 1 Glasses of wine per week     Comment: seldom    Drug use: No    Sexual activity: Yes     Partners: Male       Current Outpatient Medications   Medication Sig Dispense Refill    acetaminophen (TYLENOL) 500 MG tablet Take 2 tablets (1,000 mg total) by mouth every 8 (eight) hours as needed for Pain. 20 tablet 0    bacitracin 500 unit/gram ointment Apply topically 3 (three) times daily. 28 g 0    calcium carbonate (CALCIUM 500 ORAL) Take by mouth once daily.       ciprofloxacin HCl (CIPRO) 500 MG tablet Take 1 tablet (500 mg total) by mouth 2 (two) times daily. 20 tablet 0    clindamycin phosphate 1% (CLINDAGEL) 1 % gel Apply topically 2 (two) times daily. 60 g 2    clindamycin-benzoyl peroxide (BENZACLIN) gel Apply topically 2 (two) times daily. 50 g 3    cyproheptadine (PERIACTIN) 4 mg tablet Take 1 tablet (4 mg total) by mouth 3 (three) times daily as needed (appetite). 90 tablet 2    diazePAM  (VALIUM) 10 MG Tab Take 10 mg by mouth.      docusate sodium (COLACE) 100 MG capsule Take 1 capsule (100 mg total) by mouth 2 (two) times daily. 60 capsule 0    gabapentin (NEURONTIN) 100 MG capsule Take 1 capsule (100 mg total) by mouth every evening. 30 capsule 2    ibuprofen (ADVIL,MOTRIN) 800 MG tablet Take 1 tablet (800 mg total) by mouth every 6 (six) hours as needed for Pain. 30 tablet 2    lactulose (CHRONULAC) 20 gram/30 mL Soln Take 30 mLs (20 g total) by mouth after meals as needed (constipation). 200 mL 0    letrozole (FEMARA) 2.5 mg Tab Take 1 tablet (2.5 mg total) by mouth once daily. 90 tablet 3    mirtazapine (REMERON) 15 MG tablet Take 1 tablet (15 mg total) by mouth every evening. 30 tablet 11    multivitamin capsule Take 1 capsule by mouth once daily.      rosuvastatin (CRESTOR) 5 MG tablet Take 1 tablet (5 mg total) by mouth once daily. 90 tablet 3    triamcinolone acetonide 0.1% (KENALOG) 0.1 % cream Apply topically 2 (two) times daily. 15 g 0     No current facility-administered medications for this visit.       Review of patient's allergies indicates:  No Known Allergies       Objective   Vitals:    03/17/25 0946   BP: 110/70   Pulse: 67   Temp: 98 °F (36.7 °C)       Physical Exam  Constitutional:       General: She is not in acute distress.     Appearance: She is well-developed. She is not diaphoretic.   HENT:      Head: Normocephalic and atraumatic.      Right Ear: External ear normal.      Left Ear: External ear normal.      Nose: Nose normal.      Mouth/Throat:      Pharynx: No oropharyngeal exudate.   Eyes:      General: No scleral icterus.        Right eye: No discharge.         Left eye: No discharge.      Conjunctiva/sclera: Conjunctivae normal.      Pupils: Pupils are equal, round, and reactive to light.   Neck:      Thyroid: No thyromegaly.      Vascular: No JVD.      Trachea: No tracheal deviation.   Cardiovascular:      Rate and Rhythm: Normal rate and regular rhythm.      Heart  sounds: Normal heart sounds. No murmur heard.     No friction rub. No gallop.   Pulmonary:      Effort: Pulmonary effort is normal.      Breath sounds: Normal breath sounds. No stridor. No wheezing or rales.   Chest:      Chest wall: No tenderness.   Abdominal:      General: Bowel sounds are normal. There is no distension.      Palpations: Abdomen is soft. There is no mass.      Tenderness: There is no abdominal tenderness. There is no guarding or rebound.      Hernia: No hernia is present.   Musculoskeletal:         General: No tenderness. Normal range of motion.      Cervical back: Normal range of motion and neck supple.   Lymphadenopathy:      Cervical: No cervical adenopathy.   Skin:     General: Skin is warm and dry.      Coloration: Skin is not pale.      Findings: No erythema or rash.   Neurological:      Mental Status: She is alert and oriented to person, place, and time.      Cranial Nerves: No cranial nerve deficit.      Motor: No abnormal muscle tone.      Coordination: Coordination normal.      Deep Tendon Reflexes: Reflexes are normal and symmetric. Reflexes normal.   Psychiatric:         Behavior: Behavior normal.         Thought Content: Thought content normal.         Judgment: Judgment normal.            Assessment and Plan     1. Solitary pulmonary nodule  -     CT Chest Without Contrast; Future; Expected date: 03/17/2025    2. Malignant neoplasm of overlapping sites of right breast in female, estrogen receptor positive    3. Mixed hyperlipidemia  -     Lipid Panel; Future; Expected date: 03/17/2025  -     CK; Future; Expected date: 03/17/2025    4. IFG (impaired fasting glucose)    5. Gastroesophageal reflux disease, unspecified whether esophagitis present    6. Lung nodule    7. Neuropathic pain  -     gabapentin (NEURONTIN) 100 MG capsule; Take 1 capsule (100 mg total) by mouth every evening.  Dispense: 30 capsule; Refill: 2      Acne  -clindagel as directed     Lung nodule  -ct for  surveillance    Neuropathic pain  -gabapentin prn    HLD  -diet control    Breast CA  -in remission    Loss of appetite  -remeron rx      Spent adequate time in obtaining history and explaining differentials    30 minutes spent during this visit of which greater than 50% devoted to face-face counseling and coordination of care regarding diagnosis and management plan      Follow up in about 3 months (around 6/17/2025), or if symptoms worsen or fail to improve.

## 2025-03-17 NOTE — TELEPHONE ENCOUNTER
----- Message from Ana sent at 3/17/2025 12:29 PM CDT -----  Type:  Needs Medical AdviceWho Called: Alexia name and phone #:  [id number] - [place] - [address] AT [facility] & [address]Would the patient rather a call back or a response via [employer]? callBest Call Back Number:  [phone] Information: pt states she is still waiting on medication to be called in also would like ibuprofen (ADVIL,MOTRIN) 800 MG tablet and cream for her face (pt doesn't know name) asking for a call to clarify

## 2025-03-24 ENCOUNTER — TELEPHONE (OUTPATIENT)
Dept: INTERNAL MEDICINE | Facility: CLINIC | Age: 70
End: 2025-03-24
Payer: COMMERCIAL

## 2025-03-24 ENCOUNTER — TELEPHONE (OUTPATIENT)
Dept: FAMILY MEDICINE | Facility: CLINIC | Age: 70
End: 2025-03-24
Payer: COMMERCIAL

## 2025-03-24 ENCOUNTER — HOSPITAL ENCOUNTER (OUTPATIENT)
Dept: RADIOLOGY | Facility: HOSPITAL | Age: 70
Discharge: HOME | End: 2025-03-24
Attending: FAMILY MEDICINE
Payer: COMMERCIAL

## 2025-03-24 ENCOUNTER — RESULTS FOLLOW-UP (OUTPATIENT)
Dept: FAMILY MEDICINE | Facility: CLINIC | Age: 70
End: 2025-03-24

## 2025-03-24 DIAGNOSIS — E01.0 THYROMEGALY: Primary | ICD-10-CM

## 2025-03-24 DIAGNOSIS — R91.1 SOLITARY PULMONARY NODULE: ICD-10-CM

## 2025-03-24 PROCEDURE — 71250 CT THORAX DX C-: CPT | Mod: TC

## 2025-03-24 PROCEDURE — 71250 CT THORAX DX C-: CPT | Mod: 26,,, | Performed by: RADIOLOGY

## 2025-03-24 NOTE — TELEPHONE ENCOUNTER
Please inform    CT chest showed stable nodules and no worries. Incidentally it showed slightly enlarged thyroid - ordered ultrasound

## 2025-03-24 NOTE — LETTER
March 24, 2025    Jaja Toledo  [address] [place] [address]         [facility]  [address]  [address]  [place] [address]  Phone: [phone]  Fax: [phone] March 24, 2025     Patient: Jaja Toledo   YOB: 1955   Date of Visit: 3/24/2025       To Whom It May Concern:    Ms Jaja Toledo, is my patient and has been suffering with anxiety and right breast cancer. She is under treatment from oncology. She recently lost her mother 2 days ago. She is dealing with extreme anxiety and on medications and psychotherapy.    It is my medical opinion that Jaja Toledo should remain out of work until 05/07/2025 .    If you have any questions or concerns, please don't hesitate to call.    Sincerely,        Brian Vu MD

## 2025-03-25 ENCOUNTER — TELEPHONE (OUTPATIENT)
Dept: FAMILY MEDICINE | Facility: CLINIC | Age: 70
End: 2025-03-25
Payer: COMMERCIAL

## 2025-03-25 NOTE — TELEPHONE ENCOUNTER
----- Message from Evie sent at 3/25/2025 10:17 AM CDT -----  Regarding: call back  Contact: [phone]  Type:  Patient Returning CallWho Called: PT Who Left Message for Patient: Nurse Does the patient know what this is regarding?: Yes Would the patient rather a call back or a response via [employer]? Call back Best Call Back Number: [phone] Additional Information:

## 2025-03-28 ENCOUNTER — TELEPHONE (OUTPATIENT)
Dept: HEMATOLOGY/ONCOLOGY | Facility: CLINIC | Age: 70
End: 2025-03-28
Payer: COMMERCIAL

## 2025-03-28 NOTE — TELEPHONE ENCOUNTER
I attempt to leave pt a voice mail  to remind her of upcoming apt,but pt do not have a voice mail set up at this time

## 2025-03-31 ENCOUNTER — OFFICE VISIT (OUTPATIENT)
Dept: HEMATOLOGY/ONCOLOGY | Facility: CLINIC | Age: 70
End: 2025-03-31
Payer: COMMERCIAL

## 2025-03-31 VITALS
WEIGHT: 183.44 LBS | HEART RATE: 120 BPM | BODY MASS INDEX: 30.56 KG/M2 | DIASTOLIC BLOOD PRESSURE: 87 MMHG | OXYGEN SATURATION: 98 % | TEMPERATURE: 99 F | RESPIRATION RATE: 18 BRPM | HEIGHT: 65 IN | SYSTOLIC BLOOD PRESSURE: 140 MMHG

## 2025-03-31 DIAGNOSIS — Z12.31 ENCOUNTER FOR SCREENING MAMMOGRAM FOR MALIGNANT NEOPLASM OF BREAST: ICD-10-CM

## 2025-03-31 DIAGNOSIS — G62.9 NEUROPATHY: ICD-10-CM

## 2025-03-31 DIAGNOSIS — Z17.0 MALIGNANT NEOPLASM OF OVERLAPPING SITES OF RIGHT BREAST IN FEMALE, ESTROGEN RECEPTOR POSITIVE: Primary | ICD-10-CM

## 2025-03-31 DIAGNOSIS — Z78.0 MENOPAUSE: ICD-10-CM

## 2025-03-31 DIAGNOSIS — R63.0 APPETITE LOSS: ICD-10-CM

## 2025-03-31 DIAGNOSIS — C50.811 MALIGNANT NEOPLASM OF OVERLAPPING SITES OF RIGHT BREAST IN FEMALE, ESTROGEN RECEPTOR POSITIVE: Primary | ICD-10-CM

## 2025-03-31 PROCEDURE — 3288F FALL RISK ASSESSMENT DOCD: CPT | Mod: CPTII,S$GLB,, | Performed by: INTERNAL MEDICINE

## 2025-03-31 PROCEDURE — G2211 COMPLEX E/M VISIT ADD ON: HCPCS | Mod: S$GLB,,, | Performed by: INTERNAL MEDICINE

## 2025-03-31 PROCEDURE — 3008F BODY MASS INDEX DOCD: CPT | Mod: CPTII,S$GLB,, | Performed by: INTERNAL MEDICINE

## 2025-03-31 PROCEDURE — 99999 PR PBB SHADOW E&M-EST. PATIENT-LVL V: CPT | Mod: PBBFAC,,, | Performed by: INTERNAL MEDICINE

## 2025-03-31 PROCEDURE — 99214 OFFICE O/P EST MOD 30 MIN: CPT | Mod: S$GLB,,, | Performed by: INTERNAL MEDICINE

## 2025-03-31 PROCEDURE — 3079F DIAST BP 80-89 MM HG: CPT | Mod: CPTII,S$GLB,, | Performed by: INTERNAL MEDICINE

## 2025-03-31 PROCEDURE — 1159F MED LIST DOCD IN RCRD: CPT | Mod: CPTII,S$GLB,, | Performed by: INTERNAL MEDICINE

## 2025-03-31 PROCEDURE — 3077F SYST BP >= 140 MM HG: CPT | Mod: CPTII,S$GLB,, | Performed by: INTERNAL MEDICINE

## 2025-03-31 PROCEDURE — 1126F AMNT PAIN NOTED NONE PRSNT: CPT | Mod: CPTII,S$GLB,, | Performed by: INTERNAL MEDICINE

## 2025-03-31 PROCEDURE — 1101F PT FALLS ASSESS-DOCD LE1/YR: CPT | Mod: CPTII,S$GLB,, | Performed by: INTERNAL MEDICINE

## 2025-03-31 PROCEDURE — 1160F RVW MEDS BY RX/DR IN RCRD: CPT | Mod: CPTII,S$GLB,, | Performed by: INTERNAL MEDICINE

## 2025-03-31 NOTE — PROGRESS NOTES
Subjective:       Patient ID: Jaja Toledo is a 69 y.o. female.     Chief Complaint: follow up for breast cancer     Diagnosis:  Stage IA (mp T1cN0 (I+)M0) Right breast cancer, s/p right mastectomy on 6/4/19     Oncologic History:  1. Ms Toledo is a 62 yo postmenopausal woman who initially saw me on 7/22/19 for breast cancer. She was noted to have asymmetry in the right breast at the inner position on mammogram 3/18/19. Diagnosed mammogram on 4/11/19 showed a 11 mm high density, irregularly shaped mass with indistinct margins in the right breast at 3 oclock. US showed a 7 x 9 x 7 mm mass at 3 oclock. The right axilla was normal. Breast biopsy on 4/22/19 showed invasive ductal carcinoma, grade 3, with associated high grade DCIS. ER+ (>95%), HI+(>90%), HER2 equivocal, FISH negative. Ki-67 20%. She was seen by Dr Arteaga in May 2019. MRI breast on 5/16/19 showed a 13 mm x 10 mm x 10 mm mass at 3 oclock correlating with the prior finding. There is another 9 mm x 7 mm x 8 mm mass at 6 oclock in the right breast. Left breast is benign. She underwent biopsy of the second mass on 5/27/19, which showed a grade 2 invasive lobular carcinoma, ER positive 70%, HI negative, HER2 negative, Ki-67 15%. Patient underwent right mastectomy and right axillary lymph node biopsy on 6/4/19. Pathology showed two tumors: #1, one 1.3 cm grade 2 invasive ductal carcinoma with lobular features, #2, one 1.7 cm grade 2 invasive lobular carcinoma. DCIS present, grade 3, with necrosis. LCIS. Margins are negative. lymphovascular invasion was not identified. One sentinel lymph node removed, with isolated tumor cells. Extranodal extention not identified. mp T1c sn N0 (i+). Receptors: #1: ER positive 95%, HI positive 90%, HER2 FISH negative, Ki67 20%. #2, ER positive 70%, HI negative, HER2 negative, Ki-67 15%. She returns today to discuss further management. She currently feels well. She had some mild sweating on her forehead in her last 50s. She  had a hysterectomy more than 20 years ago. She had [employer] genetic test done which was negative. Oncotype DX of her 1.7 cm ILC showed an RS of 15, distant recurrence rate at 9 years is 4%, benefit from chemo is <1%. Oncotype DX of her 1.3 cm IDC is pending.   2. Oncotype DX of her 1.3 cm IDC showed RS of 24, distant recurrence rate at 9 years is 10%, benefit from chemotherapy <1%  3. Started letrozole in 2019. Bone density 19 and 2023 normal  4. Colonoscopy 8/15/2023 was unremarkable  5. BCI: no benefit from extended endocrine therapy. Recurrent risk 5.6% regardless of 5 vs 10 years of AI.      Interval History:   Ms Toledo returns for follow up. Her mother has passed away from bladder cancer. Patient feels okay. Noted neuropathy in her foot.      ECO     ROS:   A ten-point system review is obtained and negative except for what was stated in the Interval History.      Physical Examination:   Vital signs reviewed.   General: well hydrated, well developed, in no acute distress  HEENT: normocephalic, PERRLA, EOMI, anicteric sclerae  Neck: supple, no JVD, thyromegaly, cervical or supraclavicular lymphadenopathy  Lungs: clear breath sounds bilaterally, no wheezing, rales, or rhonchi  Heart: RRR, no M/R/G  Breast: s/p R mastectomy and reconstruction. No abnormal skin changes, masses or axillary LAD. L: no abnormal skin changes, masses, or axillary LAD.   Abdomen: soft, no tenderness, non-distended, no hepatosplenomegaly, mass, or hernia. BS present  Extremities: no clubbing, cyanosis, edema  Skin: no rash, ulcer, or open wounds  Neuro: alert and oriented x 4  Psych: normal      Objective:      Laboratory Data:  Labs reviewed.      Imaging Data:   L mammogram 24:  Left  The left breast is heterogeneously dense, which may obscure small masses. There is no evidence of suspicious masses, calcifications, or other abnormal findings in the left breast.     Impression:  There is no mammographic evidence  of malignancy in the left breast.     BI-RADS Category:   Overall: 1 - Negative        Recommendation:  Routine screening mammogram in 1 year is recommended.     Assessment and Plan:      1. Malignant neoplasm of overlapping sites of right breast in female, estrogen receptor positive    2. Encounter for screening mammogram for malignant neoplasm of breast    3. Menopause    4. Appetite loss    5. Neuropathy      1-2  - Ms Tre is a 67 yo woman with Stage IA (mp T1cN0 (I+)M0) Right breast cancer s/p R mastectomy, SLNB and immediate reconstruction on 6/4/19. She had a 1.3 cm invasive ductal carcinoma and a 1.7 cm invasive lobular carcinoma. Both tumors ER positive, HER2 negative. She had isolated tumor cells in one sentinel lymph node, which is stage N0 (I+).  Oncotype DX score of the 1.7 cm ILC showed an RS of 15, distant recurrence rate at 9 years is 4%, benefit from chemo is <1%. Oncotype DX of her 1.3 cm IDC showed RS of 24, distant recurrence rate at 9 years is 10%, benefit from chemotherapy <1%  - Has been on letrozole since Aug 2019. Tolerating it well  - reviewed breast cancer index results. No benefit from extended endocrine therapy. Asked patient to stop letrozole.   - cue for mammogram in Aug 2025  - bone density denied. Unclear reason. Her last one was in Jan 2025  - will get bone density and mammogram in Aug and see her after  - c/w OTC vit D    3.  - get bone density    4.  - asked patient to f/u with PCP    5.  - never got chemo  - asked patient to f/u with PCP    Follow-up:      Return in 6 months    Route Chart for Scheduling    Med Onc Chart Routing      Follow up with physician . Get bone density and mammogram in August 2025. see me in Sep 2025   Follow up with [facility]    Infusion scheduling note    Injection scheduling note    Labs    Imaging DXA scan and mammogram   Aug 2025   Pharmacy appointment    Other referrals

## 2025-04-02 DIAGNOSIS — R73.03 PREDIABETES: ICD-10-CM

## 2025-04-17 ENCOUNTER — TELEPHONE (OUTPATIENT)
Dept: ADMINISTRATIVE | Facility: OTHER | Age: 70
End: 2025-04-17
Payer: COMMERCIAL

## 2025-04-25 ENCOUNTER — OFFICE VISIT (OUTPATIENT)
Dept: URGENT CARE | Facility: CLINIC | Age: 70
End: 2025-04-25
Payer: COMMERCIAL

## 2025-04-25 ENCOUNTER — TELEPHONE (OUTPATIENT)
Dept: PLASTIC SURGERY | Facility: CLINIC | Age: 70
End: 2025-04-25
Payer: COMMERCIAL

## 2025-04-25 VITALS
TEMPERATURE: 98 F | BODY MASS INDEX: 30.49 KG/M2 | OXYGEN SATURATION: 95 % | SYSTOLIC BLOOD PRESSURE: 119 MMHG | RESPIRATION RATE: 16 BRPM | HEART RATE: 118 BPM | DIASTOLIC BLOOD PRESSURE: 74 MMHG | HEIGHT: 65 IN | WEIGHT: 183 LBS

## 2025-04-25 DIAGNOSIS — N39.0 COMPLICATED UTI (URINARY TRACT INFECTION): Primary | ICD-10-CM

## 2025-04-25 DIAGNOSIS — R30.0 DYSURIA: ICD-10-CM

## 2025-04-25 LAB
BILIRUBIN, UA POC OHS: NEGATIVE
BLOOD, UA POC OHS: ABNORMAL
CLARITY, UA POC OHS: ABNORMAL
COLOR, UA POC OHS: YELLOW
GLUCOSE, UA POC OHS: NEGATIVE
KETONES, UA POC OHS: NEGATIVE
LEUKOCYTES, UA POC OHS: ABNORMAL
NITRITE, UA POC OHS: POSITIVE
PH, UA POC OHS: 6.5
PROTEIN, UA POC OHS: 100
SPECIFIC GRAVITY, UA POC OHS: 1.02
UROBILINOGEN, UA POC OHS: 1

## 2025-04-25 PROCEDURE — 87186 SC STD MICRODIL/AGAR DIL: CPT

## 2025-04-25 RX ORDER — LIDOCAINE HYDROCHLORIDE 10 MG/ML
2 INJECTION, SOLUTION INFILTRATION; PERINEURAL
Status: COMPLETED | OUTPATIENT
Start: 2025-04-25 | End: 2025-04-25

## 2025-04-25 RX ORDER — CIPROFLOXACIN 500 MG/1
500 TABLET ORAL EVERY 12 HOURS
Qty: 14 TABLET | Refills: 0 | Status: SHIPPED | OUTPATIENT
Start: 2025-04-25 | End: 2025-05-02

## 2025-04-25 RX ORDER — CEFTRIAXONE 1 G/1
1 INJECTION, POWDER, FOR SOLUTION INTRAMUSCULAR; INTRAVENOUS
Status: COMPLETED | OUTPATIENT
Start: 2025-04-25 | End: 2025-04-25

## 2025-04-25 RX ADMIN — CEFTRIAXONE 1 G: 1 INJECTION, POWDER, FOR SOLUTION INTRAMUSCULAR; INTRAVENOUS at 12:04

## 2025-04-25 RX ADMIN — LIDOCAINE HYDROCHLORIDE 2 ML: 10 INJECTION, SOLUTION INFILTRATION; PERINEURAL at 12:04

## 2025-04-25 NOTE — TELEPHONE ENCOUNTER
Attempted to contact pt to discuss concerns. Pt was not available at the time of the call. Pt's VM was full. No message could be left.

## 2025-04-25 NOTE — PATIENT INSTRUCTIONS
Please take full prescription of antibiotic until complete dose.  Take for the full 7 days or symptoms will not get better.  Take antibiotic with a meal and water to decrease GI upset. Always take a probiotic or eat daily yogurt while taking an antibiotic to maintain good gut and vaginal maxime.    Please drink plenty of fluids (water is best) within the next few days (2.7 liters or five 16 oz bottles) to flush out kidneys and bladder.  Avoid baths for the next week and only take showers.  Please practice good toileting hygiene and wipe front to back to avoid contamination to vaginal area.  Make sure to urinate after sexual intercourse to clear your urethra of bacteria.      You can alternate Tylenol and ibuprofen every 4 hours as needed for pain/discomfort. Always take a NSAID with food and water to avoid GI upset. Stop taking ibuprofen if you develop abdominal pain or blood in your stool.     If your symptoms worsen or develop high fevers, worsening pain, please return to the clinic or follow-up with primary care provider.

## 2025-04-25 NOTE — PROGRESS NOTES
"Subjective:      Patient ID: Jaja Toledo is a 69 y.o. female.    Vitals:  height is 5' 5" (1.651 m) and weight is 83 kg (183 lb). Her temperature is 98.2 °F (36.8 °C). Her blood pressure is 119/74 and her pulse is 118 (abnormal). Her respiration is 16 and oxygen saturation is 95%.     Chief Complaint: Dysuria    69 y.o female presents to clinic c.o dysuria x 1 week. Pt states she is experiencing back pain and she also have some numbness and tingling in bilateral legs.     Dysuria   This is a new problem. The current episode started 1 to 4 weeks ago. The problem has been gradually worsening. Associated symptoms include flank pain. Pertinent negatives include no behavior changes, chills, discharge, frequency, hematuria, hesitancy, nausea, possible pregnancy, sweats, urgency, vomiting, weight loss, bubble bath use, constipation, rash or withholding. There is no history of catheterization, diabetes insipidus, diabetes mellitus, genitourinary reflux, hypertension, kidney stones, recurrent UTIs, a single kidney, STD, urinary stasis or a urological procedure.       Constitution: Negative for chills, fatigue and fever.   Gastrointestinal:  Negative for nausea, vomiting and constipation.   Genitourinary:  Positive for dysuria and flank pain. Negative for frequency, urgency and hematuria.   Skin:  Negative for rash.      Objective:     Physical Exam   Constitutional: She is oriented to person, place, and time. She appears well-developed.   HENT:   Head: Normocephalic and atraumatic.   Ears:   Right Ear: External ear normal.   Left Ear: External ear normal.   Nose: Nose normal.   Mouth/Throat: Mucous membranes are normal.   Eyes: Conjunctivae and lids are normal.   Neck: Trachea normal. Neck supple.   Cardiovascular: Regular rhythm and normal heart sounds. Tachycardia present.   Pulmonary/Chest: Effort normal and breath sounds normal. No stridor. No respiratory distress. She has no wheezes. She has no rhonchi. She has no " rales.   Abdominal: Normal appearance and bowel sounds are normal. She exhibits no distension and no mass. Soft. There is no abdominal tenderness. There is left CVA tenderness.   Musculoskeletal: Normal range of motion.         General: Normal range of motion.   Neurological: She is alert and oriented to person, place, and time. She has normal strength.   Skin: Skin is warm, dry, intact, not diaphoretic and not pale.   Psychiatric: Her speech is normal and behavior is normal. Judgment and thought content normal.   Nursing note and vitals reviewed.      Assessment:     1. Complicated UTI (urinary tract infection)    2. Dysuria        Plan:     Results for orders placed or performed in visit on 04/25/25   POCT Urinalysis(Instrument)    Collection Time: 04/25/25 11:48 AM   Result Value Ref Range    Color, POC UA Yellow Yellow, Straw, Colorless    Clarity, POC UA Cloudy (A) Clear    Glucose, POC UA Negative Negative    Bilirubin, POC UA Negative Negative    Ketones, POC UA Negative Negative    Spec Grav POC UA 1.020 1.005 - 1.030    Blood, POC UA Moderate (A) Negative    pH, POC UA 6.5 5.0 - 8.0    Protein, POC  (A) Negative    Urobilinogen, POC UA 1.0 <=1.0    Nitrite, POC UA Positive (A) Negative    WBC, POC UA Large (A) Negative       Complicated UTI (urinary tract infection)  -     Urine Culture High Risk  -     cefTRIAXone injection 1 g  -     LIDOcaine HCL 10 mg/ml (1%) injection 2 mL  -     ciprofloxacin HCl (CIPRO) 500 MG tablet; Take 1 tablet (500 mg total) by mouth every 12 (twelve) hours. for 7 days  Dispense: 14 tablet; Refill: 0    Dysuria  -     POCT Urinalysis(Instrument)      Pt reports difficult to treat UTIs in the past. Had multiple abx changes to cover. Previous cultures, labs and visits reviewed. Was treated in the past with ceph and switched to cipro which relieved symptoms. Pt is tachycardic today, left side flank/CVA tenderness, dysuria. No fevers or vomiting. UA positive for nitrates,  blood and leukocytes. Will treat patient for complicated UTI. Urine culture sent. Advised to increase water intake. F/u with PCP in 1 week. Strict ED precautions.         Discussed results/diagnosis/plan with patient in clinic. Strict precautions given to patient to monitor for worsening signs and symptoms. Advised to follow up with PCP or specialist.  Explained side effects of medications prescribed with patient and informed him/her to discontinue use if he/she has any side effects and to inform UC or PCP if this occurs. All questions answered. Strict ED verses clinic return precautions stressed and given in depth. Advised if symptoms worsens of fail to improve he/she should go to the Emergency Room. Discharge and follow-up instructions given verbally/printed with the patient who expressed understanding and willingness to comply with my recommendations. Patient voiced understanding and in agreement with current treatment plan. Patient exits the exam room in no acute distress. Conversant and engaged during discharge discussion, verbalized understanding.

## 2025-04-26 LAB — BACTERIA UR CULT: ABNORMAL

## 2025-04-29 ENCOUNTER — TELEPHONE (OUTPATIENT)
Dept: URGENT CARE | Facility: CLINIC | Age: 70
End: 2025-04-29
Payer: COMMERCIAL

## 2025-04-29 ENCOUNTER — RESULTS FOLLOW-UP (OUTPATIENT)
Dept: URGENT CARE | Facility: CLINIC | Age: 70
End: 2025-04-29

## 2025-04-29 NOTE — TELEPHONE ENCOUNTER
I saw patient on 4/25 for complicated UTI. Urine culture is positive and treated appropriately with cipro at time of visit. I called patient to notify. No answer and unable to leave a voicemail. My chart text message sent to check the portal.

## 2025-05-02 ENCOUNTER — TELEPHONE (OUTPATIENT)
Dept: URGENT CARE | Facility: CLINIC | Age: 70
End: 2025-05-02
Payer: COMMERCIAL

## 2025-05-06 ENCOUNTER — HOSPITAL ENCOUNTER (EMERGENCY)
Facility: HOSPITAL | Age: 70
Discharge: HOME | End: 2025-05-06
Attending: EMERGENCY MEDICINE
Payer: COMMERCIAL

## 2025-05-06 VITALS
SYSTOLIC BLOOD PRESSURE: 116 MMHG | RESPIRATION RATE: 18 BRPM | HEART RATE: 97 BPM | DIASTOLIC BLOOD PRESSURE: 78 MMHG | TEMPERATURE: 98 F | OXYGEN SATURATION: 99 %

## 2025-05-06 DIAGNOSIS — R68.89 MULTIPLE COMPLAINTS: ICD-10-CM

## 2025-05-06 DIAGNOSIS — J90 PLEURAL EFFUSION: ICD-10-CM

## 2025-05-06 DIAGNOSIS — R07.9 CHEST PAIN: Primary | ICD-10-CM

## 2025-05-06 DIAGNOSIS — M79.672 LEFT FOOT PAIN: ICD-10-CM

## 2025-05-06 LAB
ABSOLUTE EOSINOPHIL (OHS): 0.22 K/UL
ABSOLUTE MONOCYTE (OHS): 0.54 K/UL (ref 0.3–1)
ABSOLUTE NEUTROPHIL COUNT (OHS): 3.51 K/UL (ref 1.8–7.7)
ALBUMIN SERPL BCP-MCNC: 3.7 G/DL (ref 3.5–5.2)
ALP SERPL-CCNC: 81 UNIT/L (ref 40–150)
ALT SERPL W/O P-5'-P-CCNC: 22 UNIT/L (ref 10–44)
ANION GAP (OHS): 10 MMOL/L (ref 8–16)
AST SERPL-CCNC: 36 UNIT/L (ref 11–45)
BASOPHILS # BLD AUTO: 0.06 K/UL
BASOPHILS NFR BLD AUTO: 0.9 %
BILIRUB SERPL-MCNC: 0.3 MG/DL (ref 0.1–1)
BNP SERPL-MCNC: 25 PG/ML (ref 0–99)
BUN SERPL-MCNC: 12 MG/DL (ref 8–23)
CALCIUM SERPL-MCNC: 9.2 MG/DL (ref 8.7–10.5)
CHLORIDE SERPL-SCNC: 111 MMOL/L (ref 95–110)
CO2 SERPL-SCNC: 21 MMOL/L (ref 23–29)
CREAT SERPL-MCNC: 0.8 MG/DL (ref 0.5–1.4)
ERYTHROCYTE [DISTWIDTH] IN BLOOD BY AUTOMATED COUNT: 13.8 % (ref 11.5–14.5)
GFR SERPLBLD CREATININE-BSD FMLA CKD-EPI: >60 ML/MIN/1.73/M2
GLUCOSE SERPL-MCNC: 105 MG/DL (ref 70–110)
HCT VFR BLD AUTO: 40 % (ref 37–48.5)
HGB BLD-MCNC: 12 GM/DL (ref 12–16)
IMM GRANULOCYTES # BLD AUTO: 0.02 K/UL (ref 0–0.04)
IMM GRANULOCYTES NFR BLD AUTO: 0.3 % (ref 0–0.5)
LYMPHOCYTES # BLD AUTO: 2.15 K/UL (ref 1–4.8)
MCH RBC QN AUTO: 26.7 PG (ref 27–31)
MCHC RBC AUTO-ENTMCNC: 30 G/DL (ref 32–36)
MCV RBC AUTO: 89 FL (ref 82–98)
NUCLEATED RBC (/100WBC) (OHS): 0 /100 WBC
OHS QRS DURATION: 78 MS
OHS QTC CALCULATION: 437 MS
PLATELET # BLD AUTO: 345 K/UL (ref 150–450)
PMV BLD AUTO: 9.2 FL (ref 9.2–12.9)
POTASSIUM SERPL-SCNC: 4.3 MMOL/L (ref 3.5–5.1)
PROT SERPL-MCNC: 7.7 GM/DL (ref 6–8.4)
RBC # BLD AUTO: 4.5 M/UL (ref 4–5.4)
RELATIVE EOSINOPHIL (OHS): 3.4 %
RELATIVE LYMPHOCYTE (OHS): 33.1 % (ref 18–48)
RELATIVE MONOCYTE (OHS): 8.3 % (ref 4–15)
RELATIVE NEUTROPHIL (OHS): 54 % (ref 38–73)
SODIUM SERPL-SCNC: 142 MMOL/L (ref 136–145)
TROPONIN I SERPL HS-MCNC: <3 NG/L
WBC # BLD AUTO: 6.5 K/UL (ref 3.9–12.7)

## 2025-05-06 PROCEDURE — 84484 ASSAY OF TROPONIN QUANT: CPT | Performed by: PHYSICIAN ASSISTANT

## 2025-05-06 PROCEDURE — 93010 ELECTROCARDIOGRAM REPORT: CPT | Mod: ,,, | Performed by: INTERNAL MEDICINE

## 2025-05-06 PROCEDURE — 82040 ASSAY OF SERUM ALBUMIN: CPT | Performed by: PHYSICIAN ASSISTANT

## 2025-05-06 PROCEDURE — 85025 COMPLETE CBC W/AUTO DIFF WBC: CPT | Performed by: PHYSICIAN ASSISTANT

## 2025-05-06 PROCEDURE — 99285 EMERGENCY DEPT VISIT HI MDM: CPT | Mod: 25

## 2025-05-06 PROCEDURE — 93005 ELECTROCARDIOGRAM TRACING: CPT

## 2025-05-06 PROCEDURE — 83880 ASSAY OF NATRIURETIC PEPTIDE: CPT | Performed by: PHYSICIAN ASSISTANT

## 2025-05-06 NOTE — ED PROVIDER NOTES
Encounter Date: 5/6/2025       History     Chief Complaint   Patient presents with    Foot Problem     L foot pain, and my R lung hurts had benign tumor removed on foot yrs ago and told if malignant can go to my lungs     69-year-old female with history of anxiety, chronic low back pain, gastritis, GERD presents to the emergency department with multiple complaints.  States that her left foot has been hurting her a little bit for the last few months.  She noticed a small wound that developed and has put Vaseline on the area.  She denies any drainage.  Around the same time, 3 months ago, she had some pain in the center of her chest.  It resolved and she did not think much of it.  She was caring for her mother at the time and was not paying attention to her own health. She had another episode of pain a couple of weeks ago. She hasn't had any pain in the last two weeks. She had tumors removed in her foot many years ago.  She was told that the tumor could spread into her lung and she is concerned that that may be the cause the pain in her chest.  She has no current chest pain or shortness of breath.  No abdominal pain, nausea, vomiting, diarrhea, dysuria, hematuria, fever.  She denies other worsening or alleviating factors.      Review of patient's allergies indicates:  No Known Allergies  Past Medical History:   Diagnosis Date    Anxiety     Chronic low back pain     Gastritis     GERD (gastroesophageal reflux disease)     History of cervical cancer     History of recurrent urinary tract infection     IFG (impaired fasting glucose)     Malignant neoplasm of overlapping sites of right breast in female, estrogen receptor positive 7/19/2019    Soft tissue tumor, malignant     sarcoma left foot     Past Surgical History:   Procedure Laterality Date    ABDOMINOPLASTY N/A 11/29/2018    Procedure: EXTENDED ABDOMINOPLASTY;  Surgeon: Mio Arriaga MD;  Location: [facility];  Service: Plastics;  Laterality: N/A;    AXILLARY NODE  DISSECTION Right 6/4/2019    Procedure: LYMPHADENECTOMY, AXILLARY RIGHT;  Surgeon: Sabine Arteaga MD;  [facility];  Service: General;  Laterality: Right;    BACK SURGERY      BREAST CYST EXCISION Left 1984    BREAST RECONSTRUCTION Right 6/6/2022    Procedure: RECONSTRUCTION, BREAST;  Surgeon: Leonard Cordero MD;  [facility];  Service: Plastics;  Laterality: Right;  T-DAP    BREAST REVISION SURGERY Right 7/20/2020    Procedure: BREAST REVISION SURGERY RIGHT;  Surgeon: Leonard Cordero MD;  [facility];  Service: Plastics;  Laterality: Right;    COLONOSCOPY N/A 10/19/2020    Procedure: COLONOSCOPY;  Surgeon: Chanel Saenz MD;  Location: [facility] ENDO (4TH FLR);  Service: Endoscopy;  Laterality: N/A;  covid test 10/16[facility]    COLONOSCOPY N/A 8/15/2023    Procedure: COLONOSCOPY;  Surgeon: Ranjan Avery MD;  Location: [facility] ENDO (4TH FLR);  Service: Endoscopy;  Laterality: N/A;  peg prep  prep instructions placed at    prep instructions sent to pt email [email or web address]  8/14 Revised time and prep instructions - PC    FOOT SURGERY  2009 or 2010    INSERTION OF BREAST IMPLANT Right 6/4/2019    Procedure: INSERTION, BREAST IMPLANT RIGHT;  Surgeon: Leonard Cordero MD;  Location: [address];  Service: Plastics;  Laterality: Right;    INSERTION OF BREAST TISSUE EXPANDER Right 6/4/2019    Procedure: INSERTION, TISSUE EXPANDER, BREAST RIGHT;  Surgeon: Leonard Cordero MD;  [address];  Service: Plastics;  Laterality: Right;    LIPOSUCTION W/ FAT INJECTION N/A 11/29/2018    Procedure: LIPOSUCTION, WITH FAT TRANSFER TO BUTTOCKS;  Surgeon: Mio Arriaga MD;  Location: [facility];  Service: Plastics;  Laterality: N/A;    MASTOPEXY Left 6/4/2019    Procedure: MASTOPEXY LEFT;  Surgeon: Leonard Cordero MD;  Location: [address];  Service: Plastics;  Laterality: Left;    MASTOPEXY Left  7/20/2020    Procedure: MASTOPEXY LEFT;  Surgeon: Leonard Cordero MD;  Location: [address];  Service: Plastics;  Laterality: Left;    MASTOPEXY Bilateral 6/22/2023    Procedure: MASTOPEXY;  Surgeon: Leonard Cordero MD;  Location: [address];  Service: Plastics;  Laterality: Bilateral;  cresent mastopexy    SENTINEL LYMPH NODE BIOPSY Right 6/4/2019    Procedure: BIOPSY, LYMPH NODE, SENTINEL RIGHT;  Surgeon: Sabine Arteaga MD;  Location: [facility] OR [address] [place] [address];  Service: General;  Laterality: Right;    SIMPLE MASTECTOMY Right 6/4/2019    Procedure: MASTECTOMY, SIMPLE RIGHT (CONSENT AM OF) 4.0 hr case;  Surgeon: Sabine Arteaga MD;  Location: [address];  Service: General;  Laterality: Right;    TOTAL REDUCTION MAMMOPLASTY Left 2019    TOTAL VAGINAL HYSTERECTOMY  1996    TUBAL LIGATION  1994     Family History   Problem Relation Name Age of Onset    Breast cancer Sister Mary Ellen         dx in 2006    No Known Problems Mother      Throat cancer Father      Colon cancer Neg Hx      Diabetes Neg Hx      Hypertension Neg Hx      Ovarian cancer Neg Hx      Stroke Neg Hx      Anesthesia problems Neg Hx       Social History[1]  Review of Systems   Cardiovascular:  Positive for chest pain.       Physical Exam     Initial Vitals [05/06/25 1332]   BP Pulse Resp Temp SpO2   137/83 104 18 98 °F (36.7 °C) 97 %      MAP       --         Physical Exam    Vitals reviewed.  Constitutional: She appears well-developed and well-nourished. She is not diaphoretic. No distress.   HENT:   Head: Normocephalic and atraumatic. Mouth/Throat: Oropharynx is clear and moist.   Eyes: Conjunctivae and EOM are normal. Pupils are equal, round, and reactive to light.   Neck: Neck supple.   Normal range of motion.  Cardiovascular:  Normal rate, regular rhythm, normal heart sounds and intact distal pulses.     Exam reveals no gallop and no friction rub.       No murmur heard.  Pulmonary/Chest: Breath sounds normal. She has no wheezes.  She has no rhonchi. She has no rales.   Abdominal: Abdomen is soft. Bowel sounds are normal. There is no abdominal tenderness.   Musculoskeletal:         General: Normal range of motion.      Cervical back: Normal range of motion and neck supple.      Comments: Chronic skin changes noted to the dorsum of the left wound. Small ulceration, approximately 1 cm, without drainage, induration, erythema. No evidence of infection. Non tender to palpation.      Neurological: She is alert and oriented to person, place, and time. She has normal strength. No cranial nerve deficit or sensory deficit. GCS score is 15. GCS eye subscore is 4. GCS verbal subscore is 5. GCS motor subscore is 6.   Skin: Skin is warm and dry. Capillary refill takes less than 2 seconds.   Psychiatric: She has a normal mood and affect. Her behavior is normal. Judgment and thought content normal.         ED Course   Procedures  Labs Reviewed   COMPREHENSIVE METABOLIC PANEL - Abnormal       Result Value    Sodium 142      Potassium 4.3      Chloride 111 (*)     CO2 21 (*)     Glucose 105      BUN 12      Creatinine 0.8      Calcium 9.2      Protein Total 7.7      Albumin 3.7      Bilirubin Total 0.3      ALP 81      AST 36      ALT 22      Anion Gap 10      eGFR >60     CBC WITH DIFFERENTIAL - Abnormal    WBC 6.50      RBC 4.50      HGB 12.0      HCT 40.0      MCV 89      MCH 26.7 (*)     MCHC 30.0 (*)     RDW 13.8      Platelet Count 345      MPV 9.2      Nucleated RBC 0      Neut % 54.0      Lymph % 33.1      Mono % 8.3      Eos % 3.4      Basophil % 0.9      Imm Grans % 0.3      Neut # 3.51      Lymph # 2.15      Mono # 0.54      Eos # 0.22      Baso # 0.06      Imm Grans # 0.02     TROPONIN I HIGH SENSITIVITY - Normal    Troponin High Sensitive <3     B-TYPE NATRIURETIC PEPTIDE - Normal    BNP 25     CBC W/ AUTO DIFFERENTIAL    Narrative:     The following orders were created for panel order CBC auto differential.  Procedure                                Abnormality         Status                     ---------                               -----------         ------                     CBC with Differential[[id number]]       Abnormal            Final result                 Please view results for these tests on the individual orders.     EKG Readings: (Independently Interpreted)   Initial Reading: No STEMI. Rhythm: Sinus Tachycardia. Heart Rate: 103.     ECG Results              EKG 12-lead (Final result)        Collection Time Result Time QRS Duration OHS QTC Calculation    05/06/25 13:39:56 05/06/25 15:51:19 78 437                     Final result by Interface, Lab In [facility] (05/06/25 15:51:24)                   Narrative:    Test Reason : R68.89,    Vent. Rate : 103 BPM     Atrial Rate : 103 BPM     P-R Int : 152 ms          QRS Dur :  78 ms      QT Int : 334 ms       P-R-T Axes :  47 -18  19 degrees    QTcB Int : 437 ms    Sinus tachycardia  Otherwise normal ECG  When compared with ECG of 22-Feb-2021 10:33,  No significant change was found  Confirmed by Bebeto Jang ([id number]) on 5/6/2025 3:51:15 PM    Referred By: AAAREFERRAL SELF           Confirmed By: Bebeto Jang                                  Imaging Results              X-Ray Foot Complete Left (Final result)  Result time 05/06/25 17:12:20      Final result by Jose Meraz MD (05/06/25 17:12:20)                   Impression:      1. No convincing acute displaced fracture or dislocation of the foot noting similar appearing dorsal edema.      Electronically signed by: Jose Meraz MD  Date:    05/06/2025  Time:    17:12               Narrative:    EXAMINATION:  XR FOOT COMPLETE 3 VIEW LEFT    CLINICAL HISTORY:  .  Pain in left foot    TECHNIQUE:  AP, lateral and oblique views of the left foot were performed.    COMPARISON:  03/31/2021    FINDINGS:  Three views left foot.    There is osteopenia.  No acute displaced fracture or dislocation of the foot.  Surgical changes are noted at the level of the  ankle.  There is edema about the dorsal aspect of the foot.                                       X-Ray Chest 1 View (Final result)  Result time 05/06/25 16:52:47   Procedure changed from X-Ray Chest PA And Lateral     Final result by Joes Meraz MD (05/06/25 16:52:47)                   Impression:      1. Appearing interstitial findings noting there may be small left pleural effusion.  Developing underlying consolidation not excluded.  Correlation is needed in this hypoventilatory exam.      Electronically signed by: Jose Meraz MD  Date:    05/06/2025  Time:    16:52               Narrative:    EXAMINATION:  XR CHEST 1 VIEW    CLINICAL HISTORY:  Chest Pain;    TECHNIQUE:  Single frontal view of the chest was performed.    COMPARISON:  02/22/2021    FINDINGS:  The cardiomediastinal silhouette is not enlarged, magnified by technique..  There is obscuration of the left costophrenic angle, possibly reflecting small effusion versus artifact from overlying habitus..  The trachea is midline.  The lungs are symmetrically expanded bilaterally with mildly coarse interstitial attenuation.  There is bilateral basilar subsegmental atelectasis..  There is no pneumothorax.  The osseous structures are remarkable for levo scoliotic curvature of the spine..                                    X-Rays:   Independently Interpreted Readings:   Chest X-Ray: Normal heart size. Questionable pleural effusion on the left. No pneumothorax.      Medications - No data to display  Medical Decision Making  Emergent evaluation of a 69 y.o. female presenting to the emergency department complaining of foot pain and chest pain that started three months ago. Patient is afebrile, hemodynamically stable, and non toxic appearing.   Will order labs, imaging. Patient has no pain currently and declines analgesia.     Differential diagnosis includes but isn't limited to ACS, pneumothorax, cellulitis, abscess, fracture, dislocation.    Patient  presenting with pain in her foot that started approximately 3 months ago.  She has been caring for her mother and neglecting her own health and therefore did not see her PCP regarding this.  She also had some chest pain around that time.  The last time she felt any pain in her chest was 2 weeks ago.  She has had no chest pain or shortness of breath since then.      No severe metabolic or hematologic derangements.  Troponin negative.  BNP within normal limits.  X-ray of the foot with no fracture or dislocation.  Similar appearing dorsal edema.  Chest x-ray with questionable pleural effusion on the left.  The patient has no cough or fever.  I do not suspect this represents pneumonia.  Will not treat with antibiotic for now.  I recommend further outpatient follow-up with PCP.  Return precautions given.  All questions answered.  The patient was instructed to follow up with a primary care provider or to return to the emergency department for worsening symptoms. The treatment plan was discussed with the patient who demonstrated understanding and comfort with plan.     Amount and/or Complexity of Data Reviewed  Labs: ordered.  Radiology: ordered.                                      Clinical Impression:  Final diagnoses:  [R68.89] Multiple complaints  [R07.9] Chest pain (Primary)  [M79.672] Left foot pain  [J90] Pleural effusion          ED Disposition Condition    Discharge Stable          ED Prescriptions    None       Follow-up Information       Follow up With Specialties Details Why Contact Info    [facility] - Emergency Dept Emergency Medicine Go to  If symptoms worsen [id number]  [facility] [address] [phone]    Brian Vu MD Internal Medicine Schedule an appointment as soon as possible for a visit   [address] [facility]  [address]  [facility] [address] [phone]                 [1]   Social History  Tobacco Use    Smoking status: Former     Current packs/day: 0.00     Average packs/day: 0.3  packs/day for 15.0 years (3.8 ttl pk-yrs)     Types: Cigarettes     Start date: 8/15/1978     Quit date: 8/15/1993     Years since quittin.7    Smokeless tobacco: Never   Substance Use Topics    Alcohol use: Yes     Alcohol/week: 1.0 standard drink of alcohol     Types: 1 Glasses of wine per week     Comment: seldom    Drug use: No        Rafaela Johnson PA-C  [id number]

## 2025-05-06 NOTE — ED NOTES
"Patient statesd " something wrong" with her left foot, has has surgery after " tumors" fund on left foot, states her foot was " rebuilt" 10 years ago  "

## 2025-05-06 NOTE — Clinical Note
"Jaja"Love Toledo was seen and treated in our emergency department on 5/6/2025.  She may return to work on 05/13/2025.       If you have any questions or concerns, please don't hesitate to call.      Rafaela Johnson PA-C"

## 2025-05-06 NOTE — ED NOTES
Patient identifiers verified and correct for  MS Toledo  C/C:  Left foot pain SEE NN  APPEARANCE: awake and alert in NAD. PAIN  3/10  SKIN: warm, dry discolored area to left foot after skin graft, no open areas, no drainage, no redness, SEE PHOTOS   MUSCULOSKELETAL: Patient moving all extremities spontaneously, no obvious swelling or deformities noted. Ambulates independently.  RESPIRATORY: Denies shortness of breath.Respirations unlabored.   CARDIAC: Denies CP, 2+ distal pulses; no peripheral edema  ABDOMEN: S/ND/NT, Denies nausea  : voids spontaneously, denies difficulty  Neurologic: AAO x 4; follows commands equal strength in all extremities; denies numbness/tingling. Denies dizziness  Denies new [id number]

## 2025-05-07 ENCOUNTER — TELEPHONE (OUTPATIENT)
Dept: FAMILY MEDICINE | Facility: CLINIC | Age: 70
End: 2025-05-07
Payer: COMMERCIAL

## 2025-05-07 ENCOUNTER — TELEPHONE (OUTPATIENT)
Dept: URGENT CARE | Facility: CLINIC | Age: 70
End: 2025-05-07

## 2025-05-07 NOTE — TELEPHONE ENCOUNTER
Ot requesting abx for UTI  she needs an appointment and does not have my chart portal to do an Evisit    Call placed to pt , mail box is full   Cannot leave message

## 2025-05-07 NOTE — TELEPHONE ENCOUNTER
----- Message from Isaiaselenitain sent at 5/7/2025  2:53 PM CDT -----  Type : Patient Call  Who Called : Patient   Does the patient know what this is regarding?: Pt missed the providers office phone call and requesting another phone call . Please Advise   Would the patient rather a call back or a response via [facility]? Call   Best Call Back Number: [phone]   Additional Information:

## 2025-05-07 NOTE — TELEPHONE ENCOUNTER
Pt presented to clinic requesting refill of abx for dx UTI. Pt was told she was treated appropriately at time of visit and if her sx are persistent she would need to check in for a visit to be re-assessed. Pt did not want to pay another co-pay to be re-assessed. Pt was advised to send a message or to follow up with PCP for tx, as at an urgent care we are unable to give follow-ups/tx with out an encounter. PVU  AL

## 2025-05-07 NOTE — TELEPHONE ENCOUNTER
----- Message from Ana sent at 5/7/2025  1:31 PM CDT -----  Type:  Patient Returning CallWho Called:ptWho Left Message for Patient:Celsa Gustafson CMADoes the patient know what this is regarding?:returning callWould the patient rather a call back or a response via [facility]? callBe Call Back Number: [phone] Information:

## 2025-05-07 NOTE — TELEPHONE ENCOUNTER
Called again, got voice mail which is full    PT MUST MAKE AN APPOINTMENT we cannot just call in ABX without appt

## 2025-05-08 ENCOUNTER — OFFICE VISIT (OUTPATIENT)
Dept: FAMILY MEDICINE | Facility: CLINIC | Age: 70
End: 2025-05-08
Payer: COMMERCIAL

## 2025-05-08 ENCOUNTER — RESULTS FOLLOW-UP (OUTPATIENT)
Dept: FAMILY MEDICINE | Facility: CLINIC | Age: 70
End: 2025-05-08

## 2025-05-08 ENCOUNTER — LAB VISIT (OUTPATIENT)
Dept: LAB | Facility: HOSPITAL | Age: 70
End: 2025-05-08
Payer: COMMERCIAL

## 2025-05-08 VITALS
DIASTOLIC BLOOD PRESSURE: 82 MMHG | OXYGEN SATURATION: 98 % | BODY MASS INDEX: 29.94 KG/M2 | WEIGHT: 179.69 LBS | TEMPERATURE: 98 F | SYSTOLIC BLOOD PRESSURE: 120 MMHG | HEART RATE: 97 BPM | HEIGHT: 65 IN

## 2025-05-08 DIAGNOSIS — R30.0 DYSURIA: ICD-10-CM

## 2025-05-08 DIAGNOSIS — N76.0 ACUTE VAGINITIS: ICD-10-CM

## 2025-05-08 DIAGNOSIS — B37.31 VAGINAL YEAST INFECTION: ICD-10-CM

## 2025-05-08 DIAGNOSIS — R63.0 LOSS OF APPETITE: ICD-10-CM

## 2025-05-08 DIAGNOSIS — L70.9 ACNE, UNSPECIFIED ACNE TYPE: ICD-10-CM

## 2025-05-08 DIAGNOSIS — R30.0 DYSURIA: Primary | ICD-10-CM

## 2025-05-08 LAB
BACTERIA #/AREA URNS AUTO: NORMAL /HPF
BILIRUB UR QL STRIP.AUTO: NEGATIVE
CLARITY UR: CLEAR
COLOR UR AUTO: YELLOW
GLUCOSE UR QL STRIP: NEGATIVE
HGB UR QL STRIP: NEGATIVE
KETONES UR QL STRIP: NEGATIVE
LEUKOCYTE ESTERASE UR QL STRIP: ABNORMAL
MICROSCOPIC COMMENT: NORMAL
NITRITE UR QL STRIP: NEGATIVE
PH UR STRIP: 6 [PH]
PROT UR QL STRIP: NEGATIVE
RBC #/AREA URNS AUTO: 0 /HPF (ref 0–4)
SP GR UR STRIP: 1.02
UROBILINOGEN UR STRIP-ACNC: NEGATIVE EU/DL
WBC #/AREA URNS AUTO: 0 /HPF (ref 0–5)
YEAST UR QL AUTO: NORMAL /HPF

## 2025-05-08 PROCEDURE — 3288F FALL RISK ASSESSMENT DOCD: CPT | Mod: CPTII,S$GLB,,

## 2025-05-08 PROCEDURE — 3074F SYST BP LT 130 MM HG: CPT | Mod: CPTII,S$GLB,,

## 2025-05-08 PROCEDURE — 1160F RVW MEDS BY RX/DR IN RCRD: CPT | Mod: CPTII,S$GLB,,

## 2025-05-08 PROCEDURE — 87086 URINE CULTURE/COLONY COUNT: CPT

## 2025-05-08 PROCEDURE — 3008F BODY MASS INDEX DOCD: CPT | Mod: CPTII,S$GLB,,

## 2025-05-08 PROCEDURE — 81003 URINALYSIS AUTO W/O SCOPE: CPT

## 2025-05-08 PROCEDURE — 99215 OFFICE O/P EST HI 40 MIN: CPT | Mod: S$GLB,,,

## 2025-05-08 PROCEDURE — 3079F DIAST BP 80-89 MM HG: CPT | Mod: CPTII,S$GLB,,

## 2025-05-08 PROCEDURE — 1159F MED LIST DOCD IN RCRD: CPT | Mod: CPTII,S$GLB,,

## 2025-05-08 PROCEDURE — 99999 PR PBB SHADOW E&M-EST. PATIENT-LVL V: CPT | Mod: PBBFAC,,,

## 2025-05-08 PROCEDURE — 1101F PT FALLS ASSESS-DOCD LE1/YR: CPT | Mod: CPTII,S$GLB,,

## 2025-05-08 PROCEDURE — 1126F AMNT PAIN NOTED NONE PRSNT: CPT | Mod: CPTII,S$GLB,,

## 2025-05-08 RX ORDER — HYDROCORTISONE 25 MG/G
OINTMENT TOPICAL 2 TIMES DAILY
Qty: 20 G | Refills: 0 | Status: SHIPPED | OUTPATIENT
Start: 2025-05-08 | End: 2025-05-15

## 2025-05-08 RX ORDER — CLINDAMYCIN PHOSPHATE 10 MG/G
1 GEL TOPICAL 2 TIMES DAILY
Qty: 60 G | Refills: 2 | Status: SHIPPED | OUTPATIENT
Start: 2025-05-08

## 2025-05-08 RX ORDER — MIRTAZAPINE 15 MG/1
15 TABLET, FILM COATED ORAL NIGHTLY
Qty: 30 TABLET | Refills: 11 | Status: SHIPPED | OUTPATIENT
Start: 2025-05-08 | End: 2026-05-08

## 2025-05-08 RX ORDER — CLINDAMYCIN AND BENZOYL PEROXIDE 10; 50 MG/G; MG/G
GEL TOPICAL 2 TIMES DAILY
Qty: 50 G | Refills: 3 | Status: SHIPPED | OUTPATIENT
Start: 2025-05-08 | End: 2026-05-08

## 2025-05-08 RX ORDER — FLUCONAZOLE 150 MG/1
150 TABLET ORAL DAILY
Qty: 1 TABLET | Refills: 0 | Status: SHIPPED | OUTPATIENT
Start: 2025-05-08 | End: 2025-05-09

## 2025-05-08 NOTE — PATIENT INSTRUCTIONS
Take diflucan for one dose.  Apply hydrocortisone to vaginal area BID for up to 7 days.  Return for new or worsening symptoms.    ER/Urgent Care precautions discussed with patient. Go to ER for new or worsening symptoms.

## 2025-05-08 NOTE — PROGRESS NOTES
Subjective     Patient ID: Jaja Toledo is a 69 y.o. female.    Chief Complaint: Urinary Tract Infection      Patient is a 69 year old black female with anxiety, GERD, personal history of breast cancer with mastectomy that presents as a new patient to me today, established with Dr. Vu, for c/o possible UTI. Was treated for complicated UTI recently by urgent care on 4/25/25, given IM rocephin and oral Cipro for treatment, completed therapy. States the discomfort she is currently having is not the same as before. States it is a discomfort/irritating feeling present all the time, not just when she urinates and endorses some itching in the vaginal area. Denies any obvious discharge or bleeding. Denies any abdominal pain or back pain. Denies any fever, chills, n/v/d. Has been applying Vaseline to the area with minimal relief.       Review of Systems   Constitutional:  Negative for chills and fever.   Respiratory:  Negative for shortness of breath.    Cardiovascular:  Negative for chest pain.   Gastrointestinal:  Negative for abdominal pain, diarrhea, nausea and vomiting.   Genitourinary:  Positive for dysuria and frequency. Negative for bladder incontinence, difficulty urinating, flank pain, hematuria and vaginal discharge.   Integumentary:  Negative for rash.   Psychiatric/Behavioral:  Negative for confusion.      Past Medical History:   Diagnosis Date    Anxiety     Chronic low back pain     Gastritis     GERD (gastroesophageal reflux disease)     History of cervical cancer     History of recurrent urinary tract infection     IFG (impaired fasting glucose)     Malignant neoplasm of overlapping sites of right breast in female, estrogen receptor positive 7/19/2019    Soft tissue tumor, malignant     sarcoma left foot       Past Surgical History:   Procedure Laterality Date    ABDOMINOPLASTY N/A 11/29/2018    Procedure: EXTENDED ABDOMINOPLASTY;  Surgeon: Mio Arriaga MD;  Location: [facility];  Service: Plastics;   Laterality: N/A;    AXILLARY NODE DISSECTION Right 6/4/2019    Procedure: LYMPHADENECTOMY, AXILLARY RIGHT;  Surgeon: Sabine Arteaga MD;  Location[facility];  Service: General;  Laterality: Right;    BACK SURGERY      BREAST CYST EXCISION Left 1984    BREAST RECONSTRUCTION Right 6/6/2022    Procedure: RECONSTRUCTION, BREAST;  Surgeon: Leonard Cordero MD;  Location: [facility];  Service: Plastics;  Laterality: Right;  T-DAP    BREAST REVISION SURGERY Right 7/20/2020    Procedure: BREAST REVISION SURGERY RIGHT;  Surgeon: Leonard Cordero MD;  Location: [facility];  Service: Plastics;  Laterality: Right;    COLONOSCOPY N/A 10/19/2020    Procedure: COLONOSCOPY;  Surgeon: Chanel Saenz MD;  Location: [facility] ENDO (4TH FLR);  Service: Endoscopy;  Laterality: N/A;  covid test 10/16-[facility] urgent care    COLONOSCOPY N/A 8/15/2023    Procedure: COLONOSCOPY;  Surgeon: Ranjan Avery MD;  Location: [facility] ENDO (4TH FLR);  Service: Endoscopy;  Laterality: N/A;  peg prep  prep instructions placed at    prep instructions sent to pt email [email or web address]  8/14 Revised time and prep instructions - PC    FOOT SURGERY  2009 or 2010    INSERTION OF BREAST IMPLANT Right 6/4/2019    Procedure: INSERTION, BREAST IMPLANT RIGHT;  Surgeon: Leonard Cordero MD;  Location: [address];  Service: Plastics;  Laterality: Right;    INSERTION OF BREAST TISSUE EXPANDER Right 6/4/2019    Procedure: INSERTION, TISSUE EXPANDER, BREAST RIGHT;  Surgeon: Leonard Cordero MD;  Location: [address];  Service: Plastics;  Laterality: Right;    LIPOSUCTION W/ FAT INJECTION N/A 11/29/2018    Procedure: LIPOSUCTION, WITH FAT TRANSFER TO BUTTOCKS;  Surgeon: Mio Arriaga MD;  Location: [facility] OR;  Service: Plastics;  Laterality: N/A;    MASTOPEXY Left 6/4/2019    Procedure: MASTOPEXY LEFT;  Surgeon: Leonard Cordero MD;  Location: [address];  Service: Plastics;   Laterality: Left;    MASTOPEXY Left 2020    Procedure: MASTOPEXY LEFT;  Surgeon: Leonard Cordero MD;  Location: [address];  Service: Plastics;  Laterality: Left;    MASTOPEXY Bilateral 2023    Procedure: MASTOPEXY;  Surgeon: Leonard Cordero MD;  Location: [address];  Service: Plastics;  Laterality: Bilateral;  cresent mastopexy    SENTINEL LYMPH NODE BIOPSY Right 2019    Procedure: BIOPSY, LYMPH NODE, SENTINEL RIGHT;  Surgeon: Sabine Arteaga MD;  Location: [address];  Service: General;  Laterality: Right;    SIMPLE MASTECTOMY Right 2019    Procedure: MASTECTOMY, SIMPLE RIGHT (CONSENT AM OF) 4.0 hr case;  Surgeon: Sabine Arteaga MD;  Location: [address];  Service: General;  Laterality: Right;    TOTAL REDUCTION MAMMOPLASTY Left     TOTAL VAGINAL HYSTERECTOMY  1996    TUBAL LIGATION         Family History   Problem Relation Name Age of Onset    Breast cancer Sister Mary Ellen         dx in     No Known Problems Mother      Throat cancer Father      Colon cancer Neg Hx      Diabetes Neg Hx      Hypertension Neg Hx      Ovarian cancer Neg Hx      Stroke Neg Hx      Anesthesia problems Neg Hx         Social History     Socioeconomic History    Marital status:    Tobacco Use    Smoking status: Former     Current packs/day: 0.00     Average packs/day: 0.3 packs/day for 15.0 years (3.8 ttl pk-yrs)     Types: Cigarettes     Start date: 8/15/1978     Quit date: 8/15/1993     Years since quittin.7    Smokeless tobacco: Never   Substance and Sexual Activity    Alcohol use: Yes     Alcohol/week: 1.0 standard drink of alcohol     Types: 1 Glasses of wine per week     Comment: seldom    Drug use: No    Sexual activity: Yes     Partners: Male       Current Medications[1]    Review of patient's allergies indicates:  No Known Allergies      Objective     Vitals:    25 0922   BP: 120/82   Pulse: 97   Temp: 98.2 °F (36.8 °C)   TempSrc: Oral   SpO2: 98%  "  Weight: 81.5 kg (179 lb 10.8 oz)   Height: 5' 5" (1.651 m)   PainSc: 0-No pain      Physical Exam  Vitals and nursing note reviewed. Chaperone present: chaperone declined..   Constitutional:       General: She is not in acute distress.     Appearance: Normal appearance. She is not ill-appearing.   HENT:      Head: Normocephalic and atraumatic.      Mouth/Throat:      Mouth: Mucous membranes are moist.      Pharynx: Oropharynx is clear.   Eyes:      General: No scleral icterus.        Right eye: No discharge.         Left eye: No discharge.      Extraocular Movements: Extraocular movements intact.      Conjunctiva/sclera: Conjunctivae normal.   Cardiovascular:      Rate and Rhythm: Normal rate.      Pulses: Normal pulses.   Pulmonary:      Effort: Pulmonary effort is normal. No respiratory distress.   Genitourinary:     Pubic Area: No rash.       Labia:         Right: Rash present. No lesion.         Left: Rash present. No lesion.       Vagina: Vaginal discharge present.      Comments: External vaginal irritation with redness and mild discharge noted.   Musculoskeletal:         General: Normal range of motion.      Cervical back: Normal range of motion.   Skin:     General: Skin is warm and dry.      Findings: No rash.   Neurological:      Mental Status: She is alert and oriented to person, place, and time.      GCS: GCS eye subscore is 4. GCS verbal subscore is 5. GCS motor subscore is 6.   Psychiatric:         Mood and Affect: Mood normal.         Speech: Speech normal.         Behavior: Behavior normal. Behavior is cooperative.         Cognition and Memory: Cognition normal.          Assessment and Plan     1. Dysuria  - New problem; recent UTI treated with IM rocephin and oral Cipro; completed therapy. Urine and culture will be repeated today. Will reach out with results.   -     Urine Culture High Risk; Future; Expected date: 05/08/2025  -     Urinalysis; Future; Expected date: 05/08/2025    2. Acute " vaginitis  - New problem; +vaginal irritation/itching; +external redness/irritation noted on visual inspection. No lesions or bleeding noted. Apply ointment to area BID for up to 7 days. RTC is s/s worsen or fail to improve. Follow up with GYN.   -     hydrocortisone 2.5 % ointment; Apply topically 2 (two) times daily. for 7 days  Dispense: 20 g; Refill: 0    3. Vaginal yeast infection  - New problem; recently completed oral antibiotics for UTI and now having mild discharge with external vaginal itching/irritation. Take medication X 1 dose. RTC is s/s worsen or fail to improve.   -     fluconazole (DIFLUCAN) 150 MG Tab; Take 1 tablet (150 mg total) by mouth once daily. for 1 dose  Dispense: 1 tablet; Refill: 0    4. Acne, unspecified acne type  - Chronic; stable on current treatment plan; follow up with PCP.  - Medication refilled upon request.   -     clindamycin-benzoyl peroxide (BENZACLIN) gel; Apply topically 2 (two) times daily.  Dispense: 50 g; Refill: 3    5. Loss of appetite  - Chronic; stable on current treatment plan; follow up with PCP  - Medication refilled upon request.  -     mirtazapine (REMERON) 15 MG tablet; Take 1 tablet (15 mg total) by mouth every evening.  Dispense: 30 tablet; Refill: 11      ER/Urgent Care precautions discussed with patient. Go to ER for new or worsening symptoms.           Follow up if symptoms worsen or fail to improve.    40 minutes of total time spent on the encounter, which includes face to face time and non-face to face time preparing to see the patient (eg, review of tests), Obtaining and/or reviewing separately obtained history, Documenting clinical information in the electronic or other health record, Independently interpreting results (not separately reported) and communicating results to the patient/family/caregiver, or Care coordination (not separately reported).      Lupe Robin, MSN, APRN, FNP-C  Family Medicine  Office #[phone]          [1]   Current  Outpatient Medications   Medication Sig Dispense Refill    acetaminophen (TYLENOL) 500 MG tablet Take 2 tablets (1,000 mg total) by mouth every 8 (eight) hours as needed for Pain. 20 tablet 0    bacitracin 500 unit/gram ointment Apply topically 3 (three) times daily. 28 g 0    calcium carbonate (CALCIUM 500 ORAL) Take by mouth once daily.       clindamycin phosphate 1% (CLINDAGEL) 1 % gel Apply topically 2 (two) times daily. 60 g 2    cyproheptadine (PERIACTIN) 4 mg tablet Take 1 tablet (4 mg total) by mouth 3 (three) times daily as needed (appetite). 90 tablet 2    diazePAM (VALIUM) 10 MG Tab Take 10 mg by mouth.      docusate sodium (COLACE) 100 MG capsule Take 1 capsule (100 mg total) by mouth 2 (two) times daily. 60 capsule 0    gabapentin (NEURONTIN) 100 MG capsule Take 1 capsule (100 mg total) by mouth every evening. 30 capsule 2    ibuprofen (ADVIL,MOTRIN) 800 MG tablet Take 1 tablet (800 mg total) by mouth every 6 (six) hours as needed for Pain. 30 tablet 2    lactulose (CHRONULAC) 20 gram/30 mL Soln Take 30 mLs (20 g total) by mouth after meals as needed (constipation). 200 mL 0    multivitamin capsule Take 1 capsule by mouth once daily.      rosuvastatin (CRESTOR) 5 MG tablet Take 1 tablet (5 mg total) by mouth once daily. 90 tablet 3    triamcinolone acetonide 0.1% (KENALOG) 0.1 % cream Apply topically 2 (two) times daily. 15 g 0    clindamycin-benzoyl peroxide (BENZACLIN) gel Apply topically 2 (two) times daily. 50 g 3    fluconazole (DIFLUCAN) 150 MG Tab Take 1 tablet (150 mg total) by mouth once daily. for 1 dose 1 tablet 0    hydrocortisone 2.5 % ointment Apply topically 2 (two) times daily. for 7 days 20 g 0    mirtazapine (REMERON) 15 MG tablet Take 1 tablet (15 mg total) by mouth every evening. 30 tablet 11     No current facility-administered medications for this visit.

## 2025-05-09 LAB — BACTERIA UR CULT: NO GROWTH

## 2025-05-12 ENCOUNTER — TELEPHONE (OUTPATIENT)
Dept: FAMILY MEDICINE | Facility: CLINIC | Age: 70
End: 2025-05-12
Payer: COMMERCIAL

## 2025-06-16 ENCOUNTER — OFFICE VISIT (OUTPATIENT)
Dept: FAMILY MEDICINE | Facility: CLINIC | Age: 70
End: 2025-06-16
Attending: FAMILY MEDICINE
Payer: COMMERCIAL

## 2025-06-16 VITALS
BODY MASS INDEX: 29.46 KG/M2 | SYSTOLIC BLOOD PRESSURE: 122 MMHG | OXYGEN SATURATION: 98 % | WEIGHT: 177 LBS | HEART RATE: 87 BPM | DIASTOLIC BLOOD PRESSURE: 76 MMHG

## 2025-06-16 DIAGNOSIS — R63.0 LOSS OF APPETITE: ICD-10-CM

## 2025-06-16 DIAGNOSIS — G89.29 CHRONIC LOW BACK PAIN WITH SCIATICA, SCIATICA LATERALITY UNSPECIFIED, UNSPECIFIED BACK PAIN LATERALITY: ICD-10-CM

## 2025-06-16 DIAGNOSIS — R51.9 HEADACHE, CHRONIC DAILY: ICD-10-CM

## 2025-06-16 DIAGNOSIS — E78.2 MIXED HYPERLIPIDEMIA: Primary | ICD-10-CM

## 2025-06-16 DIAGNOSIS — L70.9 ACNE, UNSPECIFIED ACNE TYPE: ICD-10-CM

## 2025-06-16 DIAGNOSIS — M79.2 NEUROPATHIC PAIN: ICD-10-CM

## 2025-06-16 DIAGNOSIS — K21.9 GASTROESOPHAGEAL REFLUX DISEASE, UNSPECIFIED WHETHER ESOPHAGITIS PRESENT: ICD-10-CM

## 2025-06-16 DIAGNOSIS — C50.811 MALIGNANT NEOPLASM OF OVERLAPPING SITES OF RIGHT BREAST IN FEMALE, ESTROGEN RECEPTOR POSITIVE: ICD-10-CM

## 2025-06-16 DIAGNOSIS — M54.40 CHRONIC LOW BACK PAIN WITH SCIATICA, SCIATICA LATERALITY UNSPECIFIED, UNSPECIFIED BACK PAIN LATERALITY: ICD-10-CM

## 2025-06-16 DIAGNOSIS — Z17.0 MALIGNANT NEOPLASM OF OVERLAPPING SITES OF RIGHT BREAST IN FEMALE, ESTROGEN RECEPTOR POSITIVE: ICD-10-CM

## 2025-06-16 DIAGNOSIS — R73.01 IFG (IMPAIRED FASTING GLUCOSE): ICD-10-CM

## 2025-06-16 DIAGNOSIS — R63.0 POOR APPETITE: ICD-10-CM

## 2025-06-16 PROCEDURE — 1101F PT FALLS ASSESS-DOCD LE1/YR: CPT | Mod: CPTII,S$GLB,, | Performed by: FAMILY MEDICINE

## 2025-06-16 PROCEDURE — 3288F FALL RISK ASSESSMENT DOCD: CPT | Mod: CPTII,S$GLB,, | Performed by: FAMILY MEDICINE

## 2025-06-16 PROCEDURE — 3078F DIAST BP <80 MM HG: CPT | Mod: CPTII,S$GLB,, | Performed by: FAMILY MEDICINE

## 2025-06-16 PROCEDURE — 1160F RVW MEDS BY RX/DR IN RCRD: CPT | Mod: CPTII,S$GLB,, | Performed by: FAMILY MEDICINE

## 2025-06-16 PROCEDURE — 3008F BODY MASS INDEX DOCD: CPT | Mod: CPTII,S$GLB,, | Performed by: FAMILY MEDICINE

## 2025-06-16 PROCEDURE — 1159F MED LIST DOCD IN RCRD: CPT | Mod: CPTII,S$GLB,, | Performed by: FAMILY MEDICINE

## 2025-06-16 PROCEDURE — 3074F SYST BP LT 130 MM HG: CPT | Mod: CPTII,S$GLB,, | Performed by: FAMILY MEDICINE

## 2025-06-16 PROCEDURE — 99999 PR PBB SHADOW E&M-EST. PATIENT-LVL IV: CPT | Mod: PBBFAC,,, | Performed by: FAMILY MEDICINE

## 2025-06-16 PROCEDURE — 99214 OFFICE O/P EST MOD 30 MIN: CPT | Mod: S$GLB,,, | Performed by: FAMILY MEDICINE

## 2025-06-16 PROCEDURE — 1126F AMNT PAIN NOTED NONE PRSNT: CPT | Mod: CPTII,S$GLB,, | Performed by: FAMILY MEDICINE

## 2025-06-16 RX ORDER — IBUPROFEN 800 MG/1
800 TABLET, FILM COATED ORAL EVERY 6 HOURS PRN
Qty: 30 TABLET | Refills: 2 | Status: SHIPPED | OUTPATIENT
Start: 2025-06-16

## 2025-06-16 RX ORDER — MIRTAZAPINE 15 MG/1
15 TABLET, FILM COATED ORAL NIGHTLY
Qty: 90 TABLET | Refills: 3 | Status: SHIPPED | OUTPATIENT
Start: 2025-06-16 | End: 2026-06-16

## 2025-06-16 RX ORDER — CLINDAMYCIN AND BENZOYL PEROXIDE 10; 50 MG/G; MG/G
GEL TOPICAL 2 TIMES DAILY
Qty: 50 G | Refills: 3 | Status: SHIPPED | OUTPATIENT
Start: 2025-06-16 | End: 2026-06-16

## 2025-06-16 RX ORDER — GABAPENTIN 100 MG/1
100 CAPSULE ORAL NIGHTLY
Qty: 90 CAPSULE | Refills: 3 | Status: SHIPPED | OUTPATIENT
Start: 2025-06-16 | End: 2026-06-16

## 2025-06-16 RX ORDER — CLINDAMYCIN PHOSPHATE 10 MG/G
1 GEL TOPICAL 2 TIMES DAILY
Qty: 60 G | Refills: 2 | Status: SHIPPED | OUTPATIENT
Start: 2025-06-16

## 2025-06-16 RX ORDER — CYPROHEPTADINE HYDROCHLORIDE 4 MG/1
4 TABLET ORAL 3 TIMES DAILY PRN
Qty: 90 TABLET | Refills: 2 | Status: SHIPPED | OUTPATIENT
Start: 2025-06-16

## 2025-06-16 NOTE — PROGRESS NOTES
Subjective     Patient ID: Jaja Toledo is a 69 y.o. female.    Chief Complaint: Follow-up    69 yr old pleasant female with HLD, IFG, GERD, presents today for her and evaluation of HLD, and IFG. C/o lefty leg tingling and had history of surgery. On crestor for lipids.    HLD - controlled -   LDLCALC                  164.0 (H)           04/02/2024      - on diet     Lung nodule - had CT many years ago and she had breast cancer - want to get for surveillance    Breast CA - on letrozole daily     Loss of appetite - on Remeron - need refill    History as below - reviewed     Follow-up  Pertinent negatives include no arthralgias, chest pain, congestion, diaphoresis, headaches, myalgias, nausea or sore throat.   Hyperlipidemia  This is a chronic problem. The current episode started more than 1 year ago. The problem is controlled. Recent lipid tests were reviewed and are normal. She has no history of chronic renal disease, hypothyroidism or nephrotic syndrome. There are no known factors aggravating her hyperlipidemia. Pertinent negatives include no chest pain, myalgias or shortness of breath. Current antihyperlipidemic treatment includes statins. There are no compliance problems.  Risk factors for coronary artery disease include dyslipidemia and post-menopausal.     Review of Systems   Constitutional: Negative.  Negative for activity change, diaphoresis and unexpected weight change.   HENT: Negative.  Negative for nasal congestion, ear discharge, hearing loss, rhinorrhea, sore throat and voice change.    Eyes: Negative.  Negative for pain, discharge and visual disturbance.   Respiratory: Negative.  Negative for chest tightness, shortness of breath and wheezing.    Cardiovascular: Negative.  Negative for chest pain.   Gastrointestinal: Negative.  Negative for abdominal distention, anal bleeding, constipation and nausea.   Endocrine: Negative.  Negative for cold intolerance, polydipsia and polyuria.   Genitourinary:  Negative.  Negative for decreased urine volume, difficulty urinating, dysuria, frequency, menstrual problem and vaginal pain.   Musculoskeletal: Negative.  Negative for arthralgias, gait problem and myalgias.   Integumentary:  Negative for color change, pallor and wound. Negative.   Allergic/Immunologic: Negative.  Negative for environmental allergies and immunocompromised state.   Neurological: Negative.  Negative for dizziness, tremors, seizures, speech difficulty and headaches.   Hematological: Negative.  Negative for adenopathy. Does not bruise/bleed easily.   Psychiatric/Behavioral: Negative.  Negative for agitation, confusion, decreased concentration, hallucinations, self-injury and suicidal ideas. The patient is not nervous/anxious.      Past Medical History:   Diagnosis Date    Anxiety     Chronic low back pain     Gastritis     GERD (gastroesophageal reflux disease)     History of cervical cancer     History of recurrent urinary tract infection     IFG (impaired fasting glucose)     Malignant neoplasm of overlapping sites of right breast in female, estrogen receptor positive 7/19/2019    Soft tissue tumor, malignant     sarcoma left foot       Past Surgical History:   Procedure Laterality Date    ABDOMINOPLASTY N/A 11/29/2018    Procedure: EXTENDED ABDOMINOPLASTY;  Surgeon: Mio Arriaga MD;  Location: [facility] OR;  Service: Plastics;  Laterality: N/A;    AXILLARY NODE DISSECTION Right 6/4/2019    Procedure: LYMPHADENECTOMY, AXILLARY RIGHT;  Surgeon: Sabine Arteaga MD;  Location: [facility] OR [address] [place] [address];  Service: General;  Laterality: Right;    BACK SURGERY      BREAST CYST EXCISION Left 1984    BREAST RECONSTRUCTION Right 6/6/2022    Procedure: RECONSTRUCTION, BREAST;  Surgeon: Leonard Cordero MD;  Location: [address];  Service: Plastics;  Laterality: Right;  T-DAP    BREAST REVISION SURGERY Right 7/20/2020    Procedure: BREAST REVISION SURGERY RIGHT;  Surgeon: Leonard Cordero MD;  Location:  [facility] OR 2ND FLR;  Service: Plastics;  Laterality: Right;    COLONOSCOPY N/A 10/19/2020    Procedure: COLONOSCOPY;  Surgeon: Chanel Saenz MD;  Location: [facility] ENDO (4TH FLR);  Service: Endoscopy;  Laterality: N/A;  covid test 10/16-[facility] urgent care    COLONOSCOPY N/A 8/15/2023    Procedure: COLONOSCOPY;  Surgeon: Ranjan Avery MD;  Location: [facility] ENDO (4TH FLR);  Service: Endoscopy;  Laterality: N/A;  peg prep  prep instructions placed at    prep instructions sent to pt email [email or web address]  8/14 Revised time and prep instructions - PC    FOOT SURGERY  2009 or 2010    INSERTION OF BREAST IMPLANT Right 6/4/2019    Procedure: INSERTION, BREAST IMPLANT RIGHT;  Surgeon: Leonard Cordero MD;  Location: [address];  Service: Plastics;  Laterality: Right;    INSERTION OF BREAST TISSUE EXPANDER Right 6/4/2019    Procedure: INSERTION, TISSUE EXPANDER, BREAST RIGHT;  Surgeon: Leonard Cordero MD;  Location: [address];  Service: Plastics;  Laterality: Right;    LIPOSUCTION W/ FAT INJECTION N/A 11/29/2018    Procedure: LIPOSUCTION, WITH FAT TRANSFER TO BUTTOCKS;  Surgeon: Mio Arriaga MD;  Location: [facility];  Service: Plastics;  Laterality: N/A;    MASTOPEXY Left 6/4/2019    Procedure: MASTOPEXY LEFT;  Surgeon: Leonard Cordero MD;  Location: [address];  Service: Plastics;  Laterality: Left;    MASTOPEXY Left 7/20/2020    Procedure: MASTOPEXY LEFT;  Surgeon: Leonard Cordero MD;  Location: [address];  Service: Plastics;  Laterality: Left;    MASTOPEXY Bilateral 6/22/2023    Procedure: MASTOPEXY;  Surgeon: Leonard Cordero MD;  Location: [address];  Service: Plastics;  Laterality: Bilateral;  cresent mastopexy    SENTINEL LYMPH NODE BIOPSY Right 6/4/2019    Procedure: BIOPSY, LYMPH NODE, SENTINEL RIGHT;  Surgeon: Sabine Arteaga MD;  Location: [facility] OR 2ND FLR;  Service: General;  Laterality: Right;    SIMPLE MASTECTOMY Right  2019    Procedure: MASTECTOMY, SIMPLE RIGHT (CONSENT AM OF) 4.0 hr case;  Surgeon: Sabine Arteaga MD;  Location: [facility] OR [address] [place] [address];  Service: General;  Laterality: Right;    TOTAL REDUCTION MAMMOPLASTY Left     TOTAL VAGINAL HYSTERECTOMY      TUBAL LIGATION         Family History   Problem Relation Name Age of Onset    Breast cancer Sister Mary Ellen         dx in     No Known Problems Mother      Throat cancer Father      Colon cancer Neg Hx      Diabetes Neg Hx      Hypertension Neg Hx      Ovarian cancer Neg Hx      Stroke Neg Hx      Anesthesia problems Neg Hx         Social History     Socioeconomic History    Marital status:    Tobacco Use    Smoking status: Former     Current packs/day: 0.00     Average packs/day: 0.3 packs/day for 15.0 years (3.8 ttl pk-yrs)     Types: Cigarettes     Start date: 8/15/1978     Quit date: 8/15/1993     Years since quittin.8    Smokeless tobacco: Never   Substance and Sexual Activity    Alcohol use: Yes     Alcohol/week: 1.0 standard drink of alcohol     Types: 1 Glasses of wine per week     Comment: seldom    Drug use: No    Sexual activity: Yes     Partners: Male       Current Outpatient Medications   Medication Sig Dispense Refill    acetaminophen (TYLENOL) 500 MG tablet Take 2 tablets (1,000 mg total) by mouth every 8 (eight) hours as needed for Pain. 20 tablet 0    calcium carbonate (CALCIUM 500 ORAL) Take by mouth once daily.       diazePAM (VALIUM) 10 MG Tab Take 10 mg by mouth.      docusate sodium (COLACE) 100 MG capsule Take 1 capsule (100 mg total) by mouth 2 (two) times daily. 60 capsule 0    hydrocortisone 2.5 % ointment Apply topically 2 (two) times daily. for 7 days 20 g 0    lactulose (CHRONULAC) 20 gram/30 mL Soln Take 30 mLs (20 g total) by mouth after meals as needed (constipation). 200 mL 0    multivitamin capsule Take 1 capsule by mouth once daily.      rosuvastatin (CRESTOR) 5 MG tablet Take 1 tablet (5 mg total) by mouth once  daily. 90 tablet 3    triamcinolone acetonide 0.1% (KENALOG) 0.1 % cream Apply topically 2 (two) times daily. 15 g 0    clindamycin phosphate 1% 1 % gel Apply 1 application  topically 2 (two) times daily. Apply to affected area 60 g 2    clindamycin-benzoyl peroxide (BENZACLIN) gel Apply topically 2 (two) times daily. 50 g 3    cyproheptadine (PERIACTIN) 4 mg tablet Take 1 tablet (4 mg total) by mouth 3 (three) times daily as needed (appetite). 90 tablet 2    gabapentin (NEURONTIN) 100 MG capsule Take 1 capsule (100 mg total) by mouth every evening. 90 capsule 3    ibuprofen (ADVIL,MOTRIN) 800 MG tablet Take 1 tablet (800 mg total) by mouth every 6 (six) hours as needed for Pain. 30 tablet 2    mirtazapine (REMERON) 15 MG tablet Take 1 tablet (15 mg total) by mouth every evening. 90 tablet 3     No current facility-administered medications for this visit.       Review of patient's allergies indicates:  No Known Allergies       Objective   Vitals:    06/16/25 1420   BP: 122/76   Pulse: 87       Physical Exam  Constitutional:       General: She is not in acute distress.     Appearance: She is well-developed. She is not diaphoretic.   HENT:      Head: Normocephalic and atraumatic.      Right Ear: External ear normal.      Left Ear: External ear normal.      Nose: Nose normal.      Mouth/Throat:      Pharynx: No oropharyngeal exudate.   Eyes:      General: No scleral icterus.        Right eye: No discharge.         Left eye: No discharge.      Conjunctiva/sclera: Conjunctivae normal.      Pupils: Pupils are equal, round, and reactive to light.   Neck:      Thyroid: No thyromegaly.      Vascular: No JVD.      Trachea: No tracheal deviation.   Cardiovascular:      Rate and Rhythm: Normal rate and regular rhythm.      Heart sounds: Normal heart sounds. No murmur heard.     No friction rub. No gallop.   Pulmonary:      Effort: Pulmonary effort is normal.      Breath sounds: Normal breath sounds. No stridor. No wheezing or  rales.   Chest:      Chest wall: No tenderness.   Abdominal:      General: Bowel sounds are normal. There is no distension.      Palpations: Abdomen is soft. There is no mass.      Tenderness: There is no abdominal tenderness. There is no guarding or rebound.      Hernia: No hernia is present.   Musculoskeletal:         General: No tenderness. Normal range of motion.      Cervical back: Normal range of motion and neck supple.   Lymphadenopathy:      Cervical: No cervical adenopathy.   Skin:     General: Skin is warm and dry.      Coloration: Skin is not pale.      Findings: No erythema or rash.   Neurological:      Mental Status: She is alert and oriented to person, place, and time.      Cranial Nerves: No cranial nerve deficit.      Motor: No abnormal muscle tone.      Coordination: Coordination normal.      Deep Tendon Reflexes: Reflexes are normal and symmetric. Reflexes normal.   Psychiatric:         Behavior: Behavior normal.         Thought Content: Thought content normal.         Judgment: Judgment normal.            Assessment and Plan     1. Mixed hyperlipidemia  -     CBC Auto Differential; Future; Expected date: 06/16/2025  -     Comprehensive Metabolic Panel; Future; Expected date: 06/16/2025  -     Hemoglobin A1C; Future; Expected date: 06/16/2025  -     Lipid Panel; Future; Expected date: 06/16/2025  -     TSH; Future; Expected date: 06/16/2025    2. IFG (impaired fasting glucose)  -     CBC Auto Differential; Future; Expected date: 06/16/2025  -     Comprehensive Metabolic Panel; Future; Expected date: 06/16/2025  -     Hemoglobin A1C; Future; Expected date: 06/16/2025  -     Lipid Panel; Future; Expected date: 06/16/2025  -     TSH; Future; Expected date: 06/16/2025    3. Malignant neoplasm of overlapping sites of right breast in female, estrogen receptor positive  -     CBC Auto Differential; Future; Expected date: 06/16/2025  -     Comprehensive Metabolic Panel; Future; Expected date: 06/16/2025  -      Hemoglobin A1C; Future; Expected date: 06/16/2025  -     Lipid Panel; Future; Expected date: 06/16/2025  -     TSH; Future; Expected date: 06/16/2025    4. Gastroesophageal reflux disease, unspecified whether esophagitis present  -     CBC Auto Differential; Future; Expected date: 06/16/2025  -     Comprehensive Metabolic Panel; Future; Expected date: 06/16/2025  -     Hemoglobin A1C; Future; Expected date: 06/16/2025  -     Lipid Panel; Future; Expected date: 06/16/2025  -     TSH; Future; Expected date: 06/16/2025    5. Chronic low back pain with sciatica, sciatica laterality unspecified, unspecified back pain laterality  -     CBC Auto Differential; Future; Expected date: 06/16/2025  -     Comprehensive Metabolic Panel; Future; Expected date: 06/16/2025  -     Hemoglobin A1C; Future; Expected date: 06/16/2025  -     Lipid Panel; Future; Expected date: 06/16/2025  -     TSH; Future; Expected date: 06/16/2025    6. Neuropathic pain  -     gabapentin (NEURONTIN) 100 MG capsule; Take 1 capsule (100 mg total) by mouth every evening.  Dispense: 90 capsule; Refill: 3  -     CBC Auto Differential; Future; Expected date: 06/16/2025  -     Comprehensive Metabolic Panel; Future; Expected date: 06/16/2025  -     Hemoglobin A1C; Future; Expected date: 06/16/2025  -     Lipid Panel; Future; Expected date: 06/16/2025  -     TSH; Future; Expected date: 06/16/2025    7. Poor appetite  -     cyproheptadine (PERIACTIN) 4 mg tablet; Take 1 tablet (4 mg total) by mouth 3 (three) times daily as needed (appetite).  Dispense: 90 tablet; Refill: 2  -     CBC Auto Differential; Future; Expected date: 06/16/2025  -     Comprehensive Metabolic Panel; Future; Expected date: 06/16/2025  -     Hemoglobin A1C; Future; Expected date: 06/16/2025  -     Lipid Panel; Future; Expected date: 06/16/2025  -     TSH; Future; Expected date: 06/16/2025    8. Headache, chronic daily  -     ibuprofen (ADVIL,MOTRIN) 800 MG tablet; Take 1 tablet (800 mg  total) by mouth every 6 (six) hours as needed for Pain.  Dispense: 30 tablet; Refill: 2  -     CBC Auto Differential; Future; Expected date: 06/16/2025  -     Comprehensive Metabolic Panel; Future; Expected date: 06/16/2025  -     Hemoglobin A1C; Future; Expected date: 06/16/2025  -     Lipid Panel; Future; Expected date: 06/16/2025  -     TSH; Future; Expected date: 06/16/2025    9. Loss of appetite  -     mirtazapine (REMERON) 15 MG tablet; Take 1 tablet (15 mg total) by mouth every evening.  Dispense: 90 tablet; Refill: 3  -     CBC Auto Differential; Future; Expected date: 06/16/2025  -     Comprehensive Metabolic Panel; Future; Expected date: 06/16/2025  -     Hemoglobin A1C; Future; Expected date: 06/16/2025  -     Lipid Panel; Future; Expected date: 06/16/2025  -     TSH; Future; Expected date: 06/16/2025    10. Acne, unspecified acne type  -     clindamycin-benzoyl peroxide (BENZACLIN) gel; Apply topically 2 (two) times daily.  Dispense: 50 g; Refill: 3  -     clindamycin phosphate 1% 1 % gel; Apply 1 application  topically 2 (two) times daily. Apply to affected area  Dispense: 60 g; Refill: 2  -     CBC Auto Differential; Future; Expected date: 06/16/2025  -     Comprehensive Metabolic Panel; Future; Expected date: 06/16/2025  -     Hemoglobin A1C; Future; Expected date: 06/16/2025  -     Lipid Panel; Future; Expected date: 06/16/2025  -     TSH; Future; Expected date: 06/16/2025      Acne  -clindagel as directed     Lung nodule  -ct for surveillance    Neuropathic pain  -gabapentin prn    HLD  -diet control    Breast CA  -in remission    Loss of appetite  -remeron rx      Spent adequate time in obtaining history and explaining differentials    30 minutes spent during this visit of which greater than 50% devoted to face-face counseling and coordination of care regarding diagnosis and management plan      Follow up in about 3 months (around 9/16/2025), or if symptoms worsen or fail to improve.

## 2025-07-29 DIAGNOSIS — E78.2 MIXED HYPERLIPIDEMIA: ICD-10-CM

## 2025-07-29 RX ORDER — ROSUVASTATIN CALCIUM 5 MG/1
5 TABLET, COATED ORAL
Qty: 90 TABLET | Refills: 2 | Status: SHIPPED | OUTPATIENT
Start: 2025-07-29

## 2025-07-29 NOTE — TELEPHONE ENCOUNTER
No care due was identified.  [facility] Embedded Care Due Messages. Reference number: [id number].   7/29/2025 9:30:13 AM CDT

## 2025-07-30 NOTE — TELEPHONE ENCOUNTER
Refill Decision Note   Jaja Toledo  is requesting a refill authorization.  Brief Assessment and Rationale for Refill:  Approve     Medication Therapy Plan:         Comments:     Note composed:7:11 PM 07/29/2025

## 2025-08-20 ENCOUNTER — HOSPITAL ENCOUNTER (OUTPATIENT)
Dept: RADIOLOGY | Facility: HOSPITAL | Age: 70
Discharge: HOME | End: 2025-08-20
Attending: INTERNAL MEDICINE
Payer: COMMERCIAL

## 2025-08-20 ENCOUNTER — HOSPITAL ENCOUNTER (OUTPATIENT)
Dept: RADIOLOGY | Facility: CLINIC | Age: 70
Discharge: HOME | End: 2025-08-20
Attending: INTERNAL MEDICINE
Payer: COMMERCIAL

## 2025-08-20 DIAGNOSIS — Z12.31 ENCOUNTER FOR SCREENING MAMMOGRAM FOR MALIGNANT NEOPLASM OF BREAST: ICD-10-CM

## 2025-08-20 DIAGNOSIS — Z78.0 MENOPAUSE: ICD-10-CM

## 2025-08-20 PROCEDURE — 77067 SCR MAMMO BI INCL CAD: CPT | Mod: TC,52

## 2025-08-20 PROCEDURE — 77080 DXA BONE DENSITY AXIAL: CPT | Mod: TC

## (undated) DEVICE — SEE MEDLINE ITEM 146417

## (undated) DEVICE — SYR 50ML CATH TIP

## (undated) DEVICE — SPONGE DERMACEA GAUZE 4X4

## (undated) DEVICE — SOL WATER STRL IRR 1000ML

## (undated) DEVICE — SOL NS 1000CC

## (undated) DEVICE — SYS REVOLVE FAT PROCESSING

## (undated) DEVICE — SUPPORT ULNA NERVE PROTECTOR

## (undated) DEVICE — BLADE SURG #15 CARBON STEEL

## (undated) DEVICE — NDL SPINAL 20GX3.5 HUB

## (undated) DEVICE — SUT STRATAFIX 4-0 30CM PS-2

## (undated) DEVICE — SUT MONOCRYL 3-0 PS-2 UND

## (undated) DEVICE — SUT PDS II 2-0 CT1

## (undated) DEVICE — NDL 18GA X1 1/2 REG BEVEL

## (undated) DEVICE — APPLIER LIGACLIP SM 9.38IN

## (undated) DEVICE — SUT SILK 2-0 PS 18IN BLACK

## (undated) DEVICE — SEE MEDLINE ITEM 146313

## (undated) DEVICE — SUT 2-0 VICRYL / CT-1

## (undated) DEVICE — BLADE SURG CARBON STEEL #10

## (undated) DEVICE — BOVIE SUCTION

## (undated) DEVICE — DRESSING XEROFORM FOIL PK 1X8

## (undated) DEVICE — APPLICATOR CHLORAPREP ORN 26ML

## (undated) DEVICE — NDL HYPO REG 25G X 1 1/2

## (undated) DEVICE — ELECTRODE REM PLYHSV RETURN 9

## (undated) DEVICE — SUT PRLENE 1CT 1 BL MONO 30

## (undated) DEVICE — SEE MEDLINE ITEM 157128

## (undated) DEVICE — DRESSING XEROFORM GAUZE 5X9

## (undated) DEVICE — NEOGUARD COVER 4X30CM STERILE

## (undated) DEVICE — SUT 2-0 VICRYL / SH (J417)

## (undated) DEVICE — GOWN AERO CHROME W/ TOWEL XL

## (undated) DEVICE — SUT MCRYL PLUS 4-0 PS2 27IN

## (undated) DEVICE — SPONGE LAP 18X18 PREWASHED

## (undated) DEVICE — NDL SPINAL SPINOCAN 22GX3.5

## (undated) DEVICE — HOLDER TUBE

## (undated) DEVICE — SOL 9P NACL IRR PIC IL

## (undated) DEVICE — SUT 5/0 27IN CHROMIC GUT B

## (undated) DEVICE — SYR 30CC LUER LOCK

## (undated) DEVICE — CRADLE POSITIONER FOAM DISP

## (undated) DEVICE — GAUZE FLUFF XXLG 36X36 2 PLY

## (undated) DEVICE — TUBING 8FT HI VACLIPOSUCTION

## (undated) DEVICE — SUT ETHILON 2-0 PSLX 30IN

## (undated) DEVICE — SYS PRINEO SKIN CLOSURE

## (undated) DEVICE — SEE MEDLINE ITEM 152622

## (undated) DEVICE — GOWN POLY REINF BRTH SLV XL

## (undated) DEVICE — DRAIN SIL RND HUBLSS 19F TRCR

## (undated) DEVICE — SEE MEDLINE ITEM 152740

## (undated) DEVICE — SEE MEDLINE ITEM 152512

## (undated) DEVICE — KIT IRR SUCTION HND PIECE

## (undated) DEVICE — DRAPE STERI INSTRUMENT 1018

## (undated) DEVICE — GAUZE SPONGE 4X4 12PLY

## (undated) DEVICE — SUT VICRYL CTD 2-0 GI 27 SH

## (undated) DEVICE — DRAPE HALF SURGICAL 40X58IN

## (undated) DEVICE — SHEET DRAPE FAN-FOLDED 3/4

## (undated) DEVICE — TRAY FOLEY 16FR INFECTION CONT

## (undated) DEVICE — LINER NS ORTHO W/PATIENT PORT

## (undated) DEVICE — GAUZE DERMACEA LOW PLY 2X4YRDS

## (undated) DEVICE — SET IRR URLGY 2LINE UNIV SPIKE

## (undated) DEVICE — DRAIN CHANNEL ROUND 15FR

## (undated) DEVICE — SEE MEDLINE ITEM 152487

## (undated) DEVICE — PACK UNIVERSAL SPLIT II

## (undated) DEVICE — TRAY MINOR GEN SURG

## (undated) DEVICE — PILLOW HEAD REST

## (undated) DEVICE — HOOK LONE STAR BLUNT 12MM

## (undated) DEVICE — SEE MEDLINE ITEM 146292

## (undated) DEVICE — SUT LIGACLIP SMALL XTRA

## (undated) DEVICE — PAD ABDOMINAL 8X7.5 STERILE

## (undated) DEVICE — MANIFOLD 4 PORT

## (undated) DEVICE — CHLORAPREP W TINT 26ML APPL

## (undated) DEVICE — SKINMARKER & RULER REGULAR X-F

## (undated) DEVICE — GOWN SURGICAL X-LARGE

## (undated) DEVICE — SEE MEDLINE ITEM 152531

## (undated) DEVICE — SET FLUID TRANSFER ASEPTIC

## (undated) DEVICE — TRAY MINOR GEN SURG OMC

## (undated) DEVICE — SYR ONLY LUER LOCK 20CC

## (undated) DEVICE — SUT MONOCRYL 4-0 PS-2

## (undated) DEVICE — SUT ETHILON 5-0 18IN PLAIN

## (undated) DEVICE — KIT PAIN MANGEMNT W/SOAKR DUAL

## (undated) DEVICE — SUT VICRYL 2-0 36 CT-1

## (undated) DEVICE — SYS CLSR DERMABOND PRINEO 22CM

## (undated) DEVICE — CLIPPER BLADE MOD 4406 (CAREF)

## (undated) DEVICE — DRAPE THYROID WITH ARMBOARD

## (undated) DEVICE — EVACUATOR WOUND BULB 100CC

## (undated) DEVICE — PACK DRAPE UNIVERSAL CONVERTOR

## (undated) DEVICE — DRESSING TRANS 4X4 TEGADERM

## (undated) DEVICE — ELECTRODE BLADE INSULATED 1 IN

## (undated) DEVICE — GLOVE SURG BIOGEL LATEX SZ 7.5

## (undated) DEVICE — COVER OVERHEAD SURG LT BLUE

## (undated) DEVICE — SUT PROLENE 5-0 PS-2 18

## (undated) DEVICE — SUT ETHILON 2-0 FSLX 30 BLK

## (undated) DEVICE — GLOVE BIOGEL ECLIPSE SZ 6

## (undated) DEVICE — ELECTRODE EXTENDED BLADE

## (undated) DEVICE — CANISTER SUCTION 2 LTR

## (undated) DEVICE — STAPLER SKIN ROTATING HEAD

## (undated) DEVICE — CONTAINER SPECIMEN STRL 4OZ

## (undated) DEVICE — TIP YANKAUERS BULB NO VENT

## (undated) DEVICE — TOWEL OR DISP STRL BLUE 4/PK

## (undated) DEVICE — REMOVER STAPLE SKIN STERILE

## (undated) DEVICE — POSITIONER IV ARMBOARD FOAM

## (undated) DEVICE — Device

## (undated) DEVICE — BRA SURGICAL LG 38-40

## (undated) DEVICE — SYR 10CC LUER LOCK

## (undated) DEVICE — BLADE SURG CARBON STEEL SZ11

## (undated) DEVICE — SUT SILK SH 2-0 30IN MP BLK

## (undated) DEVICE — SYR 50CC LL

## (undated) DEVICE — PAD K-THERMIA 24IN X 60IN

## (undated) DEVICE — CUP MEDICINE STERILE 2OZ

## (undated) DEVICE — CAUTERY PUSHBUTTON PENCIL

## (undated) DEVICE — SUT MONOCRYL 3-0 PS-1

## (undated) DEVICE — RETRACTOR RADIALUX LIGHTED

## (undated) DEVICE — PAD ABD 8X10 STERILE

## (undated) DEVICE — PENCIL ELECTROSURG BLD

## (undated) DEVICE — SYS LABLNG CORECT MED 4 FLG

## (undated) DEVICE — SUT MONOCRYL 3-0 SH U/D

## (undated) DEVICE — HOOK STAY ELAS 5MM 8EA/PK

## (undated) DEVICE — SYR CATHETER TIP 60ML

## (undated) DEVICE — DRESSING XEROFORM NONADH 1X8IN

## (undated) DEVICE — BLADE 4IN EDGE INSULATED

## (undated) DEVICE — ADHESIVE DERMABOND ADVANCED

## (undated) DEVICE — SEE MEDLINE ITEM 157117

## (undated) DEVICE — SYR DISP LL 5CC

## (undated) DEVICE — SUT VICRYL 3-0 27 SH

## (undated) DEVICE — TUBE ASP CLR .312ID .5OD 9 LG

## (undated) DEVICE — BLANKET UPPER BODY 78.7X29.9IN

## (undated) DEVICE — CAUTERY ACUTEMP FINE 1/2IN

## (undated) DEVICE — APPLIER CLIP LIAGCLIP 9.375IN

## (undated) DEVICE — SUT STRATAFIX SPRL PS-2 3-0

## (undated) DEVICE — KIT DRESSING PREVENA PLUS

## (undated) DEVICE — BLADE SURG STAINLESS STEEL #15

## (undated) DEVICE — KIT DYE SPY ELITE

## (undated) DEVICE — SET BLD COLL SAFETY 21G X 3/4

## (undated) DEVICE — POSITIONER HEAD DONUT 9IN FOAM

## (undated) DEVICE — SUT ETHILON 4-0 PS2 18 BLK

## (undated) DEVICE — SEE MEDLINE ITEM 146345

## (undated) DEVICE — ELECTRODE BLADE TEFLON 6

## (undated) DEVICE — DRESSING XEROFORM 1X8IN

## (undated) DEVICE — STOCKINET 4INX48

## (undated) DEVICE — SYS LABEL CORRECT MED

## (undated) DEVICE — SOL LR INJ 1000ML BG